# Patient Record
Sex: FEMALE | Race: WHITE | NOT HISPANIC OR LATINO | Employment: OTHER | URBAN - NONMETROPOLITAN AREA
[De-identification: names, ages, dates, MRNs, and addresses within clinical notes are randomized per-mention and may not be internally consistent; named-entity substitution may affect disease eponyms.]

---

## 2017-03-08 ENCOUNTER — OFFICE VISIT (OUTPATIENT)
Dept: OBSTETRICS AND GYNECOLOGY | Facility: CLINIC | Age: 27
End: 2017-03-08

## 2017-03-08 VITALS
BODY MASS INDEX: 32.27 KG/M2 | DIASTOLIC BLOOD PRESSURE: 90 MMHG | HEIGHT: 64 IN | WEIGHT: 189 LBS | SYSTOLIC BLOOD PRESSURE: 138 MMHG

## 2017-03-08 DIAGNOSIS — R87.612 LOW GRADE SQUAMOUS INTRAEPITHELIAL LESION (LGSIL) ON PAPANICOLAOU SMEAR OF CERVIX: Primary | ICD-10-CM

## 2017-03-08 PROCEDURE — 99212 OFFICE O/P EST SF 10 MIN: CPT | Performed by: OBSTETRICS & GYNECOLOGY

## 2017-03-08 NOTE — PROGRESS NOTES
She presents thinking she is due for a Pap smear.  She underwent a cone biopsy back in September secondary to a history of mild cervical dysplasia.  The Pap smear result was benign.  At this time she just needs to reschedule appointment for September for a Pap smear with HPV testing.

## 2017-04-27 ENCOUNTER — TRANSCRIBE ORDERS (OUTPATIENT)
Dept: GENERAL RADIOLOGY | Facility: CLINIC | Age: 27
End: 2017-04-27

## 2017-04-27 DIAGNOSIS — G89.29 BILATERAL CHRONIC KNEE PAIN: Primary | ICD-10-CM

## 2017-04-27 DIAGNOSIS — M25.561 BILATERAL CHRONIC KNEE PAIN: Primary | ICD-10-CM

## 2017-04-27 DIAGNOSIS — M25.562 BILATERAL CHRONIC KNEE PAIN: Primary | ICD-10-CM

## 2018-03-14 ENCOUNTER — OFFICE VISIT (OUTPATIENT)
Dept: FAMILY MEDICINE CLINIC | Facility: CLINIC | Age: 28
End: 2018-03-14

## 2018-03-14 VITALS
OXYGEN SATURATION: 97 % | SYSTOLIC BLOOD PRESSURE: 128 MMHG | HEART RATE: 69 BPM | TEMPERATURE: 97.6 F | HEIGHT: 64 IN | WEIGHT: 176 LBS | DIASTOLIC BLOOD PRESSURE: 74 MMHG | BODY MASS INDEX: 30.05 KG/M2

## 2018-03-14 DIAGNOSIS — F41.1 GAD (GENERALIZED ANXIETY DISORDER): ICD-10-CM

## 2018-03-14 DIAGNOSIS — M25.552 BILATERAL HIP PAIN: ICD-10-CM

## 2018-03-14 DIAGNOSIS — I10 ESSENTIAL HYPERTENSION: ICD-10-CM

## 2018-03-14 DIAGNOSIS — M54.50 CHRONIC MIDLINE LOW BACK PAIN WITHOUT SCIATICA: ICD-10-CM

## 2018-03-14 DIAGNOSIS — F33.1 MODERATE EPISODE OF RECURRENT MAJOR DEPRESSIVE DISORDER (HCC): Primary | ICD-10-CM

## 2018-03-14 DIAGNOSIS — E66.09 CLASS 1 OBESITY DUE TO EXCESS CALORIES WITHOUT SERIOUS COMORBIDITY WITH BODY MASS INDEX (BMI) OF 30.0 TO 30.9 IN ADULT: ICD-10-CM

## 2018-03-14 DIAGNOSIS — F17.201 TOBACCO DEPENDENCE IN REMISSION: ICD-10-CM

## 2018-03-14 DIAGNOSIS — M25.551 BILATERAL HIP PAIN: ICD-10-CM

## 2018-03-14 DIAGNOSIS — G89.29 CHRONIC MIDLINE LOW BACK PAIN WITHOUT SCIATICA: ICD-10-CM

## 2018-03-14 DIAGNOSIS — Z76.89 ENCOUNTER TO ESTABLISH CARE WITH NEW DOCTOR: ICD-10-CM

## 2018-03-14 PROCEDURE — 99214 OFFICE O/P EST MOD 30 MIN: CPT | Performed by: FAMILY MEDICINE

## 2018-03-14 RX ORDER — TRAZODONE HYDROCHLORIDE 50 MG/1
50 TABLET ORAL NIGHTLY
Qty: 30 TABLET | Refills: 5 | Status: SHIPPED | OUTPATIENT
Start: 2018-03-14 | End: 2018-11-08 | Stop reason: SDUPTHER

## 2018-03-14 RX ORDER — DULOXETIN HYDROCHLORIDE 30 MG/1
30 CAPSULE, DELAYED RELEASE ORAL DAILY
Qty: 30 CAPSULE | Refills: 1 | Status: SHIPPED | OUTPATIENT
Start: 2018-03-14 | End: 2018-06-01 | Stop reason: SDUPTHER

## 2018-03-14 RX ORDER — OXYCODONE AND ACETAMINOPHEN 7.5; 325 MG/1; MG/1
1 TABLET ORAL EVERY 6 HOURS PRN
COMMUNITY
End: 2019-02-21
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
## 2018-06-01 RX ORDER — DULOXETIN HYDROCHLORIDE 30 MG/1
30 CAPSULE, DELAYED RELEASE ORAL DAILY
Qty: 30 CAPSULE | Refills: 1 | Status: SHIPPED | OUTPATIENT
Start: 2018-06-01 | End: 2018-08-13 | Stop reason: SDUPTHER

## 2018-08-13 RX ORDER — DULOXETIN HYDROCHLORIDE 30 MG/1
30 CAPSULE, DELAYED RELEASE ORAL DAILY
Qty: 30 CAPSULE | Refills: 0 | Status: SHIPPED | OUTPATIENT
Start: 2018-08-13 | End: 2018-11-08 | Stop reason: SDUPTHER

## 2018-09-11 RX ORDER — DULOXETIN HYDROCHLORIDE 30 MG/1
30 CAPSULE, DELAYED RELEASE ORAL DAILY
Qty: 30 CAPSULE | Refills: 0 | OUTPATIENT
Start: 2018-09-11

## 2018-11-08 ENCOUNTER — OFFICE VISIT (OUTPATIENT)
Dept: FAMILY MEDICINE CLINIC | Facility: CLINIC | Age: 28
End: 2018-11-08

## 2018-11-08 VITALS
OXYGEN SATURATION: 100 % | BODY MASS INDEX: 30.52 KG/M2 | TEMPERATURE: 98.2 F | WEIGHT: 178.8 LBS | SYSTOLIC BLOOD PRESSURE: 130 MMHG | HEIGHT: 64 IN | DIASTOLIC BLOOD PRESSURE: 90 MMHG | HEART RATE: 95 BPM

## 2018-11-08 DIAGNOSIS — F33.1 MODERATE EPISODE OF RECURRENT MAJOR DEPRESSIVE DISORDER (HCC): ICD-10-CM

## 2018-11-08 DIAGNOSIS — G89.29 CHRONIC MIDLINE LOW BACK PAIN WITHOUT SCIATICA: ICD-10-CM

## 2018-11-08 DIAGNOSIS — M54.50 CHRONIC MIDLINE LOW BACK PAIN WITHOUT SCIATICA: ICD-10-CM

## 2018-11-08 DIAGNOSIS — E66.09 CLASS 1 OBESITY DUE TO EXCESS CALORIES WITHOUT SERIOUS COMORBIDITY WITH BODY MASS INDEX (BMI) OF 30.0 TO 30.9 IN ADULT: ICD-10-CM

## 2018-11-08 DIAGNOSIS — F41.1 GENERALIZED ANXIETY DISORDER: Primary | ICD-10-CM

## 2018-11-08 DIAGNOSIS — Z28.21 INFLUENZA VACCINE REFUSED: ICD-10-CM

## 2018-11-08 DIAGNOSIS — Z23 NEED FOR TETANUS BOOSTER: ICD-10-CM

## 2018-11-08 PROCEDURE — 99214 OFFICE O/P EST MOD 30 MIN: CPT | Performed by: FAMILY MEDICINE

## 2018-11-08 PROCEDURE — 90471 IMMUNIZATION ADMIN: CPT | Performed by: FAMILY MEDICINE

## 2018-11-08 PROCEDURE — 90715 TDAP VACCINE 7 YRS/> IM: CPT | Performed by: FAMILY MEDICINE

## 2018-11-08 RX ORDER — PROPRANOLOL HYDROCHLORIDE 20 MG/1
20 TABLET ORAL 3 TIMES DAILY
Qty: 90 TABLET | Refills: 2 | Status: SHIPPED | OUTPATIENT
Start: 2018-11-08 | End: 2019-02-21 | Stop reason: SDUPTHER

## 2018-11-08 RX ORDER — DULOXETIN HYDROCHLORIDE 30 MG/1
30 CAPSULE, DELAYED RELEASE ORAL DAILY
Qty: 30 CAPSULE | Refills: 2 | Status: SHIPPED | OUTPATIENT
Start: 2018-11-08 | End: 2019-01-14 | Stop reason: SDUPTHER

## 2018-11-08 RX ORDER — TRAZODONE HYDROCHLORIDE 50 MG/1
50 TABLET ORAL NIGHTLY
Qty: 30 TABLET | Refills: 2 | Status: SHIPPED | OUTPATIENT
Start: 2018-11-08 | End: 2019-01-14

## 2018-11-08 RX ORDER — GABAPENTIN 300 MG/1
300 CAPSULE ORAL 3 TIMES DAILY
Qty: 90 CAPSULE | Refills: 0 | Status: SHIPPED | OUTPATIENT
Start: 2018-11-08 | End: 2018-12-10 | Stop reason: SDUPTHER

## 2018-11-08 RX ORDER — ROPINIROLE 1 MG/1
TABLET, FILM COATED ORAL
Refills: 0 | COMMUNITY
Start: 2018-10-15 | End: 2019-02-21

## 2018-11-08 RX ORDER — ROPINIROLE 1 MG/1
TABLET, FILM COATED ORAL
Refills: 0 | Status: CANCELLED | OUTPATIENT
Start: 2018-11-08

## 2018-11-08 RX ORDER — ROPINIROLE 0.5 MG/1
TABLET, FILM COATED ORAL
Refills: 0 | COMMUNITY
Start: 2018-10-15 | End: 2018-11-08

## 2018-11-08 NOTE — PROGRESS NOTES
Subjective   Chief Complaint   Patient presents with   • Depression     8 months   • Med Refill     Cally Olsen is a 28 y.o. female.   Depression (8 months) and Med Refill    History of Present Illness     Depression and anxiety - currently remains uncontrolled  She has been without medication for a month or longer  Previously treated with klonopin which she has brought up today while discussing her mood  Depression rated 5/10 by patient today  She has a long standing history of depression but denies any medical treatment with antidepressants   She discusses that people think she is crazy and she is often not honest while at the doctor because she fears being judged or being admitted to a psych unit  She denies hallucinations but describes seeing spirits in her home  She believes her home is haunted by children spirits    She is asking to restart neurontin for her back pain and anxiety    Chronic back and hip pain - followed by Dr Boston  Managed with percocet    Insomnia - managed with trazodone PRN    The following portions of the patient's history were reviewed and updated as appropriate: allergies, current medications, past family history, past medical history, past social history, past surgical history and problem list.    Review of Systems   Constitutional: Negative for appetite change, chills, fatigue and fever.   HENT: Negative for congestion, ear pain, rhinorrhea and sore throat.    Eyes: Negative for pain.   Respiratory: Negative for cough and shortness of breath.    Cardiovascular: Negative for chest pain and palpitations.   Gastrointestinal: Negative for abdominal pain, constipation, diarrhea and nausea.   Genitourinary: Negative for dysuria.   Musculoskeletal: Positive for arthralgias and back pain. Negative for joint swelling and neck pain.   Skin: Negative for rash.   Neurological: Negative for dizziness and headaches.   Psychiatric/Behavioral: Positive for decreased concentration, dysphoric mood  "and sleep disturbance. The patient is nervous/anxious.        Objective   /90   Pulse 95   Temp 98.2 °F (36.8 °C)   Ht 162.6 cm (64.02\")   Wt 81.1 kg (178 lb 12.8 oz)   SpO2 100%   BMI 30.68 kg/m²   Physical Exam   Constitutional: She is oriented to person, place, and time. She appears well-developed and well-nourished.   HENT:   Head: Normocephalic and atraumatic.   Eyes: Pupils are equal, round, and reactive to light.   Neck: Normal range of motion. Neck supple.   Cardiovascular: Normal rate, regular rhythm and normal heart sounds.    Pulmonary/Chest: Effort normal and breath sounds normal. No respiratory distress. She has no wheezes. She has no rales.   Abdominal: Soft. Bowel sounds are normal.   Musculoskeletal: Normal range of motion.   Neurological: She is alert and oriented to person, place, and time.   Skin: Skin is warm and dry.   Psychiatric: She has a normal mood and affect.   Nursing note and vitals reviewed.      Assessment/Plan   Problems Addressed this Visit        Digestive    Class 1 obesity due to excess calories without serious comorbidity with body mass index (BMI) of 30.0 to 30.9 in adult       Nervous and Auditory    Chronic midline low back pain without sciatica       Other    Moderate episode of recurrent major depressive disorder (CMS/HCC)    Relevant Medications    traZODone (DESYREL) 50 MG tablet    DULoxetine (CYMBALTA) 30 MG capsule    Generalized anxiety disorder - Primary    Relevant Medications    traZODone (DESYREL) 50 MG tablet    DULoxetine (CYMBALTA) 30 MG capsule      Other Visit Diagnoses     Need for tetanus booster        Influenza vaccine refused            Tetanus booster today    Declined flu vaccine    Patient's Body mass index is 30.68 kg/m². BMI is above normal parameters. Recommendations include: exercise counseling and nutrition counseling.    The patient has read and signed the [facility] Controlled Substance Contract.  I will continue to see patient " for regular follow up appointments.  They are well controlled on their medication.  RORY is updated every 3 months. The patient is aware of the potential for addiction and dependence.  rory [id number]  Last neurontin prescribed in February - 400mg dose    Refilled cymbalta, trazodone, inderal    Recheck in 4 weeks

## 2018-12-03 RX ORDER — GABAPENTIN 300 MG/1
CAPSULE ORAL
Qty: 90 CAPSULE | Refills: 0 | OUTPATIENT
Start: 2018-12-03

## 2018-12-10 ENCOUNTER — OFFICE VISIT (OUTPATIENT)
Dept: FAMILY MEDICINE CLINIC | Facility: CLINIC | Age: 28
End: 2018-12-10

## 2018-12-10 VITALS
DIASTOLIC BLOOD PRESSURE: 90 MMHG | HEIGHT: 64 IN | OXYGEN SATURATION: 98 % | BODY MASS INDEX: 30.22 KG/M2 | WEIGHT: 177 LBS | HEART RATE: 88 BPM | SYSTOLIC BLOOD PRESSURE: 142 MMHG | TEMPERATURE: 98 F

## 2018-12-10 DIAGNOSIS — F41.1 GENERALIZED ANXIETY DISORDER: Primary | ICD-10-CM

## 2018-12-10 DIAGNOSIS — R10.2 CHRONIC PELVIC PAIN IN FEMALE: ICD-10-CM

## 2018-12-10 DIAGNOSIS — F33.1 MODERATE EPISODE OF RECURRENT MAJOR DEPRESSIVE DISORDER (HCC): ICD-10-CM

## 2018-12-10 DIAGNOSIS — F51.01 PRIMARY INSOMNIA: ICD-10-CM

## 2018-12-10 DIAGNOSIS — G89.29 CHRONIC PELVIC PAIN IN FEMALE: ICD-10-CM

## 2018-12-10 DIAGNOSIS — Z98.890 HISTORY OF LOOP ELECTROSURGICAL EXCISION PROCEDURE (LEEP): ICD-10-CM

## 2018-12-10 DIAGNOSIS — E66.09 CLASS 1 OBESITY DUE TO EXCESS CALORIES WITHOUT SERIOUS COMORBIDITY WITH BODY MASS INDEX (BMI) OF 30.0 TO 30.9 IN ADULT: ICD-10-CM

## 2018-12-10 PROCEDURE — 99214 OFFICE O/P EST MOD 30 MIN: CPT | Performed by: FAMILY MEDICINE

## 2018-12-10 RX ORDER — GABAPENTIN 300 MG/1
300 CAPSULE ORAL 3 TIMES DAILY
Qty: 90 CAPSULE | Refills: 0 | Status: SHIPPED | OUTPATIENT
Start: 2018-12-10 | End: 2019-01-14 | Stop reason: SDUPTHER

## 2018-12-10 NOTE — PROGRESS NOTES
"Subjective   Chief Complaint   Patient presents with   • Anxiety     4 weeks   • Med Refill     Cally Olsen is a 28 y.o. female.   Anxiety (4 weeks) and Med Refill    History of Present Illness     Depression and anxiety - currently managed with cymbalta, trazodone  Depression rated 5/10 by patient today  She has a long standing history of depression but denies any medical treatment with antidepressants     Chronic back and hip pain - followed by Dr Boston  Managed with percocet, neurontin  She restarted the neurontin last visit and is due for a refill today    Insomnia - managed with trazodone PRN    C/o chronic pelvic pain  History of mild cervical dysplasia  History of LEEP procedure with Dr Chen  She is due for a repeat pelvic exam  Would like to set up a follow up appointment with ob/gyn or see women's health specialist    The following portions of the patient's history were reviewed and updated as appropriate: allergies, current medications, past family history, past medical history, past social history, past surgical history and problem list.    Review of Systems   Constitutional: Negative for appetite change, chills, fatigue and fever.   HENT: Negative for congestion, ear pain, rhinorrhea and sore throat.    Eyes: Negative for pain.   Respiratory: Negative for cough and shortness of breath.    Cardiovascular: Negative for chest pain and palpitations.   Gastrointestinal: Negative for abdominal pain, constipation, diarrhea and nausea.   Genitourinary: Positive for pelvic pain. Negative for dysuria.   Musculoskeletal: Negative for back pain, joint swelling and neck pain.   Skin: Negative for rash.   Neurological: Negative for dizziness and headaches.       Objective   /90   Pulse 88   Temp 98 °F (36.7 °C)   Ht 162.6 cm (64.02\")   Wt 80.3 kg (177 lb)   SpO2 98%   BMI 30.37 kg/m²   Physical Exam   Constitutional: She is oriented to person, place, and time. She appears well-developed and " well-nourished.   HENT:   Head: Normocephalic and atraumatic.   Eyes: Pupils are equal, round, and reactive to light.   Neck: Normal range of motion. Neck supple.   Cardiovascular: Normal rate, regular rhythm and normal heart sounds.   Pulmonary/Chest: Effort normal and breath sounds normal. No respiratory distress. She has no wheezes. She has no rales.   Abdominal: Soft. Bowel sounds are normal.   Musculoskeletal: Normal range of motion.   Neurological: She is alert and oriented to person, place, and time.   Skin: Skin is warm and dry.   Psychiatric: She has a normal mood and affect.   Nursing note and vitals reviewed.      Assessment/Plan   Problems Addressed this Visit        Digestive    Class 1 obesity due to excess calories without serious comorbidity with body mass index (BMI) of 30.0 to 30.9 in adult       Nervous and Auditory    Chronic pelvic pain in female       Other    Moderate episode of recurrent major depressive disorder (CMS/HCC)    Generalized anxiety disorder - Primary    History of loop electrosurgical excision procedure (LEEP)      Other Visit Diagnoses     Primary insomnia            Patient's Body mass index is 30.37 kg/m². BMI is above normal parameters. Recommendations include: exercise counseling and nutrition counseling.    Referred to women's health specialist    Refilled neurontin  The patient has read and signed the [facility] Controlled Substance Contract.  I will continue to see patient for regular follow up appointments.  They are well controlled on their medication.  RORY is updated every 3 months. The patient is aware of the potential for addiction and dependence.    Recheck in 4 weeks

## 2019-01-12 RX ORDER — GABAPENTIN 300 MG/1
CAPSULE ORAL
Qty: 90 CAPSULE | Refills: 0 | Status: CANCELLED | OUTPATIENT
Start: 2019-01-12

## 2019-01-14 ENCOUNTER — OFFICE VISIT (OUTPATIENT)
Dept: FAMILY MEDICINE CLINIC | Facility: CLINIC | Age: 29
End: 2019-01-14

## 2019-01-14 VITALS
HEIGHT: 64 IN | BODY MASS INDEX: 30.05 KG/M2 | RESPIRATION RATE: 16 BRPM | HEART RATE: 103 BPM | DIASTOLIC BLOOD PRESSURE: 82 MMHG | OXYGEN SATURATION: 98 % | SYSTOLIC BLOOD PRESSURE: 132 MMHG | WEIGHT: 176 LBS | TEMPERATURE: 98.3 F

## 2019-01-14 DIAGNOSIS — M54.50 CHRONIC MIDLINE LOW BACK PAIN WITHOUT SCIATICA: Primary | ICD-10-CM

## 2019-01-14 DIAGNOSIS — E66.09 CLASS 1 OBESITY DUE TO EXCESS CALORIES WITHOUT SERIOUS COMORBIDITY WITH BODY MASS INDEX (BMI) OF 30.0 TO 30.9 IN ADULT: ICD-10-CM

## 2019-01-14 DIAGNOSIS — G89.29 CHRONIC MIDLINE LOW BACK PAIN WITHOUT SCIATICA: Primary | ICD-10-CM

## 2019-01-14 DIAGNOSIS — F33.1 MODERATE EPISODE OF RECURRENT MAJOR DEPRESSIVE DISORDER (HCC): ICD-10-CM

## 2019-01-14 DIAGNOSIS — F51.01 PRIMARY INSOMNIA: ICD-10-CM

## 2019-01-14 PROCEDURE — 99214 OFFICE O/P EST MOD 30 MIN: CPT | Performed by: FAMILY MEDICINE

## 2019-01-14 RX ORDER — ALBUTEROL SULFATE 90 UG/1
2 AEROSOL, METERED RESPIRATORY (INHALATION) EVERY 6 HOURS PRN
Qty: 18 G | Refills: 5 | Status: SHIPPED | OUTPATIENT
Start: 2019-01-14 | End: 2019-08-06 | Stop reason: SDUPTHER

## 2019-01-14 RX ORDER — DULOXETIN HYDROCHLORIDE 30 MG/1
30 CAPSULE, DELAYED RELEASE ORAL DAILY
Qty: 90 CAPSULE | Refills: 0 | Status: SHIPPED | OUTPATIENT
Start: 2019-01-14 | End: 2019-04-12 | Stop reason: SDUPTHER

## 2019-01-14 RX ORDER — GABAPENTIN 300 MG/1
300 CAPSULE ORAL 3 TIMES DAILY
Qty: 90 CAPSULE | Refills: 0 | Status: CANCELLED | OUTPATIENT
Start: 2019-01-14

## 2019-01-14 RX ORDER — GABAPENTIN 300 MG/1
300 CAPSULE ORAL 3 TIMES DAILY
Qty: 90 CAPSULE | Refills: 0 | Status: SHIPPED | OUTPATIENT
Start: 2019-01-14 | End: 2019-02-21 | Stop reason: SDUPTHER

## 2019-01-14 RX ORDER — MIRTAZAPINE 15 MG/1
15 TABLET, FILM COATED ORAL NIGHTLY
Qty: 90 TABLET | Refills: 0 | Status: SHIPPED | OUTPATIENT
Start: 2019-01-14 | End: 2019-02-21

## 2019-01-14 NOTE — PROGRESS NOTES
"Subjective   Chief Complaint   Patient presents with   • Med Refill     Cally Olsen is a 28 y.o. female.   Med Refill    History of Present Illness     Patient presents today for medication refill on her neurontin for chronic back pain    Depression and anxiety - currently managed with cymbalta, trazodone, neurontin    Chronic back and hip pain - followed by Dr Boston  Managed with percocet, neurontin  She restarted the neurontin last visit and is due for a refill today    Insomnia - managed with trazodone PRN  She has complaints of headaches with the trazodone  Has tried seroquel     Asking for a refill on albuterol inhaler    The following portions of the patient's history were reviewed and updated as appropriate: allergies, current medications, past family history, past medical history, past social history, past surgical history and problem list.    Review of Systems   Constitutional: Negative for appetite change, chills, fatigue and fever.   HENT: Negative for congestion, ear pain, rhinorrhea and sore throat.    Eyes: Negative for pain.   Respiratory: Negative for cough and shortness of breath.    Cardiovascular: Negative for chest pain and palpitations.   Gastrointestinal: Negative for abdominal pain, constipation, diarrhea and nausea.   Genitourinary: Negative for dysuria.   Musculoskeletal: Positive for arthralgias and back pain. Negative for joint swelling and neck pain.   Skin: Negative for rash.   Neurological: Negative for dizziness and headaches.   Psychiatric/Behavioral: Positive for sleep disturbance.       Objective   /82   Pulse 103   Temp 98.3 °F (36.8 °C) (Temporal)   Resp 16   Ht 162.6 cm (64\")   Wt 79.8 kg (176 lb)   SpO2 98%   Breastfeeding? No   BMI 30.21 kg/m²   Physical Exam   Constitutional: She is oriented to person, place, and time. She appears well-developed and well-nourished.   HENT:   Head: Normocephalic and atraumatic.   Eyes: Pupils are equal, round, and reactive to " light.   Neck: Normal range of motion. Neck supple.   Cardiovascular: Normal rate, regular rhythm and normal heart sounds.   Pulmonary/Chest: Effort normal and breath sounds normal. No respiratory distress. She has no wheezes. She has no rales.   Abdominal: Soft. Bowel sounds are normal.   Musculoskeletal: Normal range of motion.   Neurological: She is alert and oriented to person, place, and time.   Skin: Skin is warm and dry.   Psychiatric: She has a normal mood and affect.   Nursing note and vitals reviewed.      Assessment/Plan   Problems Addressed this Visit        Digestive    Class 1 obesity due to excess calories without serious comorbidity with body mass index (BMI) of 30.0 to 30.9 in adult       Nervous and Auditory    Chronic midline low back pain without sciatica - Primary       Other    Moderate episode of recurrent major depressive disorder (CMS/HCC)    Relevant Medications    DULoxetine (CYMBALTA) 30 MG capsule    mirtazapine (REMERON) 15 MG tablet      Other Visit Diagnoses     Primary insomnia            Stop trazodone  Start remeron  Continue with cymbalta    Patient's Body mass index is 30.21 kg/m². BMI is above normal parameters. Recommendations include: exercise counseling and nutrition counseling.    The patient has read and signed the [facility] Controlled Substance Contract.  I will continue to see patient for regular follow up appointments.  They are well controlled on their medication.  RORY is updated every 3 months. The patient is aware of the potential for addiction and dependence.  Refilled neurontin    Refilled albuterol inhaler    Recheck in 4 weeks

## 2019-02-06 RX ORDER — DULOXETIN HYDROCHLORIDE 30 MG/1
30 CAPSULE, DELAYED RELEASE ORAL DAILY
Qty: 30 CAPSULE | Refills: 0 | OUTPATIENT
Start: 2019-02-06

## 2019-02-06 RX ORDER — GABAPENTIN 300 MG/1
CAPSULE ORAL
Qty: 90 CAPSULE | Refills: 0 | OUTPATIENT
Start: 2019-02-06

## 2019-02-21 ENCOUNTER — LAB (OUTPATIENT)
Dept: LAB | Facility: OTHER | Age: 29
End: 2019-02-21

## 2019-02-21 ENCOUNTER — OFFICE VISIT (OUTPATIENT)
Dept: FAMILY MEDICINE CLINIC | Facility: CLINIC | Age: 29
End: 2019-02-21

## 2019-02-21 VITALS
BODY MASS INDEX: 29.74 KG/M2 | TEMPERATURE: 98 F | HEIGHT: 64 IN | DIASTOLIC BLOOD PRESSURE: 77 MMHG | SYSTOLIC BLOOD PRESSURE: 130 MMHG | WEIGHT: 174.2 LBS | HEART RATE: 88 BPM

## 2019-02-21 DIAGNOSIS — M25.551 BILATERAL HIP PAIN: ICD-10-CM

## 2019-02-21 DIAGNOSIS — G89.29 CHRONIC MIDLINE LOW BACK PAIN WITHOUT SCIATICA: ICD-10-CM

## 2019-02-21 DIAGNOSIS — F41.1 GENERALIZED ANXIETY DISORDER: ICD-10-CM

## 2019-02-21 DIAGNOSIS — F51.01 PRIMARY INSOMNIA: Primary | ICD-10-CM

## 2019-02-21 DIAGNOSIS — Z51.81 THERAPEUTIC DRUG MONITORING: ICD-10-CM

## 2019-02-21 DIAGNOSIS — I10 ESSENTIAL HYPERTENSION: ICD-10-CM

## 2019-02-21 DIAGNOSIS — M25.552 BILATERAL HIP PAIN: ICD-10-CM

## 2019-02-21 DIAGNOSIS — E66.09 CLASS 1 OBESITY DUE TO EXCESS CALORIES WITHOUT SERIOUS COMORBIDITY WITH BODY MASS INDEX (BMI) OF 30.0 TO 30.9 IN ADULT: ICD-10-CM

## 2019-02-21 DIAGNOSIS — F41.1 GAD (GENERALIZED ANXIETY DISORDER): ICD-10-CM

## 2019-02-21 DIAGNOSIS — E66.3 OVERWEIGHT (BMI 25.0-29.9): ICD-10-CM

## 2019-02-21 DIAGNOSIS — M54.50 CHRONIC MIDLINE LOW BACK PAIN WITHOUT SCIATICA: ICD-10-CM

## 2019-02-21 DIAGNOSIS — N92.0 MENORRHAGIA WITH REGULAR CYCLE: ICD-10-CM

## 2019-02-21 DIAGNOSIS — F33.1 MODERATE EPISODE OF RECURRENT MAJOR DEPRESSIVE DISORDER (HCC): ICD-10-CM

## 2019-02-21 LAB
ALBUMIN SERPL-MCNC: 5.2 G/DL (ref 3.5–5)
ALBUMIN/GLOB SERPL: 1.6 G/DL (ref 1.1–1.8)
ALP SERPL-CCNC: 76 U/L (ref 38–126)
ALT SERPL W P-5'-P-CCNC: 24 U/L
ANION GAP SERPL CALCULATED.3IONS-SCNC: 9 MMOL/L (ref 5–15)
AST SERPL-CCNC: 24 U/L (ref 14–36)
BASOPHILS # BLD AUTO: 0.02 10*3/MM3 (ref 0–0.2)
BASOPHILS NFR BLD AUTO: 0.2 % (ref 0–2)
BILIRUB SERPL-MCNC: 0.4 MG/DL (ref 0.2–1.3)
BILIRUB UR QL STRIP: NEGATIVE
BUN BLD-MCNC: 13 MG/DL (ref 7–17)
BUN/CREAT SERPL: 21.3 (ref 7–25)
CALCIUM SPEC-SCNC: 9.6 MG/DL (ref 8.4–10.2)
CHLORIDE SERPL-SCNC: 102 MMOL/L (ref 98–107)
CLARITY UR: ABNORMAL
CO2 SERPL-SCNC: 31 MMOL/L (ref 22–30)
COLOR UR: YELLOW
CREAT BLD-MCNC: 0.61 MG/DL (ref 0.52–1.04)
DEPRECATED RDW RBC AUTO: 42.1 FL (ref 36.4–46.3)
EOSINOPHIL # BLD AUTO: 0.13 10*3/MM3 (ref 0–0.7)
EOSINOPHIL NFR BLD AUTO: 1.1 % (ref 0–7)
ERYTHROCYTE [DISTWIDTH] IN BLOOD BY AUTOMATED COUNT: 13.1 % (ref 11.5–14.5)
GFR SERPL CREATININE-BSD FRML MDRD: 115 ML/MIN/1.73 (ref 64–149)
GLOBULIN UR ELPH-MCNC: 3.3 GM/DL (ref 2.3–3.5)
GLUCOSE BLD-MCNC: 84 MG/DL (ref 74–99)
GLUCOSE UR STRIP-MCNC: NEGATIVE MG/DL
HCT VFR BLD AUTO: 45.7 % (ref 35–45)
HGB BLD-MCNC: 15.7 G/DL (ref 12–15.5)
HGB UR QL STRIP.AUTO: ABNORMAL
KETONES UR QL STRIP: NEGATIVE
LEUKOCYTE ESTERASE UR QL STRIP.AUTO: ABNORMAL
LYMPHOCYTES # BLD AUTO: 3.78 10*3/MM3 (ref 0.6–4.2)
LYMPHOCYTES NFR BLD AUTO: 33 % (ref 10–50)
MCH RBC QN AUTO: 30.8 PG (ref 26.5–34)
MCHC RBC AUTO-ENTMCNC: 34.4 G/DL (ref 31.4–36)
MCV RBC AUTO: 89.8 FL (ref 80–98)
MONOCYTES # BLD AUTO: 0.87 10*3/MM3 (ref 0–0.9)
MONOCYTES NFR BLD AUTO: 7.6 % (ref 0–12)
NEUTROPHILS # BLD AUTO: 6.64 10*3/MM3 (ref 2–8.6)
NEUTROPHILS NFR BLD AUTO: 58.1 % (ref 37–80)
NITRITE UR QL STRIP: NEGATIVE
PH UR STRIP.AUTO: 7 [PH] (ref 5.5–8)
PLATELET # BLD AUTO: 383 10*3/MM3 (ref 150–450)
PMV BLD AUTO: 8.8 FL (ref 8–12)
POTASSIUM BLD-SCNC: 4.1 MMOL/L (ref 3.4–5)
PROT SERPL-MCNC: 8.5 G/DL (ref 6.3–8.2)
PROT UR QL STRIP: NEGATIVE
RBC # BLD AUTO: 5.09 10*6/MM3 (ref 3.77–5.16)
SODIUM BLD-SCNC: 142 MMOL/L (ref 137–145)
SP GR UR STRIP: 1.01 (ref 1–1.03)
T4 FREE SERPL-MCNC: 1.17 NG/DL (ref 0.78–2.19)
TSH SERPL DL<=0.05 MIU/L-ACNC: 0.82 MIU/ML (ref 0.46–4.68)
UROBILINOGEN UR QL STRIP: ABNORMAL
WBC NRBC COR # BLD: 11.44 10*3/MM3 (ref 3.2–9.8)

## 2019-02-21 PROCEDURE — 80053 COMPREHEN METABOLIC PANEL: CPT | Performed by: FAMILY MEDICINE

## 2019-02-21 PROCEDURE — 84443 ASSAY THYROID STIM HORMONE: CPT | Performed by: FAMILY MEDICINE

## 2019-02-21 PROCEDURE — 80307 DRUG TEST PRSMV CHEM ANLYZR: CPT | Performed by: FAMILY MEDICINE

## 2019-02-21 PROCEDURE — 81003 URINALYSIS AUTO W/O SCOPE: CPT | Performed by: FAMILY MEDICINE

## 2019-02-21 PROCEDURE — 84439 ASSAY OF FREE THYROXINE: CPT | Performed by: FAMILY MEDICINE

## 2019-02-21 PROCEDURE — 85025 COMPLETE CBC W/AUTO DIFF WBC: CPT | Performed by: FAMILY MEDICINE

## 2019-02-21 PROCEDURE — 99214 OFFICE O/P EST MOD 30 MIN: CPT | Performed by: FAMILY MEDICINE

## 2019-02-21 PROCEDURE — G0481 DRUG TEST DEF 8-14 CLASSES: HCPCS | Performed by: FAMILY MEDICINE

## 2019-02-21 RX ORDER — PROPRANOLOL HYDROCHLORIDE 20 MG/1
20 TABLET ORAL 3 TIMES DAILY
Qty: 90 TABLET | Refills: 5 | Status: SHIPPED | OUTPATIENT
Start: 2019-02-21 | End: 2019-08-06 | Stop reason: SDUPTHER

## 2019-02-21 RX ORDER — GABAPENTIN 400 MG/1
400 CAPSULE ORAL 3 TIMES DAILY
Qty: 90 CAPSULE | Refills: 0 | Status: SHIPPED | OUTPATIENT
Start: 2019-02-21 | End: 2019-04-12 | Stop reason: SDUPTHER

## 2019-02-21 RX ORDER — GABAPENTIN 400 MG/1
400 CAPSULE ORAL 3 TIMES DAILY
Qty: 90 CAPSULE | Refills: 0 | Status: CANCELLED | OUTPATIENT
Start: 2019-02-21

## 2019-02-21 RX ORDER — ONDANSETRON 4 MG/1
4 TABLET, FILM COATED ORAL EVERY 8 HOURS PRN
Qty: 30 TABLET | Refills: 0 | Status: SHIPPED | OUTPATIENT
Start: 2019-02-21 | End: 2019-04-12 | Stop reason: SDUPTHER

## 2019-02-21 RX ORDER — HYDROCODONE BITARTRATE AND ACETAMINOPHEN 10; 325 MG/1; MG/1
TABLET ORAL
Refills: 0 | COMMUNITY
Start: 2019-02-16 | End: 2021-12-13 | Stop reason: SDUPTHER

## 2019-02-21 RX ORDER — DULOXETIN HYDROCHLORIDE 30 MG/1
30 CAPSULE, DELAYED RELEASE ORAL DAILY
Qty: 90 CAPSULE | Refills: 0 | Status: CANCELLED | OUTPATIENT
Start: 2019-02-21

## 2019-02-21 RX ORDER — AMITRIPTYLINE HYDROCHLORIDE 25 MG/1
25 TABLET, FILM COATED ORAL NIGHTLY
Qty: 90 TABLET | Refills: 1 | Status: SHIPPED | OUTPATIENT
Start: 2019-02-21 | End: 2019-05-10 | Stop reason: SDUPTHER

## 2019-02-21 NOTE — PROGRESS NOTES
"Subjective   Chief Complaint   Patient presents with   • chronic back pain     refills last dose of pain med 02/21/2019     Cally Olsen is a 28 y.o. female.   chronic back pain (refills last dose of pain med 02/21/2019)    History of Present Illness     Patient presents today for medication refill on her neurontin for chronic back pain  She has requested a change in the dose of her medication to return to her 400 mg dose previously prescribed by her previous provider.    Depression and anxiety - currently managed with cymbalta, trazodone, neurontin    Chronic back and hip pain - followed by Dr Boston  Managed with percocet, neurontin    Insomnia - has remained uncontrolled  She has complaints of headaches with the trazodone  Has tried seroquel  Failed remeron    Menstrual irregularity with heavy flow - has complaints of periodic spotting  scheduled for evaluation with Sole Houser's office    The following portions of the patient's history were reviewed and updated as appropriate: allergies, current medications, past family history, past medical history, past social history, past surgical history and problem list.    Review of Systems   Constitutional: Negative for appetite change, chills, fatigue and fever.   HENT: Negative for congestion, ear pain, rhinorrhea and sore throat.    Eyes: Negative for pain.   Respiratory: Negative for cough and shortness of breath.    Cardiovascular: Negative for chest pain and palpitations.   Gastrointestinal: Negative for abdominal pain, constipation, diarrhea and nausea.   Genitourinary: Positive for menstrual problem. Negative for dysuria.   Musculoskeletal: Positive for arthralgias and back pain. Negative for joint swelling and neck pain.   Skin: Negative for rash.   Neurological: Negative for dizziness and headaches.   Psychiatric/Behavioral: Positive for sleep disturbance.       Objective   /77   Pulse 88   Temp 98 °F (36.7 °C)   Ht 162.6 cm (64\")   Wt 79 kg (174 " lb 3.2 oz)   BMI 29.90 kg/m²   Physical Exam   Constitutional: She is oriented to person, place, and time. She appears well-developed and well-nourished.   HENT:   Head: Normocephalic and atraumatic.   Eyes: Pupils are equal, round, and reactive to light.   Neck: Normal range of motion. Neck supple.   Cardiovascular: Normal rate, regular rhythm and normal heart sounds.   Pulmonary/Chest: Effort normal and breath sounds normal. No respiratory distress. She has no wheezes. She has no rales.   Abdominal: Soft. Bowel sounds are normal.   Musculoskeletal: Normal range of motion.   Neurological: She is alert and oriented to person, place, and time.   Skin: Skin is warm and dry.   Psychiatric: She has a normal mood and affect.   Nursing note and vitals reviewed.      Assessment/Plan   Problems Addressed this Visit        Cardiovascular and Mediastinum    Essential hypertension    Relevant Medications    propranolol (INDERAL) 20 MG tablet       Digestive    RESOLVED: Class 1 obesity due to excess calories without serious comorbidity with body mass index (BMI) of 30.0 to 30.9 in adult       Nervous and Auditory    Bilateral hip pain    Chronic midline low back pain without sciatica       Genitourinary    Menorrhagia with regular cycle       Other    Moderate episode of recurrent major depressive disorder (CMS/HCC)    Relevant Medications    amitriptyline (ELAVIL) 25 MG tablet    Generalized anxiety disorder    Relevant Medications    amitriptyline (ELAVIL) 25 MG tablet    Other Relevant Orders    CBC & Differential (Completed)    Comprehensive Metabolic Panel (Completed)    TSH    T4, free    Primary insomnia - Primary    Overweight (BMI 25.0-29.9)      Other Visit Diagnoses     Therapeutic drug monitoring        Relevant Orders    ToxASSURE Select 13 (MW) - Urine, Clean Catch        The patient has read and signed the [facility] Controlled Substance Contract.  I will continue to see patient for regular follow up  appointments.  They are well controlled on their medication.  RORY is updated every 3 months. The patient is aware of the potential for addiction and dependence.  Adjusted neurontin  uds ordered    Labs ordered - to be done today    Patient's Body mass index is 29.9 kg/m². BMI is above normal parameters. Recommendations include: exercise counseling and nutrition counseling.    Refilled medications    Start elavil for insomnia  Educated patient on prescription medications - encouraged her to not just stop medication if this appears to be ineffective  This may require a dose adjustment to find the right mg to help with her symptoms    Recheck in 4 weeks then will provide her with refills until I return from medical leave

## 2019-02-27 LAB — CONV REPORT SUMMARY: NORMAL

## 2019-03-22 RX ORDER — GABAPENTIN 400 MG/1
CAPSULE ORAL
Qty: 90 CAPSULE | Refills: 0 | OUTPATIENT
Start: 2019-03-22

## 2019-03-25 RX ORDER — ONDANSETRON 4 MG/1
TABLET, FILM COATED ORAL
Qty: 30 TABLET | Refills: 0 | OUTPATIENT
Start: 2019-03-25

## 2019-04-12 ENCOUNTER — OFFICE VISIT (OUTPATIENT)
Dept: FAMILY MEDICINE CLINIC | Facility: CLINIC | Age: 29
End: 2019-04-12

## 2019-04-12 VITALS
WEIGHT: 180.3 LBS | BODY MASS INDEX: 30.78 KG/M2 | TEMPERATURE: 97.3 F | DIASTOLIC BLOOD PRESSURE: 84 MMHG | OXYGEN SATURATION: 97 % | HEART RATE: 85 BPM | SYSTOLIC BLOOD PRESSURE: 128 MMHG | RESPIRATION RATE: 16 BRPM | HEIGHT: 64 IN

## 2019-04-12 DIAGNOSIS — G89.29 CHRONIC MIDLINE LOW BACK PAIN WITHOUT SCIATICA: Primary | ICD-10-CM

## 2019-04-12 DIAGNOSIS — R11.0 NAUSEA: ICD-10-CM

## 2019-04-12 DIAGNOSIS — M51.36 DDD (DEGENERATIVE DISC DISEASE), LUMBAR: ICD-10-CM

## 2019-04-12 DIAGNOSIS — R31.9 URINARY TRACT INFECTION WITH HEMATURIA, SITE UNSPECIFIED: ICD-10-CM

## 2019-04-12 DIAGNOSIS — M54.50 CHRONIC MIDLINE LOW BACK PAIN WITHOUT SCIATICA: Primary | ICD-10-CM

## 2019-04-12 DIAGNOSIS — N39.0 URINARY TRACT INFECTION WITH HEMATURIA, SITE UNSPECIFIED: ICD-10-CM

## 2019-04-12 DIAGNOSIS — D72.829 LEUKOCYTOSIS, UNSPECIFIED TYPE: ICD-10-CM

## 2019-04-12 DIAGNOSIS — F33.1 MODERATE EPISODE OF RECURRENT MAJOR DEPRESSIVE DISORDER (HCC): ICD-10-CM

## 2019-04-12 PROCEDURE — 99214 OFFICE O/P EST MOD 30 MIN: CPT | Performed by: NURSE PRACTITIONER

## 2019-04-12 RX ORDER — GABAPENTIN 400 MG/1
400 CAPSULE ORAL 3 TIMES DAILY
Qty: 90 CAPSULE | Refills: 0 | Status: SHIPPED | OUTPATIENT
Start: 2019-04-12 | End: 2019-05-10 | Stop reason: SDUPTHER

## 2019-04-12 RX ORDER — HYDROCODONE BITARTRATE AND ACETAMINOPHEN 10; 325 MG/1; MG/1
TABLET ORAL
Refills: 0 | Status: CANCELLED | OUTPATIENT
Start: 2019-04-12

## 2019-04-12 RX ORDER — ONDANSETRON 4 MG/1
4 TABLET, FILM COATED ORAL EVERY 8 HOURS PRN
Qty: 30 TABLET | Refills: 0 | Status: SHIPPED | OUTPATIENT
Start: 2019-04-12 | End: 2019-05-10 | Stop reason: SDUPTHER

## 2019-04-12 RX ORDER — DULOXETIN HYDROCHLORIDE 30 MG/1
30 CAPSULE, DELAYED RELEASE ORAL DAILY
Qty: 90 CAPSULE | Refills: 0 | Status: SHIPPED | OUTPATIENT
Start: 2019-04-12 | End: 2019-05-10 | Stop reason: SDUPTHER

## 2019-04-12 NOTE — PROGRESS NOTES
Chief Complaint   Patient presents with   • Establish Care     Charanjit pt med refills     Subjective   Cally Olsen is a 29 y.o. female who presents to the office for establishing care, transfer from Dr Dueñas office. She has multiple chronic complaints and needs refills on gabapentin, zofran and cymbalta.     Most recent imaging:   MRI L spine 7/27/16 shows mild bulging anulus L5-S1 and mild levoscoliosis at L3/4.    T spine MRI of 7/27/16 shows a subcentimeter probabale benign hemanigoma at T10 vertebral body.   Xray left knee:   Mild DJD with mild joint space narrowing left knee by xray of 4/27/17    UDS: 2/21/19  appropriate  Fasting labs: 2/21/19  Positive for UTI and leukocytosis, no record of or patient recall of any treatment will retest today.       The following portions of the patient's history were reviewed and updated as appropriate: allergies, current medications, past family history, past medical history, past social history, past surgical history and problem list.    History of Present Illness   Patient presents today for medication refill on her neurontin for chronic back pain     Depression and anxiety - currently managed with cymbalta, trazodone, neurontin     Chronic back and hip pain - followed by Dr Boston  Managed with percocet, neurontin     Insomnia - has remained uncontrolled  She has complaints of headaches with the trazodone  Has tried seroquel  Failed remeron     Menstrual irregularity with heavy flow - has complaints of periodic spotting  scheduled for evaluation with Sole Houser's office        Past Medical History:   Diagnosis Date   • Acute pharyngitis     unspecified     • Adjustment disorder with anxious mood     Celexa     • Allergic rhinitis    • Asthma    • Backache    • Candidiasis    • Cervical intraepithelial neoplasia grade 1    • Contraception     Desires permanent sterilization    • Depressive disorder    • Dysphagia     unspecified     • Encounter for gynecological  "examination without abnormal finding    • Encounter for  visit      visit status    • Excessive and frequent menstruation with irregular cycle    • Excessive and frequent menstruation with regular cycle    • Fatigue    • Generalized abdominal pain    • Generalized anxiety disorder    • Headache    • Heartburn    • Hip pain    • Insomnia      Trazodone   • Low grade squamous intraepithelial lesion (LGSIL) on cervicovaginal cytologic smear    • Malaise    • Organic anxiety disorder    • Spasm of back muscles    • Surgical follow-up care    • Tobacco dependence syndrome           Family History   Problem Relation Age of Onset   • Seizures Mother    • Alcohol abuse Father    • Stroke Other         Review of Systems   Constitutional: Negative.  Negative for fever and unexpected weight change.   HENT: Negative.    Eyes: Negative.    Respiratory: Negative.  Negative for cough, chest tightness and shortness of breath.    Cardiovascular: Negative.  Negative for chest pain.   Gastrointestinal: Negative.    Endocrine: Negative.    Genitourinary: Positive for menstrual problem and pelvic pain. Negative for dysuria.        Abnormal PAP requiring multiple treatments, nearly constant pelvic pain.   Musculoskeletal: Positive for arthralgias and back pain.   Skin: Negative.  Negative for color change, pallor, rash and wound.   Allergic/Immunologic: Negative.    Neurological: Negative.    Hematological: Negative.    Psychiatric/Behavioral: Negative.  Negative for sleep disturbance and suicidal ideas.   All other systems reviewed and are negative.      Objective   Vitals:    19 0919   BP: 128/84   BP Location: Left arm   Patient Position: Sitting   Cuff Size: Adult   Pulse: 85   Resp: 16   Temp: 97.3 °F (36.3 °C)   TempSrc: Oral   SpO2: 97%   Weight: 81.8 kg (180 lb 4.8 oz)   Height: 162.6 cm (64\")   PainSc: 0-No pain     Physical Exam   Constitutional: She is oriented to person, place, and time. She appears " well-developed and well-nourished.   HENT:   Head: Normocephalic and atraumatic.   Eyes: Conjunctivae are normal. Pupils are equal, round, and reactive to light.   Neck: Normal range of motion. Neck supple.   Cardiovascular: Normal rate, regular rhythm, normal heart sounds and intact distal pulses. Exam reveals no gallop and no friction rub.   No murmur heard.  Pulmonary/Chest: Effort normal and breath sounds normal. No respiratory distress. She has no wheezes. She has no rales. She exhibits no tenderness.   Abdominal: Soft. Bowel sounds are normal.   Musculoskeletal: Normal range of motion. She exhibits no edema, tenderness or deformity.   Lymphadenopathy:     She has no cervical adenopathy.   Neurological: She is alert and oriented to person, place, and time.   Skin: Skin is warm and dry. Capillary refill takes 2 to 3 seconds. No erythema. No pallor.   Psychiatric: She has a normal mood and affect. Her behavior is normal. Judgment and thought content normal.   Nursing note and vitals reviewed.      Assessment/Plan   Cally was seen today for establish care.    Diagnoses and all orders for this visit:    Chronic midline low back pain without sciatica  -     gabapentin (NEURONTIN) 400 MG capsule; Take 1 capsule by mouth 3 (Three) Times a Day.    DDD (degenerative disc disease), lumbar  -     gabapentin (NEURONTIN) 400 MG capsule; Take 1 capsule by mouth 3 (Three) Times a Day.    Urinary tract infection with hematuria, site unspecified  -     Urinalysis With Microscopic - Urine, Clean Catch; Future  -     Urine Culture - Urine, Urine, Clean Catch    Leukocytosis, unspecified type  -     CBC & Differential  -     Urinalysis With Microscopic - Urine, Clean Catch; Future  -     Urine Culture - Urine, Urine, Clean Catch    Moderate episode of recurrent major depressive disorder (CMS/HCC)  -     gabapentin (NEURONTIN) 400 MG capsule; Take 1 capsule by mouth 3 (Three) Times a Day.  -     DULoxetine (CYMBALTA) 30 MG  capsule; Take 1 capsule by mouth Daily.    Nausea  -     ondansetron (ZOFRAN) 4 MG tablet; Take 1 tablet by mouth Every 8 (Eight) Hours As Needed for Nausea or Vomiting.    Other orders  -     Cancel: HYDROcodone-acetaminophen (NORCO)  MG per tablet           PHQ-2/PHQ-9 Depression Screening 4/12/2019   Little interest or pleasure in doing things 1   Feeling down, depressed, or hopeless 0   Trouble falling or staying asleep, or sleeping too much -   Feeling tired or having little energy -   Poor appetite or overeating -   Feeling bad about yourself - or that you are a failure or have let yourself or your family down -   Trouble concentrating on things, such as reading the newspaper or watching television -   Moving or speaking so slowly that other people could have noticed. Or the opposite - being so fidgety or restless that you have been moving around a lot more than usual -   Thoughts that you would be better off dead, or of hurting yourself in some way -   Total Score 1   Patient understands the risks associated with this controlled medication, including tolerance and addiction.  Patient also agrees to only obtain this medication from me, and not from a another provider, unless that provider is covering for me in my absence.  Patient also agrees to be compliant in dosing, and not self adjust the dose of medication.  A signed controlled substance agreement is on file, and the patient has received a controlled substance education sheet at this a previous visit.  The patient has also signed a consent for treatment with a controlled substance as per [facility] policy. RORY was obtained.      HITESH Parra         Return in about 4 weeks (around 5/10/2019) for Next scheduled follow up.    There are no Patient Instructions on file for this visit.

## 2019-05-07 DIAGNOSIS — M54.50 CHRONIC MIDLINE LOW BACK PAIN WITHOUT SCIATICA: ICD-10-CM

## 2019-05-07 DIAGNOSIS — M51.36 DDD (DEGENERATIVE DISC DISEASE), LUMBAR: ICD-10-CM

## 2019-05-07 DIAGNOSIS — G89.29 CHRONIC MIDLINE LOW BACK PAIN WITHOUT SCIATICA: ICD-10-CM

## 2019-05-07 DIAGNOSIS — F33.1 MODERATE EPISODE OF RECURRENT MAJOR DEPRESSIVE DISORDER (HCC): ICD-10-CM

## 2019-05-07 RX ORDER — GABAPENTIN 400 MG/1
CAPSULE ORAL
Qty: 90 CAPSULE | Refills: 0 | OUTPATIENT
Start: 2019-05-07

## 2019-05-10 ENCOUNTER — LAB (OUTPATIENT)
Dept: LAB | Facility: OTHER | Age: 29
End: 2019-05-10

## 2019-05-10 ENCOUNTER — OFFICE VISIT (OUTPATIENT)
Dept: FAMILY MEDICINE CLINIC | Facility: CLINIC | Age: 29
End: 2019-05-10

## 2019-05-10 VITALS
SYSTOLIC BLOOD PRESSURE: 122 MMHG | HEIGHT: 64 IN | RESPIRATION RATE: 16 BRPM | BODY MASS INDEX: 31.48 KG/M2 | DIASTOLIC BLOOD PRESSURE: 80 MMHG | HEART RATE: 84 BPM | WEIGHT: 184.4 LBS | OXYGEN SATURATION: 97 % | TEMPERATURE: 98.1 F

## 2019-05-10 DIAGNOSIS — F33.1 MODERATE EPISODE OF RECURRENT MAJOR DEPRESSIVE DISORDER (HCC): ICD-10-CM

## 2019-05-10 DIAGNOSIS — E55.9 VITAMIN D DEFICIENCY: ICD-10-CM

## 2019-05-10 DIAGNOSIS — F51.01 PRIMARY INSOMNIA: ICD-10-CM

## 2019-05-10 DIAGNOSIS — R53.82 CHRONIC FATIGUE: ICD-10-CM

## 2019-05-10 DIAGNOSIS — R11.0 NAUSEA: ICD-10-CM

## 2019-05-10 DIAGNOSIS — N92.6 MENSTRUAL ABNORMALITY: ICD-10-CM

## 2019-05-10 DIAGNOSIS — G47.8 OTHER SLEEP DISORDERS: ICD-10-CM

## 2019-05-10 DIAGNOSIS — M25.561 ARTHRALGIA OF BOTH KNEES: ICD-10-CM

## 2019-05-10 DIAGNOSIS — M25.562 ARTHRALGIA OF BOTH KNEES: ICD-10-CM

## 2019-05-10 DIAGNOSIS — E66.09 CLASS 1 OBESITY DUE TO EXCESS CALORIES WITHOUT SERIOUS COMORBIDITY WITH BODY MASS INDEX (BMI) OF 31.0 TO 31.9 IN ADULT: ICD-10-CM

## 2019-05-10 DIAGNOSIS — N39.0 URINARY TRACT INFECTION WITH HEMATURIA, SITE UNSPECIFIED: ICD-10-CM

## 2019-05-10 DIAGNOSIS — J44.9 CHRONIC OBSTRUCTIVE PULMONARY DISEASE, UNSPECIFIED COPD TYPE (HCC): ICD-10-CM

## 2019-05-10 DIAGNOSIS — R31.9 URINARY TRACT INFECTION WITH HEMATURIA, SITE UNSPECIFIED: ICD-10-CM

## 2019-05-10 DIAGNOSIS — N94.6 PAINFUL MENSTRUAL PERIODS: ICD-10-CM

## 2019-05-10 DIAGNOSIS — M51.36 DDD (DEGENERATIVE DISC DISEASE), LUMBAR: ICD-10-CM

## 2019-05-10 DIAGNOSIS — G89.29 CHRONIC MIDLINE LOW BACK PAIN WITHOUT SCIATICA: Primary | ICD-10-CM

## 2019-05-10 DIAGNOSIS — R05.9 COUGH: ICD-10-CM

## 2019-05-10 DIAGNOSIS — E53.8 B12 DEFICIENCY: ICD-10-CM

## 2019-05-10 DIAGNOSIS — E88.81 METABOLIC SYNDROME: ICD-10-CM

## 2019-05-10 DIAGNOSIS — M54.50 CHRONIC MIDLINE LOW BACK PAIN WITHOUT SCIATICA: Primary | ICD-10-CM

## 2019-05-10 DIAGNOSIS — R06.83 SNORING: ICD-10-CM

## 2019-05-10 DIAGNOSIS — Z13.220 SCREENING FOR HYPERLIPIDEMIA: ICD-10-CM

## 2019-05-10 DIAGNOSIS — R79.89 ABNORMAL CBC: ICD-10-CM

## 2019-05-10 PROBLEM — I10 ESSENTIAL HYPERTENSION: Chronic | Status: ACTIVE | Noted: 2018-03-14

## 2019-05-10 PROBLEM — M25.551 BILATERAL HIP PAIN: Chronic | Status: ACTIVE | Noted: 2018-03-14

## 2019-05-10 PROBLEM — M25.552 BILATERAL HIP PAIN: Chronic | Status: ACTIVE | Noted: 2018-03-14

## 2019-05-10 PROBLEM — F41.1 GENERALIZED ANXIETY DISORDER: Chronic | Status: ACTIVE | Noted: 2018-03-14

## 2019-05-10 PROBLEM — E66.3 OVERWEIGHT (BMI 25.0-29.9): Chronic | Status: ACTIVE | Noted: 2019-02-21

## 2019-05-10 LAB
BACTERIA UR QL AUTO: ABNORMAL /HPF
BASOPHILS # BLD AUTO: 0.02 10*3/MM3 (ref 0–0.2)
BASOPHILS NFR BLD AUTO: 0.1 % (ref 0–1.5)
BILIRUB UR QL STRIP: NEGATIVE
CHOLEST SERPL-MCNC: 247 MG/DL (ref 150–200)
CLARITY UR: ABNORMAL
COLOR UR: YELLOW
DEPRECATED RDW RBC AUTO: 43.9 FL (ref 37–54)
EOSINOPHIL # BLD AUTO: 0.07 10*3/MM3 (ref 0–0.4)
EOSINOPHIL NFR BLD AUTO: 0.5 % (ref 0.3–6.2)
ERYTHROCYTE [DISTWIDTH] IN BLOOD BY AUTOMATED COUNT: 13.1 % (ref 12.3–15.4)
ERYTHROCYTE [SEDIMENTATION RATE] IN BLOOD: 12 MM/HR (ref 0–20)
GLUCOSE UR STRIP-MCNC: NEGATIVE MG/DL
HCT VFR BLD AUTO: 45.3 % (ref 34–46.6)
HDLC SERPL-MCNC: 45 MG/DL (ref 40–59)
HGB BLD-MCNC: 15.7 G/DL (ref 12–15.9)
HGB UR QL STRIP.AUTO: NEGATIVE
HYALINE CASTS UR QL AUTO: ABNORMAL /LPF
KETONES UR QL STRIP: NEGATIVE
LDLC SERPL CALC-MCNC: 159 MG/DL
LDLC/HDLC SERPL: 3.53 {RATIO} (ref 0–3.22)
LEUKOCYTE ESTERASE UR QL STRIP.AUTO: NEGATIVE
LYMPHOCYTES # BLD AUTO: 3 10*3/MM3 (ref 0.7–3.1)
LYMPHOCYTES NFR BLD AUTO: 22.2 % (ref 19.6–45.3)
MCH RBC QN AUTO: 32.4 PG (ref 26.6–33)
MCHC RBC AUTO-ENTMCNC: 34.7 G/DL (ref 31.5–35.7)
MCV RBC AUTO: 93.4 FL (ref 79–97)
MONOCYTES # BLD AUTO: 0.89 10*3/MM3 (ref 0.1–0.9)
MONOCYTES NFR BLD AUTO: 6.6 % (ref 5–12)
MUCOUS THREADS URNS QL MICRO: ABNORMAL /HPF
NEUTROPHILS # BLD AUTO: 9.55 10*3/MM3 (ref 1.7–7)
NEUTROPHILS NFR BLD AUTO: 70.6 % (ref 42.7–76)
NITRITE UR QL STRIP: NEGATIVE
PH UR STRIP.AUTO: 7.5 [PH] (ref 5.5–8)
PLATELET # BLD AUTO: 318 10*3/MM3 (ref 140–450)
PMV BLD AUTO: 8.9 FL (ref 6–12)
PROT UR QL STRIP: NEGATIVE
RBC # BLD AUTO: 4.85 10*6/MM3 (ref 3.77–5.28)
RBC # UR: ABNORMAL /HPF
REF LAB TEST METHOD: ABNORMAL
RHEUMATOID FACT SERPL-ACNC: NEGATIVE [IU]/ML
SP GR UR STRIP: 1.02 (ref 1–1.03)
SQUAMOUS #/AREA URNS HPF: ABNORMAL /HPF
TRIGL SERPL-MCNC: 215 MG/DL
URATE SERPL-MCNC: 4.2 MG/DL (ref 2.5–6.2)
UROBILINOGEN UR QL STRIP: ABNORMAL
VLDLC SERPL-MCNC: 43 MG/DL
WBC NRBC COR # BLD: 13.53 10*3/MM3 (ref 3.4–10.8)
WBC UR QL AUTO: ABNORMAL /HPF

## 2019-05-10 PROCEDURE — 83036 HEMOGLOBIN GLYCOSYLATED A1C: CPT | Performed by: NURSE PRACTITIONER

## 2019-05-10 PROCEDURE — 86430 RHEUMATOID FACTOR TEST QUAL: CPT | Performed by: NURSE PRACTITIONER

## 2019-05-10 PROCEDURE — 86235 NUCLEAR ANTIGEN ANTIBODY: CPT | Performed by: NURSE PRACTITIONER

## 2019-05-10 PROCEDURE — 83525 ASSAY OF INSULIN: CPT | Performed by: NURSE PRACTITIONER

## 2019-05-10 PROCEDURE — 85651 RBC SED RATE NONAUTOMATED: CPT | Performed by: NURSE PRACTITIONER

## 2019-05-10 PROCEDURE — 82746 ASSAY OF FOLIC ACID SERUM: CPT | Performed by: NURSE PRACTITIONER

## 2019-05-10 PROCEDURE — 82728 ASSAY OF FERRITIN: CPT | Performed by: NURSE PRACTITIONER

## 2019-05-10 PROCEDURE — 82607 VITAMIN B-12: CPT | Performed by: NURSE PRACTITIONER

## 2019-05-10 PROCEDURE — 82306 VITAMIN D 25 HYDROXY: CPT | Performed by: NURSE PRACTITIONER

## 2019-05-10 PROCEDURE — 86225 DNA ANTIBODY NATIVE: CPT | Performed by: NURSE PRACTITIONER

## 2019-05-10 PROCEDURE — 83540 ASSAY OF IRON: CPT | Performed by: NURSE PRACTITIONER

## 2019-05-10 PROCEDURE — 84550 ASSAY OF BLOOD/URIC ACID: CPT | Performed by: NURSE PRACTITIONER

## 2019-05-10 PROCEDURE — 84466 ASSAY OF TRANSFERRIN: CPT | Performed by: NURSE PRACTITIONER

## 2019-05-10 PROCEDURE — 99214 OFFICE O/P EST MOD 30 MIN: CPT | Performed by: NURSE PRACTITIONER

## 2019-05-10 PROCEDURE — 86200 CCP ANTIBODY: CPT | Performed by: NURSE PRACTITIONER

## 2019-05-10 PROCEDURE — 81001 URINALYSIS AUTO W/SCOPE: CPT | Performed by: NURSE PRACTITIONER

## 2019-05-10 PROCEDURE — 83527 ASSAY OF INSULIN: CPT | Performed by: NURSE PRACTITIONER

## 2019-05-10 PROCEDURE — 85025 COMPLETE CBC W/AUTO DIFF WBC: CPT | Performed by: NURSE PRACTITIONER

## 2019-05-10 PROCEDURE — 80061 LIPID PANEL: CPT | Performed by: NURSE PRACTITIONER

## 2019-05-10 RX ORDER — AMITRIPTYLINE HYDROCHLORIDE 25 MG/1
25 TABLET, FILM COATED ORAL NIGHTLY
Qty: 90 TABLET | Refills: 3 | Status: SHIPPED | OUTPATIENT
Start: 2019-05-10 | End: 2019-08-06 | Stop reason: DRUGHIGH

## 2019-05-10 RX ORDER — SULFAMETHOXAZOLE AND TRIMETHOPRIM 800; 160 MG/1; MG/1
1 TABLET ORAL 2 TIMES DAILY
Qty: 14 TABLET | Refills: 0 | Status: SHIPPED | OUTPATIENT
Start: 2019-05-10 | End: 2019-05-17

## 2019-05-10 RX ORDER — GABAPENTIN 400 MG/1
400 CAPSULE ORAL 3 TIMES DAILY
Qty: 90 CAPSULE | Refills: 2 | Status: SHIPPED | OUTPATIENT
Start: 2019-05-10 | End: 2019-08-06 | Stop reason: SDUPTHER

## 2019-05-10 RX ORDER — ONDANSETRON 4 MG/1
4 TABLET, FILM COATED ORAL EVERY 8 HOURS PRN
Qty: 30 TABLET | Refills: 2 | Status: SHIPPED | OUTPATIENT
Start: 2019-05-10 | End: 2019-08-06 | Stop reason: SDUPTHER

## 2019-05-10 RX ORDER — DULOXETIN HYDROCHLORIDE 30 MG/1
30 CAPSULE, DELAYED RELEASE ORAL DAILY
Qty: 90 CAPSULE | Refills: 3 | Status: SHIPPED | OUTPATIENT
Start: 2019-05-10 | End: 2019-08-06 | Stop reason: SDUPTHER

## 2019-05-10 NOTE — PROGRESS NOTES
Chief Complaint   Patient presents with   • Hip Pain     4 week f/u med refills     Subjective   Cally Olsen is a 29 y.o. female who presents to the office for management of chronic neuropathy pain and new complaint of a chronic fatigue which is becoming more troublesome.     The following portions of the patient's history were reviewed and updated as appropriate: allergies, current medications, past family history, past medical history, past social history, past surgical history and problem list.    History of Present Illness   Most recent imaging:   MRI L spine 16 shows mild bulging anulus L5-S1 and mild levoscoliosis at L3/4.    T spine MRI of 16 shows a subcentimeter probabale benign hemanigoma at T10 vertebral body.   Xray left knee:   Mild DJD with mild joint space narrowing left knee by xray of 17     UDS: 19  appropriate  Fasting labs: 19  Positive for UTI and leukocytosis, needs retested today    Patient presents today for medication refill on her neurontin for chronic back pain  Depression and anxiety - currently managed with cymbalta, trazodone, neurontin     Chronic back and hip pain - followed by Dr Boston  Managed with hydrocodone, neurontin     Insomnia - has remained uncontrolled  She has complaints of headaches with the trazodone  Has tried seroquel  Failed remeron     Menstrual irregularity with heavy flow - has complaints of periodic spotting  scheduled for evaluation with Sole Houser's office           Past Medical History:   Diagnosis Date   • Acute pharyngitis     unspecified     • Adjustment disorder with anxious mood     Celexa     • Allergic rhinitis    • Asthma    • Backache    • Candidiasis    • Cervical intraepithelial neoplasia grade 1    • Contraception     Desires permanent sterilization    • Depressive disorder    • Dysphagia     unspecified     • Encounter for gynecological examination without abnormal finding    • Encounter for  visit      " visit status    • Excessive and frequent menstruation with irregular cycle    • Excessive and frequent menstruation with regular cycle    • Fatigue    • Generalized abdominal pain    • Generalized anxiety disorder    • Headache    • Heartburn    • Hip pain    • Insomnia      Trazodone   • Low grade squamous intraepithelial lesion (LGSIL) on cervicovaginal cytologic smear    • Malaise    • Organic anxiety disorder    • Spasm of back muscles    • Surgical follow-up care    • Tobacco dependence syndrome           Family History   Problem Relation Age of Onset   • Seizures Mother    • Alcohol abuse Father    • Stroke Other         Review of Systems   Constitutional: Positive for fatigue. Negative for fever and unexpected weight change.   HENT: Negative.    Eyes: Negative.    Respiratory: Negative.  Negative for cough, chest tightness and shortness of breath.    Cardiovascular: Negative.  Negative for chest pain.   Gastrointestinal: Negative.    Endocrine: Negative.    Genitourinary: Positive for menstrual problem and pelvic pain. Negative for dysuria.        Abnormal PAP requiring multiple treatments, nearly constant pelvic pain.   Musculoskeletal: Positive for arthralgias and back pain.   Skin: Negative.  Negative for color change, pallor, rash and wound.   Allergic/Immunologic: Negative.    Neurological: Negative.    Hematological: Negative.    Psychiatric/Behavioral: Negative.  Negative for sleep disturbance and suicidal ideas.   All other systems reviewed and are negative.      Objective   Vitals:    05/10/19 0925   BP: 122/80   BP Location: Left arm   Patient Position: Sitting   Cuff Size: Adult   Pulse: 84   Resp: 16   Temp: 98.1 °F (36.7 °C)   TempSrc: Oral   SpO2: 97%   Weight: 83.6 kg (184 lb 6.4 oz)   Height: 162.6 cm (64\")   PainSc:   6   PainLoc: Hip     Physical Exam   Constitutional: She is oriented to person, place, and time. She appears well-developed and well-nourished.   HENT:   Head: " Normocephalic and atraumatic.   Eyes: Conjunctivae are normal. Pupils are equal, round, and reactive to light.   Neck: Normal range of motion. Neck supple.   Cardiovascular: Normal rate, regular rhythm, normal heart sounds and intact distal pulses. Exam reveals no gallop and no friction rub.   No murmur heard.  Pulmonary/Chest: Effort normal and breath sounds normal. No respiratory distress. She has no wheezes. She has no rales. She exhibits no tenderness.   Abdominal: Soft. Bowel sounds are normal.   Musculoskeletal: Normal range of motion. She exhibits no edema, tenderness or deformity.   Lymphadenopathy:     She has no cervical adenopathy.   Neurological: She is alert and oriented to person, place, and time.   Skin: Skin is warm and dry. Capillary refill takes 2 to 3 seconds. No erythema. No pallor.   Psychiatric: She has a normal mood and affect. Her behavior is normal. Judgment and thought content normal.   Nursing note and vitals reviewed.      Assessment/Plan   Cally was seen today for hip pain.    Diagnoses and all orders for this visit:    Chronic midline low back pain without sciatica  -     gabapentin (NEURONTIN) 400 MG capsule; Take 1 capsule by mouth 3 (Three) Times a Day.    Moderate episode of recurrent major depressive disorder (CMS/HCC)  -     gabapentin (NEURONTIN) 400 MG capsule; Take 1 capsule by mouth 3 (Three) Times a Day.  -     DULoxetine (CYMBALTA) 30 MG capsule; Take 1 capsule by mouth Daily.    DDD (degenerative disc disease), lumbar  -     gabapentin (NEURONTIN) 400 MG capsule; Take 1 capsule by mouth 3 (Three) Times a Day.  -     [facility]ANY Comprehensive Panel  -     Rheumatoid Factor  -     Cyclic Citrul Peptide Antibody, IgG / IgA  -     Sedimentation Rate  -     Uric Acid    Nausea  -     ondansetron (ZOFRAN) 4 MG tablet; Take 1 tablet by mouth Every 8 (Eight) Hours As Needed for Nausea or Vomiting.    Chronic fatigue  -     BRITTANY Comprehensive Panel  -     Rheumatoid Factor  -     Cyclic  Citrul Peptide Antibody, IgG / IgA  -     Sedimentation Rate  -     Ferritin  -     Iron Profile  -     Vitamin B12 & Folate  -     Vitamin D 25 Hydroxy  -     Insulin, Free & Total, Serum; Future  -     Hemoglobin A1c    B12 deficiency  -     Vitamin B12 & Folate    Vitamin D deficiency  -     Vitamin D 25 Hydroxy    Screening for hyperlipidemia  -     Lipid Panel    Abnormal CBC  -     CBC & Differential  -     Ferritin  -     Iron Profile    Arthralgia of both knees  -     BRITTANY Comprehensive Panel  -     Rheumatoid Factor  -     Cyclic Citrul Peptide Antibody, IgG / IgA  -     Sedimentation Rate  -     Uric Acid    Urinary tract infection with hematuria, site unspecified  -     Urinalysis With Microscopic - Urine, Clean Catch; Future    Menstrual abnormality  -     Ambulatory Referral to Gynecology    Painful menstrual periods  -     Ambulatory Referral to Gynecology    Metabolic syndrome  -     Insulin, Free & Total, Serum; Future  -     Hemoglobin A1c  -     Ambulatory Referral to Sleep Medicine    Cough  -     XR Chest 2 View    Chronic obstructive pulmonary disease, unspecified COPD type (CMS/HCC)  -     Full Pulmonary Function Test With Bronchodilator; Future    Snoring  -     Ambulatory Referral to Sleep Medicine    Primary insomnia  -     Ambulatory Referral to Sleep Medicine    Other sleep disorders  -     Ambulatory Referral to Sleep Medicine    Class 1 obesity due to excess calories without serious comorbidity with body mass index (BMI) of 31.0 to 31.9 in adult    Other orders  -     amitriptyline (ELAVIL) 25 MG tablet; Take 1 tablet by mouth Every Night.           PHQ-2/PHQ-9 Depression Screening 5/10/2019   Little interest or pleasure in doing things 0   Feeling down, depressed, or hopeless 0   Trouble falling or staying asleep, or sleeping too much -   Feeling tired or having little energy -   Poor appetite or overeating -   Feeling bad about yourself - or that you are a failure or have let yourself  or your family down -   Trouble concentrating on things, such as reading the newspaper or watching television -   Moving or speaking so slowly that other people could have noticed. Or the opposite - being so fidgety or restless that you have been moving around a lot more than usual -   Thoughts that you would be better off dead, or of hurting yourself in some way -   Total Score 0   Patient understands the risks associated with this controlled medication, including tolerance and addiction.  Patient also agrees to only obtain this medication from me, and not from a another provider, unless that provider is covering for me in my absence.  Patient also agrees to be compliant in dosing, and not self adjust the dose of medication.  A signed controlled substance agreement is on file, and the patient has received a controlled substance education sheet at this a previous visit.  The patient has also signed a consent for treatment with a controlled substance as per [facility] policy. RORY was obtained.      HITESH Parra         Return in about 3 months (around 8/10/2019), or 1 WEEK FOR LABS AND FATIGUE.    Patient Instructions   YOU HAVE BEEN REFERRED TO [facility] FOR ABNORMAL VAGINAL BLEEDING.     YOU HAVE BEEN REFERRED TO DR TUCKER, [facility] SLEEP MEDICINE-SLEEP DISORDERS CAN CAUSE CHRONIC FATIGUE.    HAVE LABS DONE SOON TO TEST FOR CAUSES OF FATIGUE    SEE ME IN 1 WEEK FOR LAB RESULTS AND FATIGUE    SEE ME IN 12 WEEKS FOR REFILL OF NEURONTIN    YOU HAVE BEEN REFERRED FOR PULMONARY FUNCTION TESTING TO GAUGE YOUR OXYGENATION AND AERATION AND SEE IF YOU NEED ANY TREATMENT FOR EITHER ASTHMA OR COPD/ CHRONIC BRONCHITIS-- NOT GETTING ENOUGH OXYGEN IN CAN CAUSE CHRONIC FATIGUE.      Abnormal Uterine Bleeding  Abnormal uterine bleeding means bleeding more than usual from your uterus. It can include:  · Bleeding between periods.  · Bleeding after sex.  · Bleeding that is heavier than  normal.  · Periods that last longer than usual.  · Bleeding after you have stopped having your period (menopause).    There are many problems that may cause this. You should see a doctor for any kind of bleeding that is not normal. Treatment depends on the cause of the bleeding.  Follow these instructions at home:  · Watch your condition for any changes.  · Do not use tampons, douche, or have sex, if your doctor tells you not to.  · Change your pads often.  · Get regular well-woman exams. Make sure they include a pelvic exam and cervical cancer screening.  · Keep all follow-up visits as told by your doctor. This is important.  Contact a doctor if:  · The bleeding lasts more than one week.  · You feel dizzy at times.  · You feel like you are going to throw up (nauseous).  · You throw up.  Get help right away if:  · You pass out.  · You have to change pads every hour.  · You have belly (abdominal) pain.  · You have a fever.  · You get sweaty.  · You get weak.  · You passing large blood clots from your vagina.  Summary  · Abnormal uterine bleeding means bleeding more than usual from your uterus.  · There are many problems that may cause this. You should see a doctor for any kind of bleeding that is not normal.  · Treatment depends on the cause of the bleeding.  This information is not intended to replace advice given to you by your health care provider. Make sure you discuss any questions you have with your health care provider.  Document Released: 10/15/2010 Document Revised: 12/12/2017 Document Reviewed: 12/12/2017  [employer] Interactive Patient Education © 2019 [employer]    Asthma, Adult  Asthma is a long-term (chronic) condition in which the airways get tight and narrow. The airways are the breathing passages that lead from the nose and mouth down into the lungs. A person with asthma will have times when symptoms get worse. These are called asthma attacks. They can cause coughing, whistling sounds when you  breathe (wheezing), shortness of breath, and chest pain. They can make it hard to breathe. There is no cure for asthma, but medicines and lifestyle changes can help control it.  There are many things that can bring on an asthma attack or make asthma symptoms worse (triggers). Common triggers include:  · Mold.  · Dust.  · Cigarette smoke.  · Cockroaches.  · Things that can cause allergy symptoms (allergens). These include animal skin flakes (dander) and pollen from trees or grass.  · Things that pollute the air. These may include household , wood smoke, smog, or chemical odors.  · Cold air, weather changes, and wind.  · Crying or laughing hard.  · Stress.  · Certain medicines or drugs.  · Certain foods such as dried fruit, potato chips, and grape juice.  · Infections, such as a cold or the flu.  · Certain medical conditions or diseases.  · Exercise or tiring activities.    Asthma may be treated with medicines and by staying away from the things that cause asthma attacks. Types of medicines may include:  · Controller medicines. These help prevent asthma symptoms. They are usually taken every day.  · Fast-acting reliever or rescue medicines. These quickly relieve asthma symptoms. They are used as needed and provide short-term relief.  · Allergy medicines if your attacks are brought on by allergens.  · Medicines to help control the body's defense (immune) system.    Follow these instructions at home:  Avoiding triggers in your home  · Change your heating and air conditioning filter often.  · Limit your use of fireplaces and wood stoves.  · Get rid of pests (such as roaches and mice) and their droppings.  · Throw away plants if you see mold on them.  · Clean your floors. Dust regularly. Use cleaning products that do not smell.  · Have someone vacuum when you are not home. Use a vacuum  with a HEPA filter if possible.  · Replace carpet with wood, tile, or vinyl david. Carpet can trap animal skin flakes  and dust.  · Use allergy-proof pillows, mattress covers, and box spring covers.  · Wash bed sheets and blankets every week in hot water. Dry them in a dryer.  · Keep your bedroom free of any triggers.   · Avoid pets and keep windows closed when things that cause allergy symptoms are in the air.  · Use blankets that are made of polyester or cotton.  · Clean bathrooms and vanessa with bleach. If possible, have someone repaint the walls in these rooms with mold-resistant paint. Keep out of the rooms that are being cleaned and painted.  · Wash your hands often with soap and water. If soap and water are not available, use hand .  · Do not allow anyone to smoke in your home.  General instructions  · Take over-the-counter and prescription medicines only as told by your doctor.  ? Talk with your doctor if you have questions about how or when to take your medicines.  ? Make note if you need to use your medicines more often than usual.  · Do not use any products that contain nicotine or tobacco, such as cigarettes and e-cigarettes. If you need help quitting, ask your doctor.  · Stay away from secondhand smoke.  · Avoid doing things outdoors when allergen counts are high and when air quality is low.  · Wear a ski mask when doing outdoor activities in the winter. The mask should cover your nose and mouth. Exercise indoors on cold days if you can.  · Warm up before you exercise. Take time to cool down after exercise.  · Use a peak flow meter as told by your doctor. A peak flow meter is a tool that measures how well the lungs are working.  · Keep track of the peak flow meter's readings. Write them down.  · Follow your asthma action plan. This is a written plan for taking care of your asthma and treating your attacks.  · Make sure you get all the shots (vaccines) that your doctor recommends. Ask your doctor about a flu shot and a pneumonia shot.  · Keep all follow-up visits as told by your doctor. This is  important.  Contact a doctor if:  · You have wheezing, shortness of breath, or a cough even while taking medicine to prevent attacks.  · The mucus you cough up (sputum) is thicker than usual.  · The mucus you cough up changes from clear or white to yellow, green, gray, or bloody.  · You have problems from the medicine you are taking, such as:  ? A rash.  ? Itching.  ? Swelling.  ? Trouble breathing.  · You need reliever medicines more than 2-3 times a week.  · Your peak flow reading is still at 50-79% of your personal best after following the action plan for 1 hour.  · You have a fever.  Get help right away if:  · You seem to be worse and are not responding to medicine during an asthma attack.  · You are short of breath even at rest.  · You get short of breath when doing very little activity.  · You have trouble eating, drinking, or talking.  · You have chest pain or tightness.  · You have a fast heartbeat.  · Your lips or fingernails start to turn blue.  · You are light-headed or dizzy, or you faint.  · Your peak flow is less than 50% of your personal best.  · You feel too tired to breathe normally.  Summary  · Asthma is a long-term (chronic) condition in which the airways get tight and narrow. An asthma attack can make it hard to breathe.  · Asthma cannot be cured, but medicines and lifestyle changes can help control it.  · Make sure you understand how to avoid triggers and how and when to use your medicines.  This information is not intended to replace advice given to you by your health care provider. Make sure you discuss any questions you have with your health care provider.  Document Released: 06/05/2009 Document Revised: 01/22/2018 Document Reviewed: 01/22/2018  [employer] Interactive Patient Education © 2019 [employer]    Back Injury Prevention  Back injuries can be very painful. They can also be difficult to heal. After having one back injury, you are more likely to injure your back again. It is important  "to learn how to avoid injuring or re-injuring your back. The following tips can help you to prevent a back injury.  What should I know about physical fitness?  · Exercise for 30 minutes per day on most days of the week or as told by your doctor. Make sure to:  ? Do aerobic exercises, such as walking, jogging, biking, or swimming.  ? Do exercises that increase balance and strength, such as jen chi and yoga.  ? Do stretching exercises. This helps with flexibility.  ? Try to develop strong belly (abdominal) muscles. Your belly muscles help to support your back.  · Stay at a healthy weight. This helps to decrease your risk of a back injury.  What should I know about my diet?  · Talk with your doctor about your overall diet. Take supplements and vitamins only as told by your doctor.  · Talk with your doctor about how much calcium and vitamin D you need each day. These nutrients help to prevent weakening of the bones (osteoporosis).  · Include good sources of calcium in your diet, such as:  ? Dairy products.  ? Green leafy vegetables.  ? Products that have had calcium added to them (fortified).  · Include good sources of vitamin D in your diet, such as:  ? Milk.  ? Foods that have had vitamin D added to them.  What should I know about my posture?  · Sit up straight and stand up straight. Avoid leaning forward when you sit or hunching over when you stand.  · Choose chairs that have good low-back (lumbar) support.  · If you work at a desk, sit close to it so you do not need to lean over. Keep your chin tucked in. Keep your neck drawn back. Keep your elbows bent so your arms look like the letter \"L\" (right angle).  · Sit high and close to the steering wheel when you drive. Add a low-back support to your car seat, if needed.  · Avoid sitting or standing in one position for very long. Take breaks to get up, stretch, and walk around at least one time every hour. Take breaks every hour if you are driving for long periods of " time.  · Sleep on your side with your knees slightly bent, or sleep on your back with a pillow under your knees. Do not lie on the front of your body to sleep.  What should I know about lifting, twisting, and reaching?  Lifting and Heavy Lifting    · Avoid heavy lifting, especially lifting over and over again. If you must do heavy lifting:  ? Stretch before lifting.  ? Work slowly.  ? Rest between lifts.  ? Use a tool such as a cart or a vannesa to move objects if one is available.  ? Make several small trips instead of carrying one heavy load.  ? Ask for help when you need it, especially when moving big objects.  · Follow these steps when lifting:  ? Stand with your feet shoulder-width apart.  ? Get as close to the object as you can. Do not  a heavy object that is far from your body.  ? Use handles or lifting straps if they are available.  ? Bend at your knees. Squat down, but keep your heels off the floor.  ? Keep your shoulders back. Keep your chin tucked in. Keep your back straight.  ? Lift the object slowly while you tighten the muscles in your legs, belly, and butt. Keep the object as close to the center of your body as possible.  · Follow these steps when putting down a heavy load:  ? Stand with your feet shoulder-width apart.  ? Lower the object slowly while you tighten the muscles in your legs, belly, and butt. Keep the object as close to the center of your body as possible.  ? Keep your shoulders back. Keep your chin tucked in. Keep your back straight.  ? Bend at your knees. Squat down, but keep your heels off the floor.  ? Use handles or lifting straps if they are available.  Twisting and Reaching  · Avoid lifting heavy objects above your waist.  · Do not twist at your waist while you are lifting or carrying a load. If you need to turn, move your feet.  · Do not bend over without bending at your knees.  · Avoid reaching over your head, across a table, or for an object on a high surface.  What are  some other tips?  · Avoid wet floors and icy ground. Keep sidewalks clear of ice to prevent falls.  · Do not sleep on a mattress that is too soft or too hard.  · Keep items that you use often within easy reach.  · Put heavier objects on shelves at waist level, and put lighter objects on lower or higher shelves.  · Find ways to lower your stress, such as:  ? Exercise.  ? Massage.  ? Relaxation techniques.  · Talk with your doctor if you feel anxious or depressed. These conditions can make back pain worse.  · Wear flat heel shoes with cushioned soles.  · Avoid making quick (sudden) movements.  · Use both shoulder straps when carrying a backpack.  · Do not use any tobacco products, including cigarettes, chewing tobacco, or electronic cigarettes. If you need help quitting, ask your doctor.  This information is not intended to replace advice given to you by your health care provider. Make sure you discuss any questions you have with your health care provider.  Document Released: 06/05/2009 Document Revised: 05/25/2017 Document Reviewed: 12/22/2015  [employer] Interactive Patient Education © 2019 [employer]    Chronic Bronchitis  Chronic bronchitis is a lasting inflammation of the bronchial tubes, which are the tubes that carry air into your lungs. This is inflammation that occurs:  · On most days of the week.  · For at least three months at a time.  · Over a period of two years in a row.    When the bronchial tubes are inflamed, they start to produce mucus. The inflammation and buildup of mucus make it more difficult to breathe. Chronic bronchitis is usually a permanent problem and is one type of chronic obstructive pulmonary disease (COPD). People with chronic bronchitis are at greater risk for getting repeated colds, or respiratory infections.  What are the causes?  Chronic bronchitis most often occurs in people who have:  · Long-standing, severe asthma.  · A history of smoking.  · Asthma and who also smoke.    What  "are the signs or symptoms?  Chronic bronchitis may cause the following:  · A cough that brings up mucus (productive cough).  · Shortness of breath.  · Early morning headache.  · Wheezing.  · Chest discomfort.  · Recurring respiratory infections.    How is this diagnosed?  Your health care provider may confirm the diagnosis by:  · Taking your medical history.  · Performing a physical exam.  · Taking a chest X-ray.  · Performing pulmonary function tests.    How is this treated?  Treatment involves controlling symptoms with medicines, oxygen therapy, or making lifestyle changes, such as exercising and eating a healthy, well-balanced diet. Medicines could include:  · Inhalers to improve air flow in and out of your lungs.  · Antibiotics to treat bacterial infections, such as pneumonia, sinus infections, and acute bronchitis.    As a preventative measure, your health care provider may recommend routine vaccinations for influenza and pneumonia. This is to prevent infection and hospitalization since you may be more at risk for these types of infections.  Follow these instructions at home:  · Take medicines only as directed by your health care provider.  · If you smoke cigarettes, chew tobacco, or use electronic cigarettes, quit. If you need help quitting, ask your health care provider.  · Avoid pollen, dust, animal dander, molds, smoke, and other things that cause shortness of breath or wheezing attacks.  · Talk to your health care provider about possible exercise routines. Regular exercise is very important to help you feel better.  · If you are prescribed oxygen use at home follow these guidelines:  ? Never smoke while using oxygen. Oxygen does not burn or explode, but flammable materials will burn faster in the presence of oxygen.  ? Keep a fire extinguisher close by. Let your fire department know that you have oxygen in your home.  ? Warn visitors not to smoke near you when you are using oxygen. Put up \"no smoking\" " signs in your home where you most often use the oxygen.  ? Regularly test your smoke detectors at home to make sure they work. If you receive care in your home from a nurse or other health care provider, he or she may also check to make sure your smoke detectors work.  · Ask your health care provider whether you would benefit from a pulmonary rehabilitation program.  · Do not wait to get medical care if you have any concerning symptoms. Delays could cause permanent injury and may be life threatening.  Contact a health care provider if:  · You have increased coughing or shortness of breath or both.  · You have muscle aches.  · You have chest pain.  · Your mucus gets thicker.  · Your mucus changes from clear or white to yellow, green, gray, or bloody.  Get help right away if:  · Your usual medicines do not stop your wheezing.  · You have increased difficulty breathing.  · You have any problems with the medicine you are taking, such as a rash, itching, swelling, or trouble breathing.  This information is not intended to replace advice given to you by your health care provider. Make sure you discuss any questions you have with your health care provider.  Document Released: 10/05/2007 Document Revised: 04/27/2017 Document Reviewed: 01/26/2015  [employer] Interactive Patient Education © 2018 [employer]  Degenerative Disk Disease  Degenerative disk disease is a condition caused by the changes that occur in spinal disks as you grow older. Spinal disks are soft and compressible disks located between the bones of your spine (vertebrae). These disks act like shock absorbers. Degenerative disk disease can affect the whole spine. However, the neck and lower back are most commonly affected. Many changes can occur in the spinal disks with aging, such as:  · The spinal disks may dry and shrink.  · Small tears may occur in the tough, outer covering of the disk (annulus).  · The disk space may become smaller due to loss of  water.  · Abnormal growths in the bone (spurs) may occur. This can put pressure on the nerve roots exiting the spinal canal, causing pain.  · The spinal canal may become narrowed.    What increases the risk?  · Being overweight.  · Having a family history of degenerative disk disease.  · Smoking.  · There is increased risk if you are doing heavy lifting or have a sudden injury.  What are the signs or symptoms?  Symptoms vary from person to person and may include:  · Pain that varies in intensity. Some people have no pain, while others have severe pain. The location of the pain depends on the part of your backbone that is affected.  ? You will have neck or arm pain if a disk in the neck area is affected.  ? You will have pain in your back, buttocks, or legs if a disk in the lower back is affected.  · Pain that becomes worse while bending, reaching up, or with twisting movements.  · Pain that may start gradually and then get worse as time passes. It may also start after a major or minor injury.  · Numbness or tingling in the arms or legs.    How is this diagnosed?  Your health care provider will ask you about your symptoms and about activities or habits that may cause the pain. He or she may also ask about any injuries, diseases, or treatments you have had. Your health care provider will examine you to check for the range of movement that is possible in the affected area, to check for strength in your extremities, and to check for sensation in the areas of the arms and legs supplied by different nerve roots. You may also have:  · An X-ray of the spine.  · Other imaging tests, such as MRI.    How is this treated?  Your health care provider will advise you on the best plan for treatment. Treatment may include:  · Medicines.  · Rehabilitation exercises.    Follow these instructions at home:  · Follow proper lifting and walking techniques as advised by your health care provider.  · Maintain good posture.  · Exercise  regularly as advised by your health care provider.  · Perform relaxation exercises.  · Change your sitting, standing, and sleeping habits as advised by your health care provider.  · Change positions frequently.  · Lose weight or maintain a healthy weight as advised by your health care provider.  · Do not use any tobacco products, including cigarettes, chewing tobacco, or electronic cigarettes. If you need help quitting, ask your health care provider.  · Wear supportive footwear.  · Take medicines only as directed by your health care provider.  Contact a health care provider if:  · Your pain does not go away within 1-4 weeks.  · You have significant appetite or weight loss.  Get help right away if:  · Your pain is severe.  · You notice weakness in your arms, hands, or legs.  · You begin to lose control of your bladder or bowel movements.  · You have fevers or night sweats.  This information is not intended to replace advice given to you by your health care provider. Make sure you discuss any questions you have with your health care provider.  Document Released: 10/14/2008 Document Revised: 05/25/2017 Document Reviewed: 04/21/2015  [employer] Interactive Patient Education © 2019 [employer]    Dysmenorrhea  Dysmenorrhea means painful cramps during your period (menstrual period). You will have pain in your lower belly (abdomen). The pain is caused by the tightening (david) of the muscles of the womb (uterus). The pain may be mild or very bad. With this condition, you may:  · Have a headache.  · Feel sick to your stomach (nauseous).  · Throw up (vomit).  · Have lower back pain.    Follow these instructions at home:  Helping pain and cramping  · Put heat on your lower back or belly when you have pain or cramps. Use the heat source that your doctor tells you to use.  ? Place a towel between your skin and the heat.  ? Leave the heat on for 20-30 minutes.  ? Remove the heat if your skin turns bright red. This is  especially important if you cannot feel pain, heat, or cold.  ? Do not have a heating pad on during sleep.  · Do aerobic exercises. These include walking, swimming, or biking. These may help with cramps.  · Massage your lower back or belly. This may help lessen pain.  General instructions  · Take over-the-counter and prescription medicines only as told by your doctor.  · Do not drive or use heavy machinery while taking prescription pain medicine.  · Avoid alcohol and caffeine during and right before your period. These can make cramps worse.  · Do not use any products that have nicotine or tobacco. These include cigarettes and e-cigarettes. If you need help quitting, ask your doctor.  · Keep all follow-up visits as told by your doctor. This is important.  Contact a doctor if:  · You have pain that gets worse.  · You have pain that does not get better with medicine.  · You have pain during sex.  · You feel sick to your stomach or you throw up during your period, and medicine does not help.  Get help right away if:  · You pass out (faint).  Summary  · Dysmenorrhea means painful cramps during your period (menstrual period).  · Put heat on your lower back or belly when you have pain or cramps.  · Do exercises like walking, swimming, or biking to help with cramps.  · Contact a doctor if you have pain during sex.  This information is not intended to replace advice given to you by your health care provider. Make sure you discuss any questions you have with your health care provider.  Document Released: 03/16/2010 Document Revised: 01/04/2018 Document Reviewed: 01/04/2018  [employer] Interactive Patient Education © 2019 [employer]    Pulmonary Function Tests  Pulmonary function tests (PFTs) are used to measure how well your lungs work, find out what is causing your lung problems, and figure out the best treatment for you. You may have PFTs:  · When you have an illness involving the lungs.  · To follow changes in your lung  function over time if you have a chronic lung disease.  · If you are an industrial . This checks the effects of being exposed to chemicals over a long period of time.  · To check lung function before having surgery or other procedures.  · To check your lungs if you smoke.  · To check if prescribed medicines or treatments are helping your lungs.    Your results will be compared to the expected lung function of someone with healthy lungs who is similar to you in:  · Age.  · Gender.  · Height.  · Weight.  · Race or ethnicity.    This is done to show how your lungs compare to normal lung function (percent predicted). This is how your health care provider knows if your lung function is normal or not. If you have had PFTs done before, your health care provider will compare your current results with past results. This shows if your lung function is better, worse, or the same as before.  Tell a health care provider about:  · Any allergies you have.  · All medicines you are taking, including inhaler or nebulizer medicines, vitamins, herbs, eye drops, creams, and over-the-counter medicines.  · Any blood disorders you have.  · Any surgeries you have had, especially recent eye surgery, abdominal surgery, or chest surgery. These can make PFTs difficult or unsafe.  · Any medical conditions you have, including chest pain or heart problems, tuberculosis, or respiratory infections such as pneumonia, a cold, or the flu.  · Any fear of being in closed spaces (claustrophobia). Some of your tests may be in a closed space.  What are the risks?  Generally, this is a safe procedure. However, problems may occur, including:  · Light-headedness due to over-breathing (hyperventilation).  · An asthma attack from deep breathing.  · A collapsed lung.    What happens before the procedure?  · Take over-the-counter and prescription medicines only as told by your health care provider. If you take inhaler or nebulizer medicines, ask your  health care provider which medicines you should take on the day of your testing. Some inhaler medicines may interfere with PFTs if they are taken shortly before the tests.  · Follow your health care provider's instructions on eating and drinking restrictions. This may include avoiding eating large meals and drinking alcohol before the testing.  · Do not use any products that contain nicotine or tobacco, such as cigarettes and e-cigarettes. If you need help quitting, ask your health care provider.  · Wear comfortable clothing that will not interfere with breathing.  What happens during the procedure?  · You will be given a soft nose clip to wear. This is done so all of your breaths will go through your mouth instead of your nose.  · You will be given a germ-free (sterile) mouthpiece. It will be attached to a machine that measures your breathing (spirometer).  · You will be asked to do various breathing maneuvers. The maneuvers will be done by breathing in (inhaling) and breathing out (exhaling). You may be asked to repeat the maneuvers several times before the testing is done.  · It is important to follow the instructions exactly to get accurate results. Make sure to blow as hard and as fast as you can when you are told to do so.  · You may be given a medicine that makes the small air passages in your lungs larger (bronchodilator) after testing has been done. This medicine will make it easier for you to breathe.  · The tests will be repeated after the bronchodilator has taken effect.  · You will be monitored carefully during the procedure for faintness, dizziness, trouble breathing, or any other problems.  The procedure may vary among health care providers and hospitals.  What happens after the procedure?  · It is up to you to get your test results. Ask your health care provider, or the department that is doing the tests, when your results will be ready. After you have received your test results, talk with your  health care provider about treatment options, if necessary.  Summary  · Pulmonary function tests (PFTs) are used to measure how well your lungs work, find out what is causing your lung problems, and figure out the best treatment for you.  · Wear comfortable clothing that will not interfere with breathing.  · It is up to you to get your test results. After you have received them, talk with your health care provider about treatment options, if necessary.  This information is not intended to replace advice given to you by your health care provider. Make sure you discuss any questions you have with your health care provider.  Document Released: 08/10/2005 Document Revised: 11/09/2017 Document Reviewed: 11/09/2017  [employer] Interactive Patient Education © 2019 [employer]  Urinalysis  A urinalysis (UA) is a series of tests done on a sample of your urine. Your kidneys filter your blood to make urine. They get rid of waste products and save the important parts of your blood like proteins and minerals (electrolytes). You may need more testing if your UA shows too much:  · Protein.  · Sugar (glucose).  · Blood cells.  · Bacteria.    You may have a UA:  · As part of routine wellness screening.  · Before surgery.  · During pregnancy.    You may also need to have this test if you have:  · Kidney disease.  · Symptoms of a urinary tract infection (UTI).  · Diabetes.  · Conditions that cause an imbalance in your hormone regulation systems.    UA is divided into three parts:  · A visual exam of the urine sample to check for color or cloudiness.  · A dipstick test to check for:  ? Proteins.  ? Concentration (specific gravity).  ? Acidity (pH).  ? Glucose.  ? Ketones. These are by-products of your body burning fat for energy instead of sugar.  ? A waste product from red blood cells (bilirubin).  ? A product of white blood cells (leukocyte esterase).  ? A product of bacteria (nitrite).  ? Blood.  · A microscopic exam to check  for:  ? Red blood cells.  ? White blood cells.  ? Tube-shaped proteins (hyaline casts).  ? Crystal structures.  ? Bacteria.  ? Epithelial cells. These are cells that line your urinary tract.  ? Yeast.    How do I prepare for this test?  Some medicines can affect the results of your UA. Let your health care provider know about all medicines you are taking, including vitamins, herbs, and over-the-counter medicines.  A test sample is collected by passing urine into a clean cup. You may be asked to collect a urine sample first thing in the morning. You may also be asked to collect a clean-catch sample to prevent the bacteria on your skin from getting into your test sample. Your health care provider may give you sterile wipes to clean your vagina or penis to prepare for collecting a clean-catch sample.  For women and girls  · Sit on the toilet and spread open the lips of your vagina.  · Use one wipe to clean your vaginal area from front to back.  · Use the second wipe to clean the opening of your urethra. Your urethra is the opening from which urine exits the body.  · Pass a small amount of urine directly into the toilet while still holding your vaginal lips apart.  · Hold the sterile cup underneath you and urinate into it.  · Fill the cup about long-term, then cap it and return it for testing.  For men and boys  · Use the sterile wipe to clean the tip of your penis.  · Pass a small amount of urine directly into the toilet first.  · Urinate into the sterile cup provided.  · Fill the cup about long-term, then cap it and return it for testing.  What do the results mean?  It is your responsibility to obtain your test results. Ask the lab or department performing the test when and how you will get your results. Contact your health care provider to discuss any questions you have about your results.  The results of a UA depend on the kind of test:  · A visual exam result will be based on the appearance of the urine.  · A dipstick  test result will be a range of values or negative or positive.  · Microscopic findings result will be a range of values or negative or positive.    Normal Results  Ranges for normal values vary among different labs and hospitals. You should always check with your health care provider after having lab work or other tests done to discuss whether your values are considered within normal limits.  · Appearance: clear.  · Color: gail yellow.  · Odor: aromatic.  · pH: 4.6-8.0 (average, 6.0).  · Protein:  ? 0-8 mg/dL.  ? 50-80 mg/24 hr (at rest).  ? Less than 250 mg/24 hr (during exercise).  · Specific gravity:  ? Adult: 1.005-1.030 (usually, 1.010-1.025).  ? Elderly: values decrease with age.  ? : 1.001-1.020.  · Leukocyte esterase: negative.  · Nitrites: none.  · Ketones: none.  · Bilirubin: none.  · Urobilinogen: 0.01-1 Edna unit/mL.  · Crystals: none.  · Casts: none.  · Glucose:  ? Fresh specimen: none.  ? 24-hour specimen:  mg/24 hr or 0.3-1.7 mmol/day (SI units).  · White blood cells (WBCs): 0-4 per low-power field.  · WBC casts: none.  · Red blood cells (RBCs): Less than or equal to 2.  · RBC casts: none.  · Epithelial cells: Few or 0-4 per low-power field.  · Bacteria: none.  · Yeast: none.    Meaning of Abnormal Results  Many conditions can cause abnormal UA results:  · Cloudy urine may be a sign of a UTI.  · Blood in your urine may be a sign of kidney disease, UTI, or other medical conditions.  · White blood cells may be a sign of UTI.  · Crystals may be a sign of a kidney stone or other kidney disease.  · High pH may mean you have a kidney stone, UTI, or kidney disease.  · Protein may be a sign of kidney disease, high blood pressure in pregnancy (toxemia), or other medical conditions.  · Glucose may be a sign of diabetes.  · Urobilinogen may be a sign of liver disease.    Discuss the meaning of abnormal results with your health care provider. You may need to have additional tests.  Talk with your  health care provider to discuss your results, treatment options, and if necessary, the need for more tests. Talk with your health care provider if you have any questions about your results.  This information is not intended to replace advice given to you by your health care provider. Make sure you discuss any questions you have with your health care provider.  Document Released: 01/12/2006 Document Revised: 08/23/2017 Document Reviewed: 04/15/2015  [employer] Interactive Patient Education © 2019 [employer]    Chronic Fatigue Syndrome  Chronic fatigue syndrome (CFS) is a condition that causes extreme tiredness (fatigue). This fatigue does not improve with rest, and it gets worse with physical or mental activity. You may have several other symptoms along with fatigue.  Symptoms may come and go, but they generally last for months. Sometimes, CFS gets better over time, but it can be a lifelong condition. There is no cure, but there are many possible treatments. You will need to work with your health care providers to find a treatment plan that works best for you.  What are the causes?  The cause of CFS is not known. There may be more than one cause. Possible causes include:  · An infection.  · An abnormal body defense system (immune system).  · Low blood pressure.  · Poor diet.  · Physical or emotional stress.    What increases the risk?  You are more likely to develop this condition if:  · You are female.  · You are 40?50 years old.  · You have a family history of CFS.  · You live with a lot of emotional stress.    What are the signs or symptoms?  The main symptom of CFS is fatigue that is severe enough to interfere with day-to-day activities. This fatigue does not get better with rest, and it gets worse with physical or mental activity. There are eight other major symptoms of CFS:  · Lack of energy (malaise) that lasts more than 24 hours after physical exertion.  · Sleep that does not relieve fatigue (unrefreshing  sleep).  · Short-term memory loss or confusion.  · Joint pain without redness or swelling.  · Muscle aches.  · Headaches.  · Painful and swollen glands (lymph nodes) in the neck or under the arms.  · Sore throat.    You may also have:  · Abdominal cramps, constipation, or diarrhea (irritable bowel).  · Chills.  · Night sweats.  · Vision changes.  · Dizziness.  · Mental confusion (brain fog).  · Clumsiness.  · Sensitivity to food, noise, or odors.  · Mood swings, depression, or anxiety attacks.    How is this diagnosed?  There are no tests that can diagnose this condition. Your health care provider will make the diagnosis based on your medical history, a physical exam, and a mental health exam. However, it is important to make sure that your symptoms are not caused by another medical condition. You may have lab tests or X-rays to rule out other conditions.  For your health care provider to diagnose CFS:  · You must have had fatigue for at least 6 straight months.  · Fatigue must be your first symptom, and it must be severe enough to interfere with day-to-day activities.  · There must be no other cause found for the fatigue.  · You must also have at least four of the eight other major symptoms of CFS.    How is this treated?  There is no cure for CFS. The condition affects everyone differently. You will need to work with your team of health care providers to find the best treatments for your symptoms. Your team may include your primary care provider, physical and exercise therapists, and mental health therapists. Treatment may include:  · Improving sleep with a regular bedtime routine.  · Avoiding caffeine, alcohol, and tobacco.  · Doing light exercise and stretching during the day.  · Taking medicines to help you sleep or to relieve joint or muscle pain.  · Learning and practicing relaxation techniques.  · Using memory aids or doing brainteasers to improve memory and concentration.  · Seeing a mental health therapist  to evaluate and treat depression, if necessary.  · Trying massage therapy, acupuncture, and movement exercises, such as yoga or jen chi.    Follow these instructions at home:    Activity  · Exercise regularly, as told by your health care provider.  · Avoid fatigue by pacing yourself during the day and getting enough sleep at night.  · Go to bed and get up at the same time every day.  Eating and drinking  · Avoid caffeine and alcohol.  · Avoid heavy meals in the evening.  · Eat a well-balanced diet.  General instructions  · Take over-the-counter and prescription medicines only as told by your health care provider.  · Do not use herbal or dietary supplements unless they are approved by your health care provider.  · Maintain a healthy weight.  · Avoid stress and use stress-reducing techniques that you learn in therapy.  · Do not use any products that contain nicotine or tobacco, such as cigarettes and e-cigarettes. If you need help quitting, ask your health care provider.  · Consider joining a CFS support group.  · Keep all follow-up visits as told by your health care provider. This is important.  Contact a health care provider if:  · Your symptoms do not get better or they get worse.  · You feel angry, guilty, anxious, or depressed.  This information is not intended to replace advice given to you by your health care provider. Make sure you discuss any questions you have with your health care provider.  Document Released: 01/25/2006 Document Revised: 08/24/2017 Document Reviewed: 03/27/2017  [employer] Interactive Patient Education © 2019 [employer]        Lab on 02/21/2019   Component Date Value Ref Range Status   • Color, UA 02/21/2019 Yellow  Yellow, Straw Final   • Appearance, UA 02/21/2019 Slightly Cloudy* Clear Final   • pH, UA 02/21/2019 7.0  5.5 - 8.0 Final   • Specific Gravity, UA 02/21/2019 1.010  1.005 - 1.030 Final   • Glucose, UA 02/21/2019 Negative  Negative Final   • Ketones, UA 02/21/2019 Negative   Negative Final   • Bilirubin, UA 02/21/2019 Negative  Negative Final   • Blood, UA 02/21/2019 Moderate (2+)* Negative Final   • Protein, UA 02/21/2019 Negative  Negative Final   • Leuk Esterase, UA 02/21/2019 Trace* Negative Final   • Nitrite, UA 02/21/2019 Negative  Negative Final   • Urobilinogen, UA 02/21/2019 0.2 E.U./dL  0.2 - 1.0 E.U./dL Final   • Report Summary 02/21/2019 FINAL   Final    Comment: ====================================================================  [facility] SELECT 13 (MW)  ====================================================================  Test                             Result       Flag       Units  Drug Present    Hydrocodone                    2085                    ng/mg creat    Hydromorphone                  923                     ng/mg creat    Dihydrocodeine                 313                     ng/mg creat    Norhydrocodone                 3697                    ng/mg creat     Sources of hydrocodone include scheduled prescription     medications. Hydromorphone, dihydrocodeine and norhydrocodone are     expected metabolites of hydrocodone. Hydromorphone and     dihydrocodeine are also available as scheduled prescription     medications.  ====================================================================  Test                      Result    Flag   Units      Ref Range    Creatinine              39               mg/dL                                 >=20  ====================================================================  Declared Medications:   Medication list was not provided.  ====================================================================  For clinical consultation, please call [phone].  ====================================================================   • TSH 02/21/2019 0.820  0.460 - 4.680 mIU/mL Final   • Glucose 02/21/2019 84  74 - 99 mg/dL Final   • BUN 02/21/2019 13  7 - 17 mg/dL Final   • Creatinine 02/21/2019 0.61  0.52 - 1.04 mg/dL Final   •  Sodium 02/21/2019 142  137 - 145 mmol/L Final   • Potassium 02/21/2019 4.1  3.4 - 5.0 mmol/L Final   • Chloride 02/21/2019 102  98 - 107 mmol/L Final   • CO2 02/21/2019 31.0* 22.0 - 30.0 mmol/L Final   • Calcium 02/21/2019 9.6  8.4 - 10.2 mg/dL Final   • Total Protein 02/21/2019 8.5* 6.3 - 8.2 g/dL Final   • Albumin 02/21/2019 5.20* 3.50 - 5.00 g/dL Final   • ALT (SGPT) 02/21/2019 24  <=35 U/L Final   • AST (SGOT) 02/21/2019 24  14 - 36 U/L Final   • Alkaline Phosphatase 02/21/2019 76  38 - 126 U/L Final   • Total Bilirubin 02/21/2019 0.4  0.2 - 1.3 mg/dL Final   • eGFR Non African Amer 02/21/2019 115  64 - 149 mL/min/1.73 Final   • Globulin 02/21/2019 3.3  2.3 - 3.5 gm/dL Final   • A/G Ratio 02/21/2019 1.6  1.1 - 1.8 g/dL Final   • BUN/Creatinine Ratio 02/21/2019 21.3  7.0 - 25.0 Final   • Anion Gap 02/21/2019 9.0  5.0 - 15.0 mmol/L Final   • Free T4 02/21/2019 1.17  0.78 - 2.19 ng/dL Final   • WBC 02/21/2019 11.44* 3.20 - 9.80 10*3/mm3 Final   • RBC 02/21/2019 5.09  3.77 - 5.16 10*6/mm3 Final   • Hemoglobin 02/21/2019 15.7* 12.0 - 15.5 g/dL Final   • Hematocrit 02/21/2019 45.7* 35.0 - 45.0 % Final   • MCV 02/21/2019 89.8  80.0 - 98.0 fL Final   • MCH 02/21/2019 30.8  26.5 - 34.0 pg Final   • MCHC 02/21/2019 34.4  31.4 - 36.0 g/dL Final   • RDW 02/21/2019 13.1  11.5 - 14.5 % Final   • RDW-SD 02/21/2019 42.1  36.4 - 46.3 fl Final   • MPV 02/21/2019 8.8  8.0 - 12.0 fL Final   • Platelets 02/21/2019 383  150 - 450 10*3/mm3 Final   • Neutrophil % 02/21/2019 58.1  37.0 - 80.0 % Final   • Lymphocyte % 02/21/2019 33.0  10.0 - 50.0 % Final   • Monocyte % 02/21/2019 7.6  0.0 - 12.0 % Final   • Eosinophil % 02/21/2019 1.1  0.0 - 7.0 % Final   • Basophil % 02/21/2019 0.2  0.0 - 2.0 % Final   • Neutrophils, Absolute 02/21/2019 6.64  2.00 - 8.60 10*3/mm3 Final   • Lymphocytes, Absolute 02/21/2019 3.78  0.60 - 4.20 10*3/mm3 Final   • Monocytes, Absolute 02/21/2019 0.87  0.00 - 0.90 10*3/mm3 Final   • Eosinophils, Absolute  02/21/2019 0.13  0.00 - 0.70 10*3/mm3 Final   • Basophils, Absolute 02/21/2019 0.02  0.00 - 0.20 10*3/mm3 Final   ]

## 2019-05-10 NOTE — PATIENT INSTRUCTIONS
YOU HAVE BEEN REFERRED TO [facility] FOR ABNORMAL VAGINAL BLEEDING.     YOU HAVE BEEN REFERRED TO DR TUCKER, [facility] SLEEP MEDICINE-SLEEP DISORDERS CAN CAUSE CHRONIC FATIGUE.    HAVE LABS DONE SOON TO TEST FOR CAUSES OF FATIGUE    SEE ME IN 1 WEEK FOR LAB RESULTS AND FATIGUE    SEE ME IN 12 WEEKS FOR REFILL OF NEURONTIN    YOU HAVE BEEN REFERRED FOR PULMONARY FUNCTION TESTING TO GAUGE YOUR OXYGENATION AND AERATION AND SEE IF YOU NEED ANY TREATMENT FOR EITHER ASTHMA OR COPD/ CHRONIC BRONCHITIS-- NOT GETTING ENOUGH OXYGEN IN CAN CAUSE CHRONIC FATIGUE.      Abnormal Uterine Bleeding  Abnormal uterine bleeding means bleeding more than usual from your uterus. It can include:  · Bleeding between periods.  · Bleeding after sex.  · Bleeding that is heavier than normal.  · Periods that last longer than usual.  · Bleeding after you have stopped having your period (menopause).    There are many problems that may cause this. You should see a doctor for any kind of bleeding that is not normal. Treatment depends on the cause of the bleeding.  Follow these instructions at home:  · Watch your condition for any changes.  · Do not use tampons, douche, or have sex, if your doctor tells you not to.  · Change your pads often.  · Get regular well-woman exams. Make sure they include a pelvic exam and cervical cancer screening.  · Keep all follow-up visits as told by your doctor. This is important.  Contact a doctor if:  · The bleeding lasts more than one week.  · You feel dizzy at times.  · You feel like you are going to throw up (nauseous).  · You throw up.  Get help right away if:  · You pass out.  · You have to change pads every hour.  · You have belly (abdominal) pain.  · You have a fever.  · You get sweaty.  · You get weak.  · You passing large blood clots from your vagina.  Summary  · Abnormal uterine bleeding means bleeding more than usual from your uterus.  · There are many problems that may cause this. You  should see a doctor for any kind of bleeding that is not normal.  · Treatment depends on the cause of the bleeding.  This information is not intended to replace advice given to you by your health care provider. Make sure you discuss any questions you have with your health care provider.  Document Released: 10/15/2010 Document Revised: 12/12/2017 Document Reviewed: 12/12/2017  [employer] Interactive Patient Education © 2019 [employer]    Asthma, Adult  Asthma is a long-term (chronic) condition in which the airways get tight and narrow. The airways are the breathing passages that lead from the nose and mouth down into the lungs. A person with asthma will have times when symptoms get worse. These are called asthma attacks. They can cause coughing, whistling sounds when you breathe (wheezing), shortness of breath, and chest pain. They can make it hard to breathe. There is no cure for asthma, but medicines and lifestyle changes can help control it.  There are many things that can bring on an asthma attack or make asthma symptoms worse (triggers). Common triggers include:  · Mold.  · Dust.  · Cigarette smoke.  · Cockroaches.  · Things that can cause allergy symptoms (allergens). These include animal skin flakes (dander) and pollen from trees or grass.  · Things that pollute the air. These may include household , wood smoke, smog, or chemical odors.  · Cold air, weather changes, and wind.  · Crying or laughing hard.  · Stress.  · Certain medicines or drugs.  · Certain foods such as dried fruit, potato chips, and grape juice.  · Infections, such as a cold or the flu.  · Certain medical conditions or diseases.  · Exercise or tiring activities.    Asthma may be treated with medicines and by staying away from the things that cause asthma attacks. Types of medicines may include:  · Controller medicines. These help prevent asthma symptoms. They are usually taken every day.  · Fast-acting reliever or rescue medicines.  These quickly relieve asthma symptoms. They are used as needed and provide short-term relief.  · Allergy medicines if your attacks are brought on by allergens.  · Medicines to help control the body's defense (immune) system.    Follow these instructions at home:  Avoiding triggers in your home  · Change your heating and air conditioning filter often.  · Limit your use of fireplaces and wood stoves.  · Get rid of pests (such as roaches and mice) and their droppings.  · Throw away plants if you see mold on them.  · Clean your floors. Dust regularly. Use cleaning products that do not smell.  · Have someone vacuum when you are not home. Use a vacuum  with a HEPA filter if possible.  · Replace carpet with wood, tile, or vinyl david. Carpet can trap animal skin flakes and dust.  · Use allergy-proof pillows, mattress covers, and box spring covers.  · Wash bed sheets and blankets every week in hot water. Dry them in a dryer.  · Keep your bedroom free of any triggers.   · Avoid pets and keep windows closed when things that cause allergy symptoms are in the air.  · Use blankets that are made of polyester or cotton.  · Clean bathrooms and vanessa with bleach. If possible, have someone repaint the walls in these rooms with mold-resistant paint. Keep out of the rooms that are being cleaned and painted.  · Wash your hands often with soap and water. If soap and water are not available, use hand .  · Do not allow anyone to smoke in your home.  General instructions  · Take over-the-counter and prescription medicines only as told by your doctor.  ? Talk with your doctor if you have questions about how or when to take your medicines.  ? Make note if you need to use your medicines more often than usual.  · Do not use any products that contain nicotine or tobacco, such as cigarettes and e-cigarettes. If you need help quitting, ask your doctor.  · Stay away from secondhand smoke.  · Avoid doing things outdoors when  allergen counts are high and when air quality is low.  · Wear a ski mask when doing outdoor activities in the winter. The mask should cover your nose and mouth. Exercise indoors on cold days if you can.  · Warm up before you exercise. Take time to cool down after exercise.  · Use a peak flow meter as told by your doctor. A peak flow meter is a tool that measures how well the lungs are working.  · Keep track of the peak flow meter's readings. Write them down.  · Follow your asthma action plan. This is a written plan for taking care of your asthma and treating your attacks.  · Make sure you get all the shots (vaccines) that your doctor recommends. Ask your doctor about a flu shot and a pneumonia shot.  · Keep all follow-up visits as told by your doctor. This is important.  Contact a doctor if:  · You have wheezing, shortness of breath, or a cough even while taking medicine to prevent attacks.  · The mucus you cough up (sputum) is thicker than usual.  · The mucus you cough up changes from clear or white to yellow, green, gray, or bloody.  · You have problems from the medicine you are taking, such as:  ? A rash.  ? Itching.  ? Swelling.  ? Trouble breathing.  · You need reliever medicines more than 2-3 times a week.  · Your peak flow reading is still at 50-79% of your personal best after following the action plan for 1 hour.  · You have a fever.  Get help right away if:  · You seem to be worse and are not responding to medicine during an asthma attack.  · You are short of breath even at rest.  · You get short of breath when doing very little activity.  · You have trouble eating, drinking, or talking.  · You have chest pain or tightness.  · You have a fast heartbeat.  · Your lips or fingernails start to turn blue.  · You are light-headed or dizzy, or you faint.  · Your peak flow is less than 50% of your personal best.  · You feel too tired to breathe normally.  Summary  · Asthma is a long-term (chronic) condition in  which the airways get tight and narrow. An asthma attack can make it hard to breathe.  · Asthma cannot be cured, but medicines and lifestyle changes can help control it.  · Make sure you understand how to avoid triggers and how and when to use your medicines.  This information is not intended to replace advice given to you by your health care provider. Make sure you discuss any questions you have with your health care provider.  Document Released: 06/05/2009 Document Revised: 01/22/2018 Document Reviewed: 01/22/2018  [employer] Interactive Patient Education © 2019 [employer]    Back Injury Prevention  Back injuries can be very painful. They can also be difficult to heal. After having one back injury, you are more likely to injure your back again. It is important to learn how to avoid injuring or re-injuring your back. The following tips can help you to prevent a back injury.  What should I know about physical fitness?  · Exercise for 30 minutes per day on most days of the week or as told by your doctor. Make sure to:  ? Do aerobic exercises, such as walking, jogging, biking, or swimming.  ? Do exercises that increase balance and strength, such as jen chi and yoga.  ? Do stretching exercises. This helps with flexibility.  ? Try to develop strong belly (abdominal) muscles. Your belly muscles help to support your back.  · Stay at a healthy weight. This helps to decrease your risk of a back injury.  What should I know about my diet?  · Talk with your doctor about your overall diet. Take supplements and vitamins only as told by your doctor.  · Talk with your doctor about how much calcium and vitamin D you need each day. These nutrients help to prevent weakening of the bones (osteoporosis).  · Include good sources of calcium in your diet, such as:  ? Dairy products.  ? Green leafy vegetables.  ? Products that have had calcium added to them (fortified).  · Include good sources of vitamin D in your diet, such  "as:  ? Milk.  ? Foods that have had vitamin D added to them.  What should I know about my posture?  · Sit up straight and stand up straight. Avoid leaning forward when you sit or hunching over when you stand.  · Choose chairs that have good low-back (lumbar) support.  · If you work at a desk, sit close to it so you do not need to lean over. Keep your chin tucked in. Keep your neck drawn back. Keep your elbows bent so your arms look like the letter \"L\" (right angle).  · Sit high and close to the steering wheel when you drive. Add a low-back support to your car seat, if needed.  · Avoid sitting or standing in one position for very long. Take breaks to get up, stretch, and walk around at least one time every hour. Take breaks every hour if you are driving for long periods of time.  · Sleep on your side with your knees slightly bent, or sleep on your back with a pillow under your knees. Do not lie on the front of your body to sleep.  What should I know about lifting, twisting, and reaching?  Lifting and Heavy Lifting    · Avoid heavy lifting, especially lifting over and over again. If you must do heavy lifting:  ? Stretch before lifting.  ? Work slowly.  ? Rest between lifts.  ? Use a tool such as a cart or a vannesa to move objects if one is available.  ? Make several small trips instead of carrying one heavy load.  ? Ask for help when you need it, especially when moving big objects.  · Follow these steps when lifting:  ? Stand with your feet shoulder-width apart.  ? Get as close to the object as you can. Do not  a heavy object that is far from your body.  ? Use handles or lifting straps if they are available.  ? Bend at your knees. Squat down, but keep your heels off the floor.  ? Keep your shoulders back. Keep your chin tucked in. Keep your back straight.  ? Lift the object slowly while you tighten the muscles in your legs, belly, and butt. Keep the object as close to the center of your body as " possible.  · Follow these steps when putting down a heavy load:  ? Stand with your feet shoulder-width apart.  ? Lower the object slowly while you tighten the muscles in your legs, belly, and butt. Keep the object as close to the center of your body as possible.  ? Keep your shoulders back. Keep your chin tucked in. Keep your back straight.  ? Bend at your knees. Squat down, but keep your heels off the floor.  ? Use handles or lifting straps if they are available.  Twisting and Reaching  · Avoid lifting heavy objects above your waist.  · Do not twist at your waist while you are lifting or carrying a load. If you need to turn, move your feet.  · Do not bend over without bending at your knees.  · Avoid reaching over your head, across a table, or for an object on a high surface.  What are some other tips?  · Avoid wet floors and icy ground. Keep sidewalks clear of ice to prevent falls.  · Do not sleep on a mattress that is too soft or too hard.  · Keep items that you use often within easy reach.  · Put heavier objects on shelves at waist level, and put lighter objects on lower or higher shelves.  · Find ways to lower your stress, such as:  ? Exercise.  ? Massage.  ? Relaxation techniques.  · Talk with your doctor if you feel anxious or depressed. These conditions can make back pain worse.  · Wear flat heel shoes with cushioned soles.  · Avoid making quick (sudden) movements.  · Use both shoulder straps when carrying a backpack.  · Do not use any tobacco products, including cigarettes, chewing tobacco, or electronic cigarettes. If you need help quitting, ask your doctor.  This information is not intended to replace advice given to you by your health care provider. Make sure you discuss any questions you have with your health care provider.  Document Released: 06/05/2009 Document Revised: 05/25/2017 Document Reviewed: 12/22/2015  [employer] Interactive Patient Education © 2019 [employer]    Chronic Bronchitis  Chronic  bronchitis is a lasting inflammation of the bronchial tubes, which are the tubes that carry air into your lungs. This is inflammation that occurs:  · On most days of the week.  · For at least three months at a time.  · Over a period of two years in a row.    When the bronchial tubes are inflamed, they start to produce mucus. The inflammation and buildup of mucus make it more difficult to breathe. Chronic bronchitis is usually a permanent problem and is one type of chronic obstructive pulmonary disease (COPD). People with chronic bronchitis are at greater risk for getting repeated colds, or respiratory infections.  What are the causes?  Chronic bronchitis most often occurs in people who have:  · Long-standing, severe asthma.  · A history of smoking.  · Asthma and who also smoke.    What are the signs or symptoms?  Chronic bronchitis may cause the following:  · A cough that brings up mucus (productive cough).  · Shortness of breath.  · Early morning headache.  · Wheezing.  · Chest discomfort.  · Recurring respiratory infections.    How is this diagnosed?  Your health care provider may confirm the diagnosis by:  · Taking your medical history.  · Performing a physical exam.  · Taking a chest X-ray.  · Performing pulmonary function tests.    How is this treated?  Treatment involves controlling symptoms with medicines, oxygen therapy, or making lifestyle changes, such as exercising and eating a healthy, well-balanced diet. Medicines could include:  · Inhalers to improve air flow in and out of your lungs.  · Antibiotics to treat bacterial infections, such as pneumonia, sinus infections, and acute bronchitis.    As a preventative measure, your health care provider may recommend routine vaccinations for influenza and pneumonia. This is to prevent infection and hospitalization since you may be more at risk for these types of infections.  Follow these instructions at home:  · Take medicines only as directed by your health care  "provider.  · If you smoke cigarettes, chew tobacco, or use electronic cigarettes, quit. If you need help quitting, ask your health care provider.  · Avoid pollen, dust, animal dander, molds, smoke, and other things that cause shortness of breath or wheezing attacks.  · Talk to your health care provider about possible exercise routines. Regular exercise is very important to help you feel better.  · If you are prescribed oxygen use at home follow these guidelines:  ? Never smoke while using oxygen. Oxygen does not burn or explode, but flammable materials will burn faster in the presence of oxygen.  ? Keep a fire extinguisher close by. Let your fire department know that you have oxygen in your home.  ? Warn visitors not to smoke near you when you are using oxygen. Put up \"no smoking\" signs in your home where you most often use the oxygen.  ? Regularly test your smoke detectors at home to make sure they work. If you receive care in your home from a nurse or other health care provider, he or she may also check to make sure your smoke detectors work.  · Ask your health care provider whether you would benefit from a pulmonary rehabilitation program.  · Do not wait to get medical care if you have any concerning symptoms. Delays could cause permanent injury and may be life threatening.  Contact a health care provider if:  · You have increased coughing or shortness of breath or both.  · You have muscle aches.  · You have chest pain.  · Your mucus gets thicker.  · Your mucus changes from clear or white to yellow, green, gray, or bloody.  Get help right away if:  · Your usual medicines do not stop your wheezing.  · You have increased difficulty breathing.  · You have any problems with the medicine you are taking, such as a rash, itching, swelling, or trouble breathing.  This information is not intended to replace advice given to you by your health care provider. Make sure you discuss any questions you have with your health care " provider.  Document Released: 10/05/2007 Document Revised: 04/27/2017 Document Reviewed: 01/26/2015  [employer] Interactive Patient Education © 2018 [employer]  Degenerative Disk Disease  Degenerative disk disease is a condition caused by the changes that occur in spinal disks as you grow older. Spinal disks are soft and compressible disks located between the bones of your spine (vertebrae). These disks act like shock absorbers. Degenerative disk disease can affect the whole spine. However, the neck and lower back are most commonly affected. Many changes can occur in the spinal disks with aging, such as:  · The spinal disks may dry and shrink.  · Small tears may occur in the tough, outer covering of the disk (annulus).  · The disk space may become smaller due to loss of water.  · Abnormal growths in the bone (spurs) may occur. This can put pressure on the nerve roots exiting the spinal canal, causing pain.  · The spinal canal may become narrowed.    What increases the risk?  · Being overweight.  · Having a family history of degenerative disk disease.  · Smoking.  · There is increased risk if you are doing heavy lifting or have a sudden injury.  What are the signs or symptoms?  Symptoms vary from person to person and may include:  · Pain that varies in intensity. Some people have no pain, while others have severe pain. The location of the pain depends on the part of your backbone that is affected.  ? You will have neck or arm pain if a disk in the neck area is affected.  ? You will have pain in your back, buttocks, or legs if a disk in the lower back is affected.  · Pain that becomes worse while bending, reaching up, or with twisting movements.  · Pain that may start gradually and then get worse as time passes. It may also start after a major or minor injury.  · Numbness or tingling in the arms or legs.    How is this diagnosed?  Your health care provider will ask you about your symptoms and about activities or  habits that may cause the pain. He or she may also ask about any injuries, diseases, or treatments you have had. Your health care provider will examine you to check for the range of movement that is possible in the affected area, to check for strength in your extremities, and to check for sensation in the areas of the arms and legs supplied by different nerve roots. You may also have:  · An X-ray of the spine.  · Other imaging tests, such as MRI.    How is this treated?  Your health care provider will advise you on the best plan for treatment. Treatment may include:  · Medicines.  · Rehabilitation exercises.    Follow these instructions at home:  · Follow proper lifting and walking techniques as advised by your health care provider.  · Maintain good posture.  · Exercise regularly as advised by your health care provider.  · Perform relaxation exercises.  · Change your sitting, standing, and sleeping habits as advised by your health care provider.  · Change positions frequently.  · Lose weight or maintain a healthy weight as advised by your health care provider.  · Do not use any tobacco products, including cigarettes, chewing tobacco, or electronic cigarettes. If you need help quitting, ask your health care provider.  · Wear supportive footwear.  · Take medicines only as directed by your health care provider.  Contact a health care provider if:  · Your pain does not go away within 1-4 weeks.  · You have significant appetite or weight loss.  Get help right away if:  · Your pain is severe.  · You notice weakness in your arms, hands, or legs.  · You begin to lose control of your bladder or bowel movements.  · You have fevers or night sweats.  This information is not intended to replace advice given to you by your health care provider. Make sure you discuss any questions you have with your health care provider.  Document Released: 10/14/2008 Document Revised: 05/25/2017 Document Reviewed: 04/21/2015  [employer]  Patient Education © 2019 [employer]    Dysmenorrhea  Dysmenorrhea means painful cramps during your period (menstrual period). You will have pain in your lower belly (abdomen). The pain is caused by the tightening (david) of the muscles of the womb (uterus). The pain may be mild or very bad. With this condition, you may:  · Have a headache.  · Feel sick to your stomach (nauseous).  · Throw up (vomit).  · Have lower back pain.    Follow these instructions at home:  Helping pain and cramping  · Put heat on your lower back or belly when you have pain or cramps. Use the heat source that your doctor tells you to use.  ? Place a towel between your skin and the heat.  ? Leave the heat on for 20-30 minutes.  ? Remove the heat if your skin turns bright red. This is especially important if you cannot feel pain, heat, or cold.  ? Do not have a heating pad on during sleep.  · Do aerobic exercises. These include walking, swimming, or biking. These may help with cramps.  · Massage your lower back or belly. This may help lessen pain.  General instructions  · Take over-the-counter and prescription medicines only as told by your doctor.  · Do not drive or use heavy machinery while taking prescription pain medicine.  · Avoid alcohol and caffeine during and right before your period. These can make cramps worse.  · Do not use any products that have nicotine or tobacco. These include cigarettes and e-cigarettes. If you need help quitting, ask your doctor.  · Keep all follow-up visits as told by your doctor. This is important.  Contact a doctor if:  · You have pain that gets worse.  · You have pain that does not get better with medicine.  · You have pain during sex.  · You feel sick to your stomach or you throw up during your period, and medicine does not help.  Get help right away if:  · You pass out (faint).  Summary  · Dysmenorrhea means painful cramps during your period (menstrual period).  · Put heat on your lower back or  belly when you have pain or cramps.  · Do exercises like walking, swimming, or biking to help with cramps.  · Contact a doctor if you have pain during sex.  This information is not intended to replace advice given to you by your health care provider. Make sure you discuss any questions you have with your health care provider.  Document Released: 03/16/2010 Document Revised: 01/04/2018 Document Reviewed: 01/04/2018  [employer] Interactive Patient Education © 2019 [employer]    Pulmonary Function Tests  Pulmonary function tests (PFTs) are used to measure how well your lungs work, find out what is causing your lung problems, and figure out the best treatment for you. You may have PFTs:  · When you have an illness involving the lungs.  · To follow changes in your lung function over time if you have a chronic lung disease.  · If you are an industrial . This checks the effects of being exposed to chemicals over a long period of time.  · To check lung function before having surgery or other procedures.  · To check your lungs if you smoke.  · To check if prescribed medicines or treatments are helping your lungs.    Your results will be compared to the expected lung function of someone with healthy lungs who is similar to you in:  · Age.  · Gender.  · Height.  · Weight.  · Race or ethnicity.    This is done to show how your lungs compare to normal lung function (percent predicted). This is how your health care provider knows if your lung function is normal or not. If you have had PFTs done before, your health care provider will compare your current results with past results. This shows if your lung function is better, worse, or the same as before.  Tell a health care provider about:  · Any allergies you have.  · All medicines you are taking, including inhaler or nebulizer medicines, vitamins, herbs, eye drops, creams, and over-the-counter medicines.  · Any blood disorders you have.  · Any surgeries you have had,  especially recent eye surgery, abdominal surgery, or chest surgery. These can make PFTs difficult or unsafe.  · Any medical conditions you have, including chest pain or heart problems, tuberculosis, or respiratory infections such as pneumonia, a cold, or the flu.  · Any fear of being in closed spaces (claustrophobia). Some of your tests may be in a closed space.  What are the risks?  Generally, this is a safe procedure. However, problems may occur, including:  · Light-headedness due to over-breathing (hyperventilation).  · An asthma attack from deep breathing.  · A collapsed lung.    What happens before the procedure?  · Take over-the-counter and prescription medicines only as told by your health care provider. If you take inhaler or nebulizer medicines, ask your health care provider which medicines you should take on the day of your testing. Some inhaler medicines may interfere with PFTs if they are taken shortly before the tests.  · Follow your health care provider's instructions on eating and drinking restrictions. This may include avoiding eating large meals and drinking alcohol before the testing.  · Do not use any products that contain nicotine or tobacco, such as cigarettes and e-cigarettes. If you need help quitting, ask your health care provider.  · Wear comfortable clothing that will not interfere with breathing.  What happens during the procedure?  · You will be given a soft nose clip to wear. This is done so all of your breaths will go through your mouth instead of your nose.  · You will be given a germ-free (sterile) mouthpiece. It will be attached to a machine that measures your breathing (spirometer).  · You will be asked to do various breathing maneuvers. The maneuvers will be done by breathing in (inhaling) and breathing out (exhaling). You may be asked to repeat the maneuvers several times before the testing is done.  · It is important to follow the instructions exactly to get accurate results.  Make sure to blow as hard and as fast as you can when you are told to do so.  · You may be given a medicine that makes the small air passages in your lungs larger (bronchodilator) after testing has been done. This medicine will make it easier for you to breathe.  · The tests will be repeated after the bronchodilator has taken effect.  · You will be monitored carefully during the procedure for faintness, dizziness, trouble breathing, or any other problems.  The procedure may vary among health care providers and hospitals.  What happens after the procedure?  · It is up to you to get your test results. Ask your health care provider, or the department that is doing the tests, when your results will be ready. After you have received your test results, talk with your health care provider about treatment options, if necessary.  Summary  · Pulmonary function tests (PFTs) are used to measure how well your lungs work, find out what is causing your lung problems, and figure out the best treatment for you.  · Wear comfortable clothing that will not interfere with breathing.  · It is up to you to get your test results. After you have received them, talk with your health care provider about treatment options, if necessary.  This information is not intended to replace advice given to you by your health care provider. Make sure you discuss any questions you have with your health care provider.  Document Released: 08/10/2005 Document Revised: 11/09/2017 Document Reviewed: 11/09/2017  [employer] Interactive Patient Education © 2019 [employer]  Urinalysis  A urinalysis (UA) is a series of tests done on a sample of your urine. Your kidneys filter your blood to make urine. They get rid of waste products and save the important parts of your blood like proteins and minerals (electrolytes). You may need more testing if your UA shows too much:  · Protein.  · Sugar (glucose).  · Blood cells.  · Bacteria.    You may have a UA:  · As part of  routine wellness screening.  · Before surgery.  · During pregnancy.    You may also need to have this test if you have:  · Kidney disease.  · Symptoms of a urinary tract infection (UTI).  · Diabetes.  · Conditions that cause an imbalance in your hormone regulation systems.    UA is divided into three parts:  · A visual exam of the urine sample to check for color or cloudiness.  · A dipstick test to check for:  ? Proteins.  ? Concentration (specific gravity).  ? Acidity (pH).  ? Glucose.  ? Ketones. These are by-products of your body burning fat for energy instead of sugar.  ? A waste product from red blood cells (bilirubin).  ? A product of white blood cells (leukocyte esterase).  ? A product of bacteria (nitrite).  ? Blood.  · A microscopic exam to check for:  ? Red blood cells.  ? White blood cells.  ? Tube-shaped proteins (hyaline casts).  ? Crystal structures.  ? Bacteria.  ? Epithelial cells. These are cells that line your urinary tract.  ? Yeast.    How do I prepare for this test?  Some medicines can affect the results of your UA. Let your health care provider know about all medicines you are taking, including vitamins, herbs, and over-the-counter medicines.  A test sample is collected by passing urine into a clean cup. You may be asked to collect a urine sample first thing in the morning. You may also be asked to collect a clean-catch sample to prevent the bacteria on your skin from getting into your test sample. Your health care provider may give you sterile wipes to clean your vagina or penis to prepare for collecting a clean-catch sample.  For women and girls  · Sit on the toilet and spread open the lips of your vagina.  · Use one wipe to clean your vaginal area from front to back.  · Use the second wipe to clean the opening of your urethra. Your urethra is the opening from which urine exits the body.  · Pass a small amount of urine directly into the toilet while still holding your vaginal lips  apart.  · Hold the sterile cup underneath you and urinate into it.  · Fill the cup about FPC, then cap it and return it for testing.  For men and boys  · Use the sterile wipe to clean the tip of your penis.  · Pass a small amount of urine directly into the toilet first.  · Urinate into the sterile cup provided.  · Fill the cup about FPC, then cap it and return it for testing.  What do the results mean?  It is your responsibility to obtain your test results. Ask the lab or department performing the test when and how you will get your results. Contact your health care provider to discuss any questions you have about your results.  The results of a UA depend on the kind of test:  · A visual exam result will be based on the appearance of the urine.  · A dipstick test result will be a range of values or negative or positive.  · Microscopic findings result will be a range of values or negative or positive.    Normal Results  Ranges for normal values vary among different labs and hospitals. You should always check with your health care provider after having lab work or other tests done to discuss whether your values are considered within normal limits.  · Appearance: clear.  · Color: gail yellow.  · Odor: aromatic.  · pH: 4.6-8.0 (average, 6.0).  · Protein:  ? 0-8 mg/dL.  ? 50-80 mg/24 hr (at rest).  ? Less than 250 mg/24 hr (during exercise).  · Specific gravity:  ? Adult: 1.005-1.030 (usually, 1.010-1.025).  ? Elderly: values decrease with age.  ? Tangent: 1.001-1.020.  · Leukocyte esterase: negative.  · Nitrites: none.  · Ketones: none.  · Bilirubin: none.  · Urobilinogen: 0.01-1 Edna unit/mL.  · Crystals: none.  · Casts: none.  · Glucose:  ? Fresh specimen: none.  ? 24-hour specimen:  mg/24 hr or 0.3-1.7 mmol/day (SI units).  · White blood cells (WBCs): 0-4 per low-power field.  · WBC casts: none.  · Red blood cells (RBCs): Less than or equal to 2.  · RBC casts: none.  · Epithelial cells: Few or 0-4  per low-power field.  · Bacteria: none.  · Yeast: none.    Meaning of Abnormal Results  Many conditions can cause abnormal UA results:  · Cloudy urine may be a sign of a UTI.  · Blood in your urine may be a sign of kidney disease, UTI, or other medical conditions.  · White blood cells may be a sign of UTI.  · Crystals may be a sign of a kidney stone or other kidney disease.  · High pH may mean you have a kidney stone, UTI, or kidney disease.  · Protein may be a sign of kidney disease, high blood pressure in pregnancy (toxemia), or other medical conditions.  · Glucose may be a sign of diabetes.  · Urobilinogen may be a sign of liver disease.    Discuss the meaning of abnormal results with your health care provider. You may need to have additional tests.  Talk with your health care provider to discuss your results, treatment options, and if necessary, the need for more tests. Talk with your health care provider if you have any questions about your results.  This information is not intended to replace advice given to you by your health care provider. Make sure you discuss any questions you have with your health care provider.  Document Released: 01/12/2006 Document Revised: 08/23/2017 Document Reviewed: 04/15/2015  [employer] Interactive Patient Education © 2019 [employer]    Chronic Fatigue Syndrome  Chronic fatigue syndrome (CFS) is a condition that causes extreme tiredness (fatigue). This fatigue does not improve with rest, and it gets worse with physical or mental activity. You may have several other symptoms along with fatigue.  Symptoms may come and go, but they generally last for months. Sometimes, CFS gets better over time, but it can be a lifelong condition. There is no cure, but there are many possible treatments. You will need to work with your health care providers to find a treatment plan that works best for you.  What are the causes?  The cause of CFS is not known. There may be more than one cause.  Possible causes include:  · An infection.  · An abnormal body defense system (immune system).  · Low blood pressure.  · Poor diet.  · Physical or emotional stress.    What increases the risk?  You are more likely to develop this condition if:  · You are female.  · You are 40?50 years old.  · You have a family history of CFS.  · You live with a lot of emotional stress.    What are the signs or symptoms?  The main symptom of CFS is fatigue that is severe enough to interfere with day-to-day activities. This fatigue does not get better with rest, and it gets worse with physical or mental activity. There are eight other major symptoms of CFS:  · Lack of energy (malaise) that lasts more than 24 hours after physical exertion.  · Sleep that does not relieve fatigue (unrefreshing sleep).  · Short-term memory loss or confusion.  · Joint pain without redness or swelling.  · Muscle aches.  · Headaches.  · Painful and swollen glands (lymph nodes) in the neck or under the arms.  · Sore throat.    You may also have:  · Abdominal cramps, constipation, or diarrhea (irritable bowel).  · Chills.  · Night sweats.  · Vision changes.  · Dizziness.  · Mental confusion (brain fog).  · Clumsiness.  · Sensitivity to food, noise, or odors.  · Mood swings, depression, or anxiety attacks.    How is this diagnosed?  There are no tests that can diagnose this condition. Your health care provider will make the diagnosis based on your medical history, a physical exam, and a mental health exam. However, it is important to make sure that your symptoms are not caused by another medical condition. You may have lab tests or X-rays to rule out other conditions.  For your health care provider to diagnose CFS:  · You must have had fatigue for at least 6 straight months.  · Fatigue must be your first symptom, and it must be severe enough to interfere with day-to-day activities.  · There must be no other cause found for the fatigue.  · You must also have at  least four of the eight other major symptoms of CFS.    How is this treated?  There is no cure for CFS. The condition affects everyone differently. You will need to work with your team of health care providers to find the best treatments for your symptoms. Your team may include your primary care provider, physical and exercise therapists, and mental health therapists. Treatment may include:  · Improving sleep with a regular bedtime routine.  · Avoiding caffeine, alcohol, and tobacco.  · Doing light exercise and stretching during the day.  · Taking medicines to help you sleep or to relieve joint or muscle pain.  · Learning and practicing relaxation techniques.  · Using memory aids or doing brainteasers to improve memory and concentration.  · Seeing a mental health therapist to evaluate and treat depression, if necessary.  · Trying massage therapy, acupuncture, and movement exercises, such as yoga or jen chi.    Follow these instructions at home:    Activity  · Exercise regularly, as told by your health care provider.  · Avoid fatigue by pacing yourself during the day and getting enough sleep at night.  · Go to bed and get up at the same time every day.  Eating and drinking  · Avoid caffeine and alcohol.  · Avoid heavy meals in the evening.  · Eat a well-balanced diet.  General instructions  · Take over-the-counter and prescription medicines only as told by your health care provider.  · Do not use herbal or dietary supplements unless they are approved by your health care provider.  · Maintain a healthy weight.  · Avoid stress and use stress-reducing techniques that you learn in therapy.  · Do not use any products that contain nicotine or tobacco, such as cigarettes and e-cigarettes. If you need help quitting, ask your health care provider.  · Consider joining a CFS support group.  · Keep all follow-up visits as told by your health care provider. This is important.  Contact a health care provider if:  · Your symptoms  do not get better or they get worse.  · You feel angry, guilty, anxious, or depressed.  This information is not intended to replace advice given to you by your health care provider. Make sure you discuss any questions you have with your health care provider.  Document Released: 01/25/2006 Document Revised: 08/24/2017 Document Reviewed: 03/27/2017  [employer] Interactive Patient Education © 2019 [employer]

## 2019-05-11 LAB
25(OH)D3 SERPL-MCNC: 31.9 NG/ML (ref 30–100)
FERRITIN SERPL-MCNC: 111 NG/ML (ref 13–150)
FOLATE SERPL-MCNC: 6.49 NG/ML (ref 4.78–24.2)
HBA1C MFR BLD: 5 % (ref 4.8–5.6)
IRON 24H UR-MRATE: 83 MCG/DL (ref 37–145)
IRON SATN MFR SERPL: 21 % (ref 20–50)
TIBC SERPL-MCNC: 402 MCG/DL (ref 298–536)
TRANSFERRIN SERPL-MCNC: 270 MG/DL (ref 200–360)
VIT B12 BLD-MCNC: 587 PG/ML (ref 211–946)

## 2019-05-13 LAB
CCP IGA+IGG SERPL IA-ACNC: 5 UNITS (ref 0–19)
CENTROMERE B AB SER-ACNC: <0.2 AI (ref 0–0.9)
CHROMATIN AB SERPL-ACNC: 3.1 AI (ref 0–0.9)
DSDNA AB SER-ACNC: 3 IU/ML (ref 0–9)
ENA JO1 AB SER-ACNC: <0.2 AI (ref 0–0.9)
ENA RNP AB SER-ACNC: <0.2 AI (ref 0–0.9)
ENA SCL70 AB SER-ACNC: 0.3 AI (ref 0–0.9)
ENA SM AB SER-ACNC: <0.2 AI (ref 0–0.9)
ENA SS-A AB SER-ACNC: <0.2 AI (ref 0–0.9)
ENA SS-B AB SER-ACNC: 0.3 AI (ref 0–0.9)
Lab: ABNORMAL

## 2019-05-14 ENCOUNTER — TELEPHONE (OUTPATIENT)
Dept: FAMILY MEDICINE CLINIC | Facility: CLINIC | Age: 29
End: 2019-05-14

## 2019-05-14 NOTE — TELEPHONE ENCOUNTER
----- Message from HITESH Saul sent at 5/10/2019  5:42 PM CDT -----  Notify pt that her urine was cloudy and looked like she may have a UTI, I sent Rx for Bactrim antibiotic to pharmacy for this  Thanks clsharmin

## 2019-05-17 LAB
INSULIN FREE SERPL-ACNC: 17 UU/ML
INSULIN SERPL-ACNC: 17 UU/ML

## 2019-08-06 ENCOUNTER — OFFICE VISIT (OUTPATIENT)
Dept: FAMILY MEDICINE CLINIC | Facility: CLINIC | Age: 29
End: 2019-08-06

## 2019-08-06 VITALS
HEIGHT: 64 IN | BODY MASS INDEX: 31.76 KG/M2 | TEMPERATURE: 97.7 F | DIASTOLIC BLOOD PRESSURE: 72 MMHG | RESPIRATION RATE: 16 BRPM | HEART RATE: 85 BPM | OXYGEN SATURATION: 98 % | SYSTOLIC BLOOD PRESSURE: 118 MMHG | WEIGHT: 186 LBS

## 2019-08-06 DIAGNOSIS — M51.36 DDD (DEGENERATIVE DISC DISEASE), LUMBAR: ICD-10-CM

## 2019-08-06 DIAGNOSIS — F33.1 MODERATE EPISODE OF RECURRENT MAJOR DEPRESSIVE DISORDER (HCC): ICD-10-CM

## 2019-08-06 DIAGNOSIS — R11.0 NAUSEA: ICD-10-CM

## 2019-08-06 DIAGNOSIS — G89.29 CHRONIC MIDLINE LOW BACK PAIN WITHOUT SCIATICA: ICD-10-CM

## 2019-08-06 DIAGNOSIS — M54.50 CHRONIC MIDLINE LOW BACK PAIN WITHOUT SCIATICA: ICD-10-CM

## 2019-08-06 PROCEDURE — 99214 OFFICE O/P EST MOD 30 MIN: CPT | Performed by: NURSE PRACTITIONER

## 2019-08-06 RX ORDER — ATORVASTATIN CALCIUM 20 MG/1
20 TABLET, FILM COATED ORAL DAILY
Qty: 90 TABLET | Refills: 1 | Status: SHIPPED | OUTPATIENT
Start: 2019-08-06 | End: 2019-11-05 | Stop reason: SDUPTHER

## 2019-08-06 RX ORDER — ONDANSETRON 4 MG/1
4 TABLET, FILM COATED ORAL EVERY 8 HOURS PRN
Qty: 90 TABLET | Refills: 1 | Status: SHIPPED | OUTPATIENT
Start: 2019-08-06 | End: 2019-11-05 | Stop reason: SDUPTHER

## 2019-08-06 RX ORDER — AMITRIPTYLINE HYDROCHLORIDE 50 MG/1
50 TABLET, FILM COATED ORAL NIGHTLY
Qty: 90 TABLET | Refills: 1 | Status: SHIPPED | OUTPATIENT
Start: 2019-08-06 | End: 2019-11-05 | Stop reason: SDUPTHER

## 2019-08-06 RX ORDER — ROPINIROLE 1 MG/1
TABLET, FILM COATED ORAL
Refills: 1 | COMMUNITY
Start: 2019-05-10 | End: 2019-08-06 | Stop reason: SDUPTHER

## 2019-08-06 RX ORDER — ALBUTEROL SULFATE 90 UG/1
2 AEROSOL, METERED RESPIRATORY (INHALATION) EVERY 6 HOURS PRN
Qty: 18 G | Refills: 5 | Status: SHIPPED | OUTPATIENT
Start: 2019-08-06 | End: 2020-03-23 | Stop reason: SDUPTHER

## 2019-08-06 RX ORDER — GABAPENTIN 400 MG/1
400 CAPSULE ORAL 4 TIMES DAILY
Qty: 120 CAPSULE | Refills: 2 | Status: SHIPPED | OUTPATIENT
Start: 2019-08-06 | End: 2019-11-05 | Stop reason: SDUPTHER

## 2019-08-06 RX ORDER — ROPINIROLE 1 MG/1
1 TABLET, FILM COATED ORAL 2 TIMES DAILY
Qty: 180 TABLET | Refills: 1 | Status: SHIPPED | OUTPATIENT
Start: 2019-08-06 | End: 2019-11-05 | Stop reason: SDUPTHER

## 2019-08-06 RX ORDER — PROPRANOLOL HYDROCHLORIDE 20 MG/1
20 TABLET ORAL 3 TIMES DAILY
Qty: 270 TABLET | Refills: 1 | Status: SHIPPED | OUTPATIENT
Start: 2019-08-06 | End: 2019-11-05 | Stop reason: SDUPTHER

## 2019-08-06 RX ORDER — AMITRIPTYLINE HYDROCHLORIDE 25 MG/1
25 TABLET, FILM COATED ORAL NIGHTLY
Qty: 90 TABLET | Refills: 3 | Status: CANCELLED | OUTPATIENT
Start: 2019-08-06

## 2019-08-06 RX ORDER — DULOXETIN HYDROCHLORIDE 30 MG/1
30 CAPSULE, DELAYED RELEASE ORAL DAILY
Qty: 90 CAPSULE | Refills: 1 | Status: SHIPPED | OUTPATIENT
Start: 2019-08-06 | End: 2019-10-15 | Stop reason: SDUPTHER

## 2019-08-06 NOTE — PROGRESS NOTES
Chief Complaint   Patient presents with   • Back Pain     med refills     Subjective   Cally Olsen is a 29 y.o. female who presents to the office for chronic pain management and follow up of chronic conditions    The following portions of the patient's history were reviewed and updated as appropriate: allergies, current medications, past family history, past medical history, past social history, past surgical history and problem list.    History of Present Illness   PMH: chronic hip and  Back pain, depression, anxiety, insomnia, mentrual irregularity.  UDS: 19  appropriate  Urine gabapentin due    Fasting labs: 5/10//19    CBC  WBC 13.53 neurotrophil, abs 9.55  CMP  Lipid elevated  Iron normal  Ferritin normal  BRITTANY comprehensive profile + for antichromatin antibodies= 3.1  Vit D normal  Vit b12 and folate, normal  Insulin level normal  A1C normal  19  CMP  CO2 31   Prot 8.5   Albumin 5.2    Most recent imaging:   MRI L spine 16 shows mild bulging anulus L5-S1 and mild levoscoliosis at L3/4.    T spine MRI of 16 shows a subcentimeter probabale benign hemanigoma at T10 vertebral body.   Xray left knee:   Mild DJD with mild joint space narrowing left knee by xray of 17          Patient presents today for medication refill on her neurontin for chronic back pain. Requests increase in dosing to 4 times daily.   Depression and anxiety - currently managed with cymbalta, trazodone, neurontin     Chronic back and hip pain - followed by Dr Boston  Managed with hydrocodone, neurontin     Insomnia - has remained uncontrolled requests increase in amitriptyline today.   She has complaints of headaches with the trazodone  Has tried seroquel  Failed remeron     Menstrual irregularity with heavy flow - has complaints of periodic spotting  scheduled for evaluation with Sole Houser's office        Past Surgical History:   Procedure Laterality Date   •  SECTION  2015    Repeat   • INJECTION OF  MEDICATION  2015    [place] (1)  -  GEMABolivar Andreajd   • OTHER SURGICAL HISTORY  2015    Laparoscopy; tubal cautery (1)  - Exam under anesthesia and laparoscopic tubal fulguration.         Past Medical History:   Diagnosis Date   • Acute pharyngitis     unspecified     • Adjustment disorder with anxious mood     Celexa     • Allergic rhinitis    • Asthma    • Backache    • Candidiasis    • Cervical intraepithelial neoplasia grade 1    • Contraception     Desires permanent sterilization    • Depressive disorder    • Dysphagia     unspecified     • Encounter for gynecological examination without abnormal finding    • Encounter for  visit      visit status    • Excessive and frequent menstruation with irregular cycle    • Excessive and frequent menstruation with regular cycle    • Fatigue    • Generalized abdominal pain    • Generalized anxiety disorder    • Headache    • Heartburn    • Hip pain    • Insomnia      Trazodone   • Low grade squamous intraepithelial lesion (LGSIL) on cervicovaginal cytologic smear    • Malaise    • Organic anxiety disorder    • Spasm of back muscles    • Surgical follow-up care    • Tobacco dependence syndrome           Family History   Problem Relation Age of Onset   • Seizures Mother    • Alcohol abuse Father    • Stroke Other         Review of Systems   Constitutional: Positive for fatigue. Negative for fever and unexpected weight change.   HENT: Negative.    Eyes: Negative.    Respiratory: Negative.  Negative for cough, chest tightness and shortness of breath.    Cardiovascular: Negative.  Negative for chest pain.   Gastrointestinal: Negative.    Endocrine: Negative.    Genitourinary: Positive for menstrual problem and pelvic pain. Negative for dysuria.        Abnormal PAP requiring multiple treatments, nearly constant pelvic pain.   Musculoskeletal: Positive for arthralgias and back pain.   Skin: Negative.  Negative for color change, pallor, rash and wound.  "  Allergic/Immunologic: Negative.    Neurological: Negative.    Hematological: Negative.    Psychiatric/Behavioral: Negative.  Negative for sleep disturbance and suicidal ideas.   All other systems reviewed and are negative.      Objective   Vitals:    08/06/19 0849   BP: 118/72   BP Location: Left arm   Patient Position: Sitting   Cuff Size: Adult   Pulse: 85   Resp: 16   Temp: 97.7 °F (36.5 °C)   TempSrc: Tympanic   SpO2: 98%   Weight: 84.4 kg (186 lb)   Height: 162.6 cm (64\")   PainSc:   6   PainLoc: Back     Physical Exam   Constitutional: She is oriented to person, place, and time. She appears well-developed and well-nourished.   HENT:   Head: Normocephalic and atraumatic.   Eyes: Conjunctivae are normal. Pupils are equal, round, and reactive to light.   Neck: Normal range of motion. Neck supple.   Cardiovascular: Normal rate, regular rhythm, normal heart sounds and intact distal pulses. Exam reveals no gallop and no friction rub.   No murmur heard.  Pulmonary/Chest: Effort normal and breath sounds normal. No respiratory distress. She has no wheezes. She has no rales. She exhibits no tenderness.   Abdominal: Soft. Bowel sounds are normal.   Musculoskeletal: Normal range of motion. She exhibits no edema, tenderness or deformity.   Lymphadenopathy:     She has no cervical adenopathy.   Neurological: She is alert and oriented to person, place, and time.   Skin: Skin is warm and dry. Capillary refill takes 2 to 3 seconds. No erythema. No pallor.   Psychiatric: She has a normal mood and affect. Her behavior is normal. Judgment and thought content normal.   Nursing note and vitals reviewed.      Assessment/Plan   Cally was seen today for back pain.    Diagnoses and all orders for this visit:    Moderate episode of recurrent major depressive disorder (CMS/HCC)  -     gabapentin (NEURONTIN) 400 MG capsule; Take 1 capsule by mouth 4 (Four) Times a Day.  -     DULoxetine (CYMBALTA) 30 MG capsule; Take 1 capsule by mouth " Daily.    Chronic midline low back pain without sciatica  -     gabapentin (NEURONTIN) 400 MG capsule; Take 1 capsule by mouth 4 (Four) Times a Day.    DDD (degenerative disc disease), lumbar  -     gabapentin (NEURONTIN) 400 MG capsule; Take 1 capsule by mouth 4 (Four) Times a Day.    Nausea  -     ondansetron (ZOFRAN) 4 MG tablet; Take 1 tablet by mouth Every 8 (Eight) Hours As Needed for Nausea or Vomiting.    Other orders  -     Cancel: amitriptyline (ELAVIL) 25 MG tablet; Take 1 tablet by mouth Every Night.  -     propranolol (INDERAL) 20 MG tablet; Take 1 tablet by mouth 3 (Three) Times a Day.  -     rOPINIRole (REQUIP) 1 MG tablet; Take 1 tablet by mouth 2 (Two) Times a Day. Take 1 hour before bedtime.  -     albuterol sulfate  (90 Base) MCG/ACT inhaler; Inhale 2 puffs Every 6 (Six) Hours As Needed for Wheezing or Shortness of Air.  -     amitriptyline (ELAVIL) 50 MG tablet; Take 1 tablet by mouth Every Night.  -     atorvastatin (LIPITOR) 20 MG tablet; Take 1 tablet by mouth Daily.           PHQ-2/PHQ-9 Depression Screening 8/6/2019   Little interest or pleasure in doing things 0   Feeling down, depressed, or hopeless 0   Trouble falling or staying asleep, or sleeping too much 0   Feeling tired or having little energy 0   Poor appetite or overeating 0   Feeling bad about yourself - or that you are a failure or have let yourself or your family down 0   Trouble concentrating on things, such as reading the newspaper or watching television 0   Moving or speaking so slowly that other people could have noticed. Or the opposite - being so fidgety or restless that you have been moving around a lot more than usual 0   Thoughts that you would be better off dead, or of hurting yourself in some way 0   Total Score 0   Patient understands the risks associated with this controlled medication, including tolerance and addiction.  Patient also agrees to only obtain this medication from me, and not from a another  provider, unless that provider is covering for me in my absence.  Patient also agrees to be compliant in dosing, and not self adjust the dose of medication.  A signed controlled substance agreement is on file, and the patient has received a controlled substance education sheet at this a previous visit.  The patient has also signed a consent for treatment with a controlled substance as per [facility] policy. RORY was obtained.      HITESH Parra         Return in about 3 months (around 11/6/2019).    There are no Patient Instructions on file for this visit.

## 2019-08-12 ENCOUNTER — OFFICE VISIT (OUTPATIENT)
Dept: FAMILY MEDICINE CLINIC | Facility: CLINIC | Age: 29
End: 2019-08-12

## 2019-08-12 VITALS
TEMPERATURE: 98.6 F | HEIGHT: 63 IN | BODY MASS INDEX: 33.1 KG/M2 | WEIGHT: 186.8 LBS | SYSTOLIC BLOOD PRESSURE: 142 MMHG | DIASTOLIC BLOOD PRESSURE: 86 MMHG | HEART RATE: 100 BPM | OXYGEN SATURATION: 98 %

## 2019-08-12 DIAGNOSIS — R05.9 COUGH: ICD-10-CM

## 2019-08-12 DIAGNOSIS — R09.81 HEAD CONGESTION: ICD-10-CM

## 2019-08-12 DIAGNOSIS — J30.2 SEASONAL ALLERGIC RHINITIS, UNSPECIFIED TRIGGER: ICD-10-CM

## 2019-08-12 DIAGNOSIS — J06.9 ACUTE URI: Primary | ICD-10-CM

## 2019-08-12 PROCEDURE — 96372 THER/PROPH/DIAG INJ SC/IM: CPT | Performed by: NURSE PRACTITIONER

## 2019-08-12 PROCEDURE — 99214 OFFICE O/P EST MOD 30 MIN: CPT | Performed by: NURSE PRACTITIONER

## 2019-08-12 RX ORDER — FLUTICASONE PROPIONATE 50 MCG
2 SPRAY, SUSPENSION (ML) NASAL DAILY
Qty: 1 BOTTLE | Refills: 11 | Status: SHIPPED | OUTPATIENT
Start: 2019-08-12 | End: 2019-08-12 | Stop reason: CLARIF

## 2019-08-12 RX ORDER — CETIRIZINE HYDROCHLORIDE 10 MG/1
10 TABLET ORAL DAILY
Qty: 30 TABLET | Refills: 11 | Status: SHIPPED | OUTPATIENT
Start: 2019-08-12 | End: 2019-08-12

## 2019-08-12 RX ORDER — MOMETASONE FUROATE 50 UG/1
2 SPRAY, METERED NASAL DAILY
Qty: 1 EACH | Refills: 12 | Status: SHIPPED | OUTPATIENT
Start: 2019-08-12 | End: 2019-11-05

## 2019-08-12 RX ORDER — CETIRIZINE HYDROCHLORIDE 10 MG/1
10 TABLET ORAL DAILY
Qty: 30 TABLET | Refills: 11 | Status: SHIPPED | OUTPATIENT
Start: 2019-08-12 | End: 2021-12-13 | Stop reason: SDUPTHER

## 2019-08-12 RX ORDER — GUAIFENESIN/DEXTROMETHORPHAN 100-10MG/5
10 SYRUP ORAL 3 TIMES DAILY PRN
Qty: 118 ML | Refills: 0 | Status: SHIPPED | OUTPATIENT
Start: 2019-08-12 | End: 2019-11-05

## 2019-08-12 RX ORDER — TRIAMCINOLONE ACETONIDE 40 MG/ML
80 INJECTION, SUSPENSION INTRA-ARTICULAR; INTRAMUSCULAR ONCE
Status: COMPLETED | OUTPATIENT
Start: 2019-08-12 | End: 2019-08-12

## 2019-08-12 RX ORDER — GUAIFENESIN 200 MG/10ML
200 LIQUID ORAL 3 TIMES DAILY PRN
Qty: 118 ML | Refills: 0 | Status: SHIPPED | OUTPATIENT
Start: 2019-08-12 | End: 2019-08-12

## 2019-08-12 RX ORDER — FLUTICASONE PROPIONATE 50 MCG
2 SPRAY, SUSPENSION (ML) NASAL DAILY
Qty: 1 BOTTLE | Refills: 11 | Status: SHIPPED | OUTPATIENT
Start: 2019-08-12 | End: 2019-08-12

## 2019-08-12 RX ADMIN — TRIAMCINOLONE ACETONIDE 80 MG: 40 INJECTION, SUSPENSION INTRA-ARTICULAR; INTRAMUSCULAR at 09:02

## 2019-08-12 NOTE — PATIENT INSTRUCTIONS
Allergic Rhinitis, Adult  Allergic rhinitis is an allergic reaction that affects the mucous membrane inside the nose. It causes sneezing, a runny or stuffy nose, and the feeling of mucus going down the back of the throat (postnasal drip). Allergic rhinitis can be mild to severe.  There are two types of allergic rhinitis:  · Seasonal. This type is also called hay fever. It happens only during certain seasons.  · Perennial. This type can happen at any time of the year.  What are the causes?  This condition happens when the body's defense system (immune system) responds to certain harmless substances called allergens as though they were germs.   Seasonal allergic rhinitis is triggered by pollen, which can come from grasses, trees, and weeds. Perennial allergic rhinitis may be caused by:  · House dust mites.  · Pet dander.  · Mold spores.  What are the signs or symptoms?  Symptoms of this condition include:  · Sneezing.  · Runny or stuffy nose (nasal congestion).  · Postnasal drip.  · Itchy nose.  · Tearing of the eyes.  · Trouble sleeping.  · Daytime sleepiness.  How is this diagnosed?  This condition may be diagnosed based on:  · Your medical history.  · A physical exam.  · Tests to check for related conditions, such as:  ? Asthma.  ? Pink eye.  ? Ear infection.  ? Upper respiratory infection.  · Tests to find out which allergens trigger your symptoms. These may include skin or blood tests.  How is this treated?  There is no cure for this condition, but treatment can help control symptoms. Treatment may include:  · Taking medicines that block allergy symptoms, such as antihistamines. Medicine may be given as a shot, nasal spray, or pill.  · Avoiding the allergen.  · Desensitization. This treatment involves getting ongoing shots until your body becomes less sensitive to the allergen. This treatment may be done if other treatments do not help.  · If taking medicine and avoiding the allergen does not work, new, stronger  medicines may be prescribed.  Follow these instructions at home:  · Find out what you are allergic to. Common allergens include smoke, dust, and pollen.  · Avoid the things you are allergic to. These are some things you can do to help avoid allergens:  ? Replace carpet with wood, tile, or vinyl david. Carpet can trap dander and dust.  ? Do not smoke. Do not allow smoking in your home.  ? Change your heating and air conditioning filter at least once a month.  ? During allergy season:  § Keep windows closed as much as possible.  § Plan outdoor activities when pollen counts are lowest. This is usually during the evening hours.  § When coming indoors, change clothing and shower before sitting on furniture or bedding.  · Take over-the-counter and prescription medicines only as told by your health care provider.  · Keep all follow-up visits as told by your health care provider. This is important.  Contact a health care provider if:  · You have a fever.  · You develop a persistent cough.  · You make whistling sounds when you breathe (you wheeze).  · Your symptoms interfere with your normal daily activities.  Get help right away if:  · You have shortness of breath.  Summary  · This condition can be managed by taking medicines as directed and avoiding allergens.  · Contact your health care provider if you develop a persistent cough or fever.  · During allergy season, keep windows closed as much as possible.  This information is not intended to replace advice given to you by your health care provider. Make sure you discuss any questions you have with your health care provider.  Document Released: 09/12/2002 Document Revised: 01/25/2018 Document Reviewed: 01/25/2018  [employer] Interactive Patient Education © 2019 [employer]    Upper Respiratory Infection, Adult  An upper respiratory infection (URI) affects the nose, throat, and upper air passages. URIs are caused by germs (viruses). The most common type of URI is often  "called \"the common cold.\"  Medicines cannot cure URIs, but you can do things at home to relieve your symptoms. URIs usually get better within 7-10 days.  Follow these instructions at home:  Activity  · Rest as needed.  · If you have a fever, stay home from work or school until your fever is gone, or until your doctor says you may return to work or school.  ? You should stay home until you cannot spread the infection anymore (you are not contagious).  ? Your doctor may have you wear a face mask so you have less risk of spreading the infection.  Relieving symptoms  · Gargle with a salt-water mixture 3-4 times a day or as needed. To make a salt-water mixture, completely dissolve ½-1 tsp of salt in 1 cup of warm water.  · Use a cool-mist humidifier to add moisture to the air. This can help you breathe more easily.  Eating and drinking    · Drink enough fluid to keep your pee (urine) pale yellow.  · Eat soups and other clear broths.  General instructions    · Take over-the-counter and prescription medicines only as told by your doctor. These include cold medicines, fever reducers, and cough suppressants.  · Do not use any products that contain nicotine or tobacco. These include cigarettes and e-cigarettes. If you need help quitting, ask your doctor.  · Avoid being where people are smoking (avoid secondhand smoke).  · Make sure you get regular shots and get the flu shot every year.  · Keep all follow-up visits as told by your doctor. This is important.  How to avoid spreading infection to others    · Wash your hands often with soap and water. If you do not have soap and water, use hand .  · Avoid touching your mouth, face, eyes, or nose.  · Cough or sneeze into a tissue or your sleeve or elbow. Do not cough or sneeze into your hand or into the air.  Contact a doctor if:  · You are getting worse, not better.  · You have any of these:  ? A fever.  ? Chills.  ? Brown or red mucus in your nose.  ? Yellow or brown " "fluid (discharge)coming from your nose.  ? Pain in your face, especially when you bend forward.  ? Swollen neck glands.  ? Pain with swallowing.  ? White areas in the back of your throat.  Get help right away if:  · You have shortness of breath that gets worse.  · You have very bad or constant:  ? Headache.  ? Ear pain.  ? Pain in your forehead, behind your eyes, and over your cheekbones (sinus pain).  ? Chest pain.  · You have long-lasting (chronic) lung disease along with any of these:  ? Wheezing.  ? Long-lasting cough.  ? Coughing up blood.  ? A change in your usual mucus.  · You have a stiff neck.  · You have changes in your:  ? Vision.  ? Hearing.  ? Thinking.  ? Mood.  Summary  · An upper respiratory infection (URI) is caused by a germ called a virus. The most common type of URI is often called \"the common cold.\"  · URIs usually get better within 7-10 days.  · Take over-the-counter and prescription medicines only as told by your doctor.  This information is not intended to replace advice given to you by your health care provider. Make sure you discuss any questions you have with your health care provider.  Document Released: 06/05/2009 Document Revised: 08/10/2018 Document Reviewed: 08/10/2018  [employer] Interactive Patient Education © 2019 [employer]    "

## 2019-08-12 NOTE — PROGRESS NOTES
"Subjective   Cally Olsen is a 29 y.o. female. Cough, congestion.    History of Present Illness Pt presents today for cough and congestion. She says she has had these symptoms for around 4 days now. At home she reports taking some kind of allergy medicine but she doesn't remember the name of it. The allergy medicine she feels provided little to no relief. In the past she says she has had ear infections and is concerned that she could have one now. She says her ears don't hurt but feel she feels a \"fullness\" in her ears. Reports that she didn't feel Claritin helped with her allergies in the past. Denies any fever, chills, myalgias, or shortness of breath. No recent sick contacts to her knowledge.    The following portions of the patient's history were reviewed and updated as appropriate: allergies, current medications, past family history, past medical history, past social history, past surgical history and problem list.    Review of Systems   Constitutional: Negative for chills, fatigue and fever.   HENT: Positive for congestion, ear pain (pt reports \"fullness\" in her ears), postnasal drip and rhinorrhea. Negative for ear discharge, sinus pressure, sneezing and sore throat.    Eyes: Negative for blurred vision, double vision and visual disturbance.   Respiratory: Negative for cough, chest tightness, shortness of breath and wheezing.    Cardiovascular: Negative for chest pain, palpitations and leg swelling.   Gastrointestinal: Negative for abdominal pain, diarrhea, nausea and vomiting.   Endocrine: Negative for cold intolerance and heat intolerance.   Genitourinary: Negative for difficulty urinating, dysuria, frequency and hematuria.   Musculoskeletal: Negative for arthralgias, back pain, myalgias, neck pain and neck stiffness.   Skin: Negative for dry skin, pallor, rash, skin lesions and bruise.   Allergic/Immunologic: Positive for environmental allergies. Negative for food allergies and immunocompromised state. "   Neurological: Negative for dizziness, syncope, weakness, light-headedness, headache and confusion.   Hematological: Negative for adenopathy. Does not bruise/bleed easily.   Psychiatric/Behavioral: Negative for self-injury, sleep disturbance, suicidal ideas, depressed mood and stress. The patient is not nervous/anxious.        Objective   Physical Exam   Constitutional: She is oriented to person, place, and time. Vital signs are normal. She appears well-developed and well-nourished. She is cooperative. She does not have a sickly appearance. She does not appear ill. No distress.   HENT:   Head: Normocephalic.   Right Ear: Hearing, tympanic membrane, external ear and ear canal normal.   Left Ear: Hearing, tympanic membrane, external ear and ear canal normal.   Nose: Mucosal edema and congestion present.   Mouth/Throat: Uvula is midline and mucous membranes are normal. Posterior oropharyngeal erythema (cobblestone appearance) present. Tonsils are 1+ on the right. Tonsils are 1+ on the left. No tonsillar exudate.   Eyes: Conjunctivae, EOM and lids are normal. Pupils are equal, round, and reactive to light.   Neck: Trachea normal, normal range of motion, full passive range of motion without pain and phonation normal. Neck supple. No thyroid mass and no thyromegaly present.   Cardiovascular: Normal rate, regular rhythm, S1 normal, S2 normal and normal heart sounds.   No murmur heard.  Pulmonary/Chest: Effort normal and breath sounds normal. No respiratory distress. She has no wheezes. She has no rhonchi. She has no rales.   Abdominal: Soft. Normal appearance. There is no tenderness.   Lymphadenopathy:     She has no cervical adenopathy.   Neurological: She is alert and oriented to person, place, and time. She has normal strength. No cranial nerve deficit. Coordination and gait normal.   Skin: Skin is warm, dry and intact. She is not diaphoretic. No pallor.   Psychiatric: She has a normal mood and affect. Her speech is  normal and behavior is normal. Judgment and thought content normal. Cognition and memory are normal.     Vitals:    08/12/19 0830   BP: 142/86   Pulse: 100   Temp: 98.6 °F (37 °C)   SpO2: 98%         Assessment/Plan   Cally was seen today for nasal congestion and cough.    Diagnoses and all orders for this visit:    Acute URI    Seasonal allergic rhinitis, unspecified trigger  -     Discontinue: cetirizine (zyrTEC) 10 MG tablet; Take 1 tablet by mouth Daily.  -     Discontinue: fluticasone (FLONASE) 50 MCG/ACT nasal spray; 2 sprays by Each Nare route Daily for 30 days.  -     cetirizine (zyrTEC) 10 MG tablet; Take 1 tablet by mouth Daily.  -     fluticasone (FLONASE) 50 MCG/ACT nasal spray; 2 sprays by Each Nare route Daily for 30 days.    Head congestion  -     triamcinolone acetonide (KENALOG-40) injection 80 mg    Cough  -     Discontinue: guaifenesin (ROBITUSSIN) 100 MG/5ML liquid; Take 10 mL by mouth 3 (Three) Times a Day As Needed for Cough or Congestion.  -     guaifenesin-dextromethorphan (ROBITUSSIN DM) 100-10 MG/5ML syrup; Take 10 mL by mouth 3 (Three) Times a Day As Needed for Cough.    Acute URI- Pt educated this is viral and no antibiotics are necessary, she should only treat her symptoms. Its important that she rest and drink plenty of water. She should have improvement in her symptoms within 7-10 days.  Seasonal allergic rhinitis- Pt educated some of her symptoms could be related to seasonal allergies. She should take zyrtec 10mg daily and use flonase nasal spray, 2 puffs in each nostril daily. Allergy warning popped up with zyrtec due to hydroxyzine allergy, pt says she has previously tolerated it in the past.  Head congestion- Kenalog 80mg given IM in office.  Cough- Robitussin DM 10ml TID PRN for cough.  If symptoms do not improve or worsen, patient was instructed to return to clinic for further evaluation.         This document has been electronically signed by HITESH Crisostomo on  August 12,  2019 9:14 AM

## 2019-10-02 DIAGNOSIS — R05.9 COUGH: ICD-10-CM

## 2019-10-02 RX ORDER — GUAIFENESIN 200 MG/10ML
LIQUID ORAL
Qty: 118 ML | Refills: 0 | Status: SHIPPED | OUTPATIENT
Start: 2019-10-02 | End: 2019-11-05

## 2019-10-15 DIAGNOSIS — F33.1 MODERATE EPISODE OF RECURRENT MAJOR DEPRESSIVE DISORDER (HCC): ICD-10-CM

## 2019-10-15 RX ORDER — DULOXETIN HYDROCHLORIDE 30 MG/1
30 CAPSULE, DELAYED RELEASE ORAL DAILY
Qty: 90 CAPSULE | Refills: 1 | Status: SHIPPED | OUTPATIENT
Start: 2019-10-15 | End: 2019-11-05 | Stop reason: SDUPTHER

## 2019-10-30 DIAGNOSIS — G89.29 CHRONIC MIDLINE LOW BACK PAIN WITHOUT SCIATICA: ICD-10-CM

## 2019-10-30 DIAGNOSIS — R11.0 NAUSEA: ICD-10-CM

## 2019-10-30 DIAGNOSIS — M54.50 CHRONIC MIDLINE LOW BACK PAIN WITHOUT SCIATICA: ICD-10-CM

## 2019-10-30 DIAGNOSIS — F33.1 MODERATE EPISODE OF RECURRENT MAJOR DEPRESSIVE DISORDER (HCC): ICD-10-CM

## 2019-10-30 DIAGNOSIS — M51.36 DDD (DEGENERATIVE DISC DISEASE), LUMBAR: ICD-10-CM

## 2019-11-01 RX ORDER — ONDANSETRON 4 MG/1
TABLET, FILM COATED ORAL
Qty: 90 TABLET | Refills: 0 | OUTPATIENT
Start: 2019-11-01

## 2019-11-01 RX ORDER — GABAPENTIN 400 MG/1
CAPSULE ORAL
Qty: 120 CAPSULE | Refills: 0 | OUTPATIENT
Start: 2019-11-01

## 2019-11-05 ENCOUNTER — OFFICE VISIT (OUTPATIENT)
Dept: OBSTETRICS AND GYNECOLOGY | Facility: CLINIC | Age: 29
End: 2019-11-05

## 2019-11-05 ENCOUNTER — OFFICE VISIT (OUTPATIENT)
Dept: FAMILY MEDICINE CLINIC | Facility: CLINIC | Age: 29
End: 2019-11-05

## 2019-11-05 VITALS
WEIGHT: 193.8 LBS | OXYGEN SATURATION: 98 % | SYSTOLIC BLOOD PRESSURE: 126 MMHG | RESPIRATION RATE: 16 BRPM | HEIGHT: 64 IN | TEMPERATURE: 98.2 F | DIASTOLIC BLOOD PRESSURE: 82 MMHG | BODY MASS INDEX: 33.09 KG/M2 | HEART RATE: 87 BPM

## 2019-11-05 VITALS
BODY MASS INDEX: 32.95 KG/M2 | HEIGHT: 64 IN | SYSTOLIC BLOOD PRESSURE: 126 MMHG | DIASTOLIC BLOOD PRESSURE: 82 MMHG | HEART RATE: 87 BPM | WEIGHT: 193 LBS

## 2019-11-05 DIAGNOSIS — F51.01 PRIMARY INSOMNIA: Chronic | ICD-10-CM

## 2019-11-05 DIAGNOSIS — G89.29 CHRONIC MIDLINE LOW BACK PAIN WITHOUT SCIATICA: ICD-10-CM

## 2019-11-05 DIAGNOSIS — R10.2 PELVIC PAIN: Primary | ICD-10-CM

## 2019-11-05 DIAGNOSIS — R10.11 COLICKY RUQ ABDOMINAL PAIN: ICD-10-CM

## 2019-11-05 DIAGNOSIS — B96.89 BV (BACTERIAL VAGINOSIS): ICD-10-CM

## 2019-11-05 DIAGNOSIS — N76.0 BV (BACTERIAL VAGINOSIS): ICD-10-CM

## 2019-11-05 DIAGNOSIS — Z79.899 ENCOUNTER FOR MONITORING LONG-TERM ANTICONVULSANT THERAPY: ICD-10-CM

## 2019-11-05 DIAGNOSIS — G25.81 RLS (RESTLESS LEGS SYNDROME): ICD-10-CM

## 2019-11-05 DIAGNOSIS — R11.0 NAUSEA: ICD-10-CM

## 2019-11-05 DIAGNOSIS — M51.36 DDD (DEGENERATIVE DISC DISEASE), LUMBAR: ICD-10-CM

## 2019-11-05 DIAGNOSIS — D72.829 LEUKOCYTOSIS, UNSPECIFIED TYPE: ICD-10-CM

## 2019-11-05 DIAGNOSIS — I10 ESSENTIAL HYPERTENSION: Chronic | ICD-10-CM

## 2019-11-05 DIAGNOSIS — N93.9 VAGINAL BLEEDING: ICD-10-CM

## 2019-11-05 DIAGNOSIS — F33.1 MODERATE EPISODE OF RECURRENT MAJOR DEPRESSIVE DISORDER (HCC): ICD-10-CM

## 2019-11-05 DIAGNOSIS — F41.1 GENERALIZED ANXIETY DISORDER: ICD-10-CM

## 2019-11-05 DIAGNOSIS — Z51.81 ENCOUNTER FOR MONITORING LONG-TERM ANTICONVULSANT THERAPY: ICD-10-CM

## 2019-11-05 DIAGNOSIS — M54.50 CHRONIC MIDLINE LOW BACK PAIN WITHOUT SCIATICA: ICD-10-CM

## 2019-11-05 DIAGNOSIS — E78.2 MIXED HYPERLIPIDEMIA: Primary | ICD-10-CM

## 2019-11-05 DIAGNOSIS — Z11.3 ROUTINE SCREENING FOR STI (SEXUALLY TRANSMITTED INFECTION): ICD-10-CM

## 2019-11-05 LAB
ALBUMIN SERPL-MCNC: 4.8 G/DL (ref 3.5–5)
ALBUMIN/GLOB SERPL: 1.4 G/DL (ref 1.1–1.8)
ALP SERPL-CCNC: 83 U/L (ref 38–126)
ALT SERPL W P-5'-P-CCNC: 28 U/L
AMYLASE SERPL-CCNC: 85 U/L (ref 30–110)
ANION GAP SERPL CALCULATED.3IONS-SCNC: 9 MMOL/L (ref 5–15)
AST SERPL-CCNC: 27 U/L (ref 14–36)
BACTERIA UR QL AUTO: ABNORMAL /HPF
BASOPHILS # BLD AUTO: 0.03 10*3/MM3 (ref 0–0.2)
BASOPHILS NFR BLD AUTO: 0.3 % (ref 0–1.5)
BILIRUB SERPL-MCNC: 0.4 MG/DL (ref 0.2–1.3)
BILIRUB UR QL STRIP: NEGATIVE
BUN BLD-MCNC: 9 MG/DL (ref 7–23)
BUN/CREAT SERPL: 16.7 (ref 7–25)
CALCIUM SPEC-SCNC: 10 MG/DL (ref 8.4–10.2)
CHLORIDE SERPL-SCNC: 104 MMOL/L (ref 101–112)
CHOLEST SERPL-MCNC: 166 MG/DL (ref 150–200)
CLARITY UR: ABNORMAL
CO2 SERPL-SCNC: 29 MMOL/L (ref 22–30)
COLOR UR: YELLOW
CREAT BLD-MCNC: 0.54 MG/DL (ref 0.52–1.04)
DEPRECATED RDW RBC AUTO: 47.5 FL (ref 37–54)
EOSINOPHIL # BLD AUTO: 0.15 10*3/MM3 (ref 0–0.4)
EOSINOPHIL NFR BLD AUTO: 1.4 % (ref 0.3–6.2)
ERYTHROCYTE [DISTWIDTH] IN BLOOD BY AUTOMATED COUNT: 14.2 % (ref 12.3–15.4)
GFR SERPL CREATININE-BSD FRML MDRD: 132 ML/MIN/1.73 (ref 64–149)
GLOBULIN UR ELPH-MCNC: 3.4 GM/DL (ref 2.3–3.5)
GLUCOSE BLD-MCNC: 75 MG/DL (ref 70–99)
GLUCOSE UR STRIP-MCNC: NEGATIVE MG/DL
HCT VFR BLD AUTO: 42.7 % (ref 34–46.6)
HDLC SERPL-MCNC: 48 MG/DL (ref 40–59)
HGB BLD-MCNC: 14.3 G/DL (ref 12–15.9)
HGB UR QL STRIP.AUTO: ABNORMAL
HYALINE CASTS UR QL AUTO: ABNORMAL /LPF
KETONES UR QL STRIP: NEGATIVE
LDLC SERPL CALC-MCNC: 83 MG/DL
LDLC/HDLC SERPL: 1.72 {RATIO} (ref 0–3.22)
LEUKOCYTE ESTERASE UR QL STRIP.AUTO: ABNORMAL
LYMPHOCYTES # BLD AUTO: 3.53 10*3/MM3 (ref 0.7–3.1)
LYMPHOCYTES NFR BLD AUTO: 32.6 % (ref 19.6–45.3)
MCH RBC QN AUTO: 31.8 PG (ref 26.6–33)
MCHC RBC AUTO-ENTMCNC: 33.5 G/DL (ref 31.5–35.7)
MCV RBC AUTO: 95.1 FL (ref 79–97)
MONOCYTES # BLD AUTO: 0.85 10*3/MM3 (ref 0.1–0.9)
MONOCYTES NFR BLD AUTO: 7.8 % (ref 5–12)
NEUTROPHILS # BLD AUTO: 6.28 10*3/MM3 (ref 1.7–7)
NEUTROPHILS NFR BLD AUTO: 57.9 % (ref 42.7–76)
NITRITE UR QL STRIP: NEGATIVE
PH UR STRIP.AUTO: 8.5 [PH] (ref 5.5–8)
PLATELET # BLD AUTO: 350 10*3/MM3 (ref 140–450)
PMV BLD AUTO: 8.8 FL (ref 6–12)
POTASSIUM BLD-SCNC: 4.7 MMOL/L (ref 3.4–5)
PROT SERPL-MCNC: 8.2 G/DL (ref 6.3–8.6)
PROT UR QL STRIP: NEGATIVE
RBC # BLD AUTO: 4.49 10*6/MM3 (ref 3.77–5.28)
RBC # UR: ABNORMAL /HPF
REF LAB TEST METHOD: ABNORMAL
SODIUM BLD-SCNC: 142 MMOL/L (ref 137–145)
SP GR UR STRIP: 1.01 (ref 1–1.03)
SQUAMOUS #/AREA URNS HPF: ABNORMAL /HPF
TRIGL SERPL-MCNC: 177 MG/DL
UROBILINOGEN UR QL STRIP: ABNORMAL
VLDLC SERPL-MCNC: 35.4 MG/DL
WBC NRBC COR # BLD: 10.84 10*3/MM3 (ref 3.4–10.8)
WBC UR QL AUTO: ABNORMAL /HPF

## 2019-11-05 PROCEDURE — 85025 COMPLETE CBC W/AUTO DIFF WBC: CPT | Performed by: NURSE PRACTITIONER

## 2019-11-05 PROCEDURE — 83690 ASSAY OF LIPASE: CPT | Performed by: NURSE PRACTITIONER

## 2019-11-05 PROCEDURE — 80053 COMPREHEN METABOLIC PANEL: CPT | Performed by: NURSE PRACTITIONER

## 2019-11-05 PROCEDURE — 87661 TRICHOMONAS VAGINALIS AMPLIF: CPT | Performed by: NURSE PRACTITIONER

## 2019-11-05 PROCEDURE — 82150 ASSAY OF AMYLASE: CPT | Performed by: NURSE PRACTITIONER

## 2019-11-05 PROCEDURE — 87210 SMEAR WET MOUNT SALINE/INK: CPT | Performed by: NURSE PRACTITIONER

## 2019-11-05 PROCEDURE — 80061 LIPID PANEL: CPT | Performed by: NURSE PRACTITIONER

## 2019-11-05 PROCEDURE — G0480 DRUG TEST DEF 1-7 CLASSES: HCPCS | Performed by: NURSE PRACTITIONER

## 2019-11-05 PROCEDURE — 99214 OFFICE O/P EST MOD 30 MIN: CPT | Performed by: NURSE PRACTITIONER

## 2019-11-05 PROCEDURE — 87591 N.GONORRHOEAE DNA AMP PROB: CPT | Performed by: NURSE PRACTITIONER

## 2019-11-05 PROCEDURE — 87086 URINE CULTURE/COLONY COUNT: CPT | Performed by: NURSE PRACTITIONER

## 2019-11-05 PROCEDURE — 87491 CHLMYD TRACH DNA AMP PROBE: CPT | Performed by: NURSE PRACTITIONER

## 2019-11-05 RX ORDER — METRONIDAZOLE 500 MG/1
500 TABLET ORAL 2 TIMES DAILY
Qty: 14 TABLET | Refills: 0 | Status: SHIPPED | OUTPATIENT
Start: 2019-11-05 | End: 2019-11-12

## 2019-11-05 RX ORDER — AMITRIPTYLINE HYDROCHLORIDE 50 MG/1
50 TABLET, FILM COATED ORAL NIGHTLY
Qty: 90 TABLET | Refills: 1 | Status: SHIPPED | OUTPATIENT
Start: 2019-11-05 | End: 2020-03-23 | Stop reason: SDUPTHER

## 2019-11-05 RX ORDER — ONDANSETRON 4 MG/1
4 TABLET, FILM COATED ORAL EVERY 8 HOURS PRN
Qty: 90 TABLET | Refills: 1 | Status: SHIPPED | OUTPATIENT
Start: 2019-11-05 | End: 2020-01-27

## 2019-11-05 RX ORDER — FLUTICASONE PROPIONATE 50 MCG
SPRAY, SUSPENSION (ML) NASAL
Refills: 11 | COMMUNITY
Start: 2019-10-01 | End: 2020-03-23

## 2019-11-05 RX ORDER — ATORVASTATIN CALCIUM 20 MG/1
20 TABLET, FILM COATED ORAL DAILY
Qty: 90 TABLET | Refills: 1 | Status: SHIPPED | OUTPATIENT
Start: 2019-11-05 | End: 2020-02-20

## 2019-11-05 RX ORDER — PROPRANOLOL HYDROCHLORIDE 20 MG/1
20 TABLET ORAL 3 TIMES DAILY
Qty: 270 TABLET | Refills: 1 | Status: SHIPPED | OUTPATIENT
Start: 2019-11-05 | End: 2020-08-03

## 2019-11-05 RX ORDER — ROPINIROLE 1 MG/1
1 TABLET, FILM COATED ORAL 2 TIMES DAILY
Qty: 180 TABLET | Refills: 1 | Status: SHIPPED | OUTPATIENT
Start: 2019-11-05 | End: 2021-11-04 | Stop reason: SDUPTHER

## 2019-11-05 RX ORDER — DULOXETIN HYDROCHLORIDE 30 MG/1
30 CAPSULE, DELAYED RELEASE ORAL DAILY
Qty: 90 CAPSULE | Refills: 1 | Status: SHIPPED | OUTPATIENT
Start: 2019-11-05 | End: 2020-03-23 | Stop reason: SDUPTHER

## 2019-11-05 RX ORDER — GABAPENTIN 400 MG/1
400 CAPSULE ORAL 4 TIMES DAILY
Qty: 120 CAPSULE | Refills: 2 | Status: SHIPPED | OUTPATIENT
Start: 2019-11-05 | End: 2020-03-23 | Stop reason: SDUPTHER

## 2019-11-05 NOTE — PROGRESS NOTES
"Tim Olsen is a 29 y.o. female.     History of Present Illness   Pt presents with complaints of pelvic pain x 6 years, worsening over the last few years. Bilateral but worse on the left.  \"Comes and goes\" but occurs multiple times a week. Ramping up over a few hours, then settling back down over a few hours. Pt states pain is severe and when asked what type of pain (ie sharp, stabbing, aching, dull, etc.) pt states \"All of it\".     Pt also complains of vaginal bleeding every single day for the last 4 years. Pt states bleeding is mostly dark brown and red spotting but can be heavier. Pt denies vaginal discharge. Confirms odor but states it is because of the blood.     9/2016, pt had a diagnostic hysteroscopy, fractional D&C, LEEP and endometrial ablation with Dr. Chen for mild dysplasia and abnormal uterine bleeding. US from that time suggests adenomyosis. Pt states bleeding only stopped for 6 months after surgery.     She doesn't think she needs STI testing but I would like to check, given her symptoms and no recorded testing in Epic since 2014.      The following portions of the patient's history were reviewed and updated as appropriate: allergies, current medications, past family history, past medical history, past social history, past surgical history and problem list.    Review of Systems   Constitutional: Negative for chills, fever, unexpected weight gain and unexpected weight loss.   Respiratory: Negative.    Cardiovascular: Negative.    Gastrointestinal: Negative for constipation and diarrhea.   Genitourinary: Positive for dyspareunia, pelvic pain and vaginal bleeding. Negative for difficulty urinating, dysuria, flank pain, frequency, hematuria, urgency, vaginal discharge and vaginal pain.   Musculoskeletal: Positive for back pain (chronic).   Psychiatric/Behavioral: Positive for depressed mood. The patient is nervous/anxious.        Objective    Vitals:    11/05/19 1023   BP: 126/82 "   Pulse: 87         11/05/19  1023   Weight: 87.5 kg (193 lb)     Body mass index is 33.13 kg/m².    Physical Exam   Constitutional: She is oriented to person, place, and time. She appears well-developed and well-nourished.   Cardiovascular: Normal rate, regular rhythm and normal heart sounds.   Pulmonary/Chest: Effort normal and breath sounds normal.   Abdominal: Soft. Normal appearance and bowel sounds are normal. There is tenderness in the right lower quadrant, suprapubic area and left lower quadrant.   Genitourinary: Cervix normal. There is no rash, tenderness, lesion or injury on the right labia. There is no rash, tenderness, lesion or injury on the left labia. Uterus is enlarged and tender. Right adnexum displays no mass, no tenderness and no fullness. Left adnexum displays no mass, no tenderness and no fullness. No erythema, tenderness or bleeding (no bleeding on exam) in the vagina. No foreign body in the vagina. No signs of injury around the vagina. Vaginal discharge found.   Genitourinary Comments: Wet prep: positive for clue cells TNTC, no yeast buds, hyphae or trichomonads. Evaluated by CRUZ Fajardo    Neurological: She is alert and oriented to person, place, and time.   Skin: Skin is warm and dry.   Psychiatric:   Flat affect    Vitals reviewed.    Brief Urine Lab Results  (Last result in the past 365 days)      Color   Clarity   Blood   Leuk Est   Nitrite   Protein   CREAT   Urine HCG        11/05/19 1033 Yellow Cloudy Trace Small (1+) Negative Negative                 Assessment/Plan   Cally was seen today for pelvic pain and abnormal bleeding.    Diagnoses and all orders for this visit:    Pelvic pain  -     Urinalysis With Culture If Indicated - Urine, Clean Catch  -     Urinalysis, Microscopic Only - Urine, Clean Catch; Future  -     Urinalysis, Microscopic Only - Urine, Clean Catch  -     US Non-ob Transvaginal; Future  -     Urine Culture - Urine,; Future  -     Urine Culture - Urine,  Urine, Clean Catch  -     Chlamydia trachomatis, Neisseria gonorrhoeae, Trichomonas vaginalis, PCR - Urine, Urine, Clean Catch    Vaginal bleeding  -     US Non-ob Transvaginal; Future    Routine screening for STI (sexually transmitted infection)  -     Chlamydia trachomatis, Neisseria gonorrhoeae, Trichomonas vaginalis, PCR - Urine, Urine, Clean Catch    BV (bacterial vaginosis)  -     metroNIDAZOLE (FLAGYL) 500 MG tablet; Take 1 tablet by mouth 2 (Two) Times a Day for 7 days. No alcohol up to 48 hours after last dose    Pt denies any urinary symptoms. UA questionable for infection with only small amount of leukocytes and trace blood. I will consider Rx based on culture results.     Obtain TVUS for further evaluation of pelvic pain and vaginal bleeding. We discussed endometrial ablations effectiveness at length. We will base our next steps on US results. Probable surgical consultation.     I will call with G/C/T results and Rx PRN.     Discussed findings on exam and wet prep consistent with BV. Treat with Flagyl 500mg BID x 7 days. No alcohol was stressed. Reviewed vulvar hygiene guidelines with pt.     Pt agrees with this plan of care. She will need a pap smear at follow up visit. Last pap was in 2015, LEEP 2016.

## 2019-11-05 NOTE — PROGRESS NOTES
Chief Complaint   Patient presents with   • Depression     3 mo f/u med refills   • Back Pain     Subjective   Cally Olsen is a 29 y.o. female who presents to the office for routine 3 month follow up of depression/anxiety and back pain with sciatica requiring gabapentin. New complaint of intermittent RUQ abd pain, colicky in nature.     The following portions of the patient's history were reviewed and updated as appropriate: allergies, current medications, past family history, past medical history, past social history, past surgical history and problem list.    History of Present Illness   PMH: chronic hip and  Back pain, depression, anxiety, insomnia, mentrual irregularity.  UDS: 2/21/19  appropriate  Urine gabapentin due     Fasting labs: 5/10//19    CBC  WBC 13.53 neurotrophil, abs 9.55  CMP  Lipid tchol 247 trig 215   Started on atorvastatin.   Iron normal  Ferritin normal  BRITTANY comprehensive profile + only for antichromatin antibodies= 3.1  Vit D normal  Vit b12 and folate, normal  Insulin level normal  A1C normal  2/21/19  CMP  CO2 31   Prot 8.5   Albumin 5.2     Most recent imaging:   MRI L spine 7/27/16 shows mild bulging anulus L5-S1 and mild levoscoliosis at L3/4.    T spine MRI of 7/27/16 shows a subcentimeter probable benign hemanigoma at T10 vertebral body.   Xray left knee:   Mild DJD with mild joint space narrowing left knee by xray of 4/27/17  Chronic lumbar and hip pain managed by Dr Forte at pain management with hydrocodone.  Her back pain is moderately improved with gabapentin, which is primarily used for mood stabilization.      Depression and anxiety - currently managed with cymbalta, amitriptyline, neurontin. Due for refills today. Complaint with therapy. Due for urine gabapentin today.      Insomnia - fair improvement with amitriptyline. Due for refill today.  She has complaints of headaches with the trazodone- stopped due to side effects.  Has tried seroquel was ineffective  Failed  "ruchi    New complaint: RUQ pain wrapping around to lower posterior rib cage intermittent, nagging, feels \"like termites crunching away on my bones\". Occurs after meals and when sleeping maybe 4 times per week.  Onset months ago, with long periods of remission, occurring more often and with greater intensity in past 30 days or so.  She still has her gallbladder.     Medications refilled for insomnia, depression, anxiety, insomnia, RLS, lower back pain (gabapentin only) which are stable.            Past Surgical History:   Procedure Laterality Date   •  SECTION   2015     Repeat   • INJECTION OF MEDICATION   2015     Kenalog (1)  -  SEFERINO Perla   • OTHER SURGICAL HISTORY   2015     Laparoscopy; tubal cautery (1)  - Exam under anesthesia and laparoscopic tubal fulguration.        Past Medical History:   Diagnosis Date   • Acute pharyngitis     unspecified     • Adjustment disorder with anxious mood     Celexa     • Allergic rhinitis    • Asthma    • Backache    • Candidiasis    • Cervical intraepithelial neoplasia grade 1    • Contraception     Desires permanent sterilization    • Depressive disorder    • Dysphagia     unspecified     • Encounter for gynecological examination without abnormal finding    • Encounter for  visit      visit status    • Excessive and frequent menstruation with irregular cycle    • Excessive and frequent menstruation with regular cycle    • Fatigue    • Generalized abdominal pain    • Generalized anxiety disorder    • Headache    • Heartburn    • Hip pain    • Insomnia      Trazodone   • Low grade squamous intraepithelial lesion (LGSIL) on cervicovaginal cytologic smear    • Malaise    • Organic anxiety disorder    • Spasm of back muscles    • Surgical follow-up care    • Tobacco dependence syndrome           Family History   Problem Relation Age of Onset   • Seizures Mother    • Alcohol abuse Father    • Stroke Other         Review of Systems " "  Constitutional: Positive for fatigue. Negative for fever and unexpected weight change.   HENT: Negative.    Eyes: Negative.    Respiratory: Negative.  Negative for cough, chest tightness and shortness of breath.    Cardiovascular: Negative.  Negative for chest pain.   Gastrointestinal: Positive for abdominal pain.   Endocrine: Negative.    Genitourinary: Negative for dysuria, menstrual problem and pelvic pain.        Abnormal PAP requiring multiple treatments, nearly constant pelvic pain.   Musculoskeletal: Positive for arthralgias and back pain.   Skin: Negative.  Negative for color change, pallor, rash and wound.   Allergic/Immunologic: Negative.    Neurological: Negative.    Hematological: Negative.    Psychiatric/Behavioral: Positive for dysphoric mood and sleep disturbance. Negative for suicidal ideas. The patient is nervous/anxious.    All other systems reviewed and are negative.      Objective   Vitals:    11/05/19 0914   BP: 126/82   BP Location: Left arm   Patient Position: Sitting   Cuff Size: Adult   Pulse: 87   Resp: 16   Temp: 98.2 °F (36.8 °C)   TempSrc: Tympanic   SpO2: 98%   Weight: 87.9 kg (193 lb 12.8 oz)   Height: 162.6 cm (64\")   PainSc: 0-No pain     Physical Exam   Constitutional: She is oriented to person, place, and time. She appears well-developed and well-nourished.   HENT:   Head: Normocephalic and atraumatic.   Eyes: Conjunctivae are normal. Pupils are equal, round, and reactive to light.   Neck: Normal range of motion. Neck supple.   Cardiovascular: Normal rate, regular rhythm, normal heart sounds and intact distal pulses. Exam reveals no gallop and no friction rub.   No murmur heard.  Pulmonary/Chest: Effort normal and breath sounds normal. No respiratory distress. She has no wheezes. She has no rales. She exhibits no tenderness.   Abdominal: Soft. Bowel sounds are normal. She exhibits no distension and no mass. There is no tenderness. There is no rebound and no guarding. No hernia. "   Musculoskeletal: Normal range of motion. She exhibits no edema, tenderness or deformity.   Lymphadenopathy:     She has no cervical adenopathy.   Neurological: She is alert and oriented to person, place, and time.   Skin: Skin is warm and dry. Capillary refill takes 2 to 3 seconds. No erythema. No pallor.   Psychiatric: She has a normal mood and affect. Her behavior is normal. Judgment and thought content normal.   Nursing note and vitals reviewed.      Assessment/Plan   Cally was seen today for depression and back pain.    Diagnoses and all orders for this visit:    Mixed hyperlipidemia  -     atorvastatin (LIPITOR) 20 MG tablet; Take 1 tablet by mouth Daily.  -     Lipid Panel    Moderate episode of recurrent major depressive disorder (CMS/HCC)  -     gabapentin (NEURONTIN) 400 MG capsule; Take 1 capsule by mouth 4 (Four) Times a Day.  -     DULoxetine (CYMBALTA) 30 MG capsule; Take 1 capsule by mouth Daily.    Chronic midline low back pain without sciatica  -     gabapentin (NEURONTIN) 400 MG capsule; Take 1 capsule by mouth 4 (Four) Times a Day.    DDD (degenerative disc disease), lumbar  -     gabapentin (NEURONTIN) 400 MG capsule; Take 1 capsule by mouth 4 (Four) Times a Day.    Nausea  -     ondansetron (ZOFRAN) 4 MG tablet; Take 1 tablet by mouth Every 8 (Eight) Hours As Needed for Nausea or Vomiting.    Essential hypertension  -     CBC & Differential  -     Comprehensive Metabolic Panel    Leukocytosis, unspecified type  -     CBC & Differential    Colicky RUQ abdominal pain  -     Amylase  -     Lipase  -     US Gallbladder; Future    Generalized anxiety disorder  -     amitriptyline (ELAVIL) 50 MG tablet; Take 1 tablet by mouth Every Night.  -     propranolol (INDERAL) 20 MG tablet; Take 1 tablet by mouth 3 (Three) Times a Day.    Primary insomnia  -     amitriptyline (ELAVIL) 50 MG tablet; Take 1 tablet by mouth Every Night.    RLS (restless legs syndrome)  -     rOPINIRole (REQUIP) 1 MG tablet; Take  1 tablet by mouth 2 (Two) Times a Day. Take 1 hour before bedtime.           PHQ-2/PHQ-9 Depression Screening 8/6/2019   Little interest or pleasure in doing things 0   Feeling down, depressed, or hopeless 0   Trouble falling or staying asleep, or sleeping too much 0   Feeling tired or having little energy 0   Poor appetite or overeating 0   Feeling bad about yourself - or that you are a failure or have let yourself or your family down 0   Trouble concentrating on things, such as reading the newspaper or watching television 0   Moving or speaking so slowly that other people could have noticed. Or the opposite - being so fidgety or restless that you have been moving around a lot more than usual 0   Thoughts that you would be better off dead, or of hurting yourself in some way 0   Total Score 0   Patient understands the risks associated with this controlled medication, including tolerance and addiction.  Patient also agrees to only obtain this medication from me, and not from a another provider, unless that provider is covering for me in my absence.  Patient also agrees to be compliant in dosing, and not self adjust the dose of medication.  A signed controlled substance agreement is on file, and the patient has received a controlled substance education sheet at this a previous visit.  The patient has also signed a consent for treatment with a controlled substance as per [facility] policy. RORY was obtained.      HITESH Parra         Return in about 3 months (around 2/5/2020), or if symptoms worsen or fail to improve.    There are no Patient Instructions on file for this visit.

## 2019-11-06 LAB
BACTERIA SPEC AEROBE CULT: NORMAL
C TRACH RRNA CVX QL NAA+PROBE: NEGATIVE
LIPASE SERPL-CCNC: 21 U/L (ref 13–60)
N GONORRHOEA RRNA SPEC QL NAA+PROBE: NEGATIVE
TRICHOMONAS VAGINALIS PCR: NEGATIVE

## 2019-11-08 LAB — GABAPENTIN UR-MCNC: 331.4 UG/ML

## 2019-12-02 DIAGNOSIS — B96.89 BV (BACTERIAL VAGINOSIS): ICD-10-CM

## 2019-12-02 DIAGNOSIS — N76.0 BV (BACTERIAL VAGINOSIS): ICD-10-CM

## 2019-12-03 RX ORDER — METRONIDAZOLE 500 MG/1
TABLET ORAL
Qty: 14 TABLET | Refills: 0 | OUTPATIENT
Start: 2019-12-03

## 2020-01-06 ENCOUNTER — HOSPITAL ENCOUNTER (OUTPATIENT)
Dept: GENERAL RADIOLOGY | Facility: HOSPITAL | Age: 30
Discharge: HOME | End: 2020-01-06
Admitting: RADIOLOGY

## 2020-01-06 ENCOUNTER — HOSPITAL ENCOUNTER (OUTPATIENT)
Dept: GENERAL RADIOLOGY | Facility: HOSPITAL | Age: 30
Discharge: HOME | End: 2020-01-06

## 2020-01-06 DIAGNOSIS — M54.50 LOW BACK PAIN, UNSPECIFIED BACK PAIN LATERALITY, UNSPECIFIED CHRONICITY, UNSPECIFIED WHETHER SCIATICA PRESENT: ICD-10-CM

## 2020-01-06 DIAGNOSIS — M54.6 PAIN IN THORACIC SPINE: ICD-10-CM

## 2020-01-06 DIAGNOSIS — M25.552 PAIN IN LEFT HIP: ICD-10-CM

## 2020-01-06 PROCEDURE — 72072 X-RAY EXAM THORAC SPINE 3VWS: CPT

## 2020-01-06 PROCEDURE — 73502 X-RAY EXAM HIP UNI 2-3 VIEWS: CPT

## 2020-01-06 PROCEDURE — 72110 X-RAY EXAM L-2 SPINE 4/>VWS: CPT

## 2020-01-14 RX ORDER — HYDROCODONE BITARTRATE AND ACETAMINOPHEN 10; 325 MG/1; MG/1
TABLET ORAL
Refills: 0 | OUTPATIENT
Start: 2020-01-14

## 2020-01-14 NOTE — TELEPHONE ENCOUNTER
If patient wants me to assume from Dr Forte, will need dose information and will need to come sign a new controlled substance prescribing contract to include hydrocodone.

## 2020-01-23 ENCOUNTER — OFFICE VISIT (OUTPATIENT)
Dept: FAMILY MEDICINE CLINIC | Facility: CLINIC | Age: 30
End: 2020-01-23

## 2020-01-23 VITALS
BODY MASS INDEX: 35.26 KG/M2 | SYSTOLIC BLOOD PRESSURE: 140 MMHG | DIASTOLIC BLOOD PRESSURE: 80 MMHG | TEMPERATURE: 96.3 F | OXYGEN SATURATION: 99 % | HEART RATE: 100 BPM | WEIGHT: 199 LBS | HEIGHT: 63 IN

## 2020-01-23 DIAGNOSIS — J40 BRONCHITIS: Primary | ICD-10-CM

## 2020-01-23 DIAGNOSIS — R05.9 COUGH: ICD-10-CM

## 2020-01-23 LAB
EXPIRATION DATE: NORMAL
FLUAV AG NPH QL: NEGATIVE
FLUBV AG NPH QL: NEGATIVE
INTERNAL CONTROL: NORMAL
Lab: NORMAL

## 2020-01-23 PROCEDURE — 87804 INFLUENZA ASSAY W/OPTIC: CPT | Performed by: NURSE PRACTITIONER

## 2020-01-23 PROCEDURE — 96372 THER/PROPH/DIAG INJ SC/IM: CPT | Performed by: NURSE PRACTITIONER

## 2020-01-23 PROCEDURE — 99213 OFFICE O/P EST LOW 20 MIN: CPT | Performed by: NURSE PRACTITIONER

## 2020-01-23 RX ORDER — TRIAMCINOLONE ACETONIDE 40 MG/ML
80 INJECTION, SUSPENSION INTRA-ARTICULAR; INTRAMUSCULAR ONCE
Status: COMPLETED | OUTPATIENT
Start: 2020-01-23 | End: 2020-01-23

## 2020-01-23 RX ORDER — AZITHROMYCIN 250 MG/1
TABLET, FILM COATED ORAL
Qty: 6 TABLET | Refills: 0 | Status: SHIPPED | OUTPATIENT
Start: 2020-01-23 | End: 2020-02-20

## 2020-01-23 RX ORDER — BROMPHENIRAMINE MALEATE, PSEUDOEPHEDRINE HYDROCHLORIDE, AND DEXTROMETHORPHAN HYDROBROMIDE 2; 30; 10 MG/5ML; MG/5ML; MG/5ML
5 SYRUP ORAL 4 TIMES DAILY PRN
Qty: 240 ML | Refills: 1 | Status: SHIPPED | OUTPATIENT
Start: 2020-01-23 | End: 2020-02-20

## 2020-01-23 RX ADMIN — TRIAMCINOLONE ACETONIDE 80 MG: 40 INJECTION, SUSPENSION INTRA-ARTICULAR; INTRAMUSCULAR at 14:00

## 2020-01-23 NOTE — PROGRESS NOTES
Subjective   Cally Olsen is a 29 y.o. female. URI    History of Present Illness   Upper Respiratory Infection  Patient complains of symptoms of a URI. Symptoms include bilateral ear pressure/pain, congestion, cough described as productive, nasal congestion, shortness of breath and sore throat. She says she has felt feverish with chills but has not checked her temperature. Onset of symptoms was 4 months ago with cough and worsened with other symptoms about a week ago . Treatment to date: cough suppressants. Uses albuterol inhaler as needed for shortness of breath and has been using more often. Her daughter is sick with similar symptoms. Denies any nausea, vomiting, or diarrhea.     The following portions of the patient's history were reviewed and updated as appropriate: allergies, current medications, past family history, past medical history, past social history, past surgical history and problem list.    Review of Systems   Constitutional: Negative for chills, fatigue and fever.   HENT: Positive for congestion, ear pain, postnasal drip and sore throat. Negative for rhinorrhea.    Eyes: Negative for blurred vision, double vision and visual disturbance.   Respiratory: Positive for cough and shortness of breath. Negative for chest tightness and wheezing.    Cardiovascular: Negative for chest pain, palpitations and leg swelling.   Gastrointestinal: Negative for abdominal pain, diarrhea, nausea and vomiting.   Endocrine: Negative for cold intolerance and heat intolerance.   Genitourinary: Negative for difficulty urinating, dysuria, frequency and hematuria.   Musculoskeletal: Negative for arthralgias, back pain, neck pain and neck stiffness.   Skin: Negative for dry skin, pallor, rash, skin lesions and bruise.   Allergic/Immunologic: Negative for environmental allergies, food allergies and immunocompromised state.   Neurological: Negative for dizziness, syncope, weakness, light-headedness, headache and confusion.    Hematological: Negative for adenopathy. Does not bruise/bleed easily.   Psychiatric/Behavioral: Negative for self-injury, sleep disturbance, suicidal ideas, depressed mood and stress. The patient is not nervous/anxious.        Objective   Physical Exam   Constitutional: She is oriented to person, place, and time. She appears well-developed and well-nourished. She is cooperative. She does not have a sickly appearance. She does not appear ill. No distress.   HENT:   Head: Normocephalic.   Right Ear: Hearing, external ear and ear canal normal. Tympanic membrane is erythematous (mild).   Left Ear: Hearing, tympanic membrane, external ear and ear canal normal.   Nose: Mucosal edema and congestion present.   Mouth/Throat: Uvula is midline and mucous membranes are normal. Posterior oropharyngeal erythema present. Tonsils are 1+ on the right. Tonsils are 1+ on the left. No tonsillar exudate.   Eyes: Pupils are equal, round, and reactive to light. Conjunctivae, EOM and lids are normal.   Neck: Trachea normal, normal range of motion, full passive range of motion without pain and phonation normal. Neck supple. No thyroid mass and no thyromegaly present.   Cardiovascular: Normal rate, regular rhythm, S1 normal, S2 normal and normal heart sounds.   No murmur heard.  Pulmonary/Chest: Effort normal and breath sounds normal. No respiratory distress. She has no decreased breath sounds. She has no wheezes. She has no rhonchi. She has no rales.   Abdominal: Soft. Normal appearance. There is no tenderness.   Neurological: She is alert and oriented to person, place, and time. She has normal strength. No cranial nerve deficit. Coordination and gait normal.   Skin: Skin is warm, dry and intact. She is not diaphoretic. No pallor.   Psychiatric: She has a normal mood and affect. Her speech is normal and behavior is normal. Judgment and thought content normal. Cognition and memory are normal.     Vitals:    01/23/20 1310   BP: 140/80    Pulse: 100   Temp: 96.3 °F (35.7 °C)   SpO2: 99%         Assessment/Plan   Cally was seen today for uri.    Diagnoses and all orders for this visit:    Bronchitis  -     triamcinolone acetonide (KENALOG-40) injection 80 mg  -     azithromycin (ZITHROMAX Z-ANT) 250 MG tablet; Take 2 tablets the first day, then 1 tablet daily for 4 days.  -     brompheniramine-pseudoephedrine-DM 30-2-10 MG/5ML syrup; Take 5 mL by mouth 4 (Four) Times a Day As Needed for Congestion or Cough.    Cough  -     POCT Influenza A/B    Flu swab negative.  Lung sounds WNL, don't suspect pneumonia.  Bronchitis- Pt educated that this is of viral origin 90% of the time so no antibiotics are necessary. Its important to rest and stay well hydrated. When coughing, cough in the bend of arm to avoid spreading to others to others and wash hands often. Usually duration of illness is 1-3 weeks but cough may persist longer. Kenalog 80 mg given to reduce inflammation of bronchus. Azithromycin 2 tablets now then 1 tablet daily for 4 days given to do history of chronic cough.   If symptoms do not improve or worsen, patient was instructed to return to clinic for further evaluation.         This document has been electronically signed by HITESH Crisostomo on  January 23, 2020 2:00 PM

## 2020-01-25 DIAGNOSIS — R11.0 NAUSEA: ICD-10-CM

## 2020-01-25 DIAGNOSIS — R05.9 COUGH: ICD-10-CM

## 2020-01-27 RX ORDER — GUAIFENESIN 200 MG/10ML
LIQUID ORAL
Qty: 118 ML | Refills: 0 | Status: SHIPPED | OUTPATIENT
Start: 2020-01-27 | End: 2020-02-20

## 2020-01-27 RX ORDER — ONDANSETRON 4 MG/1
TABLET, FILM COATED ORAL
Qty: 30 TABLET | Refills: 2 | Status: SHIPPED | OUTPATIENT
Start: 2020-01-27 | End: 2020-03-23 | Stop reason: SDUPTHER

## 2020-02-13 DIAGNOSIS — M51.36 DDD (DEGENERATIVE DISC DISEASE), LUMBAR: ICD-10-CM

## 2020-02-13 DIAGNOSIS — F33.1 MODERATE EPISODE OF RECURRENT MAJOR DEPRESSIVE DISORDER (HCC): ICD-10-CM

## 2020-02-13 DIAGNOSIS — F51.01 PRIMARY INSOMNIA: Chronic | ICD-10-CM

## 2020-02-13 DIAGNOSIS — E78.2 MIXED HYPERLIPIDEMIA: ICD-10-CM

## 2020-02-13 DIAGNOSIS — G89.29 CHRONIC MIDLINE LOW BACK PAIN WITHOUT SCIATICA: ICD-10-CM

## 2020-02-13 DIAGNOSIS — M54.50 CHRONIC MIDLINE LOW BACK PAIN WITHOUT SCIATICA: ICD-10-CM

## 2020-02-13 DIAGNOSIS — F41.1 GENERALIZED ANXIETY DISORDER: ICD-10-CM

## 2020-02-14 RX ORDER — PROPRANOLOL HYDROCHLORIDE 20 MG/1
TABLET ORAL
Qty: 270 TABLET | Refills: 1 | OUTPATIENT
Start: 2020-02-14

## 2020-02-14 RX ORDER — GABAPENTIN 400 MG/1
CAPSULE ORAL
Qty: 120 CAPSULE | OUTPATIENT
Start: 2020-02-14

## 2020-02-14 RX ORDER — ATORVASTATIN CALCIUM 20 MG/1
20 TABLET, FILM COATED ORAL DAILY
Qty: 90 TABLET | Refills: 1 | OUTPATIENT
Start: 2020-02-14

## 2020-02-14 RX ORDER — AMITRIPTYLINE HYDROCHLORIDE 50 MG/1
50 TABLET, FILM COATED ORAL NIGHTLY
Qty: 90 TABLET | Refills: 1 | OUTPATIENT
Start: 2020-02-14

## 2020-02-20 ENCOUNTER — OFFICE VISIT (OUTPATIENT)
Dept: FAMILY MEDICINE CLINIC | Facility: CLINIC | Age: 30
End: 2020-02-20

## 2020-02-20 ENCOUNTER — APPOINTMENT (OUTPATIENT)
Dept: LAB | Facility: HOSPITAL | Age: 30
End: 2020-02-20

## 2020-02-20 VITALS
WEIGHT: 195.4 LBS | OXYGEN SATURATION: 100 % | TEMPERATURE: 96.4 F | HEART RATE: 95 BPM | BODY MASS INDEX: 34.62 KG/M2 | DIASTOLIC BLOOD PRESSURE: 78 MMHG | SYSTOLIC BLOOD PRESSURE: 140 MMHG | HEIGHT: 63 IN

## 2020-02-20 DIAGNOSIS — R05.9 COUGH: ICD-10-CM

## 2020-02-20 DIAGNOSIS — Z72.0 DECLINED SMOKING CESSATION: ICD-10-CM

## 2020-02-20 DIAGNOSIS — J40 BRONCHITIS: Primary | ICD-10-CM

## 2020-02-20 DIAGNOSIS — B37.0 ORAL CANDIDIASIS: ICD-10-CM

## 2020-02-20 DIAGNOSIS — R06.02 SHORTNESS OF BREATH: ICD-10-CM

## 2020-02-20 LAB — D-DIMER, QUANTITATIVE (MAD,POW, STR): <270 NG/ML (FEU) (ref 0–470)

## 2020-02-20 PROCEDURE — 99213 OFFICE O/P EST LOW 20 MIN: CPT | Performed by: NURSE PRACTITIONER

## 2020-02-20 PROCEDURE — 85379 FIBRIN DEGRADATION QUANT: CPT | Performed by: NURSE PRACTITIONER

## 2020-02-20 RX ORDER — GUAIFENESIN/DEXTROMETHORPHAN 100-10MG/5
10 SYRUP ORAL 4 TIMES DAILY PRN
Qty: 240 ML | Refills: 1 | Status: SHIPPED | OUTPATIENT
Start: 2020-02-20 | End: 2020-03-23 | Stop reason: SDUPTHER

## 2020-02-20 NOTE — PROGRESS NOTES
Subjective   Cally Olsen is a 29 y.o. female. URI    History of Present Illness   Upper Respiratory Infection  Patient complains of symptoms of a URI. Symptoms include productive cough with  brown colored sputum, wheezing and chest tightness. She says she has also felt more fatigued in the last few days. Onset of symptoms was a few days ago, and has been gradually worsening since that time. Treatment to date: cough suppressants. Denies any recent sick contacts. Treated for bronchitis about a month. Denies any chest pain, heart palpations, fever, chills, nausea, vomiting, or diarrhea.    Cally Olsen  reports that she has been smoking. She has a 8.00 pack-year smoking history. She has never used smokeless tobacco.. I have educated her on the risk of diseases from using tobacco products such as cancer, COPD, heart diease and cataracts.     I advised her to quit and she is not willing to quit.    I spent 3  minutes counseling the patient.    The following portions of the patient's history were reviewed and updated as appropriate: allergies, current medications, past family history, past medical history, past social history, past surgical history and problem list.    Review of Systems   Constitutional: Positive for fatigue. Negative for chills and fever.   HENT: Negative for congestion, ear pain, rhinorrhea and sore throat.    Eyes: Negative for blurred vision, double vision and visual disturbance.   Respiratory: Positive for cough, chest tightness and shortness of breath. Negative for wheezing.    Cardiovascular: Negative for chest pain, palpitations and leg swelling.   Gastrointestinal: Negative for abdominal pain, diarrhea, nausea and vomiting.   Endocrine: Negative for cold intolerance and heat intolerance.   Genitourinary: Negative for difficulty urinating, dysuria, frequency and hematuria.   Musculoskeletal: Negative for arthralgias, back pain, neck pain and neck stiffness.   Skin: Negative for dry  skin, pallor, rash, skin lesions and bruise.   Allergic/Immunologic: Negative for environmental allergies, food allergies and immunocompromised state.   Neurological: Negative for dizziness, syncope, weakness, light-headedness, headache and confusion.   Hematological: Negative for adenopathy. Does not bruise/bleed easily.   Psychiatric/Behavioral: Negative for self-injury, sleep disturbance, suicidal ideas, depressed mood and stress. The patient is not nervous/anxious.        Objective   Physical Exam   Constitutional: She is oriented to person, place, and time. She appears well-developed and well-nourished. She is cooperative. She does not have a sickly appearance. She does not appear ill. No distress.   HENT:   Head: Normocephalic.   Right Ear: Hearing, tympanic membrane, external ear and ear canal normal.   Left Ear: Hearing, tympanic membrane, external ear and ear canal normal.   Nose: Mucosal edema and congestion present.   Mouth/Throat: Uvula is midline and mucous membranes are normal. Oral lesions (white coating on tongue) present. Posterior oropharyngeal erythema present. Tonsils are 1+ on the right. Tonsils are 1+ on the left. No tonsillar exudate.   Eyes: Pupils are equal, round, and reactive to light. Conjunctivae, EOM and lids are normal.   Neck: Trachea normal, normal range of motion, full passive range of motion without pain and phonation normal. Neck supple. No thyroid mass and no thyromegaly present.   Cardiovascular: Normal rate, regular rhythm, S1 normal, S2 normal and normal heart sounds.   No murmur heard.  Pulmonary/Chest: Effort normal and breath sounds normal. No respiratory distress. She has no decreased breath sounds. She has no wheezes. She has no rhonchi. She has no rales.   Abdominal: Soft. Normal appearance. There is no tenderness.   Neurological: She is alert and oriented to person, place, and time. She has normal strength. No cranial nerve deficit. Coordination and gait normal.   Skin:  Skin is warm, dry and intact. She is not diaphoretic. No pallor.   Psychiatric: She has a normal mood and affect. Her speech is normal and behavior is normal. Judgment and thought content normal. Cognition and memory are normal.     Vitals:    02/20/20 0910   BP: 140/78   Pulse: 95   Temp: 96.4 °F (35.8 °C)   SpO2: 100%         Assessment/Plan   Cally was seen today for uri.    Diagnoses and all orders for this visit:    Bronchitis    Oral candidiasis  -     nystatin (MYCOSTATIN) 409382 UNIT/ML suspension; Swish and swallow 5 mL 4 (Four) Times a Day.    Shortness of breath  -     XR Chest PA & Lateral (In Office)  -     D-dimer, Quantitative    Cough  -     XR Chest PA & Lateral (In Office)  -     guaiFENesin-dextromethorphan (ROBITUSSIN DM) 100-10 MG/5ML syrup; Take 10 mL by mouth 4 (Four) Times a Day As Needed for Cough.    Declined smoking cessation    Bronchitis- Pt educated that this is of viral origin 90% of the time so no antibiotics are necessary. Its important to rest and stay well hydrated. When coughing, cough in the bend of arm to avoid spreading to others to others and wash hands often. Usually duration of illness is 1-3 weeks but cough may persist longer. Pt educated that smoking makes bronchitis symptoms worse and persist longer. Pt educated on dangers of smoking but says she is not ready to quit.  Oral candidiasis Nystatin swish and swallow 5 ml QID until gone.   Shortness of breath- Chest XR unremarkable, awaiting radiologist to confirm, will call pt with final radiologist report. D-dimer to rule out possible PE, will call with results  If symptoms do not improve or worsen, patient was instructed to return to clinic for further evaluation.         This document has been electronically signed by HITESH Crisostomo on  February 20, 2020 9:55 AM

## 2020-03-17 DIAGNOSIS — M51.36 DDD (DEGENERATIVE DISC DISEASE), LUMBAR: ICD-10-CM

## 2020-03-17 DIAGNOSIS — F33.1 MODERATE EPISODE OF RECURRENT MAJOR DEPRESSIVE DISORDER (HCC): ICD-10-CM

## 2020-03-17 DIAGNOSIS — M54.50 CHRONIC MIDLINE LOW BACK PAIN WITHOUT SCIATICA: ICD-10-CM

## 2020-03-17 DIAGNOSIS — G89.29 CHRONIC MIDLINE LOW BACK PAIN WITHOUT SCIATICA: ICD-10-CM

## 2020-03-17 RX ORDER — GABAPENTIN 400 MG/1
400 CAPSULE ORAL 4 TIMES DAILY
Qty: 120 CAPSULE | Refills: 2 | OUTPATIENT
Start: 2020-03-17

## 2020-03-17 NOTE — TELEPHONE ENCOUNTER
Needs visit, RORY, UDS and urine gabapentin, last visit in November, must be seen every 3 months for controls.,

## 2020-03-23 ENCOUNTER — OFFICE VISIT (OUTPATIENT)
Dept: FAMILY MEDICINE CLINIC | Facility: CLINIC | Age: 30
End: 2020-03-23

## 2020-03-23 VITALS
RESPIRATION RATE: 16 BRPM | BODY MASS INDEX: 33.29 KG/M2 | TEMPERATURE: 97.6 F | HEART RATE: 66 BPM | OXYGEN SATURATION: 98 % | SYSTOLIC BLOOD PRESSURE: 138 MMHG | DIASTOLIC BLOOD PRESSURE: 76 MMHG | HEIGHT: 64 IN | WEIGHT: 195 LBS

## 2020-03-23 DIAGNOSIS — Z51.81 ENCOUNTER FOR THERAPEUTIC DRUG LEVEL MONITORING: ICD-10-CM

## 2020-03-23 DIAGNOSIS — M54.50 CHRONIC MIDLINE LOW BACK PAIN WITHOUT SCIATICA: ICD-10-CM

## 2020-03-23 DIAGNOSIS — G89.29 CHRONIC MIDLINE LOW BACK PAIN WITHOUT SCIATICA: ICD-10-CM

## 2020-03-23 DIAGNOSIS — R05.9 COUGH: ICD-10-CM

## 2020-03-23 DIAGNOSIS — M51.36 DDD (DEGENERATIVE DISC DISEASE), LUMBAR: ICD-10-CM

## 2020-03-23 DIAGNOSIS — F33.1 MODERATE EPISODE OF RECURRENT MAJOR DEPRESSIVE DISORDER (HCC): ICD-10-CM

## 2020-03-23 DIAGNOSIS — J44.9 CHRONIC OBSTRUCTIVE PULMONARY DISEASE, UNSPECIFIED COPD TYPE (HCC): Primary | ICD-10-CM

## 2020-03-23 DIAGNOSIS — F41.1 GENERALIZED ANXIETY DISORDER: ICD-10-CM

## 2020-03-23 DIAGNOSIS — F51.01 PRIMARY INSOMNIA: Chronic | ICD-10-CM

## 2020-03-23 DIAGNOSIS — R11.0 NAUSEA: ICD-10-CM

## 2020-03-23 PROCEDURE — 99214 OFFICE O/P EST MOD 30 MIN: CPT | Performed by: NURSE PRACTITIONER

## 2020-03-23 PROCEDURE — 80307 DRUG TEST PRSMV CHEM ANLYZR: CPT | Performed by: NURSE PRACTITIONER

## 2020-03-23 PROCEDURE — G0481 DRUG TEST DEF 8-14 CLASSES: HCPCS | Performed by: NURSE PRACTITIONER

## 2020-03-23 PROCEDURE — G0480 DRUG TEST DEF 1-7 CLASSES: HCPCS | Performed by: NURSE PRACTITIONER

## 2020-03-23 RX ORDER — ALBUTEROL SULFATE 90 UG/1
2 AEROSOL, METERED RESPIRATORY (INHALATION) EVERY 6 HOURS PRN
Qty: 18 G | Refills: 5 | Status: SHIPPED | OUTPATIENT
Start: 2020-03-23 | End: 2020-06-15

## 2020-03-23 RX ORDER — GUAIFENESIN/DEXTROMETHORPHAN 100-10MG/5
10 SYRUP ORAL 4 TIMES DAILY PRN
Qty: 240 ML | Refills: 1 | Status: SHIPPED | OUTPATIENT
Start: 2020-03-23 | End: 2021-03-08

## 2020-03-23 RX ORDER — AMITRIPTYLINE HYDROCHLORIDE 50 MG/1
50 TABLET, FILM COATED ORAL NIGHTLY
Qty: 90 TABLET | Refills: 1 | Status: SHIPPED | OUTPATIENT
Start: 2020-03-23 | End: 2020-09-28

## 2020-03-23 RX ORDER — DULOXETIN HYDROCHLORIDE 30 MG/1
30 CAPSULE, DELAYED RELEASE ORAL DAILY
Qty: 90 CAPSULE | Refills: 1 | Status: SHIPPED | OUTPATIENT
Start: 2020-03-23 | End: 2020-09-28

## 2020-03-23 RX ORDER — GABAPENTIN 400 MG/1
400 CAPSULE ORAL 4 TIMES DAILY
Qty: 120 CAPSULE | Refills: 2 | Status: SHIPPED | OUTPATIENT
Start: 2020-03-23 | End: 2020-05-19 | Stop reason: SDUPTHER

## 2020-03-23 RX ORDER — ONDANSETRON 4 MG/1
4 TABLET, FILM COATED ORAL EVERY 8 HOURS PRN
Qty: 30 TABLET | Refills: 2 | Status: SHIPPED | OUTPATIENT
Start: 2020-03-23 | End: 2020-06-12

## 2020-03-23 NOTE — PROGRESS NOTES
Chief Complaint   Patient presents with   • Pain     refill     Subjective   Cally Olsen is a 29 y.o. female who presents to the office for review of chronic mood disorder managed with gabapentin.    The following portions of the patient's history were reviewed and updated as appropriate: allergies, current medications, past family history, past medical history, past social history, past surgical history and problem list.    History of Present Illness   UDs and urine gabapentin current but will be due before next visit, collect today.   Most recent imaging:   MRI L spine 7/27/16 shows mild bulging anulus L5-S1 and mild levoscoliosis at L3/4.    T spine MRI of 7/27/16 shows a subcentimeter probable benign hemanigoma at T10 vertebral body.   Xray left knee:   Mild DJD with mild joint space narrowing left knee by xray of 4/27/17  Chronic lumbar and hip pain managed by Dr Forte at pain management with hydrocodone.  Her back pain is moderately improved with gabapentin, which is primarily used for mood stabilization.      Depression and anxiety - currently managed with cymbalta, amitriptyline, neurontin. Due for refills today. Complaint with therapy. Due for urine gabapentin today.      Insomnia - fair improvement with amitriptyline. Due for refill today.  She has complaints of headaches with the trazodone- stopped due to side effects.  Has tried seroquel was ineffective  Failed remeron    HPI, ROS  and PE from most recent  Visit carried forward and updated as appropriate for current situation.    Past Medical History:   Diagnosis Date   • Abnormal Pap smear of cervix    • Acute pharyngitis     unspecified     • Adjustment disorder with anxious mood     Celexa     • Allergic rhinitis    • Asthma    • Backache    • Candidiasis    • Cervical intraepithelial neoplasia grade 1    • Contraception     Desires permanent sterilization    • Depressive disorder    • Dysphagia     unspecified     • Encounter for  gynecological examination without abnormal finding    • Encounter for  visit      visit status    • Excessive and frequent menstruation with irregular cycle    • Excessive and frequent menstruation with regular cycle    • Fatigue    • Generalized abdominal pain    • Generalized anxiety disorder    • Headache    • Heartburn    • Hip pain    • Hypertension    • Insomnia      Trazodone   • Low grade squamous intraepithelial lesion (LGSIL) on cervicovaginal cytologic smear    • Malaise    • Organic anxiety disorder    • Spasm of back muscles    • Surgical follow-up care    • Tobacco dependence syndrome    • Urinary tract infection           Family History   Problem Relation Age of Onset   • Seizures Mother    • Alcohol abuse Father    • Stroke Other         Review of Systems   Constitutional: Positive for fatigue. Negative for fever and unexpected weight change.   HENT: Negative.    Eyes: Negative.    Respiratory: Negative.  Negative for cough, chest tightness and shortness of breath.    Cardiovascular: Negative.  Negative for chest pain.   Gastrointestinal: Negative for abdominal pain.   Endocrine: Negative.    Genitourinary: Negative for dysuria, menstrual problem and pelvic pain.        Abnormal PAP requiring multiple treatments, nearly constant pelvic pain.   Musculoskeletal: Positive for arthralgias and back pain.   Skin: Negative.  Negative for color change, pallor, rash and wound.   Allergic/Immunologic: Negative.    Neurological: Negative.    Hematological: Negative.    Psychiatric/Behavioral: Positive for dysphoric mood and sleep disturbance. Negative for suicidal ideas. The patient is nervous/anxious.    All other systems reviewed and are negative.      Objective   Vitals:    20 0801   BP: 138/76   BP Location: Left arm   Patient Position: Sitting   Cuff Size: Adult   Pulse: 66   Resp: 16   Temp: 97.6 °F (36.4 °C)   TempSrc: Tympanic   SpO2: 98%   Weight: 88.5 kg (195 lb)   Height:  "162.6 cm (64\")     Physical Exam   Constitutional: She is oriented to person, place, and time. She appears well-developed and well-nourished.   HENT:   Head: Normocephalic and atraumatic.   Eyes: Pupils are equal, round, and reactive to light. Conjunctivae are normal.   Neck: Normal range of motion. Neck supple.   Cardiovascular: Normal rate, regular rhythm, normal heart sounds and intact distal pulses. Exam reveals no gallop and no friction rub.   No murmur heard.  Pulmonary/Chest: Effort normal and breath sounds normal. No respiratory distress. She has no wheezes. She has no rales. She exhibits no tenderness.   Abdominal: Soft. Bowel sounds are normal. She exhibits no distension and no mass. There is no tenderness. There is no rebound and no guarding. No hernia.   Musculoskeletal: Normal range of motion. She exhibits no edema, tenderness or deformity.   Lymphadenopathy:     She has no cervical adenopathy.   Neurological: She is alert and oriented to person, place, and time.   Skin: Skin is warm and dry. Capillary refill takes 2 to 3 seconds. No erythema. No pallor.   Psychiatric: She has a normal mood and affect. Her behavior is normal. Judgment and thought content normal.   Nursing note and vitals reviewed.      Assessment/Plan   Cally was seen today for pain.    Diagnoses and all orders for this visit:    Chronic obstructive pulmonary disease, unspecified COPD type (CMS/HCC)  -     albuterol sulfate  (90 Base) MCG/ACT inhaler; Inhale 2 puffs Every 6 (Six) Hours As Needed for Wheezing or Shortness of Air.    Moderate episode of recurrent major depressive disorder (CMS/HCC)  -     gabapentin (NEURONTIN) 400 MG capsule; Take 1 capsule by mouth 4 (Four) Times a Day.  -     DULoxetine (CYMBALTA) 30 MG capsule; Take 1 capsule by mouth Daily.    Chronic midline low back pain without sciatica  -     gabapentin (NEURONTIN) 400 MG capsule; Take 1 capsule by mouth 4 (Four) Times a Day.    DDD (degenerative disc " disease), lumbar  -     gabapentin (NEURONTIN) 400 MG capsule; Take 1 capsule by mouth 4 (Four) Times a Day.    Nausea  -     ondansetron (ZOFRAN) 4 MG tablet; Take 1 tablet by mouth Every 8 (Eight) Hours As Needed for Nausea or Vomiting.    Cough  -     guaiFENesin-dextromethorphan (ROBITUSSIN DM) 100-10 MG/5ML syrup; Take 10 mL by mouth 4 (Four) Times a Day As Needed for Cough.    Generalized anxiety disorder  -     amitriptyline (ELAVIL) 50 MG tablet; Take 1 tablet by mouth Every Night.    Primary insomnia  -     amitriptyline (ELAVIL) 50 MG tablet; Take 1 tablet by mouth Every Night.    Encounter for therapeutic drug level monitoring  -     ToxASSURE Select 13 Discrete -  -     Gabapentin, Urine -           PHQ-2/PHQ-9 Depression Screening 3/23/2020   Little interest or pleasure in doing things 0   Feeling down, depressed, or hopeless 0   Trouble falling or staying asleep, or sleeping too much 0   Feeling tired or having little energy 0   Poor appetite or overeating 0   Feeling bad about yourself - or that you are a failure or have let yourself or your family down 0   Trouble concentrating on things, such as reading the newspaper or watching television 0   Moving or speaking so slowly that other people could have noticed. Or the opposite - being so fidgety or restless that you have been moving around a lot more than usual 0   Thoughts that you would be better off dead, or of hurting yourself in some way 0   Total Score 0   Patient understands the risks associated with this controlled medication, including tolerance and addiction.  Patient also agrees to only obtain this medication from me, and not from a another provider, unless that provider is covering for me in my absence.  Patient also agrees to be compliant in dosing, and not self adjust the dose of medication.  A signed controlled substance agreement is on file, and the patient has received a controlled substance education sheet at this a previous visit.   The patient has also signed a consent for treatment with a controlled substance as per [facility] policy. RORY was obtained.      HITESH Parra         Return in about 3 months (around 6/23/2020).    There are no Patient Instructions on file for this visit.

## 2020-03-25 LAB — GABAPENTIN UR-MCNC: NEGATIVE UG/ML

## 2020-03-26 ENCOUNTER — TELEPHONE (OUTPATIENT)
Dept: FAMILY MEDICINE CLINIC | Facility: CLINIC | Age: 30
End: 2020-03-26

## 2020-03-26 ENCOUNTER — OFFICE VISIT (OUTPATIENT)
Dept: FAMILY MEDICINE CLINIC | Facility: CLINIC | Age: 30
End: 2020-03-26

## 2020-03-26 VITALS
HEART RATE: 120 BPM | DIASTOLIC BLOOD PRESSURE: 90 MMHG | HEIGHT: 64 IN | OXYGEN SATURATION: 95 % | WEIGHT: 195.8 LBS | TEMPERATURE: 97.6 F | SYSTOLIC BLOOD PRESSURE: 150 MMHG | BODY MASS INDEX: 33.43 KG/M2

## 2020-03-26 DIAGNOSIS — R31.9 URINARY TRACT INFECTION WITH HEMATURIA, SITE UNSPECIFIED: Primary | ICD-10-CM

## 2020-03-26 DIAGNOSIS — R10.84 GENERALIZED ABDOMINAL PAIN: ICD-10-CM

## 2020-03-26 DIAGNOSIS — N39.0 URINARY TRACT INFECTION WITH HEMATURIA, SITE UNSPECIFIED: Primary | ICD-10-CM

## 2020-03-26 DIAGNOSIS — R35.0 URINARY FREQUENCY: ICD-10-CM

## 2020-03-26 DIAGNOSIS — R10.30 LOWER ABDOMINAL PAIN: ICD-10-CM

## 2020-03-26 DIAGNOSIS — R31.9 HEMATURIA, UNSPECIFIED TYPE: ICD-10-CM

## 2020-03-26 LAB
6-ACETYLMORPHINE: NEGATIVE
6MAM SERPLBLD-MCNC: NOT DETECTED NG/MG CREAT
7-AMINOCLONAZEPAM UR: 176 NG/MG CREAT
A-OH ALPRAZ/CREAT UR: NOT DETECTED NG/MG CREAT
ALFENTANIL UR QL: NOT DETECTED NG/MG CREAT
ALPHA-HYDROXYMIDAZOLAM, URINE: NOT DETECTED NG/MG CREAT
ALPHA-HYDROXYTRIAZOLAM, URINE: NOT DETECTED NG/MG CREAT
AMOBARBITAL UR: NOT DETECTED
AMPHET UR QL CFM: NOT DETECTED NG/MG CREAT
AMPHETAMINES UR QL SCN: NEGATIVE
BARBITAL UR QL CFM: NOT DETECTED
BARBITURATES UR QL SCN: NEGATIVE
BENZODIAZ UR QL SCN: NORMAL
BENZOYLECGONINE UR: NOT DETECTED NG/MG CREAT
BILIRUB BLD-MCNC: ABNORMAL MG/DL
BUPRENORPHINE UR QL: NEGATIVE
BUPRENORPHINE UR QL: NOT DETECTED NG/MG CREAT
BUTABARBITAL UR QL: NOT DETECTED
BUTALBITAL UR QL: NOT DETECTED
CANNABINOIDS UR QL CFM: NEGATIVE
CLARITY, POC: ABNORMAL
CLONAZEPAM UR QL: NOT DETECTED NG/MG CREAT
COCAETHYLENE UR QL CFM: NOT DETECTED NG/MG CREAT
COCAINE UR QL CFM: NEGATIVE
CODEINE UR QL: NOT DETECTED NG/MG CREAT
COLOR UR: ABNORMAL
CONV COCAINE, UR: NOT DETECTED NG/MG CREAT
CONV REPORT SUMMARY: NORMAL
CREAT 24H UR-MCNC: 212 MG/DL
DESALKYLFLURAZ/CREAT UR: NOT DETECTED NG/MG CREAT
DESMETHYLFLUNITRAZEPAM: NOT DETECTED NG/MG CREAT
DIAZEPAM UR-MCNC: NOT DETECTED NG/MG CREAT
DIHYDROCODEINE UR: 475 NG/MG CREAT
EDDP SERPL QL: NOT DETECTED NG/MG CREAT
ETHANOL SCREEN URINE (REF): NEGATIVE
ETHANOL UR-MCNC: NOT DETECTED G/DL
FENTANYL UR QL: NEGATIVE
FENTANYL+NORFENTANYL UR QL SCN: NOT DETECTED NG/MG CREAT
FLUNITRAZEPAM SERPLBLD-MCNC: NOT DETECTED NG/MG CREAT
GLUCOSE UR STRIP-MCNC: NEGATIVE MG/DL
HYDROCODONE UR QL: 2615 NG/MG CREAT
HYDROMORPHONE UR QL: 125 NG/MG CREAT
KETONES UR QL: ABNORMAL
LEUKOCYTE EST, POC: ABNORMAL
LEVEL OF DETECTION:: NORMAL
LORAZEPAM UR-MCNC: NOT DETECTED NG/MG CREAT
LORAZEPAM/CREAT UR: NOT DETECTED NG/MG CREAT
MDA SERPLBLD-MCNC: NOT DETECTED NG/MG CREAT
MDMA UR QL SCN: NOT DETECTED NG/MG CREAT
MEPHOBARBITAL UR QL CFM: NOT DETECTED
METHADONE BLD QL SCN: NEGATIVE
METHADONE, URINE: NOT DETECTED NG/MG CREAT
METHAMPHETAMINE UR: NOT DETECTED NG/MG CREAT
MIDAZOLAM UR-MCNC: NOT DETECTED NG/MG CREAT
MORPHINE UR QL: NOT DETECTED NG/MG CREAT
N-DESMETHYLTRAMADOL, U: NOT DETECTED NG/MG CREAT
NARCOTICS UR: NEGATIVE
NITRITE UR-MCNC: NEGATIVE MG/ML
NORBUPRENORPHINE SERPLBLD-MCNC: NOT DETECTED NG/MG CREAT
NORCODEINE UR-MCNC: NOT DETECTED NG/MG CREAT
NORDIAZEPAM SERPL CFM-MCNC: NOT DETECTED NG/MG CREAT
NORFENTANYL UR: NOT DETECTED NG/MG CREAT
NORMORPHINE: NOT DETECTED NG/MG CREAT
NOROXYMORPHONE: NOT DETECTED NG/MG CREAT
O-DESMETHYLTRAMADOL, UR: NOT DETECTED NG/MG CREAT
OPIATES UR QL CFM: NORMAL
OPIATES, URINE, NORHYDROCODONE: >2358 NG/MG CREAT
OPIATES, URINE, NOROXYCODONE: NOT DETECTED NG/MG CREAT
OXAZEPAM UR QL: NOT DETECTED NG/MG CREAT
OXYCODONE UR QL: NEGATIVE
OXYCODONE UR: NOT DETECTED NG/MG CREAT
OXYMORPHONE UR: NOT DETECTED NG/MG CREAT
PENTOBARBITAL UR: NOT DETECTED
PH UR: 6 [PH] (ref 5–8)
PHENOBARB UR QL: NOT DETECTED
PROT UR STRIP-MCNC: ABNORMAL MG/DL
RBC # UR STRIP: ABNORMAL /UL
SECOBARBITAL UR QL: NOT DETECTED
SP GR UR: 1.02 (ref 1–1.03)
SUFENTANIL UR QL: NOT DETECTED NG/MG CREAT
TAPENTADOL SERPLBLD-MCNC: NEGATIVE NG/ML
TAPENTADOL UR-MCNC: NOT DETECTED NG/MG CREAT
TEMAZEPAM UR QL CFM: NOT DETECTED NG/MG CREAT
THC UR CFM-MCNC: NOT DETECTED NG/MG CREAT
THIOPENTAL UR QL CFM: NOT DETECTED
TRAMADOL UR: NOT DETECTED NG/MG CREAT
UROBILINOGEN UR QL: NORMAL

## 2020-03-26 PROCEDURE — 96372 THER/PROPH/DIAG INJ SC/IM: CPT | Performed by: NURSE PRACTITIONER

## 2020-03-26 PROCEDURE — 99213 OFFICE O/P EST LOW 20 MIN: CPT | Performed by: NURSE PRACTITIONER

## 2020-03-26 PROCEDURE — 81002 URINALYSIS NONAUTO W/O SCOPE: CPT | Performed by: NURSE PRACTITIONER

## 2020-03-26 RX ORDER — PHENAZOPYRIDINE HYDROCHLORIDE 100 MG/1
100 TABLET, FILM COATED ORAL 2 TIMES DAILY PRN
Qty: 10 TABLET | Refills: 0 | Status: SHIPPED | OUTPATIENT
Start: 2020-03-26 | End: 2020-10-15

## 2020-03-26 RX ORDER — SULFAMETHOXAZOLE AND TRIMETHOPRIM 800; 160 MG/1; MG/1
TABLET ORAL
Qty: 14 TABLET | Refills: 0 | OUTPATIENT
Start: 2020-03-26

## 2020-03-26 RX ORDER — SULFAMETHOXAZOLE AND TRIMETHOPRIM 800; 160 MG/1; MG/1
1 TABLET ORAL 2 TIMES DAILY
Qty: 14 TABLET | Refills: 0 | Status: SHIPPED | OUTPATIENT
Start: 2020-03-26 | End: 2020-04-02

## 2020-03-26 RX ORDER — KETOROLAC TROMETHAMINE 30 MG/ML
60 INJECTION, SOLUTION INTRAMUSCULAR; INTRAVENOUS ONCE
Status: COMPLETED | OUTPATIENT
Start: 2020-03-26 | End: 2020-03-26

## 2020-03-26 RX ADMIN — KETOROLAC TROMETHAMINE 60 MG: 30 INJECTION, SOLUTION INTRAMUSCULAR; INTRAVENOUS at 15:11

## 2020-03-26 NOTE — PATIENT INSTRUCTIONS

## 2020-03-26 NOTE — TELEPHONE ENCOUNTER
Patient Is treated in my office for mood disorder/ anxiety requiring gabapentin, is also a known patient of pain clinic provider Dr Jenaro Forte, who prescribes her hydrocodone for chronic pain.   A UDS reported today from 3/23/20 shows clonazepam metabolites, which are not legally prescribed for her by our RORY reports and by her self reporting of medications at visit on 3/23/20.   She was appropriately negative for gabapentin, as had not sought refills on that since January.  She is to be notified by my nurse today of inappropriate UDS advised she will no longer be prescribed gabapentin from my office, as she tested positive for substances not legally prescribed on the UDS. Additional fills of the gabapentin sent on 3/23/20 have been cancelled by be personally.  I will care for her other medical needs if requested, but no more controls.   Sima Hoffmann

## 2020-03-26 NOTE — PROGRESS NOTES
Subjective   Cally Olsen is a 30 y.o. female. Abdominal pain.    History of Present Illness   Abdominal Pain  Patient complains of abdominal pain. The pain is described as aching, sharp and shooting, and is 8/10 in intensity. The patient is experiencing lower pain without radiation. Onset was 1 day ago. Symptoms have been gradually worsening. Aggravating factors: none.  Alleviating factors: none. Associated symptoms: nausea, urinary frequency. The patient denies anorexia, arthralagias, belching, chills, dysuria, fever, headache, hematuria, melena, myalgias, sweats and vomiting. Last bowel movement- today, semi solid. At home ibuprofen which she says is not providing pain relief. Pt says she doesn't have regular menstrual cycles but has experienced spotting daily for several years. Pt seen in past for menstrual problem but failed to follow up. Last pap smear 2015, LEEP in 2016. She has non OB transvaginal ultrasound scheduled next week.     The following portions of the patient's history were reviewed and updated as appropriate: allergies, current medications, past family history, past medical history, past social history, past surgical history and problem list.    Review of Systems   Constitutional: Negative for chills, fatigue and fever.   HENT: Negative for congestion, ear pain, rhinorrhea and sore throat.    Eyes: Negative for blurred vision, double vision and visual disturbance.   Respiratory: Negative for cough, chest tightness, shortness of breath and wheezing.    Cardiovascular: Negative for chest pain, palpitations and leg swelling.   Gastrointestinal: Positive for abdominal pain (lower abdominal pain), diarrhea (semi solid bowel movement today) and nausea. Negative for abdominal distention, anal bleeding, blood in stool, constipation, rectal pain, vomiting, GERD and indigestion.   Endocrine: Negative for cold intolerance and heat intolerance.   Genitourinary: Positive for menstrual problem. Negative  for difficulty urinating, dysuria, frequency, hematuria, vaginal bleeding, vaginal discharge and vaginal pain.   Musculoskeletal: Negative for arthralgias, back pain, neck pain and neck stiffness.   Skin: Negative for dry skin, pallor, rash, skin lesions and bruise.   Allergic/Immunologic: Negative for environmental allergies, food allergies and immunocompromised state.   Neurological: Negative for dizziness, syncope, weakness, light-headedness, headache and confusion.   Hematological: Negative for adenopathy. Does not bruise/bleed easily.   Psychiatric/Behavioral: Negative for self-injury, sleep disturbance, suicidal ideas, depressed mood and stress. The patient is not nervous/anxious.        Objective   Physical Exam   Constitutional: She is oriented to person, place, and time. She appears well-developed and well-nourished. She is cooperative. She does not have a sickly appearance. She does not appear ill. No distress.   HENT:   Head: Normocephalic.   Right Ear: Hearing, tympanic membrane, external ear and ear canal normal.   Left Ear: Hearing, tympanic membrane, external ear and ear canal normal.   Nose: Nose normal.   Mouth/Throat: Uvula is midline, oropharynx is clear and moist and mucous membranes are normal.   Eyes: Pupils are equal, round, and reactive to light. Conjunctivae, EOM and lids are normal.   Neck: Trachea normal, normal range of motion, full passive range of motion without pain and phonation normal. Neck supple. No thyroid mass and no thyromegaly present.   Cardiovascular: Normal rate, regular rhythm, S1 normal, S2 normal and normal heart sounds.   No murmur heard.  Pulmonary/Chest: Effort normal and breath sounds normal. No respiratory distress. She has no wheezes. She has no rhonchi. She has no rales.   Abdominal: Soft. Normal appearance. She exhibits no shifting dullness, no distension, no pulsatile liver, no fluid wave, no abdominal bruit, no ascites, no pulsatile midline mass and no mass.  There is no hepatosplenomegaly. There is tenderness in the right lower quadrant, suprapubic area and left lower quadrant.   Neurological: She is alert and oriented to person, place, and time. She has normal strength. No cranial nerve deficit. Coordination and gait normal.   Skin: Skin is warm, dry and intact. She is not diaphoretic. No pallor.   Psychiatric: She has a normal mood and affect. Her speech is normal and behavior is normal. Judgment and thought content normal. Cognition and memory are normal.     Vitals:    03/26/20 1358   BP: 150/90   Pulse: 120   Temp: 97.6 °F (36.4 °C)   SpO2: 95%         Assessment/Plan   Cally was seen today for abdominal pain.    Diagnoses and all orders for this visit:    Generalized abdominal pain  -     POCT urinalysis dipstick, manual  -     ketorolac (TORADOL) injection 60 mg    Hematuria, unspecified type  -     XR Abdomen KUB (In Office)    Urinary frequency  -     XR Abdomen KUB (In Office)    Lower abdominal pain  -     XR Abdomen KUB (In Office)    Urinary tract infection with hematuria, site unspecified  -     Urine Culture - Urine, Urine, Clean Catch; Future  -     sulfamethoxazole-trimethoprim (BACTRIM DS,SEPTRA DS) 800-160 MG per tablet; Take 1 tablet by mouth 2 (Two) Times a Day for 7 days.  -     phenazopyridine (PYRIDIUM) 100 MG tablet; Take 1 tablet by mouth 2 (Two) Times a Day As Needed (urinary pain).    Urinalysis- RBC's- 3+, WBC's- trace, SG- 1.025  XR KUB revealed possible non obstructing stones which don' t correlate with patients pain.   Urinary Tract Infection- Urine culture pending, will call with results. Pyridium 100 mg BID PRN for urinary pain. Bactrim BID x7days. Stressed importance of staying well hydrated (8-10 glasses water daily) and emptying bladder when feel urge. Toradol 60 mg given IM in office for pain.   Stressed importance of patient making appointment with [facility] for pap smear and further evaluation of  menstrual problem. She already has transvaginal ultrasound scheduled next week.  If symptoms do not improve or worsen, patient was instructed to return to clinic for further evaluation.         This document has been electronically signed by HITESH Crisostomo on  March 26, 2020 15:16

## 2020-04-27 ENCOUNTER — HOSPITAL ENCOUNTER (EMERGENCY)
Facility: HOSPITAL | Age: 30
Discharge: HOME | End: 2020-04-27
Attending: FAMILY MEDICINE | Admitting: FAMILY MEDICINE

## 2020-04-27 ENCOUNTER — APPOINTMENT (OUTPATIENT)
Dept: CT IMAGING | Facility: HOSPITAL | Age: 30
End: 2020-04-27

## 2020-04-27 VITALS
BODY MASS INDEX: 34.55 KG/M2 | DIASTOLIC BLOOD PRESSURE: 78 MMHG | HEART RATE: 109 BPM | WEIGHT: 195 LBS | HEIGHT: 63 IN | TEMPERATURE: 97.5 F | OXYGEN SATURATION: 95 % | SYSTOLIC BLOOD PRESSURE: 139 MMHG | RESPIRATION RATE: 17 BRPM

## 2020-04-27 DIAGNOSIS — G43.909 MIGRAINE WITHOUT STATUS MIGRAINOSUS, NOT INTRACTABLE, UNSPECIFIED MIGRAINE TYPE: Primary | ICD-10-CM

## 2020-04-27 PROCEDURE — 96361 HYDRATE IV INFUSION ADD-ON: CPT

## 2020-04-27 PROCEDURE — 96375 TX/PRO/DX INJ NEW DRUG ADDON: CPT

## 2020-04-27 PROCEDURE — 25010000002 PROCHLORPERAZINE 10 MG/2ML SOLUTION: Performed by: STUDENT IN AN ORGANIZED HEALTH CARE EDUCATION/TRAINING PROGRAM

## 2020-04-27 PROCEDURE — 25010000002 DIPHENHYDRAMINE PER 50 MG: Performed by: STUDENT IN AN ORGANIZED HEALTH CARE EDUCATION/TRAINING PROGRAM

## 2020-04-27 PROCEDURE — 96374 THER/PROPH/DIAG INJ IV PUSH: CPT

## 2020-04-27 PROCEDURE — 99283 EMERGENCY DEPT VISIT LOW MDM: CPT

## 2020-04-27 PROCEDURE — 25010000002 KETOROLAC TROMETHAMINE PER 15 MG: Performed by: STUDENT IN AN ORGANIZED HEALTH CARE EDUCATION/TRAINING PROGRAM

## 2020-04-27 PROCEDURE — 70450 CT HEAD/BRAIN W/O DYE: CPT

## 2020-04-27 RX ORDER — KETOROLAC TROMETHAMINE 30 MG/ML
30 INJECTION, SOLUTION INTRAMUSCULAR; INTRAVENOUS ONCE
Status: COMPLETED | OUTPATIENT
Start: 2020-04-27 | End: 2020-04-27

## 2020-04-27 RX ORDER — SUMATRIPTAN 50 MG/1
TABLET, FILM COATED ORAL
Qty: 10 TABLET | Refills: 0 | Status: SHIPPED | OUTPATIENT
Start: 2020-04-27 | End: 2020-04-27 | Stop reason: SDUPTHER

## 2020-04-27 RX ORDER — PROCHLORPERAZINE EDISYLATE 5 MG/ML
5 INJECTION INTRAMUSCULAR; INTRAVENOUS ONCE
Status: COMPLETED | OUTPATIENT
Start: 2020-04-27 | End: 2020-04-27

## 2020-04-27 RX ORDER — DIPHENHYDRAMINE HYDROCHLORIDE 50 MG/ML
25 INJECTION INTRAMUSCULAR; INTRAVENOUS ONCE
Status: COMPLETED | OUTPATIENT
Start: 2020-04-27 | End: 2020-04-27

## 2020-04-27 RX ORDER — SUMATRIPTAN 50 MG/1
TABLET, FILM COATED ORAL
Qty: 10 TABLET | Refills: 0 | Status: SHIPPED | OUTPATIENT
Start: 2020-04-27 | End: 2021-03-08

## 2020-04-27 RX ADMIN — PROCHLORPERAZINE EDISYLATE 5 MG: 5 INJECTION INTRAMUSCULAR; INTRAVENOUS at 07:50

## 2020-04-27 RX ADMIN — SODIUM CHLORIDE 1000 ML: 9 INJECTION, SOLUTION INTRAVENOUS at 08:06

## 2020-04-27 RX ADMIN — DIPHENHYDRAMINE HYDROCHLORIDE 25 MG: 50 INJECTION INTRAMUSCULAR; INTRAVENOUS at 07:51

## 2020-04-27 RX ADMIN — KETOROLAC TROMETHAMINE 30 MG: 30 INJECTION, SOLUTION INTRAMUSCULAR at 07:51

## 2020-04-28 ENCOUNTER — APPOINTMENT (OUTPATIENT)
Dept: GENERAL RADIOLOGY | Facility: HOSPITAL | Age: 30
End: 2020-04-28

## 2020-04-28 ENCOUNTER — APPOINTMENT (OUTPATIENT)
Dept: ULTRASOUND IMAGING | Facility: HOSPITAL | Age: 30
End: 2020-04-28

## 2020-04-28 ENCOUNTER — APPOINTMENT (OUTPATIENT)
Dept: CT IMAGING | Facility: HOSPITAL | Age: 30
End: 2020-04-28

## 2020-04-28 ENCOUNTER — HOSPITAL ENCOUNTER (INPATIENT)
Facility: HOSPITAL | Age: 30
LOS: 4 days | Discharge: HOME | End: 2020-05-02
Attending: FAMILY MEDICINE | Admitting: INTERNAL MEDICINE

## 2020-04-28 DIAGNOSIS — Z74.09 IMPAIRED MOBILITY AND ACTIVITIES OF DAILY LIVING: ICD-10-CM

## 2020-04-28 DIAGNOSIS — Z74.09 IMPAIRED FUNCTIONAL MOBILITY, BALANCE, GAIT, AND ENDURANCE: ICD-10-CM

## 2020-04-28 DIAGNOSIS — R11.2 NON-INTRACTABLE VOMITING WITH NAUSEA, UNSPECIFIED VOMITING TYPE: Primary | ICD-10-CM

## 2020-04-28 DIAGNOSIS — R00.0 TACHYCARDIA: ICD-10-CM

## 2020-04-28 DIAGNOSIS — E86.0 DEHYDRATION: ICD-10-CM

## 2020-04-28 DIAGNOSIS — Z78.9 IMPAIRED MOBILITY AND ACTIVITIES OF DAILY LIVING: ICD-10-CM

## 2020-04-28 PROBLEM — R11.10 NON-INTRACTABLE VOMITING: Status: ACTIVE | Noted: 2020-04-28

## 2020-04-28 LAB
ALBUMIN SERPL-MCNC: 3.6 G/DL (ref 3.5–5.2)
ALBUMIN/GLOB SERPL: 1.2 G/DL
ALP SERPL-CCNC: 337 U/L (ref 39–117)
ALT SERPL W P-5'-P-CCNC: 110 U/L (ref 1–33)
AMPHET+METHAMPHET UR QL: NEGATIVE
AMPHETAMINES UR QL: NEGATIVE
ANION GAP SERPL CALCULATED.3IONS-SCNC: 17 MMOL/L (ref 5–15)
ANISOCYTOSIS BLD QL: ABNORMAL
APTT PPP: 37.3 SECONDS (ref 20–40.3)
AST SERPL-CCNC: 141 U/L (ref 1–32)
BACTERIA UR QL AUTO: ABNORMAL /HPF
BARBITURATES UR QL SCN: NEGATIVE
BASOPHILS # BLD AUTO: 0.01 10*3/MM3 (ref 0–0.2)
BASOPHILS # BLD MANUAL: 0.03 10*3/MM3 (ref 0–0.2)
BASOPHILS NFR BLD AUTO: 0.3 % (ref 0–1.5)
BASOPHILS NFR BLD AUTO: 1 % (ref 0–1.5)
BENZODIAZ UR QL SCN: NEGATIVE
BILIRUB CONJ SERPL-MCNC: 1.4 MG/DL (ref 0.2–0.3)
BILIRUB SERPL-MCNC: 2 MG/DL (ref 0.2–1.2)
BILIRUB UR QL STRIP: ABNORMAL
BUN BLD-MCNC: 13 MG/DL (ref 6–20)
BUN/CREAT SERPL: 25 (ref 7–25)
BUPRENORPHINE SERPL-MCNC: NEGATIVE NG/ML
CALCIUM SPEC-SCNC: 9 MG/DL (ref 8.6–10.5)
CANNABINOIDS SERPL QL: NEGATIVE
CHLORIDE SERPL-SCNC: 97 MMOL/L (ref 98–107)
CK SERPL-CCNC: 39 U/L (ref 20–180)
CLARITY UR: CLEAR
CO2 SERPL-SCNC: 22 MMOL/L (ref 22–29)
COCAINE UR QL: NEGATIVE
COLOR UR: ABNORMAL
CREAT BLD-MCNC: 0.52 MG/DL (ref 0.57–1)
CRP SERPL-MCNC: 19.48 MG/DL (ref 0–0.5)
D-DIMER, QUANTITATIVE (MAD,POW, STR): >4000 NG/ML (FEU) (ref 0–470)
D-LACTATE SERPL-SCNC: 1.7 MMOL/L (ref 0.5–2)
D-LACTATE SERPL-SCNC: 2.4 MMOL/L (ref 0.5–2)
DEPRECATED RDW RBC AUTO: 42.8 FL (ref 37–54)
DEPRECATED RDW RBC AUTO: 45.7 FL (ref 37–54)
EOSINOPHIL # BLD AUTO: 0 10*3/MM3 (ref 0–0.4)
EOSINOPHIL NFR BLD AUTO: 0 % (ref 0.3–6.2)
ERYTHROCYTE [DISTWIDTH] IN BLOOD BY AUTOMATED COUNT: 13.2 % (ref 12.3–15.4)
ERYTHROCYTE [DISTWIDTH] IN BLOOD BY AUTOMATED COUNT: 13.7 % (ref 12.3–15.4)
ETHANOL BLD-MCNC: <10 MG/DL (ref 0–10)
ETHANOL UR QL: <0.01 %
FERRITIN SERPL-MCNC: 2373 NG/ML (ref 13–150)
FIBRINOGEN PPP-MCNC: 361 MG/DL (ref 228–514)
GFR SERPL CREATININE-BSD FRML MDRD: 137 ML/MIN/1.73
GLOBULIN UR ELPH-MCNC: 3.1 GM/DL
GLUCOSE BLD-MCNC: 195 MG/DL (ref 65–99)
GLUCOSE UR STRIP-MCNC: ABNORMAL MG/DL
HAV IGM SERPL QL IA: NORMAL
HBA1C MFR BLD: 5.7 % (ref 4.8–5.6)
HBV CORE IGM SERPL QL IA: NORMAL
HBV SURFACE AG SERPL QL IA: NORMAL
HCG SERPL QL: NEGATIVE
HCT VFR BLD AUTO: 34.6 % (ref 34–46.6)
HCT VFR BLD AUTO: 35.2 % (ref 34–46.6)
HCV AB SER DONR QL: NORMAL
HGB BLD-MCNC: 12.1 G/DL (ref 12–15.9)
HGB BLD-MCNC: 12.5 G/DL (ref 12–15.9)
HGB UR QL STRIP.AUTO: NEGATIVE
HOLD SPECIMEN: NORMAL
HOLD SPECIMEN: NORMAL
HYALINE CASTS UR QL AUTO: ABNORMAL /LPF
IMM GRANULOCYTES # BLD AUTO: 0.02 10*3/MM3 (ref 0–0.05)
IMM GRANULOCYTES NFR BLD AUTO: 0.6 % (ref 0–0.5)
INR PPP: 1.12 (ref 0.8–1.2)
KETONES UR QL STRIP: ABNORMAL
LACTATE HOLD SPECIMEN: NORMAL
LEUKOCYTE ESTERASE UR QL STRIP.AUTO: ABNORMAL
LIPASE SERPL-CCNC: 6 U/L (ref 13–60)
LYMPHOCYTES # BLD AUTO: 0.71 10*3/MM3 (ref 0.7–3.1)
LYMPHOCYTES # BLD MANUAL: 0.26 10*3/MM3 (ref 0.7–3.1)
LYMPHOCYTES NFR BLD AUTO: 21.4 % (ref 19.6–45.3)
LYMPHOCYTES NFR BLD MANUAL: 10 % (ref 19.6–45.3)
LYMPHOCYTES NFR BLD MANUAL: 5 % (ref 5–12)
MACROCYTES BLD QL SMEAR: ABNORMAL
MAGNESIUM SERPL-MCNC: 1.7 MG/DL (ref 1.6–2.6)
MCH RBC QN AUTO: 31.4 PG (ref 26.6–33)
MCH RBC QN AUTO: 31.7 PG (ref 26.6–33)
MCHC RBC AUTO-ENTMCNC: 34.4 G/DL (ref 31.5–35.7)
MCHC RBC AUTO-ENTMCNC: 36.1 G/DL (ref 31.5–35.7)
MCV RBC AUTO: 87.8 FL (ref 79–97)
MCV RBC AUTO: 91.4 FL (ref 79–97)
METHADONE UR QL SCN: NEGATIVE
MONOCYTES # BLD AUTO: 0.13 10*3/MM3 (ref 0.1–0.9)
MONOCYTES # BLD AUTO: 0.16 10*3/MM3 (ref 0.1–0.9)
MONOCYTES NFR BLD AUTO: 4.8 % (ref 5–12)
NEUTROPHILS # BLD AUTO: 2 10*3/MM3 (ref 1.7–7)
NEUTROPHILS # BLD AUTO: 2.42 10*3/MM3 (ref 1.7–7)
NEUTROPHILS NFR BLD AUTO: 72.9 % (ref 42.7–76)
NEUTROPHILS NFR BLD MANUAL: 70 % (ref 42.7–76)
NEUTS BAND NFR BLD MANUAL: 7 % (ref 0–5)
NITRITE UR QL STRIP: NEGATIVE
NRBC BLD AUTO-RTO: 0 /100 WBC (ref 0–0.2)
OPIATES UR QL: POSITIVE
OXYCODONE UR QL SCN: NEGATIVE
PCP UR QL SCN: NEGATIVE
PH UR STRIP.AUTO: 6 [PH] (ref 5–9)
PLATELET # BLD AUTO: 31 10*3/MM3 (ref 140–450)
PLATELET # BLD AUTO: 33 10*3/MM3 (ref 140–450)
PMV BLD AUTO: 12.3 FL (ref 6–12)
PMV BLD AUTO: 12.7 FL (ref 6–12)
POTASSIUM BLD-SCNC: 3.2 MMOL/L (ref 3.5–5.2)
POTASSIUM BLD-SCNC: 3.4 MMOL/L (ref 3.5–5.2)
PROCALCITONIN SERPL-MCNC: 0.4 NG/ML (ref 0.1–0.25)
PROPOXYPH UR QL: NEGATIVE
PROT SERPL-MCNC: 6.7 G/DL (ref 6–8.5)
PROT UR QL STRIP: ABNORMAL
PROTHROMBIN TIME: 14.2 SECONDS (ref 11.1–15.3)
RBC # BLD AUTO: 3.85 10*6/MM3 (ref 3.77–5.28)
RBC # BLD AUTO: 3.94 10*6/MM3 (ref 3.77–5.28)
RBC # UR: ABNORMAL /HPF
REF LAB TEST METHOD: ABNORMAL
SMALL PLATELETS BLD QL SMEAR: ABNORMAL
SODIUM BLD-SCNC: 136 MMOL/L (ref 136–145)
SP GR UR STRIP: 1.03 (ref 1–1.03)
SQUAMOUS #/AREA URNS HPF: ABNORMAL /HPF
TRICYCLICS UR QL SCN: POSITIVE
UROBILINOGEN UR QL STRIP: ABNORMAL
VARIANT LYMPHS NFR BLD MANUAL: 7 % (ref 0–5)
WBC MORPH BLD: NORMAL
WBC NRBC COR # BLD: 2.6 10*3/MM3 (ref 3.4–10.8)
WBC NRBC COR # BLD: 3.32 10*3/MM3 (ref 3.4–10.8)
WBC UR QL AUTO: ABNORMAL /HPF
WHOLE BLOOD HOLD SPECIMEN: NORMAL
WHOLE BLOOD HOLD SPECIMEN: NORMAL

## 2020-04-28 PROCEDURE — 85610 PROTHROMBIN TIME: CPT | Performed by: INTERNAL MEDICINE

## 2020-04-28 PROCEDURE — 80307 DRUG TEST PRSMV CHEM ANLYZR: CPT | Performed by: FAMILY MEDICINE

## 2020-04-28 PROCEDURE — 86140 C-REACTIVE PROTEIN: CPT | Performed by: INTERNAL MEDICINE

## 2020-04-28 PROCEDURE — 25010000002 ONDANSETRON PER 1 MG: Performed by: INTERNAL MEDICINE

## 2020-04-28 PROCEDURE — 83735 ASSAY OF MAGNESIUM: CPT | Performed by: FAMILY MEDICINE

## 2020-04-28 PROCEDURE — 85007 BL SMEAR W/DIFF WBC COUNT: CPT | Performed by: FAMILY MEDICINE

## 2020-04-28 PROCEDURE — 71045 X-RAY EXAM CHEST 1 VIEW: CPT

## 2020-04-28 PROCEDURE — 85060 BLOOD SMEAR INTERPRETATION: CPT | Performed by: INTERNAL MEDICINE

## 2020-04-28 PROCEDURE — 99284 EMERGENCY DEPT VISIT MOD MDM: CPT

## 2020-04-28 PROCEDURE — 84703 CHORIONIC GONADOTROPIN ASSAY: CPT | Performed by: INTERNAL MEDICINE

## 2020-04-28 PROCEDURE — 85730 THROMBOPLASTIN TIME PARTIAL: CPT | Performed by: INTERNAL MEDICINE

## 2020-04-28 PROCEDURE — 83036 HEMOGLOBIN GLYCOSYLATED A1C: CPT | Performed by: INTERNAL MEDICINE

## 2020-04-28 PROCEDURE — 85384 FIBRINOGEN ACTIVITY: CPT | Performed by: INTERNAL MEDICINE

## 2020-04-28 PROCEDURE — 25010000002 IOPAMIDOL 61 % SOLUTION: Performed by: FAMILY MEDICINE

## 2020-04-28 PROCEDURE — 83690 ASSAY OF LIPASE: CPT | Performed by: FAMILY MEDICINE

## 2020-04-28 PROCEDURE — 85025 COMPLETE CBC W/AUTO DIFF WBC: CPT | Performed by: INTERNAL MEDICINE

## 2020-04-28 PROCEDURE — 84145 PROCALCITONIN (PCT): CPT | Performed by: INTERNAL MEDICINE

## 2020-04-28 PROCEDURE — 74177 CT ABD & PELVIS W/CONTRAST: CPT

## 2020-04-28 PROCEDURE — 25010000002 CEFTRIAXONE: Performed by: INTERNAL MEDICINE

## 2020-04-28 PROCEDURE — 85379 FIBRIN DEGRADATION QUANT: CPT | Performed by: INTERNAL MEDICINE

## 2020-04-28 PROCEDURE — 85025 COMPLETE CBC W/AUTO DIFF WBC: CPT | Performed by: FAMILY MEDICINE

## 2020-04-28 PROCEDURE — 87040 BLOOD CULTURE FOR BACTERIA: CPT | Performed by: FAMILY MEDICINE

## 2020-04-28 PROCEDURE — 83605 ASSAY OF LACTIC ACID: CPT | Performed by: FAMILY MEDICINE

## 2020-04-28 PROCEDURE — 81001 URINALYSIS AUTO W/SCOPE: CPT | Performed by: FAMILY MEDICINE

## 2020-04-28 PROCEDURE — 82550 ASSAY OF CK (CPK): CPT | Performed by: FAMILY MEDICINE

## 2020-04-28 PROCEDURE — 25010000002 DIHYDROERGOTAMINE MESYLATE PER 1 MG: Performed by: INTERNAL MEDICINE

## 2020-04-28 PROCEDURE — 93010 ELECTROCARDIOGRAM REPORT: CPT | Performed by: INTERNAL MEDICINE

## 2020-04-28 PROCEDURE — 25010000002 HYDROMORPHONE 1 MG/ML SOLUTION: Performed by: FAMILY MEDICINE

## 2020-04-28 PROCEDURE — 36415 COLL VENOUS BLD VENIPUNCTURE: CPT | Performed by: INTERNAL MEDICINE

## 2020-04-28 PROCEDURE — 84132 ASSAY OF SERUM POTASSIUM: CPT | Performed by: INTERNAL MEDICINE

## 2020-04-28 PROCEDURE — 82728 ASSAY OF FERRITIN: CPT | Performed by: INTERNAL MEDICINE

## 2020-04-28 PROCEDURE — 87040 BLOOD CULTURE FOR BACTERIA: CPT

## 2020-04-28 PROCEDURE — 76705 ECHO EXAM OF ABDOMEN: CPT

## 2020-04-28 PROCEDURE — 80074 ACUTE HEPATITIS PANEL: CPT | Performed by: FAMILY MEDICINE

## 2020-04-28 PROCEDURE — 82248 BILIRUBIN DIRECT: CPT | Performed by: INTERNAL MEDICINE

## 2020-04-28 PROCEDURE — 80053 COMPREHEN METABOLIC PANEL: CPT

## 2020-04-28 PROCEDURE — 25010000002 ONDANSETRON PER 1 MG: Performed by: FAMILY MEDICINE

## 2020-04-28 PROCEDURE — 25010000002 METOCLOPRAMIDE PER 10 MG: Performed by: INTERNAL MEDICINE

## 2020-04-28 PROCEDURE — 25010000002 MORPHINE PER 10 MG: Performed by: FAMILY MEDICINE

## 2020-04-28 PROCEDURE — 93005 ELECTROCARDIOGRAM TRACING: CPT | Performed by: FAMILY MEDICINE

## 2020-04-28 RX ORDER — ONDANSETRON 2 MG/ML
4 INJECTION INTRAMUSCULAR; INTRAVENOUS ONCE
Status: COMPLETED | OUTPATIENT
Start: 2020-04-28 | End: 2020-04-28

## 2020-04-28 RX ORDER — ALBUTEROL SULFATE 90 UG/1
2 AEROSOL, METERED RESPIRATORY (INHALATION) EVERY 6 HOURS PRN
Status: DISCONTINUED | OUTPATIENT
Start: 2020-04-28 | End: 2020-05-02 | Stop reason: HOSPADM

## 2020-04-28 RX ORDER — SODIUM CHLORIDE 0.9 % (FLUSH) 0.9 %
10 SYRINGE (ML) INJECTION EVERY 12 HOURS SCHEDULED
Status: DISCONTINUED | OUTPATIENT
Start: 2020-04-28 | End: 2020-05-02 | Stop reason: HOSPADM

## 2020-04-28 RX ORDER — DIHYDROERGOTAMINE MESYLATE 1 MG/ML
0.5 INJECTION, SOLUTION INTRAMUSCULAR; INTRAVENOUS; SUBCUTANEOUS ONCE
Status: DISCONTINUED | OUTPATIENT
Start: 2020-04-28 | End: 2020-04-29

## 2020-04-28 RX ORDER — NICOTINE 21 MG/24HR
1 PATCH, TRANSDERMAL 24 HOURS TRANSDERMAL EVERY 24 HOURS
Status: DISCONTINUED | OUTPATIENT
Start: 2020-04-28 | End: 2020-05-02 | Stop reason: HOSPADM

## 2020-04-28 RX ORDER — ACETAMINOPHEN 325 MG/1
650 TABLET ORAL EVERY 4 HOURS PRN
Status: DISCONTINUED | OUTPATIENT
Start: 2020-04-28 | End: 2020-05-02 | Stop reason: HOSPADM

## 2020-04-28 RX ORDER — ACETAMINOPHEN 160 MG/5ML
650 SOLUTION ORAL EVERY 4 HOURS PRN
Status: DISCONTINUED | OUTPATIENT
Start: 2020-04-28 | End: 2020-05-02 | Stop reason: HOSPADM

## 2020-04-28 RX ORDER — DIHYDROERGOTAMINE MESYLATE 1 MG/ML
1 INJECTION, SOLUTION INTRAMUSCULAR; INTRAVENOUS; SUBCUTANEOUS EVERY 8 HOURS
Status: DISCONTINUED | OUTPATIENT
Start: 2020-04-28 | End: 2020-04-29

## 2020-04-28 RX ORDER — ROPINIROLE 1 MG/1
1 TABLET, FILM COATED ORAL 2 TIMES DAILY
Status: DISCONTINUED | OUTPATIENT
Start: 2020-04-28 | End: 2020-05-02 | Stop reason: HOSPADM

## 2020-04-28 RX ORDER — SODIUM CHLORIDE 0.9 % (FLUSH) 0.9 %
10 SYRINGE (ML) INJECTION AS NEEDED
Status: DISCONTINUED | OUTPATIENT
Start: 2020-04-28 | End: 2020-05-02 | Stop reason: HOSPADM

## 2020-04-28 RX ORDER — ONDANSETRON 2 MG/ML
4 INJECTION INTRAMUSCULAR; INTRAVENOUS EVERY 6 HOURS PRN
Status: DISCONTINUED | OUTPATIENT
Start: 2020-04-28 | End: 2020-05-02 | Stop reason: HOSPADM

## 2020-04-28 RX ORDER — POTASSIUM CHLORIDE 7.45 MG/ML
10 INJECTION INTRAVENOUS
Status: DISCONTINUED | OUTPATIENT
Start: 2020-04-28 | End: 2020-05-02 | Stop reason: HOSPADM

## 2020-04-28 RX ORDER — METOCLOPRAMIDE HYDROCHLORIDE 5 MG/ML
10 INJECTION INTRAMUSCULAR; INTRAVENOUS EVERY 8 HOURS
Status: COMPLETED | OUTPATIENT
Start: 2020-04-28 | End: 2020-04-28

## 2020-04-28 RX ORDER — ACETAMINOPHEN 650 MG/1
650 SUPPOSITORY RECTAL EVERY 4 HOURS PRN
Status: DISCONTINUED | OUTPATIENT
Start: 2020-04-28 | End: 2020-05-02 | Stop reason: HOSPADM

## 2020-04-28 RX ORDER — SODIUM CHLORIDE 9 MG/ML
INJECTION, SOLUTION INTRAVENOUS
Status: COMPLETED
Start: 2020-04-28 | End: 2020-04-28

## 2020-04-28 RX ORDER — DEXTROSE MONOHYDRATE 25 G/50ML
25 INJECTION, SOLUTION INTRAVENOUS
Status: DISCONTINUED | OUTPATIENT
Start: 2020-04-28 | End: 2020-04-30

## 2020-04-28 RX ORDER — POTASSIUM CHLORIDE 750 MG/1
40 CAPSULE, EXTENDED RELEASE ORAL AS NEEDED
Status: DISCONTINUED | OUTPATIENT
Start: 2020-04-28 | End: 2020-05-02 | Stop reason: HOSPADM

## 2020-04-28 RX ORDER — FAMOTIDINE 10 MG/ML
20 INJECTION, SOLUTION INTRAVENOUS DAILY
Status: DISCONTINUED | OUTPATIENT
Start: 2020-04-28 | End: 2020-04-29 | Stop reason: CLARIF

## 2020-04-28 RX ORDER — SODIUM CHLORIDE 9 MG/ML
75 INJECTION, SOLUTION INTRAVENOUS CONTINUOUS
Status: DISCONTINUED | OUTPATIENT
Start: 2020-04-28 | End: 2020-05-02

## 2020-04-28 RX ORDER — NICOTINE POLACRILEX 4 MG
15 LOZENGE BUCCAL
Status: DISCONTINUED | OUTPATIENT
Start: 2020-04-28 | End: 2020-04-30

## 2020-04-28 RX ORDER — PROPRANOLOL HYDROCHLORIDE 20 MG/1
20 TABLET ORAL 3 TIMES DAILY
Status: DISCONTINUED | OUTPATIENT
Start: 2020-04-28 | End: 2020-05-02 | Stop reason: HOSPADM

## 2020-04-28 RX ORDER — ONDANSETRON 4 MG/1
4 TABLET, FILM COATED ORAL EVERY 6 HOURS PRN
Status: DISCONTINUED | OUTPATIENT
Start: 2020-04-28 | End: 2020-05-02 | Stop reason: HOSPADM

## 2020-04-28 RX ORDER — POTASSIUM CHLORIDE 1.5 G/1.77G
40 POWDER, FOR SOLUTION ORAL AS NEEDED
Status: DISCONTINUED | OUTPATIENT
Start: 2020-04-28 | End: 2020-05-02 | Stop reason: HOSPADM

## 2020-04-28 RX ADMIN — SODIUM CHLORIDE 1000 ML: 9 INJECTION, SOLUTION INTRAVENOUS at 11:08

## 2020-04-28 RX ADMIN — SODIUM CHLORIDE 100 ML/HR: 900 INJECTION, SOLUTION INTRAVENOUS at 17:16

## 2020-04-28 RX ADMIN — NICOTINE 1 PATCH: 21 PATCH, EXTENDED RELEASE TRANSDERMAL at 16:50

## 2020-04-28 RX ADMIN — ROPINIROLE 1 MG: 1 TABLET, FILM COATED ORAL at 22:33

## 2020-04-28 RX ADMIN — CEFTRIAXONE 1 G: 1 INJECTION, POWDER, FOR SOLUTION INTRAMUSCULAR; INTRAVENOUS at 16:50

## 2020-04-28 RX ADMIN — FAMOTIDINE 20 MG: 10 INJECTION INTRAVENOUS at 16:50

## 2020-04-28 RX ADMIN — MORPHINE SULFATE 4 MG: 4 INJECTION INTRAVENOUS at 11:46

## 2020-04-28 RX ADMIN — PROPRANOLOL HYDROCHLORIDE 20 MG: 20 TABLET ORAL at 16:55

## 2020-04-28 RX ADMIN — HYDROMORPHONE HYDROCHLORIDE 1 MG: 1 INJECTION, SOLUTION INTRAMUSCULAR; INTRAVENOUS; SUBCUTANEOUS at 09:22

## 2020-04-28 RX ADMIN — IOPAMIDOL 90 ML: 612 INJECTION, SOLUTION INTRAVENOUS at 10:18

## 2020-04-28 RX ADMIN — POTASSIUM CHLORIDE 40 MEQ: 10 CAPSULE, COATED, EXTENDED RELEASE ORAL at 16:50

## 2020-04-28 RX ADMIN — PROPRANOLOL HYDROCHLORIDE 20 MG: 20 TABLET ORAL at 22:22

## 2020-04-28 RX ADMIN — SODIUM CHLORIDE 1000 ML: 9 INJECTION, SOLUTION INTRAVENOUS at 07:40

## 2020-04-28 RX ADMIN — SODIUM CHLORIDE 1000 ML: 900 INJECTION, SOLUTION INTRAVENOUS at 11:08

## 2020-04-28 RX ADMIN — ONDANSETRON 4 MG: 2 INJECTION INTRAMUSCULAR; INTRAVENOUS at 22:33

## 2020-04-28 RX ADMIN — POTASSIUM CHLORIDE 40 MEQ: 10 CAPSULE, COATED, EXTENDED RELEASE ORAL at 22:31

## 2020-04-28 RX ADMIN — ACETAMINOPHEN 650 MG: 325 TABLET, FILM COATED ORAL at 22:20

## 2020-04-28 RX ADMIN — METOCLOPRAMIDE 10 MG: 5 INJECTION, SOLUTION INTRAMUSCULAR; INTRAVENOUS at 22:32

## 2020-04-28 RX ADMIN — DIHYDROERGOTAMINE MESYLATE 1 MG: 1 INJECTION, SOLUTION INTRAMUSCULAR; INTRAVENOUS; SUBCUTANEOUS at 22:21

## 2020-04-28 RX ADMIN — ONDANSETRON 4 MG: 2 INJECTION INTRAMUSCULAR; INTRAVENOUS at 07:41

## 2020-04-29 ENCOUNTER — APPOINTMENT (OUTPATIENT)
Dept: CT IMAGING | Facility: HOSPITAL | Age: 30
End: 2020-04-29

## 2020-04-29 LAB
ALBUMIN SERPL-MCNC: 3 G/DL (ref 3.5–5.2)
ALBUMIN/GLOB SERPL: 1.2 G/DL
ALP SERPL-CCNC: 266 U/L (ref 39–117)
ALT SERPL W P-5'-P-CCNC: 87 U/L (ref 1–33)
ANION GAP SERPL CALCULATED.3IONS-SCNC: 13 MMOL/L (ref 5–15)
AST SERPL-CCNC: 132 U/L (ref 1–32)
B PERT DNA SPEC QL NAA+PROBE: NOT DETECTED
BASOPHILS # BLD AUTO: 0.01 10*3/MM3 (ref 0–0.2)
BASOPHILS NFR BLD AUTO: 0.3 % (ref 0–1.5)
BILIRUB SERPL-MCNC: 1.7 MG/DL (ref 0.2–1.2)
BUN BLD-MCNC: 13 MG/DL (ref 6–20)
BUN/CREAT SERPL: 33.3 (ref 7–25)
C PNEUM DNA NPH QL NAA+NON-PROBE: NOT DETECTED
CALCIUM SPEC-SCNC: 7.8 MG/DL (ref 8.6–10.5)
CHLORIDE SERPL-SCNC: 100 MMOL/L (ref 98–107)
CO2 SERPL-SCNC: 22 MMOL/L (ref 22–29)
CREAT BLD-MCNC: 0.39 MG/DL (ref 0.57–1)
DEPRECATED RDW RBC AUTO: 42.9 FL (ref 37–54)
EOSINOPHIL # BLD AUTO: 0 10*3/MM3 (ref 0–0.4)
EOSINOPHIL NFR BLD AUTO: 0 % (ref 0.3–6.2)
ERYTHROCYTE [DISTWIDTH] IN BLOOD BY AUTOMATED COUNT: 13.3 % (ref 12.3–15.4)
FLUAV H1 2009 PAND RNA NPH QL NAA+PROBE: NOT DETECTED
FLUAV H1 HA GENE NPH QL NAA+PROBE: NOT DETECTED
FLUAV H3 RNA NPH QL NAA+PROBE: NOT DETECTED
FLUAV SUBTYP SPEC NAA+PROBE: NOT DETECTED
FLUBV RNA ISLT QL NAA+PROBE: NOT DETECTED
GFR SERPL CREATININE-BSD FRML MDRD: >150 ML/MIN/1.73
GLOBULIN UR ELPH-MCNC: 2.5 GM/DL
GLUCOSE BLD-MCNC: 118 MG/DL (ref 65–99)
GLUCOSE BLDC GLUCOMTR-MCNC: 120 MG/DL (ref 70–130)
GLUCOSE BLDC GLUCOMTR-MCNC: 95 MG/DL (ref 70–130)
GLUCOSE BLDC GLUCOMTR-MCNC: 99 MG/DL (ref 70–130)
HADV DNA SPEC NAA+PROBE: NOT DETECTED
HCOV 229E RNA SPEC QL NAA+PROBE: NOT DETECTED
HCOV HKU1 RNA SPEC QL NAA+PROBE: NOT DETECTED
HCOV NL63 RNA SPEC QL NAA+PROBE: NOT DETECTED
HCOV OC43 RNA SPEC QL NAA+PROBE: NOT DETECTED
HCT VFR BLD AUTO: 32.3 % (ref 34–46.6)
HGB BLD-MCNC: 11.4 G/DL (ref 12–15.9)
HMPV RNA NPH QL NAA+NON-PROBE: NOT DETECTED
HPIV1 RNA SPEC QL NAA+PROBE: NOT DETECTED
HPIV2 RNA SPEC QL NAA+PROBE: NOT DETECTED
HPIV3 RNA NPH QL NAA+PROBE: NOT DETECTED
HPIV4 P GENE NPH QL NAA+PROBE: NOT DETECTED
IMM GRANULOCYTES # BLD AUTO: 0.02 10*3/MM3 (ref 0–0.05)
IMM GRANULOCYTES NFR BLD AUTO: 0.5 % (ref 0–0.5)
LYMPHOCYTES # BLD AUTO: 1 10*3/MM3 (ref 0.7–3.1)
LYMPHOCYTES NFR BLD AUTO: 27.4 % (ref 19.6–45.3)
M PNEUMO IGG SER IA-ACNC: NOT DETECTED
MCH RBC QN AUTO: 31 PG (ref 26.6–33)
MCHC RBC AUTO-ENTMCNC: 35.3 G/DL (ref 31.5–35.7)
MCV RBC AUTO: 87.8 FL (ref 79–97)
MONOCYTES # BLD AUTO: 0.17 10*3/MM3 (ref 0.1–0.9)
MONOCYTES NFR BLD AUTO: 4.7 % (ref 5–12)
NEUTROPHILS # BLD AUTO: 2.45 10*3/MM3 (ref 1.7–7)
NEUTROPHILS NFR BLD AUTO: 67.1 % (ref 42.7–76)
NRBC BLD AUTO-RTO: 0 /100 WBC (ref 0–0.2)
PLATELET # BLD AUTO: 34 10*3/MM3 (ref 140–450)
PMV BLD AUTO: 13.2 FL (ref 6–12)
POTASSIUM BLD-SCNC: 2.9 MMOL/L (ref 3.5–5.2)
POTASSIUM BLD-SCNC: 2.9 MMOL/L (ref 3.5–5.2)
POTASSIUM BLD-SCNC: 3 MMOL/L (ref 3.5–5.2)
PROT SERPL-MCNC: 5.5 G/DL (ref 6–8.5)
RBC # BLD AUTO: 3.68 10*6/MM3 (ref 3.77–5.28)
RHINOVIRUS RNA SPEC NAA+PROBE: NOT DETECTED
RSV RNA NPH QL NAA+NON-PROBE: NOT DETECTED
SARS-COV-2 RNA RESP QL NAA+PROBE: NOT DETECTED
SODIUM BLD-SCNC: 135 MMOL/L (ref 136–145)
WBC NRBC COR # BLD: 3.65 10*3/MM3 (ref 3.4–10.8)

## 2020-04-29 PROCEDURE — 82962 GLUCOSE BLOOD TEST: CPT

## 2020-04-29 PROCEDURE — 0099U HC BIOFIRE FILMARRAY RESP PANEL 1: CPT | Performed by: INTERNAL MEDICINE

## 2020-04-29 PROCEDURE — 84132 ASSAY OF SERUM POTASSIUM: CPT | Performed by: INTERNAL MEDICINE

## 2020-04-29 PROCEDURE — 25010000002 ONDANSETRON PER 1 MG: Performed by: INTERNAL MEDICINE

## 2020-04-29 PROCEDURE — 80053 COMPREHEN METABOLIC PANEL: CPT | Performed by: INTERNAL MEDICINE

## 2020-04-29 PROCEDURE — 87635 SARS-COV-2 COVID-19 AMP PRB: CPT | Performed by: INTERNAL MEDICINE

## 2020-04-29 PROCEDURE — 0 IOPAMIDOL PER 1 ML: Performed by: INTERNAL MEDICINE

## 2020-04-29 PROCEDURE — 85025 COMPLETE CBC W/AUTO DIFF WBC: CPT | Performed by: INTERNAL MEDICINE

## 2020-04-29 PROCEDURE — 99221 1ST HOSP IP/OBS SF/LOW 40: CPT | Performed by: SURGERY

## 2020-04-29 PROCEDURE — 85362 FIBRIN DEGRADATION PRODUCTS: CPT | Performed by: INTERNAL MEDICINE

## 2020-04-29 PROCEDURE — 25010000002 CEFTRIAXONE PER 250 MG: Performed by: INTERNAL MEDICINE

## 2020-04-29 PROCEDURE — 71275 CT ANGIOGRAPHY CHEST: CPT

## 2020-04-29 PROCEDURE — 25010000003 POTASSIUM CHLORIDE 10 MEQ/100ML SOLUTION: Performed by: INTERNAL MEDICINE

## 2020-04-29 RX ORDER — HYDROCODONE BITARTRATE AND ACETAMINOPHEN 10; 325 MG/1; MG/1
1 TABLET ORAL EVERY 6 HOURS PRN
Status: DISCONTINUED | OUTPATIENT
Start: 2020-04-29 | End: 2020-05-02 | Stop reason: HOSPADM

## 2020-04-29 RX ORDER — FAMOTIDINE 20 MG/1
20 TABLET, FILM COATED ORAL DAILY
Status: DISCONTINUED | OUTPATIENT
Start: 2020-04-30 | End: 2020-05-02 | Stop reason: HOSPADM

## 2020-04-29 RX ORDER — GABAPENTIN 400 MG/1
400 CAPSULE ORAL EVERY 8 HOURS SCHEDULED
Status: DISCONTINUED | OUTPATIENT
Start: 2020-04-29 | End: 2020-05-02 | Stop reason: HOSPADM

## 2020-04-29 RX ORDER — DULOXETIN HYDROCHLORIDE 30 MG/1
30 CAPSULE, DELAYED RELEASE ORAL DAILY
Status: DISCONTINUED | OUTPATIENT
Start: 2020-04-29 | End: 2020-05-02 | Stop reason: HOSPADM

## 2020-04-29 RX ORDER — SUMATRIPTAN 50 MG/1
50 TABLET, FILM COATED ORAL ONCE AS NEEDED
Status: DISCONTINUED | OUTPATIENT
Start: 2020-04-29 | End: 2020-05-02 | Stop reason: HOSPADM

## 2020-04-29 RX ORDER — AMITRIPTYLINE HYDROCHLORIDE 50 MG/1
50 TABLET, FILM COATED ORAL NIGHTLY
Status: DISCONTINUED | OUTPATIENT
Start: 2020-04-29 | End: 2020-05-02 | Stop reason: HOSPADM

## 2020-04-29 RX ADMIN — ROPINIROLE 1 MG: 1 TABLET, FILM COATED ORAL at 22:11

## 2020-04-29 RX ADMIN — IOPAMIDOL 65 ML: 755 INJECTION, SOLUTION INTRAVENOUS at 12:12

## 2020-04-29 RX ADMIN — ONDANSETRON 4 MG: 2 INJECTION INTRAMUSCULAR; INTRAVENOUS at 13:19

## 2020-04-29 RX ADMIN — HYDROCODONE BITARTRATE AND ACETAMINOPHEN 1 TABLET: 10; 325 TABLET ORAL at 05:41

## 2020-04-29 RX ADMIN — GABAPENTIN 400 MG: 400 CAPSULE ORAL at 15:56

## 2020-04-29 RX ADMIN — PROPRANOLOL HYDROCHLORIDE 20 MG: 20 TABLET ORAL at 22:11

## 2020-04-29 RX ADMIN — PROPRANOLOL HYDROCHLORIDE 20 MG: 20 TABLET ORAL at 09:00

## 2020-04-29 RX ADMIN — GABAPENTIN 400 MG: 400 CAPSULE ORAL at 22:12

## 2020-04-29 RX ADMIN — ONDANSETRON 4 MG: 2 INJECTION INTRAMUSCULAR; INTRAVENOUS at 22:23

## 2020-04-29 RX ADMIN — SODIUM CHLORIDE 100 ML/HR: 900 INJECTION, SOLUTION INTRAVENOUS at 03:15

## 2020-04-29 RX ADMIN — NICOTINE 1 PATCH: 21 PATCH, EXTENDED RELEASE TRANSDERMAL at 16:00

## 2020-04-29 RX ADMIN — SODIUM CHLORIDE, PRESERVATIVE FREE 10 ML: 5 INJECTION INTRAVENOUS at 09:01

## 2020-04-29 RX ADMIN — POTASSIUM CHLORIDE 40 MEQ: 10 CAPSULE, COATED, EXTENDED RELEASE ORAL at 06:55

## 2020-04-29 RX ADMIN — HYDROCODONE BITARTRATE AND ACETAMINOPHEN 1 TABLET: 10; 325 TABLET ORAL at 18:06

## 2020-04-29 RX ADMIN — CEFTRIAXONE 1 G: 1 INJECTION, POWDER, FOR SOLUTION INTRAMUSCULAR; INTRAVENOUS at 15:56

## 2020-04-29 RX ADMIN — AMITRIPTYLINE HYDROCHLORIDE 50 MG: 50 TABLET, FILM COATED ORAL at 22:12

## 2020-04-29 RX ADMIN — POTASSIUM CHLORIDE 40 MEQ: 10 CAPSULE, COATED, EXTENDED RELEASE ORAL at 22:35

## 2020-04-29 RX ADMIN — DULOXETINE HYDROCHLORIDE 30 MG: 30 CAPSULE, DELAYED RELEASE ORAL at 12:58

## 2020-04-29 RX ADMIN — FAMOTIDINE 20 MG: 10 INJECTION INTRAVENOUS at 08:59

## 2020-04-29 RX ADMIN — SODIUM CHLORIDE, PRESERVATIVE FREE 10 ML: 5 INJECTION INTRAVENOUS at 22:12

## 2020-04-29 RX ADMIN — PROPRANOLOL HYDROCHLORIDE 20 MG: 20 TABLET ORAL at 15:57

## 2020-04-29 RX ADMIN — ROPINIROLE 1 MG: 1 TABLET, FILM COATED ORAL at 09:01

## 2020-04-30 LAB
ADV 40+41 DNA STL QL NAA+NON-PROBE: NOT DETECTED
ALBUMIN SERPL-MCNC: 2.9 G/DL (ref 3.5–5.2)
ALBUMIN/GLOB SERPL: 1.1 G/DL
ALP SERPL-CCNC: 313 U/L (ref 39–117)
ALT SERPL W P-5'-P-CCNC: 90 U/L (ref 1–33)
ANION GAP SERPL CALCULATED.3IONS-SCNC: 13 MMOL/L (ref 5–15)
ANISOCYTOSIS BLD QL: ABNORMAL
AST SERPL-CCNC: 144 U/L (ref 1–32)
ASTRO TYP 1-8 RNA STL QL NAA+NON-PROBE: NOT DETECTED
BILIRUB SERPL-MCNC: 1.2 MG/DL (ref 0.2–1.2)
BUN BLD-MCNC: 11 MG/DL (ref 6–20)
BUN/CREAT SERPL: 32.4 (ref 7–25)
C CAYETANENSIS DNA STL QL NAA+NON-PROBE: NOT DETECTED
CALCIUM SPEC-SCNC: 7.9 MG/DL (ref 8.6–10.5)
CAMPY SP DNA.DIARRHEA STL QL NAA+PROBE: NOT DETECTED
CHLORIDE SERPL-SCNC: 102 MMOL/L (ref 98–107)
CO2 SERPL-SCNC: 19 MMOL/L (ref 22–29)
CREAT BLD-MCNC: 0.34 MG/DL (ref 0.57–1)
CRYPTOSP STL CULT: NOT DETECTED
CYTOLOGIST CVX/VAG CYTO: NORMAL
DEPRECATED RDW RBC AUTO: 43.5 FL (ref 37–54)
E COLI DNA SPEC QL NAA+PROBE: NOT DETECTED
E HISTOLYT AG STL-ACNC: NOT DETECTED
EAEC PAA PLAS AGGR+AATA ST NAA+NON-PRB: NOT DETECTED
EC STX1 + STX2 GENES STL NAA+PROBE: NOT DETECTED
EPEC EAE GENE STL QL NAA+NON-PROBE: NOT DETECTED
ERYTHROCYTE [DISTWIDTH] IN BLOOD BY AUTOMATED COUNT: 13.5 % (ref 12.3–15.4)
ETEC LTA+ST1A+ST1B TOX ST NAA+NON-PROBE: NOT DETECTED
FSP PPP LA-ACNC: 10 UG/ML
G LAMBLIA DNA SPEC QL NAA+PROBE: NOT DETECTED
GFR SERPL CREATININE-BSD FRML MDRD: >150 ML/MIN/1.73
GLOBULIN UR ELPH-MCNC: 2.7 GM/DL
GLUCOSE BLD-MCNC: 96 MG/DL (ref 65–99)
GLUCOSE BLDC GLUCOMTR-MCNC: 103 MG/DL (ref 70–130)
GLUCOSE BLDC GLUCOMTR-MCNC: 83 MG/DL (ref 70–130)
GLUCOSE BLDC GLUCOMTR-MCNC: 90 MG/DL (ref 70–130)
HCT VFR BLD AUTO: 32.6 % (ref 34–46.6)
HGB BLD-MCNC: 11.6 G/DL (ref 12–15.9)
LYMPHOCYTES # BLD MANUAL: 1.39 10*3/MM3 (ref 0.7–3.1)
LYMPHOCYTES NFR BLD MANUAL: 31 % (ref 19.6–45.3)
LYMPHOCYTES NFR BLD MANUAL: 7 % (ref 5–12)
MAGNESIUM SERPL-MCNC: 2.1 MG/DL (ref 1.6–2.6)
MCH RBC QN AUTO: 31.3 PG (ref 26.6–33)
MCHC RBC AUTO-ENTMCNC: 35.6 G/DL (ref 31.5–35.7)
MCV RBC AUTO: 87.9 FL (ref 79–97)
MONOCYTES # BLD AUTO: 0.31 10*3/MM3 (ref 0.1–0.9)
NEUTROPHILS # BLD AUTO: 2.65 10*3/MM3 (ref 1.7–7)
NEUTROPHILS NFR BLD MANUAL: 59 % (ref 42.7–76)
NOROVIRUS GI+II RNA STL QL NAA+NON-PROBE: NOT DETECTED
P SHIGELLOIDES DNA STL QL NAA+PROBE: NOT DETECTED
PATH INTERP BLD-IMP: NORMAL
PLASMA CELL PREC NFR BLD MANUAL: 2 % (ref 0–0)
PLATELET # BLD AUTO: 50 10*3/MM3 (ref 140–450)
PMV BLD AUTO: 13 FL (ref 6–12)
POTASSIUM BLD-SCNC: 3.7 MMOL/L (ref 3.5–5.2)
POTASSIUM BLD-SCNC: 3.8 MMOL/L (ref 3.5–5.2)
PROT SERPL-MCNC: 5.6 G/DL (ref 6–8.5)
RBC # BLD AUTO: 3.71 10*6/MM3 (ref 3.77–5.28)
RV RNA STL NAA+PROBE: NOT DETECTED
SALMONELLA DNA SPEC QL NAA+PROBE: NOT DETECTED
SAPO I+II+IV+V RNA STL QL NAA+NON-PROBE: NOT DETECTED
SHIGELLA SP+EIEC IPAH STL QL NAA+PROBE: NOT DETECTED
SMALL PLATELETS BLD QL SMEAR: ABNORMAL
SODIUM BLD-SCNC: 134 MMOL/L (ref 136–145)
V CHOLERAE DNA SPEC QL NAA+PROBE: NOT DETECTED
VARIANT LYMPHS NFR BLD MANUAL: 1 % (ref 0–5)
VIBRIO DNA SPEC NAA+PROBE: NOT DETECTED
WBC MORPH BLD: NORMAL
WBC NRBC COR # BLD: 4.49 10*3/MM3 (ref 3.4–10.8)
YERSINIA STL CULT: NOT DETECTED

## 2020-04-30 PROCEDURE — 0097U HC BIOFIRE FILMARRAY GI PANEL: CPT | Performed by: INTERNAL MEDICINE

## 2020-04-30 PROCEDURE — 25010000002 ONDANSETRON PER 1 MG: Performed by: INTERNAL MEDICINE

## 2020-04-30 PROCEDURE — 85007 BL SMEAR W/DIFF WBC COUNT: CPT | Performed by: INTERNAL MEDICINE

## 2020-04-30 PROCEDURE — 97530 THERAPEUTIC ACTIVITIES: CPT

## 2020-04-30 PROCEDURE — 85025 COMPLETE CBC W/AUTO DIFF WBC: CPT | Performed by: INTERNAL MEDICINE

## 2020-04-30 PROCEDURE — 83735 ASSAY OF MAGNESIUM: CPT | Performed by: INTERNAL MEDICINE

## 2020-04-30 PROCEDURE — 84132 ASSAY OF SERUM POTASSIUM: CPT | Performed by: INTERNAL MEDICINE

## 2020-04-30 PROCEDURE — 97162 PT EVAL MOD COMPLEX 30 MIN: CPT

## 2020-04-30 PROCEDURE — 97116 GAIT TRAINING THERAPY: CPT

## 2020-04-30 PROCEDURE — 80053 COMPREHEN METABOLIC PANEL: CPT | Performed by: INTERNAL MEDICINE

## 2020-04-30 PROCEDURE — 82962 GLUCOSE BLOOD TEST: CPT

## 2020-04-30 PROCEDURE — 25010000002 CEFTRIAXONE PER 250 MG: Performed by: INTERNAL MEDICINE

## 2020-04-30 RX ORDER — LOPERAMIDE HYDROCHLORIDE 2 MG/1
2 CAPSULE ORAL 3 TIMES DAILY PRN
Status: DISPENSED | OUTPATIENT
Start: 2020-04-30 | End: 2020-05-02

## 2020-04-30 RX ORDER — LOPERAMIDE HYDROCHLORIDE 2 MG/1
4 CAPSULE ORAL ONCE
Status: COMPLETED | OUTPATIENT
Start: 2020-04-30 | End: 2020-04-30

## 2020-04-30 RX ADMIN — ROPINIROLE 1 MG: 1 TABLET, FILM COATED ORAL at 21:10

## 2020-04-30 RX ADMIN — SODIUM CHLORIDE, PRESERVATIVE FREE 10 ML: 5 INJECTION INTRAVENOUS at 09:05

## 2020-04-30 RX ADMIN — ROPINIROLE 1 MG: 1 TABLET, FILM COATED ORAL at 09:05

## 2020-04-30 RX ADMIN — AMITRIPTYLINE HYDROCHLORIDE 50 MG: 50 TABLET, FILM COATED ORAL at 21:10

## 2020-04-30 RX ADMIN — PROPRANOLOL HYDROCHLORIDE 20 MG: 20 TABLET ORAL at 09:05

## 2020-04-30 RX ADMIN — FAMOTIDINE 20 MG: 20 TABLET, FILM COATED ORAL at 09:04

## 2020-04-30 RX ADMIN — SODIUM CHLORIDE 100 ML/HR: 900 INJECTION, SOLUTION INTRAVENOUS at 02:29

## 2020-04-30 RX ADMIN — SODIUM CHLORIDE 75 ML/HR: 900 INJECTION, SOLUTION INTRAVENOUS at 12:05

## 2020-04-30 RX ADMIN — HYDROCODONE BITARTRATE AND ACETAMINOPHEN 1 TABLET: 10; 325 TABLET ORAL at 23:19

## 2020-04-30 RX ADMIN — ONDANSETRON 4 MG: 2 INJECTION INTRAMUSCULAR; INTRAVENOUS at 16:25

## 2020-04-30 RX ADMIN — HYDROCODONE BITARTRATE AND ACETAMINOPHEN 1 TABLET: 10; 325 TABLET ORAL at 02:29

## 2020-04-30 RX ADMIN — PROPRANOLOL HYDROCHLORIDE 20 MG: 20 TABLET ORAL at 21:09

## 2020-04-30 RX ADMIN — ONDANSETRON 4 MG: 2 INJECTION INTRAMUSCULAR; INTRAVENOUS at 06:43

## 2020-04-30 RX ADMIN — GABAPENTIN 400 MG: 400 CAPSULE ORAL at 13:26

## 2020-04-30 RX ADMIN — CEFTRIAXONE 1 G: 1 INJECTION, POWDER, FOR SOLUTION INTRAMUSCULAR; INTRAVENOUS at 16:15

## 2020-04-30 RX ADMIN — SODIUM CHLORIDE, PRESERVATIVE FREE 10 ML: 5 INJECTION INTRAVENOUS at 21:10

## 2020-04-30 RX ADMIN — LOPERAMIDE HYDROCHLORIDE 4 MG: 2 CAPSULE ORAL at 12:05

## 2020-04-30 RX ADMIN — POTASSIUM CHLORIDE 40 MEQ: 10 CAPSULE, COATED, EXTENDED RELEASE ORAL at 02:30

## 2020-04-30 RX ADMIN — GABAPENTIN 400 MG: 400 CAPSULE ORAL at 06:43

## 2020-04-30 RX ADMIN — NICOTINE 1 PATCH: 21 PATCH, EXTENDED RELEASE TRANSDERMAL at 13:26

## 2020-04-30 RX ADMIN — POTASSIUM CHLORIDE 40 MEQ: 10 CAPSULE, COATED, EXTENDED RELEASE ORAL at 06:44

## 2020-04-30 RX ADMIN — LOPERAMIDE HYDROCHLORIDE 2 MG: 2 CAPSULE ORAL at 17:30

## 2020-04-30 RX ADMIN — PROPRANOLOL HYDROCHLORIDE 20 MG: 20 TABLET ORAL at 16:15

## 2020-04-30 RX ADMIN — SODIUM CHLORIDE 75 ML/HR: 900 INJECTION, SOLUTION INTRAVENOUS at 23:30

## 2020-04-30 RX ADMIN — HYDROCODONE BITARTRATE AND ACETAMINOPHEN 1 TABLET: 10; 325 TABLET ORAL at 09:18

## 2020-04-30 RX ADMIN — DULOXETINE HYDROCHLORIDE 30 MG: 30 CAPSULE, DELAYED RELEASE ORAL at 09:05

## 2020-04-30 RX ADMIN — GABAPENTIN 400 MG: 400 CAPSULE ORAL at 21:10

## 2020-04-30 RX ADMIN — HYDROCODONE BITARTRATE AND ACETAMINOPHEN 1 TABLET: 10; 325 TABLET ORAL at 16:18

## 2020-04-30 RX ADMIN — ONDANSETRON 4 MG: 2 INJECTION INTRAMUSCULAR; INTRAVENOUS at 23:19

## 2020-05-01 DIAGNOSIS — R31.9 URINARY TRACT INFECTION WITH HEMATURIA, SITE UNSPECIFIED: ICD-10-CM

## 2020-05-01 DIAGNOSIS — N39.0 URINARY TRACT INFECTION WITH HEMATURIA, SITE UNSPECIFIED: ICD-10-CM

## 2020-05-01 LAB
ALBUMIN SERPL-MCNC: 2.8 G/DL (ref 3.5–5.2)
ALBUMIN/GLOB SERPL: 1 G/DL
ALP SERPL-CCNC: 294 U/L (ref 39–117)
ALT SERPL W P-5'-P-CCNC: 98 U/L (ref 1–33)
ANION GAP SERPL CALCULATED.3IONS-SCNC: 13 MMOL/L (ref 5–15)
AST SERPL-CCNC: 149 U/L (ref 1–32)
BASOPHILS # BLD AUTO: 0.03 10*3/MM3 (ref 0–0.2)
BASOPHILS NFR BLD AUTO: 0.7 % (ref 0–1.5)
BILIRUB SERPL-MCNC: 0.8 MG/DL (ref 0.2–1.2)
BUN BLD-MCNC: 9 MG/DL (ref 6–20)
BUN/CREAT SERPL: 32.1 (ref 7–25)
CALCIUM SPEC-SCNC: 8 MG/DL (ref 8.6–10.5)
CHLORIDE SERPL-SCNC: 103 MMOL/L (ref 98–107)
CO2 SERPL-SCNC: 19 MMOL/L (ref 22–29)
CREAT BLD-MCNC: 0.28 MG/DL (ref 0.57–1)
DEPRECATED RDW RBC AUTO: 44.8 FL (ref 37–54)
EOSINOPHIL # BLD AUTO: 0 10*3/MM3 (ref 0–0.4)
EOSINOPHIL NFR BLD AUTO: 0 % (ref 0.3–6.2)
ERYTHROCYTE [DISTWIDTH] IN BLOOD BY AUTOMATED COUNT: 13.8 % (ref 12.3–15.4)
GFR SERPL CREATININE-BSD FRML MDRD: >150 ML/MIN/1.73
GLOBULIN UR ELPH-MCNC: 2.9 GM/DL
GLUCOSE BLD-MCNC: 92 MG/DL (ref 65–99)
HCT VFR BLD AUTO: 33.2 % (ref 34–46.6)
HGB BLD-MCNC: 11.5 G/DL (ref 12–15.9)
IMM GRANULOCYTES # BLD AUTO: 0.04 10*3/MM3 (ref 0–0.05)
IMM GRANULOCYTES NFR BLD AUTO: 0.9 % (ref 0–0.5)
LYMPHOCYTES # BLD AUTO: 1.97 10*3/MM3 (ref 0.7–3.1)
LYMPHOCYTES NFR BLD AUTO: 44.4 % (ref 19.6–45.3)
MCH RBC QN AUTO: 30.7 PG (ref 26.6–33)
MCHC RBC AUTO-ENTMCNC: 34.6 G/DL (ref 31.5–35.7)
MCV RBC AUTO: 88.8 FL (ref 79–97)
MONOCYTES # BLD AUTO: 0.42 10*3/MM3 (ref 0.1–0.9)
MONOCYTES NFR BLD AUTO: 9.5 % (ref 5–12)
NEUTROPHILS # BLD AUTO: 1.98 10*3/MM3 (ref 1.7–7)
NEUTROPHILS NFR BLD AUTO: 44.5 % (ref 42.7–76)
NRBC BLD AUTO-RTO: 0 /100 WBC (ref 0–0.2)
PLATELET # BLD AUTO: 57 10*3/MM3 (ref 140–450)
PMV BLD AUTO: 12.9 FL (ref 6–12)
POTASSIUM BLD-SCNC: 3.4 MMOL/L (ref 3.5–5.2)
POTASSIUM BLD-SCNC: 3.9 MMOL/L (ref 3.5–5.2)
PROT SERPL-MCNC: 5.7 G/DL (ref 6–8.5)
RBC # BLD AUTO: 3.74 10*6/MM3 (ref 3.77–5.28)
SODIUM BLD-SCNC: 135 MMOL/L (ref 136–145)
WBC NRBC COR # BLD: 4.44 10*3/MM3 (ref 3.4–10.8)

## 2020-05-01 PROCEDURE — 97116 GAIT TRAINING THERAPY: CPT

## 2020-05-01 PROCEDURE — 25010000002 CEFTRIAXONE PER 250 MG: Performed by: INTERNAL MEDICINE

## 2020-05-01 PROCEDURE — 25010000002 ONDANSETRON PER 1 MG: Performed by: INTERNAL MEDICINE

## 2020-05-01 PROCEDURE — 85025 COMPLETE CBC W/AUTO DIFF WBC: CPT | Performed by: INTERNAL MEDICINE

## 2020-05-01 PROCEDURE — 97166 OT EVAL MOD COMPLEX 45 MIN: CPT

## 2020-05-01 PROCEDURE — 97530 THERAPEUTIC ACTIVITIES: CPT

## 2020-05-01 PROCEDURE — 80053 COMPREHEN METABOLIC PANEL: CPT | Performed by: INTERNAL MEDICINE

## 2020-05-01 PROCEDURE — 84132 ASSAY OF SERUM POTASSIUM: CPT | Performed by: INTERNAL MEDICINE

## 2020-05-01 RX ADMIN — ROPINIROLE 1 MG: 1 TABLET, FILM COATED ORAL at 08:42

## 2020-05-01 RX ADMIN — HYDROCODONE BITARTRATE AND ACETAMINOPHEN 1 TABLET: 10; 325 TABLET ORAL at 06:01

## 2020-05-01 RX ADMIN — POTASSIUM CHLORIDE 40 MEQ: 10 CAPSULE, COATED, EXTENDED RELEASE ORAL at 13:25

## 2020-05-01 RX ADMIN — PROPRANOLOL HYDROCHLORIDE 20 MG: 20 TABLET ORAL at 08:42

## 2020-05-01 RX ADMIN — ACETAMINOPHEN 650 MG: 325 TABLET, FILM COATED ORAL at 10:28

## 2020-05-01 RX ADMIN — CEFTRIAXONE 1 G: 1 INJECTION, POWDER, FOR SOLUTION INTRAMUSCULAR; INTRAVENOUS at 14:32

## 2020-05-01 RX ADMIN — ONDANSETRON 4 MG: 2 INJECTION INTRAMUSCULAR; INTRAVENOUS at 18:36

## 2020-05-01 RX ADMIN — ONDANSETRON 4 MG: 2 INJECTION INTRAMUSCULAR; INTRAVENOUS at 12:27

## 2020-05-01 RX ADMIN — FAMOTIDINE 20 MG: 20 TABLET, FILM COATED ORAL at 08:42

## 2020-05-01 RX ADMIN — LOPERAMIDE HYDROCHLORIDE 2 MG: 2 CAPSULE ORAL at 10:28

## 2020-05-01 RX ADMIN — LOPERAMIDE HYDROCHLORIDE 2 MG: 2 CAPSULE ORAL at 19:30

## 2020-05-01 RX ADMIN — DULOXETINE HYDROCHLORIDE 30 MG: 30 CAPSULE, DELAYED RELEASE ORAL at 08:42

## 2020-05-01 RX ADMIN — HYDROCODONE BITARTRATE AND ACETAMINOPHEN 1 TABLET: 10; 325 TABLET ORAL at 18:36

## 2020-05-01 RX ADMIN — GABAPENTIN 400 MG: 400 CAPSULE ORAL at 21:02

## 2020-05-01 RX ADMIN — POTASSIUM CHLORIDE 40 MEQ: 10 CAPSULE, COATED, EXTENDED RELEASE ORAL at 08:42

## 2020-05-01 RX ADMIN — PROPRANOLOL HYDROCHLORIDE 20 MG: 20 TABLET ORAL at 17:35

## 2020-05-01 RX ADMIN — SODIUM CHLORIDE 75 ML/HR: 900 INJECTION, SOLUTION INTRAVENOUS at 12:27

## 2020-05-01 RX ADMIN — NICOTINE 1 PATCH: 21 PATCH, EXTENDED RELEASE TRANSDERMAL at 14:32

## 2020-05-01 RX ADMIN — ACETAMINOPHEN 650 MG: 325 TABLET, FILM COATED ORAL at 19:43

## 2020-05-01 RX ADMIN — GABAPENTIN 400 MG: 400 CAPSULE ORAL at 06:01

## 2020-05-01 RX ADMIN — PROPRANOLOL HYDROCHLORIDE 20 MG: 20 TABLET ORAL at 21:09

## 2020-05-01 RX ADMIN — ROPINIROLE 1 MG: 1 TABLET, FILM COATED ORAL at 20:13

## 2020-05-01 RX ADMIN — AMITRIPTYLINE HYDROCHLORIDE 50 MG: 50 TABLET, FILM COATED ORAL at 20:13

## 2020-05-01 RX ADMIN — HYDROCODONE BITARTRATE AND ACETAMINOPHEN 1 TABLET: 10; 325 TABLET ORAL at 12:27

## 2020-05-01 RX ADMIN — GABAPENTIN 400 MG: 400 CAPSULE ORAL at 14:31

## 2020-05-01 RX ADMIN — ONDANSETRON 4 MG: 2 INJECTION INTRAMUSCULAR; INTRAVENOUS at 06:01

## 2020-05-01 NOTE — SIGNIFICANT NOTE
05/01/20 0928   Rehab Treatment   Reason Treatment Not Performed patient/family declined treatment  (pt requested that PTA check back later this am.)

## 2020-05-01 NOTE — THERAPY EVALUATION
Acute Care - Occupational Therapy Initial Evaluation  [facility]     Patient Name: Cally Olsen  : 1990  MRN: [id number]  Today's Date: 2020  Onset of Illness/Injury or Date of Surgery: 20  Date of Referral to OT: 20  Referring Physician: KWASI Robertson MD    Admit Date: 2020       ICD-10-CM ICD-9-CM   1. Non-intractable vomiting with nausea, unspecified vomiting type R11.2 787.01   2. Tachycardia R00.0 785.0   3. Dehydration E86.0 276.51   4. Impaired functional mobility, balance, gait, and endurance Z74.09 V49.89   5. Impaired mobility and activities of daily living Z74.09 799.89     Patient Active Problem List   Diagnosis   • Menorrhagia with regular cycle   • DDD (degenerative disc disease), lumbar   • Moderate episode of recurrent major depressive disorder (CMS/HCC)   • Generalized anxiety disorder   • Essential hypertension   • Tobacco dependence in remission   • Bilateral hip pain   • Chronic midline low back pain without sciatica   • History of loop electrosurgical excision procedure (LEEP)   • Chronic pelvic pain in female   • Primary insomnia   • Overweight (BMI 25.0-29.9)   • Non-intractable vomiting     Past Medical History:   Diagnosis Date   • Abnormal Pap smear of cervix    • Acute pharyngitis     unspecified     • Adjustment disorder with anxious mood     Celexa     • Allergic rhinitis    • Asthma    • Backache    • Candidiasis    • Cervical intraepithelial neoplasia grade 1    • Contraception     Desires permanent sterilization    • Depressive disorder    • Dysphagia     unspecified     • Encounter for gynecological examination without abnormal finding    • Encounter for  visit      visit status    • Excessive and frequent menstruation with irregular cycle    • Excessive and frequent menstruation with regular cycle    • Fatigue    • Generalized abdominal pain    • Generalized anxiety disorder    • Headache    • Heartburn    • Hip pain    •  Hypertension    • Insomnia      Trazodone   • Low grade squamous intraepithelial lesion (LGSIL) on cervicovaginal cytologic smear    • Malaise    • Organic anxiety disorder    • Spasm of back muscles    • Surgical follow-up care    • Tobacco dependence syndrome    • Urinary tract infection      Past Surgical History:   Procedure Laterality Date   •  SECTION  2015    Repeat   • INJECTION OF MEDICATION  2015    Kenalog (1)  -  SEFERINO Perla   • LEEP     • OTHER SURGICAL HISTORY  2015    Laparoscopy; tubal cautery (1)  - Exam under anesthesia and laparoscopic tubal fulguration.   • TUBAL ABDOMINAL LIGATION            OT ASSESSMENT FLOWSHEET (last 12 hours)      Occupational Therapy Evaluation     Row Name 20 0854                   OT Evaluation Time/Intention    Subjective Information  complains of;pain  -RB        Document Type  evaluation  -RB        Mode of Treatment  individual therapy;occupational therapy  -RB           General Information    Patient Profile Reviewed?  yes  -RB        Onset of Illness/Injury or Date of Surgery  20  -RB        Referring Physician  KWASI Robertson MD  -RB        Patient Observations  alert;cooperative;agree to therapy  -RB        General Observations of Patient  Supine in bed on room air.  -RB        Prior Level of Function  independent:  -RB        Equipment Currently Used at Home  none  -RB        Existing Precautions/Restrictions  fall  -RB        Risks Reviewed  patient:;LOB  -RB        Benefits Reviewed  patient:;improve function;increase independence;increase strength  -RB        Barriers to Rehab  none identified  -RB           Relationship/Environment    Primary Source of Support/Comfort  spouse  -RB        Lives With  spouse;child(jessika), dependent  -RB           Home Main Entrance    Number of Stairs, Main Entrance  five  -RB        Stair Railings, Main Entrance  railings on both sides of stairs  -RB           Cognitive Assessment/Intervention-  PT/OT    Orientation Status (Cognition)  oriented x 4  -RB           Safety Issues, Functional Mobility    Impairments Affecting Function (Mobility)  endurance/activity tolerance;strength  -RB           Bed Mobility Assessment/Treatment    Bed Mobility Assessment/Treatment  supine-sit;sit-supine  -RB        Supine-Sit [place] (Bed Mobility)  supervision  -RB        Sit-Supine [place] (Bed Mobility)  supervision  -RB        Assistive Device (Bed Mobility)  head of bed elevated;bed rails  -RB           Functional Mobility    Functional Mobility- Ind. Level  contact guard assist  -RB        Functional Mobility-Distance (Feet)  40  -RB        Functional Mobility- Safety Issues  step length decreased;balance decreased during turns  -RB           Transfer Assessment/Treatment    Transfer Assessment/Treatment  sit-stand transfer;stand-sit transfer;toilet transfer  -RB           Bed-Chair Transfer    Bed-Chair [place] (Transfers)  --  -RB           Sit-Stand Transfer    Sit-Stand [place] (Transfers)  contact guard  -RB           Stand-Sit Transfer    Stand-Sit [place] (Transfers)  supervision  -RB           Toilet Transfer    Type (Toilet Transfer)  sit-stand;stand-sit  -RB        [place] Level (Toilet Transfer)  contact guard  -RB           ADL Assessment/Intervention    BADL Assessment/Intervention  lower body dressing  -RB           Lower Body Dressing Assessment/Training    Lower Body Dressing [place] Level  doff;don;socks;set up;supervision  -RB        Lower Body Dressing Position  edge of bed sitting  -RB           BADL Safety/Performance    Impairments, BADL Safety/Performance  balance;endurance/activity tolerance;coordination;strength  -RB           General ROM    GENERAL ROM COMMENTS  B UE AROM was WNLs.  -RB           MMT (Manual Muscle Testing)    General MMT Comments  B UE strength was 4-/5 grossly  -RB           Dynamic Standing Balance    Level of [place], Reaches  Outside Midline (Standing, Dynamic Balance)  contact guard assist  -RB        Time Able to Maintain Position, Reaches Outside Midline (Standing, Dynamic Balance)  45 to 60 seconds  -RB           Sensory Assessment/Intervention    Sensory General Assessment  no sensation deficits identified to light touch  -RB           Positioning and Restraints    Pre-Treatment Position  in bed  -RB        Post Treatment Position  bed  -RB        In Bed  call light within reach;supine;encouraged to call for assist  -RB           Pain Scale: Numbers Pre/Post-Treatment    Pain Scale: Numbers, Pretreatment  6/10  -RB        Pain Scale: Numbers, Post-Treatment  6/10  -RB        Pain Location  head  -RB        Pain Intervention(s)  Ambulation/increased activity  -RB           Clinical Impression (OT)    Date of Referral to OT  04/30/20  -RB        OT Diagnosis  Impaired mob and ADLs.  -RB        Functional Level at Time of Evaluation (OT Eval)  Impaired mob and ADLs.  -RB        Criteria for Skilled Therapeutic Interventions Met (OT Eval)  yes;treatment indicated  -RB        Rehab Potential (OT Eval)  good, to achieve stated therapy goals  -RB        Therapy Frequency (OT Eval)  other (see comments) 5-7 days/wk.  -RB        Predicted Duration of Therapy Intervention (Therapy Eval)  Untill D/C or goals met.  -RB        Care Plan Review (OT)  evaluation/treatment results reviewed;care plan/treatment goals reviewed;risks/benefits reviewed;patient/other agree to care plan  -RB        Anticipated Equipment Needs at Discharge (OT)  shower chair toilet bars  -RB        Anticipated Discharge Disposition (OT)  home with home health  -RB           Vital Signs    Pre Systolic BP Rehab  124  -RB        Pre Treatment Diastolic BP  72  -RB        Post Systolic BP Rehab  129  -RB        Post Treatment Diastolic BP  78  -RB        Pretreatment Heart Rate (beats/min)  80  -RB        Posttreatment Heart Rate (beats/min)  81  -RB        Pre SpO2 (%)  98   -RB        O2 Delivery Pre Treatment  room air  -RB        Post SpO2 (%)  98  -RB        O2 Delivery Post Treatment  room air  -RB        Pre Patient Position  Supine  -RB        Intra Patient Position  Standing  -RB        Post Patient Position  Supine  -RB           Planned OT Interventions    Planned Therapy Interventions (OT Eval)  activity tolerance training;adaptive equipment training;BADL retraining;functional balance retraining;occupation/activity based interventions;transfer/mobility retraining;strengthening exercise;ROM/therapeutic exercise;patient/caregiver education/training  -RB           OT Goals    Transfer Goal Selection (OT)  transfer, OT goal 1  -RB        Bathing Goal Selection (OT)  bathing, OT goal 1  -RB        Dressing Goal Selection (OT)  dressing, OT goal 1  -RB        Toileting Goal Selection (OT)  toileting, OT goal 1  -RB        Strength Goal Selection (OT)  strength, OT goal 1  -RB        Additional Documentation  Strength Goal Selection (OT) (Row);Activity Tolerance Goal Selection (OT) (Row)  -RB           Transfer Goal 1 (OT)    Activity/Assistive Device (Transfer Goal 1, OT)  transfers, all  -RB        [place] Level/Cues Needed (Transfer Goal 1, OT)  conditional independence  -RB        Time Frame (Transfer Goal 1, OT)  long term goal (LTG)  -RB        Progress/Outcome (Transfer Goal 1, OT)  goal not met  -RB           Bathing Goal 1 (OT)    Activity/Assistive Device (Bathing Goal 1, OT)  bathing skills, all  -RB        [place] Level/Cues Needed (Bathing Goal 1, OT)  conditional independence  -RB        Time Frame (Bathing Goal 1, OT)  long term goal (LTG)  -RB        Progress/Outcomes (Bathing Goal 1, OT)  goal not met  -RB           Dressing Goal 1 (OT)    Activity/Assistive Device (Dressing Goal 1, OT)  dressing skills, all  -RB        [place]/Cues Needed (Dressing Goal 1, OT)  conditional independence  -RB        Time Frame (Dressing Goal 1, OT)  long term goal  (LTG)  -RB        Progress/Outcome (Dressing Goal 1, OT)  goal not met  -RB           Toileting Goal 1 (OT)    Activity/Device (Toileting Goal 1, OT)  toileting skills, all  -RB        [place] Level/Cues Needed (Toileting Goal 1, OT)  conditional independence  -RB        Time Frame (Toileting Goal 1, OT)  long term goal (LTG)  -RB        Progress/Outcome (Toileting Goal 1, OT)  goal not met  -RB           Strength Goal 1 (OT)    Strength Goal 1 (OT)  Increase B UE strength by 1/2 grade to increase safety/independence with ADLs and functional mobility.  -RB        Time Frame (Strength Goal 1, OT)  long term goal (LTG)  -RB        Progress/Outcome (Strength Goal 1, OT)  goal not met  -RB           Patient Education Goal (OT)    Activity (Patient Education Goal, OT)  Home safety/fall prev and EC/WS.  -RB        [place]/Cues/Accuracy (Memory Goal 2, OT)  demonstrates adequately;verbalizes understanding  -RB        Time Frame (Patient Education Goal, OT)  long term goal (LTG)  -RB        Progress/Outcome (Patient Education Goal, OT)  goal not met  -RB           Living Environment    Home Accessibility  stairs to enter home  -RB          User Key  (r) = Recorded By, (t) = Taken By, (c) = Cosigned By    Initials Name Effective Dates    RB Alirio Rhodes, ARIANA 07/24/19 -                OT Recommendation and Plan  Outcome Summary/Treatment Plan (OT)  Anticipated Equipment Needs at Discharge (OT): shower chair(toilet bars)  Anticipated Discharge Disposition (OT): home with home health  Planned Therapy Interventions (OT Eval): activity tolerance training, adaptive equipment training, BADL retraining, functional balance retraining, occupation/activity based interventions, transfer/mobility retraining, strengthening exercise, ROM/therapeutic exercise, patient/caregiver education/training  Therapy Frequency (OT Eval): other (see comments)(5-7 days/wk.)  Plan of Care Review  Plan of Care Reviewed With: patient  Plan of Care  Reviewed With: patient  Outcome Summary: OT eval on this date.  Pt was independent with bed mobility.  Carlene was CGA for 40' ambulation and toilet transfer.  Set up supervision for donning socks.  Pt exhibited decreased UB strength.  She could benefit from OT services to increase strength, safety and independence with ADLs and functional mobility.    Outcome Measures     Row Name 05/01/20 0854             How much help from another is currently needed...    Putting on and taking off regular lower body clothing?  3  -RB      Bathing (including washing, rinsing, and drying)  3  -RB      Toileting (which includes using toilet bed pan or urinal)  3  -RB      Putting on and taking off regular upper body clothing  4  -RB      Taking care of personal grooming (such as brushing teeth)  4  -RB      Eating meals  4  -RB      AM-PAC 6 Clicks Score (OT)  21  -RB         Functional Assessment    Outcome Measure Options  AM-PAC 6 Clicks Daily Activity (OT)  -RB        User Key  (r) = Recorded By, (t) = Taken By, (c) = Cosigned By    Initials Name Provider Type    RB Alirio Rhodes OT Occupational Therapist          Time Calculation:   Time Calculation- OT     Row Name 05/01/20 1121             Time Calculation- OT    OT Start Time  0854  -RB      OT Stop Time  0924  -RB      OT Time Calculation (min)  30 min  -RB      OT Received On  05/01/20  -RB      OT Goal Re-Cert Due Date  05/14/20  -RB        User Key  (r) = Recorded By, (t) = Taken By, (c) = Cosigned By    Initials Name Provider Type    Alirio Stoll OT Occupational Therapist        Therapy Charges for Today     Code Description Service Date Service Provider Modifiers Qty    [id number]  OT EVAL MOD COMPLEXITY 2 5/1/2020 Alirio Rhodes OT GO 1               Alirio Rhodes OT  5/1/2020

## 2020-05-01 NOTE — PROGRESS NOTES
[facility] Medicine Services  INPATIENT PROGRESS NOTE    Length of Stay: 3  Date of Admission: 4/28/2020  Primary Care Physician: Shun, HITESH Guerrero    Subjective   Chief Complaint: No new complaints.      HPI:    Patient is a 30 y.o. female with history of nicotine dependence, hypertension, depressive/anxiety disorder, restless leg syndrome, presents with 3 to 5-day history of progressively worsening right upper quadrant abdominal discomfort, nausea, vomiting, low-grade fever, generalized headache,malaise, generalized body aches and pain and decreased oral intake.     Patient was seen at emergency department on 4/27/2020  for the same complaints.  She was treated symptomatically and discharged.  Noncontrast CT scan of the head was unremarkable.     On triage her heart rate ranged from  with a T-max of 99.5.  Chest x-ray was unremarkable, UDS was positive for opiates and TCA, lactic acid was 2.4 and CBC showed leukopenia and thrombocytopenia of 33,000.  Patient was started on IV hydration, antiemetics, pain control and referred for admission and to rule out COVID-19.     Patient is seen for follow-up today. She is much improved, remains afebrile, less symptomatic, tolerating prescribed diet and diarrhea has resolved.      Review of Systems   Constitutional: Negative for activity change, appetite change, chills, diaphoresis, fatigue and fever.   HENT: Negative for trouble swallowing and voice change.    Eyes: Negative for photophobia and visual disturbance.   Respiratory: Negative for cough, choking, chest tightness, shortness of breath, wheezing and stridor.    Cardiovascular: Negative for chest pain, palpitations and leg swelling.   Gastrointestinal: Negative for abdominal distention, abdominal pain, blood in stool, constipation, diarrhea, nausea and vomiting.   Endocrine: Negative for cold intolerance, heat intolerance, polydipsia, polyphagia and polyuria.      Genitourinary: Negative for decreased urine volume, difficulty urinating, dysuria, enuresis, flank pain, frequency, hematuria and urgency.   Musculoskeletal: Negative for arthralgias, gait problem, myalgias, neck pain and neck stiffness.   Skin: Negative for pallor, rash and wound.   Neurological: Negative for dizziness, tremors, seizures, syncope, facial asymmetry, speech difficulty, weakness, light-headedness, numbness and headaches.   Hematological: Does not bruise/bleed easily.   Psychiatric/Behavioral: Negative for agitation, behavioral problems and confusion.       Objective    Temp:  [96.1 °F (35.6 °C)-97.4 °F (36.3 °C)] 96.7 °F (35.9 °C)  Heart Rate:  [73-77] 73  Resp:  [16-18] 16  BP: (126-151)/(80-90) 132/83    Physical Exam   Constitutional: She is oriented to person, place, and time. She appears well-developed and well-nourished. She is cooperative. No distress.   HENT:   Head: Normocephalic and atraumatic.   Right Ear: External ear normal.   Left Ear: External ear normal.   Nose: Nose normal.   Mouth/Throat: Oropharynx is clear and moist.   Eyes: Pupils are equal, round, and reactive to light. Conjunctivae and EOM are normal.   Neck: Normal range of motion. Neck supple. No JVD present. No thyromegaly present.   Cardiovascular: Normal rate, regular rhythm, normal heart sounds and intact distal pulses. Exam reveals no gallop and no friction rub.   No murmur heard.  Pulmonary/Chest: Effort normal and breath sounds normal. No stridor. No respiratory distress. She has no wheezes. She has no rales. She exhibits no tenderness.   Abdominal: Soft. Bowel sounds are normal. She exhibits no distension and no mass. There is no tenderness. There is no rebound and no guarding. No hernia.   Musculoskeletal: Normal range of motion. She exhibits no edema, tenderness or deformity.   Neurological: She is alert and oriented to person, place, and time. She has normal reflexes. She displays normal reflexes. No cranial nerve  deficit or sensory deficit. She exhibits normal muscle tone. Coordination normal.   Skin: Skin is warm and dry. No rash noted. She is not diaphoretic. No erythema. No pallor.   Psychiatric: She has a normal mood and affect. Her behavior is normal. Judgment and thought content normal.   Nursing note and vitals reviewed.        Medication Review:    Current Facility-Administered Medications:   •  acetaminophen (TYLENOL) tablet 650 mg, 650 mg, Oral, Q4H PRN, 650 mg at 05/01/20 1028 **OR** acetaminophen (TYLENOL) 160 MG/5ML solution 650 mg, 650 mg, Oral, Q4H PRN **OR** acetaminophen (TYLENOL) suppository 650 mg, 650 mg, Rectal, Q4H PRN, Zach Bryant MD  •  albuterol sulfate HFA (PROVENTIL HFA;VENTOLIN HFA;PROAIR HFA) inhaler 2 puff, 2 puff, Inhalation, Q6H PRN, Zach Bryant MD  •  amitriptyline (ELAVIL) tablet 50 mg, 50 mg, Oral, Nightly, Zach Bryant MD, 50 mg at 04/30/20 2110  •  cefTRIAXone (ROCEPHIN) 1 g/100 mL 0.9% NS (MBP), 1 g, Intravenous, Q24H, Zach Bryant MD, 1 g at 04/30/20 1615  •  DULoxetine (CYMBALTA) DR capsule 30 mg, 30 mg, Oral, Daily, Zach Bryant MD, 30 mg at 05/01/20 0842  •  famotidine (PEPCID) tablet 20 mg, 20 mg, Oral, Daily, Zach Bryant MD, 20 mg at 05/01/20 0842  •  gabapentin (NEURONTIN) capsule 400 mg, 400 mg, Oral, Q8H, Zach Bryant MD, 400 mg at 05/01/20 0601  •  HYDROcodone-acetaminophen (NORCO)  MG per tablet 1 tablet, 1 tablet, Oral, Q6H PRN, Zach Bryant MD, 1 tablet at 05/01/20 0601  •  insulin aspart (novoLOG) injection 0-7 Units, 0-7 Units, Subcutaneous, TID AC, Zach Bryant MD  •  loperamide (IMODIUM) capsule 2 mg, 2 mg, Oral, TID PRN, Zach Bryant MD, 2 mg at 05/01/20 1028  •  nicotine (NICODERM CQ) 21 MG/24HR patch 1 patch, 1 patch, Transdermal, Q24H, Zach Bryant MD, 1 patch at 04/30/20 1326  •  ondansetron (ZOFRAN) tablet 4 mg, 4 mg, Oral, Q6H PRN **OR** ondansetron (ZOFRAN) injection 4 mg, 4 mg,  Intravenous, Q6H PRN, Zach Bryant MD, 4 mg at 05/01/20 0601  •  potassium chloride (MICRO-K) CR capsule 40 mEq, 40 mEq, Oral, PRN, 40 mEq at 05/01/20 0842 **OR** potassium chloride (KLOR-CON) packet 40 mEq, 40 mEq, Oral, PRN **OR** potassium chloride 10 mEq in 100 mL IVPB, 10 mEq, Intravenous, Q1H PRN, Zach Bryant MD  •  propranolol (INDERAL) tablet 20 mg, 20 mg, Oral, TID, Zach Bryant MD, 20 mg at 05/01/20 0842  •  rOPINIRole (REQUIP) tablet 1 mg, 1 mg, Oral, BID, Zach Bryant MD, 1 mg at 05/01/20 0842  •  sodium chloride 0.9 % bolus 1,000 mL, 1,000 mL, Intravenous, Once, Zach Bryant MD  •  sodium chloride 0.9 % flush 10 mL, 10 mL, Intravenous, PRN, Zach Bryant MD  •  sodium chloride 0.9 % flush 10 mL, 10 mL, Intravenous, PRN, Zach Bryant MD  •  sodium chloride 0.9 % flush 10 mL, 10 mL, Intravenous, Q12H, Zach Bryant MD, 10 mL at 04/30/20 2110  •  sodium chloride 0.9 % flush 10 mL, 10 mL, Intravenous, PRN, Zach Bryant MD  •  sodium chloride 0.9 % infusion, 75 mL/hr, Intravenous, Continuous, Zach Bryant MD, Last Rate: 75 mL/hr at 04/30/20 2330, 75 mL/hr at 04/30/20 2330  •  SUMAtriptan (IMITREX) tablet 50 mg, 50 mg, Oral, Once PRN, Zach Bryant MD    Results Review:  I have reviewed the labs, radiology results, and diagnostic studies.    Laboratory Data:   Results from last 7 days   Lab Units 05/01/20  0637 04/30/20  1047 04/30/20  0616  04/29/20  0621   SODIUM mmol/L 135*  --  134*  --  135*   POTASSIUM mmol/L 3.4* 3.8 3.7   < > 2.9*   CHLORIDE mmol/L 103  --  102  --  100   CO2 mmol/L 19.0*  --  19.0*  --  22.0   BUN mg/dL 9  --  11  --  13   CREATININE mg/dL 0.28*  --  0.34*  --  0.39*   GLUCOSE mg/dL 92  --  96  --  118*   CALCIUM mg/dL 8.0*  --  7.9*  --  7.8*   BILIRUBIN mg/dL 0.8  --  1.2  --  1.7*   ALK PHOS U/L 294*  --  313*  --  266*   ALT (SGPT) U/L 98*  --  90*  --  87*   AST (SGOT) U/L 149*  --  144*  --  132*   ANION  GAP mmol/L 13.0  --  13.0  --  13.0    < > = values in this interval not displayed.     Estimated Creatinine Clearance: 306.9 mL/min (A) (by C-G formula based on SCr of 0.28 mg/dL (L)).  Results from last 7 days   Lab Units 04/30/20  1047 04/28/20  0643   MAGNESIUM mg/dL 2.1 1.7         Results from last 7 days   Lab Units 05/01/20  0637 04/30/20  0615 04/29/20  0621 04/28/20  1535 04/28/20  0751   WBC 10*3/mm3 4.44 4.49 3.65 3.32* 2.60*   HEMOGLOBIN g/dL 11.5* 11.6* 11.4* 12.1 12.5   HEMATOCRIT % 33.2* 32.6* 32.3* 35.2 34.6   PLATELETS 10*3/mm3 57* 50* 34* 31* 33*     Results from last 7 days   Lab Units 04/28/20  1535   INR  1.12       Culture Data:   No results found for: BLOODCX  No results found for: URINECX  No results found for: RESPCX  No results found for: WOUNDCX  No results found for: STOOLCX  No components found for: BODYFLD    Radiology Data:   Imaging Results (Last 24 Hours)     ** No results found for the last 24 hours. **          I have reviewed the patient's current medications.     Assessment/Plan     Hospital Problem List:  Active Problems:    Non-intractable vomiting    Sepsis secondary to acute cystitis/possible acalculous cholecystitis/possible COVID-19 viral infection: Improving.  Continue IV hydration, IV antibiotics, antiemetics and pain control.  Respiratory PCR was unremarkable, initial blood culture showed no growth and COVID-19 PCR was negative.  Lactic acidosis has resolved.  Leukopenia has resolved, thrombocytopenia and elevated LFTs are reactive, improving and will be monitored.  Hepatitis profile is unremarkable.  Input by Dr. Ghotra is appreciated.      Hypokalemia: Continue potassium repletion protocol.        Elevated d-dimer is likely reactive.  Patient is  asymptomatic and CT pulmonary angiogram is negative for pulmonary embolism.     Generalized headache: This may be secondary to viral syndrome/sinus headaches.  Patient is significantly less symptomatic.      Nicotine  dependence: Continue nicotine patch.  Nicotine cessation counseling has been discussed with the patient.     Hypertension: Stable.  Continue propranolol.     Depressive disorder: Continue home medications.     Diarrhea: Resolved.  GI panel is unremarkable.       Continue  DVT prophylaxis with SCD and GI prophylaxis.     Deconditioning: Continue PT and OT.      Discharge Planning: Likely discharge in 1 to 2 days.      Zach Bryant MD   05/01/20   10:31

## 2020-05-01 NOTE — THERAPY TREATMENT NOTE
Acute Care - Physical Therapy Treatment Note  [facility]     Patient Name: Cally Olsen  : 1990  MRN: [id number]  Today's Date: 2020  Onset of Illness/Injury or Date of Surgery: 20     Referring Physician: KWASI Robertson MD    Admit Date: 2020    Visit Dx:    ICD-10-CM ICD-9-CM   1. Non-intractable vomiting with nausea, unspecified vomiting type R11.2 787.01   2. Tachycardia R00.0 785.0   3. Dehydration E86.0 276.51   4. Impaired functional mobility, balance, gait, and endurance Z74.09 V49.89   5. Impaired mobility and activities of daily living Z74.09 799.89     Patient Active Problem List   Diagnosis   • Menorrhagia with regular cycle   • DDD (degenerative disc disease), lumbar   • Moderate episode of recurrent major depressive disorder (CMS/HCC)   • Generalized anxiety disorder   • Essential hypertension   • Tobacco dependence in remission   • Bilateral hip pain   • Chronic midline low back pain without sciatica   • History of loop electrosurgical excision procedure (LEEP)   • Chronic pelvic pain in female   • Primary insomnia   • Overweight (BMI 25.0-29.9)   • Non-intractable vomiting       Therapy Treatment    Rehabilitation Treatment Summary     Row Name 20 1416             Treatment Time/Intention    Discipline  physical therapy assistant  -TA      Document Type  therapy note (daily note)  -TA      Subjective Information  complains of;pain  -TA      Mode of Treatment  physical therapy  -TA      Therapy Frequency (PT Clinical Impression)  2 times/day  -TA      Recorded by [TA] Neida Birch, PTA 20 1541      Row Name 20 1416             Vital Signs    Pre Systolic BP Rehab  157  -TA      Pre Treatment Diastolic BP  93  -TA      Post Systolic BP Rehab  148  -TA      Post Treatment Diastolic BP  88  -TA      Pretreatment Heart Rate (beats/min)  74  -TA      Intratreatment Heart Rate (beats/min)  81  -TA      Posttreatment Heart Rate (beats/min)  79  -TA       Pre SpO2 (%)  97  -TA      O2 Delivery Pre Treatment  room air  -TA      Intra SpO2 (%)  97  -TA      O2 Delivery Intra Treatment  room air  -TA      Post SpO2 (%)  96  -TA      O2 Delivery Post Treatment  room air  -TA      Pre Patient Position  Supine  -TA      Post Patient Position  Supine  -TA      Recorded by [TA] Neida Birch, [facility] 05/01/20 1541      Row Name 05/01/20 1416             Cognitive Assessment/Intervention- PT/OT    Affect/Mental Status (Cognitive)  WFL  -TA      Personal Safety Interventions  fall prevention program maintained;gait belt;nonskid shoes/slippers when out of bed;supervised activity  -TA      Recorded by [TA] Neida Birch, [facility] 05/01/20 1541      Row Name 05/01/20 1416             Bed Mobility Assessment/Treatment    Supine-Sit [place] (Bed Mobility)  independent  -TA      Sit-Supine [place] (Bed Mobility)  independent  -TA      Assistive Device (Bed Mobility)  bed rails;head of bed elevated  -TA      Recorded by [TA] Neida Birch, [facility] 05/01/20 1541      Row Name 05/01/20 1416             Functional Mobility    Functional Mobility- Ind. Level  contact guard assist;standby assist  -TA      Functional Mobility- Device  -- No AD  -TA      Functional Mobility-Distance (Feet)  8  -TA      Recorded by [TA] Neida Birch, [facility] 05/01/20 1541      Row Name 05/01/20 1416             Bed-Chair Transfer    Bed-Chair [place] (Transfers)  contact guard;stand by assist  -TA      Recorded by [TA] Neida Birch, [facility] 05/01/20 1541      Row Name 05/01/20 1416             Sit-Stand Transfer    Sit-Stand [place] (Transfers)  contact guard;stand by assist  -TA      Recorded by [TA] Neida Birch, [facility] 05/01/20 1541      Row Name 05/01/20 1416             Toilet Transfer    Type (Toilet Transfer)  sit-stand;stand-sit  -TA      Recorded by [TA] Neida Birch, [facility] 05/01/20 1541      Row Name 05/01/20 1416             Gait/Stairs Assessment/Training    [place] Level (Gait)   contact guard;stand by assist  -TA      Assistive Device (Gait)  -- No AD  -TA      Distance in Feet (Gait)  150`  -TA      Recorded by [TA] Neida Birch, [facility] 05/01/20 1541      Row Name 05/01/20 1416             Therapeutic Exercise    Lower Extremity Range of Motion (Therapeutic Exercise)  ankle dorsiflexion/plantar flexion, bilateral  -TA      Exercise Type (Therapeutic Exercise)  AROM (active range of motion)  -TA      Position (Therapeutic Exercise)  seated  -TA      Sets/Reps (Therapeutic Exercise)  10  -TA      Expected Outcome (Therapeutic Exercise)  improve functional stability;improve motor control;increase active range of motion  -TA      Recorded by [TA] Neida Birch, PTA 05/01/20 1541      Row Name 05/01/20 1416             Positioning and Restraints    Pre-Treatment Position  in bed  -TA      Post Treatment Position  bed  -TA      In Bed  supine;call light within reach  -TA      Recorded by [TA] Neida Birch, PTA 05/01/20 1541      Row Name 05/01/20 1416             Pain Scale: Numbers Pre/Post-Treatment    Pain Scale: Numbers, Pretreatment  5/10  -TA      Pain Scale: Numbers, Post-Treatment  5/10  -TA      Pain Location - Side  --  -TA      Pain Location  head  -TA      Recorded by [TA] Neida Birch, [facility] 05/01/20 1541      Row Name 05/01/20 1416             Outcome Summary/Treatment Plan (PT)    Daily Summary of Progress (PT)  progress toward functional goals is good  -TA      Plan for Continued Treatment (PT)  continue  -TA      Anticipated Discharge Disposition (PT)  home with assist  -TA      Recorded by [TA] Neida Birch, [facility] 05/01/20 1541        User Key  (r) = Recorded By, (t) = Taken By, (c) = Cosigned By    Initials Name Effective Dates Discipline    TA Neida Birch, PTA 03/07/18 -  PT               Rehab Goal Summary     Row Name 05/01/20 0854             Occupational Therapy Goals    Transfer Goal Selection (OT)  transfer, OT goal 1  -RB      Bathing Goal Selection (OT)   bathing, OT goal 1  -RB      Dressing Goal Selection (OT)  dressing, OT goal 1  -RB      Toileting Goal Selection (OT)  toileting, OT goal 1  -RB      Strength Goal Selection (OT)  strength, OT goal 1  -RB         Transfer Goal 1 (OT)    Activity/Assistive Device (Transfer Goal 1, OT)  transfers, all  -RB      Hall Level/Cues Needed (Transfer Goal 1, OT)  conditional independence  -RB      Time Frame (Transfer Goal 1, OT)  long term goal (LTG)  -RB      Progress/Outcome (Transfer Goal 1, OT)  goal not met  -RB         Bathing Goal 1 (OT)    Activity/Assistive Device (Bathing Goal 1, OT)  bathing skills, all  -RB      Hall Level/Cues Needed (Bathing Goal 1, OT)  conditional independence  -RB      Time Frame (Bathing Goal 1, OT)  long term goal (LTG)  -RB      Progress/Outcomes (Bathing Goal 1, OT)  goal not met  -RB         Dressing Goal 1 (OT)    Activity/Assistive Device (Dressing Goal 1, OT)  dressing skills, all  -RB      Hall/Cues Needed (Dressing Goal 1, OT)  conditional independence  -RB      Time Frame (Dressing Goal 1, OT)  long term goal (LTG)  -RB      Progress/Outcome (Dressing Goal 1, OT)  goal not met  -RB         Toileting Goal 1 (OT)    Activity/Device (Toileting Goal 1, OT)  toileting skills, all  -RB      Hall Level/Cues Needed (Toileting Goal 1, OT)  conditional independence  -RB      Time Frame (Toileting Goal 1, OT)  long term goal (LTG)  -RB      Progress/Outcome (Toileting Goal 1, OT)  goal not met  -RB         Strength Goal 1 (OT)    Strength Goal 1 (OT)  Increase B UE strength by 1/2 grade to increase safety/independence with ADLs and functional mobility.  -RB      Time Frame (Strength Goal 1, OT)  long term goal (LTG)  -RB      Progress/Outcome (Strength Goal 1, OT)  goal not met  -RB         Patient Education Goal (OT)    Activity (Patient Education Goal, OT)  Home safety/fall prev and EC/WS.  -RB      Hall/Cues/Accuracy (Memory Goal 2, OT)   demonstrates adequately;verbalizes understanding  -RB      Time Frame (Patient Education Goal, OT)  long term goal (LTG)  -RB      Progress/Outcome (Patient Education Goal, OT)  goal not met  -RB        User Key  (r) = Recorded By, (t) = Taken By, (c) = Cosigned By    Initials Name Provider Type Discipline    Alirio Stoll OT Occupational Therapist OT              PT Recommendation and Plan  Anticipated Discharge Disposition (PT): home with assist  Therapy Frequency (PT Clinical Impression): 2 times/day  Outcome Summary/Treatment Plan (PT)  Daily Summary of Progress (PT): progress toward functional goals is good  Plan for Continued Treatment (PT): continue  Anticipated Discharge Disposition (PT): home with assist  Progress: improving  Outcome Summary: pt sup<>sit with independence, sit<>stand with SBA, pt ambulated 150` without AD with CGA-SBA. pt would benefit from home with assistance & continued PT services @ D/C  Outcome Measures     Row Name 05/01/20 0854             How much help from another is currently needed...    Putting on and taking off regular lower body clothing?  3  -RB      Bathing (including washing, rinsing, and drying)  3  -RB      Toileting (which includes using toilet bed pan or urinal)  3  -RB      Putting on and taking off regular upper body clothing  4  -RB      Taking care of personal grooming (such as brushing teeth)  4  -RB      Eating meals  4  -RB      AM-PAC 6 Clicks Score (OT)  21  -RB         Functional Assessment    Outcome Measure Options  AM-PAC 6 Clicks Daily Activity (OT)  -RB        User Key  (r) = Recorded By, (t) = Taken By, (c) = Cosigned By    Initials Name Provider Type    RB Alirio Rhodes OT Occupational Therapist         Time Calculation:   PT Charges     Row Name 05/01/20 1543             Time Calculation    Start Time  1416  -TA      Stop Time  1456  -TA      Time Calculation (min)  40 min  -TA      PT Received On  05/01/20  -TA         Time Calculation- PT     Total Timed Code Minutes- PT  40 minute(s)  -PEDRO        User Key  (r) = Recorded By, (t) = Taken By, (c) = Cosigned By    Initials Name Provider Type    Neida Peters PTA Physical Therapy Assistant        Therapy Charges for Today     Code Description Service Date Service Provider Modifiers Qty    87168588817 HC GAIT TRAINING EA 15 MIN 5/1/2020 Neida Birch, SAVANNA GP 1    91099892698 HC PT THERAPEUTIC ACT EA 15 MIN 5/1/2020 Neida Birch PTA GP 1          PT G-Codes  Outcome Measure Options: AM-PAC 6 Clicks Daily Activity (OT)  AM-PAC 6 Clicks Score (PT): 19  AM-PAC 6 Clicks Score (OT): 21    Neida Birch PTA  5/1/2020

## 2020-05-01 NOTE — PLAN OF CARE
Problem: Patient Care Overview  Goal: Plan of Care Review  Outcome: Ongoing (interventions implemented as appropriate)  Flowsheets (Taken 5/1/2020 1416)  Progress: improving  Outcome Summary: pt sup<>sit with independence, sit<>stand with SBA, pt ambulated 150` without AD with CGA-SBA. pt would benefit from home with assistance & continued PT services @ D/C

## 2020-05-01 NOTE — SIGNIFICANT NOTE
05/01/20 1020   Rehab Treatment   Discipline physical therapy assistant   Reason Treatment Not Performed patient/family declined treatment

## 2020-05-01 NOTE — PLAN OF CARE
Problem: Patient Care Overview  Goal: Plan of Care Review  Outcome: Ongoing (interventions implemented as appropriate)  Flowsheets (Taken 5/1/2020 1117)  Plan of Care Reviewed With: patient  Outcome Summary: OT eval on this date.  Pt was independent with bed mobility.  Carlene was CGA for 40' ambulation and toilet transfer.  Set up supervision for donning socks.  Pt exhibited decreased UB strength.  She could benefit from OT services to increase strength, safety and independence with ADLs and functional mobility.

## 2020-05-02 ENCOUNTER — READMISSION MANAGEMENT (OUTPATIENT)
Dept: CALL CENTER | Facility: HOSPITAL | Age: 30
End: 2020-05-02

## 2020-05-02 VITALS
HEART RATE: 90 BPM | HEIGHT: 63 IN | WEIGHT: 196.7 LBS | BODY MASS INDEX: 34.85 KG/M2 | DIASTOLIC BLOOD PRESSURE: 90 MMHG | RESPIRATION RATE: 18 BRPM | SYSTOLIC BLOOD PRESSURE: 159 MMHG | OXYGEN SATURATION: 96 % | TEMPERATURE: 97.2 F

## 2020-05-02 LAB
ALBUMIN SERPL-MCNC: 2.9 G/DL (ref 3.5–5.2)
ALBUMIN SERPL-MCNC: 2.9 G/DL (ref 3.5–5.2)
ALBUMIN/GLOB SERPL: 1 G/DL
ALP SERPL-CCNC: 351 U/L (ref 39–117)
ALP SERPL-CCNC: 354 U/L (ref 39–117)
ALT SERPL W P-5'-P-CCNC: 136 U/L (ref 1–33)
ALT SERPL W P-5'-P-CCNC: 136 U/L (ref 1–33)
ANION GAP SERPL CALCULATED.3IONS-SCNC: 12 MMOL/L (ref 5–15)
AST SERPL-CCNC: 196 U/L (ref 1–32)
AST SERPL-CCNC: 201 U/L (ref 1–32)
BASOPHILS # BLD AUTO: 0.06 10*3/MM3 (ref 0–0.2)
BASOPHILS NFR BLD AUTO: 1 % (ref 0–1.5)
BILIRUB CONJ SERPL-MCNC: 0.4 MG/DL (ref 0.2–0.3)
BILIRUB INDIRECT SERPL-MCNC: 0.3 MG/DL
BILIRUB SERPL-MCNC: 0.7 MG/DL (ref 0.2–1.2)
BILIRUB SERPL-MCNC: 0.7 MG/DL (ref 0.2–1.2)
BUN BLD-MCNC: 6 MG/DL (ref 6–20)
BUN/CREAT SERPL: 17.6 (ref 7–25)
CALCIUM SPEC-SCNC: 8.2 MG/DL (ref 8.6–10.5)
CHLORIDE SERPL-SCNC: 103 MMOL/L (ref 98–107)
CO2 SERPL-SCNC: 22 MMOL/L (ref 22–29)
CREAT BLD-MCNC: 0.34 MG/DL (ref 0.57–1)
DEPRECATED RDW RBC AUTO: 45.6 FL (ref 37–54)
EOSINOPHIL # BLD AUTO: 0 10*3/MM3 (ref 0–0.4)
EOSINOPHIL NFR BLD AUTO: 0 % (ref 0.3–6.2)
ERYTHROCYTE [DISTWIDTH] IN BLOOD BY AUTOMATED COUNT: 14.2 % (ref 12.3–15.4)
GFR SERPL CREATININE-BSD FRML MDRD: >150 ML/MIN/1.73
GLOBULIN UR ELPH-MCNC: 3 GM/DL
GLUCOSE BLD-MCNC: 85 MG/DL (ref 65–99)
HCT VFR BLD AUTO: 33.8 % (ref 34–46.6)
HGB BLD-MCNC: 11.6 G/DL (ref 12–15.9)
IMM GRANULOCYTES # BLD AUTO: 0.12 10*3/MM3 (ref 0–0.05)
IMM GRANULOCYTES NFR BLD AUTO: 2.1 % (ref 0–0.5)
LYMPHOCYTES # BLD AUTO: 3.3 10*3/MM3 (ref 0.7–3.1)
LYMPHOCYTES NFR BLD AUTO: 56.8 % (ref 19.6–45.3)
MCH RBC QN AUTO: 30.5 PG (ref 26.6–33)
MCHC RBC AUTO-ENTMCNC: 34.3 G/DL (ref 31.5–35.7)
MCV RBC AUTO: 88.9 FL (ref 79–97)
MONOCYTES # BLD AUTO: 0.51 10*3/MM3 (ref 0.1–0.9)
MONOCYTES NFR BLD AUTO: 8.8 % (ref 5–12)
NEUTROPHILS # BLD AUTO: 1.82 10*3/MM3 (ref 1.7–7)
NEUTROPHILS NFR BLD AUTO: 31.3 % (ref 42.7–76)
NRBC BLD AUTO-RTO: 0 /100 WBC (ref 0–0.2)
PLATELET # BLD AUTO: 92 10*3/MM3 (ref 140–450)
PMV BLD AUTO: 12.4 FL (ref 6–12)
POTASSIUM BLD-SCNC: 3.7 MMOL/L (ref 3.5–5.2)
PROT SERPL-MCNC: 5.9 G/DL (ref 6–8.5)
PROT SERPL-MCNC: 5.9 G/DL (ref 6–8.5)
RBC # BLD AUTO: 3.8 10*6/MM3 (ref 3.77–5.28)
SODIUM BLD-SCNC: 137 MMOL/L (ref 136–145)
WBC NRBC COR # BLD: 5.81 10*3/MM3 (ref 3.4–10.8)

## 2020-05-02 PROCEDURE — 80076 HEPATIC FUNCTION PANEL: CPT | Performed by: INTERNAL MEDICINE

## 2020-05-02 PROCEDURE — 85025 COMPLETE CBC W/AUTO DIFF WBC: CPT | Performed by: INTERNAL MEDICINE

## 2020-05-02 PROCEDURE — 63710000001 ONDANSETRON PER 8 MG: Performed by: INTERNAL MEDICINE

## 2020-05-02 PROCEDURE — 25010000002 ONDANSETRON PER 1 MG: Performed by: INTERNAL MEDICINE

## 2020-05-02 PROCEDURE — 97535 SELF CARE MNGMENT TRAINING: CPT

## 2020-05-02 PROCEDURE — 80053 COMPREHEN METABOLIC PANEL: CPT | Performed by: INTERNAL MEDICINE

## 2020-05-02 RX ORDER — CEFDINIR 300 MG/1
300 CAPSULE ORAL EVERY 12 HOURS SCHEDULED
Status: DISCONTINUED | OUTPATIENT
Start: 2020-05-02 | End: 2020-05-02 | Stop reason: HOSPADM

## 2020-05-02 RX ORDER — CEFDINIR 300 MG/1
300 CAPSULE ORAL EVERY 12 HOURS SCHEDULED
Qty: 6 CAPSULE | Refills: 0 | Status: SHIPPED | OUTPATIENT
Start: 2020-05-02 | End: 2020-05-05

## 2020-05-02 RX ADMIN — ONDANSETRON HYDROCHLORIDE 4 MG: 4 TABLET, FILM COATED ORAL at 02:07

## 2020-05-02 RX ADMIN — GABAPENTIN 400 MG: 400 CAPSULE ORAL at 05:21

## 2020-05-02 RX ADMIN — ROPINIROLE 1 MG: 1 TABLET, FILM COATED ORAL at 09:23

## 2020-05-02 RX ADMIN — SODIUM CHLORIDE 75 ML/HR: 900 INJECTION, SOLUTION INTRAVENOUS at 02:10

## 2020-05-02 RX ADMIN — HYDROCODONE BITARTRATE AND ACETAMINOPHEN 1 TABLET: 10; 325 TABLET ORAL at 02:07

## 2020-05-02 RX ADMIN — HYDROCODONE BITARTRATE AND ACETAMINOPHEN 1 TABLET: 10; 325 TABLET ORAL at 09:22

## 2020-05-02 RX ADMIN — FAMOTIDINE 20 MG: 20 TABLET, FILM COATED ORAL at 09:22

## 2020-05-02 RX ADMIN — DULOXETINE HYDROCHLORIDE 30 MG: 30 CAPSULE, DELAYED RELEASE ORAL at 09:23

## 2020-05-02 RX ADMIN — PROPRANOLOL HYDROCHLORIDE 20 MG: 20 TABLET ORAL at 09:22

## 2020-05-02 RX ADMIN — CEFDINIR 300 MG: 300 CAPSULE ORAL at 12:49

## 2020-05-02 RX ADMIN — ONDANSETRON 4 MG: 2 INJECTION INTRAMUSCULAR; INTRAVENOUS at 09:23

## 2020-05-02 NOTE — PLAN OF CARE
Problem: Patient Care Overview  Goal: Plan of Care Review  Outcome: Ongoing (interventions implemented as appropriate)  Flowsheets (Taken 5/2/2020 0404)  Progress: improving  Plan of Care Reviewed With: patient  Outcome Summary: pt resting. compalins of nausea, zofran given and effective, v/s stable. will continue to monitor

## 2020-05-02 NOTE — SIGNIFICANT NOTE
05/02/20 [facility]   Rehab Treatment   Discipline occupational therapy assistant   Reason Treatment Not Performed patient/family declined treatment  (pt declined tx secondary to d/c.)

## 2020-05-02 NOTE — DISCHARGE SUMMARY
[facility] Medicine Services  DISCHARGE SUMMARY       Date of Admission: 4/28/2020  Date of Discharge:  5/2/2020  Primary Care Physician: Sima Hoffmann APRN    Presenting Problem/History of Present Illness:  Dehydration [E86.0]  Tachycardia [R00.0]  Non-intractable vomiting with nausea, unspecified vomiting type [R11.2]  Non-intractable vomiting with nausea, unspecified vomiting type [R11.2]     Patient is a 30 y.o. female with history of nicotine dependence, hypertension, depressive/anxiety disorder, restless leg syndrome, presents with 3 to 5-day history of progressively worsening right upper quadrant abdominal discomfort, nausea, vomiting, low-grade fever, generalized headache,malaise, generalized body aches and pain and decreased oral intake.     Patient was seen at emergency department yesterday for the same complaints.  She was treated symptomatically and discharged.  Noncontrast CT scan of the head was unremarkable.     On triage her heart rate ranged from  with a T-max of 99.5.  Chest x-ray was unremarkable, UDS was positive for opiates and TCA, lactic acid was 2.4 and CBC showed leukopenia and thrombocytopenia of 33,000.  Patient was started on IV hydration, antiemetics, pain control and referred for admission and to rule out COVID-19.     Final Discharge Diagnoses:  Active Hospital Problems    Diagnosis   • Non-intractable vomiting       Consults:   Consults     Date and Time Order Name Status Description    4/28/2020 1306 Hospitalist (on-call MD unless specified)      4/28/2020 1306 Surgery (on-call MD unless specified)            Procedures Performed: None.                Pertinent Test Results:   Lab Results (last 7 days)     Procedure Component Value Units Date/Time    CBC & Differential [[id number]] Collected:  05/02/20 0740    Specimen:  Blood Updated:  05/02/20 0802    Narrative:       The following orders were created for panel order CBC &  Differential.  Procedure                               Abnormality         Status                     ---------                               -----------         ------                     CBC Auto Differential[[id number]]        Abnormal            Final result                 Please view results for these tests on the individual orders.    CBC Auto Differential [[id number]]  (Abnormal) Collected:  05/02/20 0740    Specimen:  Blood Updated:  05/02/20 0802     WBC 5.81 10*3/mm3      RBC 3.80 10*6/mm3      Hemoglobin 11.6 g/dL      Hematocrit 33.8 %      MCV 88.9 fL      MCH 30.5 pg      MCHC 34.3 g/dL      RDW 14.2 %      RDW-SD 45.6 fl      MPV 12.4 fL      Platelets 92 10*3/mm3      Neutrophil % 31.3 %      Lymphocyte % 56.8 %      Monocyte % 8.8 %      Eosinophil % 0.0 %      Basophil % 1.0 %      Immature Grans % 2.1 %      Neutrophils, Absolute 1.82 10*3/mm3      Lymphocytes, Absolute 3.30 10*3/mm3      Monocytes, Absolute 0.51 10*3/mm3      Eosinophils, Absolute 0.00 10*3/mm3      Basophils, Absolute 0.06 10*3/mm3      Immature Grans, Absolute 0.12 10*3/mm3      nRBC 0.0 /100 WBC     Comprehensive Metabolic Panel [[id number]]  (Abnormal) Collected:  05/02/20 0740    Specimen:  Blood Updated:  05/02/20 0802     Glucose 85 mg/dL      BUN 6 mg/dL      Creatinine 0.34 mg/dL      Sodium 137 mmol/L      Potassium 3.7 mmol/L      Chloride 103 mmol/L      CO2 22.0 mmol/L      Calcium 8.2 mg/dL      Total Protein 5.9 g/dL      Albumin 2.90 g/dL      ALT (SGPT) 136 U/L      AST (SGOT) 196 U/L      Alkaline Phosphatase 354 U/L      Total Bilirubin 0.7 mg/dL      eGFR Non African Amer >150 mL/min/1.73      Globulin 3.0 gm/dL      A/G Ratio 1.0 g/dL      BUN/Creatinine Ratio 17.6     Anion Gap 12.0 mmol/L     Narrative:       GFR Normal >60  Chronic Kidney Disease <60  Kidney Failure <15      Blood Culture - Blood, Wrist, Right [[id number]] Collected:  04/28/20 0754    Specimen:  Blood from Wrist, Right Updated:  05/02/20 0800      Blood Culture No growth at 4 days    Blood Culture - Blood, Arm, Left [[id number]] Collected:  04/28/20 0649    Specimen:  Blood from Arm, Left Updated:  05/02/20 0700     Blood Culture No growth at 4 days    Potassium [[id number]]  (Normal) Collected:  05/01/20 1722    Specimen:  Blood Updated:  05/01/20 1740     Potassium 3.9 mmol/L     Comprehensive Metabolic Panel [[id number]]  (Abnormal) Collected:  05/01/20 0637    Specimen:  Blood Updated:  05/01/20 0718     Glucose 92 mg/dL      BUN 9 mg/dL      Creatinine 0.28 mg/dL      Sodium 135 mmol/L      Potassium 3.4 mmol/L      Chloride 103 mmol/L      CO2 19.0 mmol/L      Calcium 8.0 mg/dL      Total Protein 5.7 g/dL      Albumin 2.80 g/dL      ALT (SGPT) 98 U/L      AST (SGOT) 149 U/L      Alkaline Phosphatase 294 U/L      Total Bilirubin 0.8 mg/dL      eGFR Non African Amer >150 mL/min/1.73      Globulin 2.9 gm/dL      A/G Ratio 1.0 g/dL      BUN/Creatinine Ratio 32.1     Anion Gap 13.0 mmol/L     Narrative:       GFR Normal >60  Chronic Kidney Disease <60  Kidney Failure <15      CBC & Differential [[id number]] Collected:  05/01/20 0637    Specimen:  Blood Updated:  05/01/20 0654    Narrative:       The following orders were created for panel order CBC & Differential.  Procedure                               Abnormality         Status                     ---------                               -----------         ------                     CBC Auto Differential[[id number]]        Abnormal            Final result                 Please view results for these tests on the individual orders.    CBC Auto Differential [[id number]]  (Abnormal) Collected:  05/01/20 0637    Specimen:  Blood Updated:  05/01/20 0654     WBC 4.44 10*3/mm3      RBC 3.74 10*6/mm3      Hemoglobin 11.5 g/dL      Hematocrit 33.2 %      MCV 88.8 fL      MCH 30.7 pg      MCHC 34.6 g/dL      RDW 13.8 %      RDW-SD 44.8 fl      MPV 12.9 fL      Platelets 57 10*3/mm3      Neutrophil % 44.5 %       Lymphocyte % 44.4 %      Monocyte % 9.5 %      Eosinophil % 0.0 %      Basophil % 0.7 %      Immature Grans % 0.9 %      Neutrophils, Absolute 1.98 10*3/mm3      Lymphocytes, Absolute 1.97 10*3/mm3      Monocytes, Absolute 0.42 10*3/mm3      Eosinophils, Absolute 0.00 10*3/mm3      Basophils, Absolute 0.03 10*3/mm3      Immature Grans, Absolute 0.04 10*3/mm3      nRBC 0.0 /100 WBC     POC Glucose Once [[id number]]  (Normal) Collected:  04/30/20 1927    Specimen:  Blood Updated:  04/30/20 1940     Glucose 83 mg/dL      Comment: : [id number] AVA Marks ID: [id number]       Peripheral Blood Smear [[id number]] Collected:  04/28/20 0751    Specimen:  Blood Updated:  04/30/20 1654     Performed by: Anshul Keller M.D.     Pathologist Interpretation --     Red cells are normocytic and normochromic.  Leukocyte count is borderline decreased, and my differential count follows:  neutrophils 65%, bands 9%, lymphocytes 18%, monocytes 8%.  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  Platelets are decreased in number, but are normal in size and granulation.  The findings appear reactive; the cause of thrombocytopenia is not evident on morphologic review of the peripheral smear.    Impression:  Thrombocytopenia, moderate-severe.  Leukopenia, probably reactive.    Fibrin Split Products [[id number]]  (Abnormal) Collected:  04/29/20 0106    Specimen:  Blood Updated:  04/30/20 1208     FDP 10 ug/mL     Narrative:       Performed at:  [address] [facility]  [id number]  : Byron Curry MD, Phone:  [phone]    Magnesium [[id number]]  (Normal) Collected:  04/30/20 1047    Specimen:  Blood Updated:  04/30/20 1156     Magnesium 2.1 mg/dL     POC Glucose Once [[id number]]  (Normal) Collected:  04/30/20 1114    Specimen:  Blood Updated:  04/30/20 1128     Glucose 90 mg/dL      Comment: :  [phone] HUDDLESTON ALISONMeter ID: [id number]       Potassium [[id number]]  (Normal) Collected:  04/30/20 1047    Specimen:  Blood Updated:  04/30/20 1118     Potassium 3.8 mmol/L     POC Glucose Once [[id number]]  (Normal) Collected:  04/30/20 0705    Specimen:  Blood Updated:  04/30/20 0802     Glucose 103 mg/dL      Comment: : [id number] HUDDLESTON ALISONMeter ID: [id number]       Manual Differential [[id number]]  (Abnormal) Collected:  04/30/20 0615    Specimen:  Blood Updated:  04/30/20 0707     Neutrophil % 59.0 %      Lymphocyte % 31.0 %      Monocyte % 7.0 %      Atypical Lymphocyte % 1.0 %      Plasma Cells % 2.0 %      Neutrophils Absolute 2.65 10*3/mm3      Lymphocytes Absolute 1.39 10*3/mm3      Monocytes Absolute 0.31 10*3/mm3      Anisocytosis Slight/1+     WBC Morphology Normal     Platelet Estimate Decreased    Comprehensive Metabolic Panel [[id number]]  (Abnormal) Collected:  04/30/20 0616    Specimen:  Blood Updated:  04/30/20 0658     Glucose 96 mg/dL      BUN 11 mg/dL      Creatinine 0.34 mg/dL      Sodium 134 mmol/L      Potassium 3.7 mmol/L      Chloride 102 mmol/L      CO2 19.0 mmol/L      Calcium 7.9 mg/dL      Total Protein 5.6 g/dL      Albumin 2.90 g/dL      ALT (SGPT) 90 U/L      AST (SGOT) 144 U/L      Alkaline Phosphatase 313 U/L      Total Bilirubin 1.2 mg/dL      eGFR Non African Amer >150 mL/min/1.73      Globulin 2.7 gm/dL      A/G Ratio 1.1 g/dL      BUN/Creatinine Ratio 32.4     Anion Gap 13.0 mmol/L     Narrative:       GFR Normal >60  Chronic Kidney Disease <60  Kidney Failure <15      CBC & Differential [[id number]] Collected:  04/30/20 0615    Specimen:  Blood Updated:  04/30/20 0625    Narrative:       The following orders were created for panel order CBC & Differential.  Procedure                               Abnormality         Status                     ---------                               -----------         ------                     CBC Auto  Differential[[id number]]        Abnormal            Final result                 Please view results for these tests on the individual orders.    CBC Auto Differential [[id number]]  (Abnormal) Collected:  04/30/20 0615    Specimen:  Blood Updated:  04/30/20 0625     WBC 4.49 10*3/mm3      RBC 3.71 10*6/mm3      Hemoglobin 11.6 g/dL      Hematocrit 32.6 %      MCV 87.9 fL      MCH 31.3 pg      MCHC 35.6 g/dL      RDW 13.5 %      RDW-SD 43.5 fl      MPV 13.0 fL      Platelets 50 10*3/mm3     Gastrointestinal Panel, PCR - Stool, Per Rectum [[id number]]  (Normal) Collected:  04/30/20 0245    Specimen:  Stool from Per Rectum Updated:  04/30/20 0420     Campylobacter Not Detected     Plesiomonas shigelloides Not Detected     Salmonella Not Detected     Vibrio Not Detected     Vibrio cholerae Not Detected     Yersinia enterocolitica Not Detected     Enteroaggregative E. coli (EAEC) Not Detected     Enteropathogenic E. coli (EPEC) Not Detected     Enterotoxigenic E. coli (ETEC) lt/st Not Detected     Shiga-like toxin-producing E. coli (STEC) stx1/stx2 Not Detected     E. coli O157 Not Detected     Shigella/Enteroinvasive E. coli (EIEC) Not Detected     Cryptosporidium Not Detected     Cyclospora cayetanensis Not Detected     Entamoeba histolytica Not Detected     Giardia lamblia Not Detected     Adenovirus F40/41 Not Detected     Astrovirus Not Detected     Norovirus GI/GII Not Detected     Rotavirus A Not Detected     Sapovirus (I, II, IV or V) Not Detected    Narrative:       Testing performed by multiplex PCR system.    Potassium [[id number]]  (Abnormal) Collected:  04/29/20 2120    Specimen:  Blood Updated:  04/29/20 2155     Potassium 2.9 mmol/L     POC Glucose Once [[id number]]  (Normal) Collected:  04/29/20 2007    Specimen:  Blood Updated:  04/29/20 2036     Glucose 99 mg/dL      Comment: : [id number] MEHRAN VERONICAMeter ID: [id number]       POC Glucose Once [[id number]]  (Normal) Collected:  04/29/20 1633     Specimen:  Blood Updated:  04/29/20 1702     Glucose 95 mg/dL      Comment: RN NotifiedOperator: [id number] NIKKI VICKERYMeter ID: [id number]       POC Glucose Once [[id number]]  (Normal) Collected:  04/29/20 1331    Specimen:  Blood Updated:  04/29/20 1344     Glucose 120 mg/dL      Comment: RN NotifiedOperator: [id number] NIKKI VICKERYMeter ID: [id number]       SARS-COV-2 PCR ([facility] IN-HOUSE PERFORMED), NP SWAB IN TRANSPORT MEDIA - Swab, Nasopharynx [[id number]]  (Normal) Collected:  04/29/20 0509    Specimen:  Swab from Nasopharynx Updated:  04/29/20 0839     COVID19 Not Detected    Comprehensive Metabolic Panel [[id number]]  (Abnormal) Collected:  04/29/20 0621    Specimen:  Blood Updated:  04/29/20 0714     Glucose 118 mg/dL      BUN 13 mg/dL      Creatinine 0.39 mg/dL      Sodium 135 mmol/L      Potassium 2.9 mmol/L      Chloride 100 mmol/L      CO2 22.0 mmol/L      Calcium 7.8 mg/dL      Total Protein 5.5 g/dL      Albumin 3.00 g/dL      ALT (SGPT) 87 U/L      AST (SGOT) 132 U/L      Alkaline Phosphatase 266 U/L      Total Bilirubin 1.7 mg/dL      eGFR Non African Amer >150 mL/min/1.73      Globulin 2.5 gm/dL      A/G Ratio 1.2 g/dL      BUN/Creatinine Ratio 33.3     Anion Gap 13.0 mmol/L     Narrative:       GFR Normal >60  Chronic Kidney Disease <60  Kidney Failure <15      CBC & Differential [[id number]] Collected:  04/29/20 0621    Specimen:  Blood Updated:  04/29/20 0654    Narrative:       The following orders were created for panel order CBC & Differential.  Procedure                               Abnormality         Status                     ---------                               -----------         ------                     CBC Auto Differential[[id number]]        Abnormal            Final result                 Please view results for these tests on the individual orders.    CBC Auto Differential [[id number]]  (Abnormal) Collected:  04/29/20 0621    Specimen:  Blood Updated:  04/29/20  0654     WBC 3.65 10*3/mm3      RBC 3.68 10*6/mm3      Hemoglobin 11.4 g/dL      Hematocrit 32.3 %      MCV 87.8 fL      MCH 31.0 pg      MCHC 35.3 g/dL      RDW 13.3 %      RDW-SD 42.9 fl      MPV 13.2 fL      Platelets 34 10*3/mm3      Neutrophil % 67.1 %      Lymphocyte % 27.4 %      Monocyte % 4.7 %      Eosinophil % 0.0 %      Basophil % 0.3 %      Immature Grans % 0.5 %      Neutrophils, Absolute 2.45 10*3/mm3      Lymphocytes, Absolute 1.00 10*3/mm3      Monocytes, Absolute 0.17 10*3/mm3      Eosinophils, Absolute 0.00 10*3/mm3      Basophils, Absolute 0.01 10*3/mm3      Immature Grans, Absolute 0.02 10*3/mm3      nRBC 0.0 /100 WBC     Respiratory Panel, PCR - Swab, Nasopharynx [[id number]]  (Normal) Collected:  04/29/20 0509    Specimen:  Swab from Nasopharynx Updated:  04/29/20 0630     ADENOVIRUS, PCR Not Detected     Coronavirus 229E Not Detected     Coronavirus HKU1 Not Detected     Coronavirus NL63 Not Detected     Coronavirus OC43 Not Detected     Human Metapneumovirus Not Detected     Human Rhinovirus/Enterovirus Not Detected     Influenza B PCR Not Detected     Parainfluenza Virus 1 Not Detected     Parainfluenza Virus 2 Not Detected     Parainfluenza Virus 3 Not Detected     Parainfluenza Virus 4 Not Detected     Bordetella pertussis pcr Not Detected     Influenza A H1 2009 PCR Not Detected     Chlamydophila pneumoniae PCR Not Detected     Mycoplasma pneumo by PCR Not Detected     Influenza A PCR Not Detected     Influenza A H3 Not Detected     Influenza A H1 Not Detected     RSV, PCR Not Detected    Narrative:       The coronavirus on the RVP is NOT COVID-19 and is NOT indicative of infection with COVID-19.     Potassium [[id number]]  (Abnormal) Collected:  04/29/20 0106    Specimen:  Blood Updated:  04/29/20 0125     Potassium 3.0 mmol/L     Hepatitis Panel, Acute [[id number]] Collected:  04/28/20 0643    Specimen:  Blood Updated:  04/28/20 1922    Narrative:       The following orders were created  for panel order Hepatitis Panel, Acute.  Procedure                               Abnormality         Status                     ---------                               -----------         ------                     Hepatitis Panel, Acute[[id number]]       Normal              Final result               Hep B Confirmation Tube[[id number]]                                                       Please view results for these tests on the individual orders.    Potassium [[id number]]  (Abnormal) Collected:  04/28/20 1535    Specimen:  Blood Updated:  04/28/20 1920     Potassium 3.4 mmol/L     Fibrinogen [[id number]]  (Normal) Collected:  04/28/20 1535    Specimen:  Blood Updated:  04/28/20 1656     Fibrinogen 361 mg/dL     aPTT [[id number]]  (Normal) Collected:  04/28/20 1535    Specimen:  Blood Updated:  04/28/20 1656     PTT 37.3 seconds     Narrative:       The recommended Heparin therapeutic range is 68-97 seconds.    Protime-INR [[id number]]  (Normal) Collected:  04/28/20 1535    Specimen:  Blood Updated:  04/28/20 1656     Protime 14.2 Seconds      INR 1.12    Narrative:       Therapeutic range for most indications is 2.0-3.0 INR,  or 2.5-3.5 for mechanical heart valves.    hCG, Serum, Qualitative [[id number]]  (Normal) Collected:  04/28/20 0643    Specimen:  Blood Updated:  04/28/20 1655     HCG Qualitative Negative    D-dimer, Quantitative [[id number]]  (Abnormal) Collected:  04/28/20 1535    Specimen:  Blood Updated:  04/28/20 1625     D-Dimer, Quantitative >4,000 ng/mL (FEU)     Narrative:       Dimer values <500 ng/ml FEU are FDA approved as aid in diagnosis of deep venous thrombosis and pulmonary embolism.  This test should not be used in an exclusion strategy with pretest probability alone.    A recent guideline regarding diagnosis for pulmonary thromboembolism recommends an adjusted exclusion criterion of age x 10 ng/ml FEU for patients >50 years of age ([employer] Med 2015; 163: 701-711).      Procalcitonin  "[[id number]]  (Abnormal) Collected:  04/28/20 1535    Specimen:  Blood Updated:  04/28/20 1621     Procalcitonin 0.40 ng/mL     Narrative:       As a Marker for Sepsis (Non-Neonates):   1. <0.5 ng/mL represents a low risk of severe sepsis and/or septic shock.  1. >2 ng/mL represents a high risk of severe sepsis and/or septic shock.    As a Marker for Lower Respiratory Tract Infections that require antibiotic therapy:  PCT on Admission     Antibiotic Therapy             6-12 Hrs later  > 0.5                Strongly Recommended            >0.25 - <0.5         Recommended  0.1 - 0.25           Discouraged                   Remeasure/reassess PCT  <0.1                 Strongly Discouraged          Remeasure/reassess PCT      As 28 day mortality risk marker: \"Change in Procalcitonin Result\" (> 80 % or <=80 %) if Day 0 (or Day 1) and Day 4 values are available. Refer to http://[email or web address]-[email or web address]/   Change in PCT <=80 %   A decrease of PCT levels below or equal to 80 % defines a positive change in PCT test result representing a higher risk for 28-day all-cause mortality of patients diagnosed with severe sepsis or septic shock.  Change in PCT > 80 %   A decrease of PCT levels of more than 80 % defines a negative change in PCT result representing a lower risk for 28-day all-cause mortality of patients diagnosed with severe sepsis or septic shock.                Results may be falsely decreased if patient taking Biotin.     Bilirubin, Direct [[id number]]  (Abnormal) Collected:  04/28/20 1535    Specimen:  Blood Updated:  04/28/20 1614     Bilirubin, Direct 1.4 mg/dL     CBC & Differential [[id number]] Collected:  04/28/20 1535    Specimen:  Blood Updated:  04/28/20 1607    Narrative:       The following orders were created for panel order CBC & Differential.  Procedure                               Abnormality         Status                     ---------                               -----------         ------         "             CBC Auto Differential[[id number]]        Abnormal            Final result                 Please view results for these tests on the individual orders.    CBC Auto Differential [[id number]]  (Abnormal) Collected:  04/28/20 1535    Specimen:  Blood Updated:  04/28/20 1607     WBC 3.32 10*3/mm3      RBC 3.85 10*6/mm3      Hemoglobin 12.1 g/dL      Hematocrit 35.2 %      MCV 91.4 fL      MCH 31.4 pg      MCHC 34.4 g/dL      RDW 13.7 %      RDW-SD 45.7 fl      MPV 12.7 fL      Platelets 31 10*3/mm3      Neutrophil % 72.9 %      Lymphocyte % 21.4 %      Monocyte % 4.8 %      Eosinophil % 0.0 %      Basophil % 0.3 %      Immature Grans % 0.6 %      Neutrophils, Absolute 2.42 10*3/mm3      Lymphocytes, Absolute 0.71 10*3/mm3      Monocytes, Absolute 0.16 10*3/mm3      Eosinophils, Absolute 0.00 10*3/mm3      Basophils, Absolute 0.01 10*3/mm3      Immature Grans, Absolute 0.02 10*3/mm3      nRBC 0.0 /100 WBC     Ferritin [[id number]]  (Abnormal) Collected:  04/28/20 0643    Specimen:  Blood Updated:  04/28/20 1544     Ferritin 2,373.00 ng/mL     Narrative:       Results may be falsely decreased if patient taking Biotin.      C-reactive Protein [[id number]]  (Abnormal) Collected:  04/28/20 0643    Specimen:  Blood Updated:  04/28/20 1516     C-Reactive Protein 19.48 mg/dL     Hemoglobin A1c [[id number]]  (Abnormal) Collected:  04/28/20 0751    Specimen:  Blood Updated:  04/28/20 1515     Hemoglobin A1C 5.70 %     Narrative:       Hemoglobin A1C Ranges:    Increased Risk for Diabetes  5.7% to 6.4%  Diabetes                     >= 6.5%  Diabetic Goal                < 7.0%    Hepatitis Panel, Acute [[id number]]  (Normal) Collected:  04/28/20 0643    Specimen:  Blood Updated:  04/28/20 1512     Hepatitis B Surface Ag Non-Reactive     Hep A IgM Non-Reactive     Hep B C IgM Non-Reactive     Hepatitis C Ab Non-Reactive    Narrative:       Results may be falsely decreased if patient taking Biotin.     Lactic Acid, Reflex  [[id number]]  (Normal) Collected:  04/28/20 1030    Specimen:  Blood Updated:  04/28/20 1103     Lactate 1.7 mmol/L     Lactic Acid, Reflex Timer (This will reflex a repeat order 3-3:15 hours after ordered.) [[id number]] Collected:  04/28/20 0649    Specimen:  Blood Updated:  04/28/20 1030     Hold Tube Hold for add-ons.     Comment: Auto resulted.       Manual Differential [[id number]]  (Abnormal) Collected:  04/28/20 0751    Specimen:  Blood Updated:  04/28/20 1030     Neutrophil % 70.0 %      Lymphocyte % 10.0 %      Monocyte % 5.0 %      Basophil % 1.0 %      Bands %  7.0 %      Atypical Lymphocyte % 7.0 %      Neutrophils Absolute 2.00 10*3/mm3      Lymphocytes Absolute 0.26 10*3/mm3      Monocytes Absolute 0.13 10*3/mm3      Basophils Absolute 0.03 10*3/mm3      Anisocytosis Slight/1+     Macrocytes Slight/1+     WBC Morphology Normal     Platelet Estimate Decreased     Comment: RARE GIANT PLATELET SEEN       Urinalysis, Microscopic Only - Urine, Clean Catch [[id number]]  (Abnormal) Collected:  04/28/20 0901    Specimen:  Urine, Clean Catch Updated:  04/28/20 1028     RBC, UA 0-2 /HPF      WBC, UA 3-5 /HPF      Bacteria, UA 1+ /HPF      Squamous Epithelial Cells, UA 6-12 /HPF      Hyaline Casts, UA None Seen /LPF      Methodology Manual Light Microscopy    Urinalysis With Culture If Indicated - Urine, Clean Catch [[id number]]  (Abnormal) Collected:  04/28/20 0901    Specimen:  Urine, Clean Catch Updated:  04/28/20 1001     Color, UA Traverse     Appearance, UA Clear     pH, UA 6.0     Specific Gravity, UA 1.029     Glucose,  mg/dL (2+)     Ketones, UA Trace     Bilirubin, UA Moderate (2+)     Blood, UA Negative     Protein, UA 30 mg/dL (1+)     Leuk Esterase, UA Trace     Nitrite, UA Negative     Urobilinogen, UA >=8.0 E.U./dL    Urine Drug Screen - Urine, Clean Catch [[id number]]  (Abnormal) Collected:  04/28/20 0901    Specimen:  Urine, Clean Catch Updated:  04/28/20 0951     THC, Screen, Urine Negative      Phencyclidine (PCP), Urine Negative     Cocaine Screen, Urine Negative     Methamphetamine, Ur Negative     Opiate Screen Positive     Amphetamine Screen, Urine Negative     Benzodiazepine Screen, Urine Negative     Tricyclic Antidepressants Screen Positive     Methadone Screen, Urine Negative     Barbiturates Screen, Urine Negative     Oxycodone Screen, Urine Negative     Propoxyphene Screen Negative     Buprenorphine, Screen, Urine Negative    Narrative:       Cutoff For Drugs Screened:    Amphetamines               500 ng/ml  Barbiturates               200 ng/ml  Benzodiazepines            150 ng/ml  Cocaine                    150 ng/ml  Methadone                  200 ng/ml  Opiates                    100 ng/ml  Phencyclidine               25 ng/ml  THC                            50 ng/ml  Methamphetamine            500 ng/ml  Tricyclic Antidepressants  300 ng/ml  Oxycodone                  100 ng/ml  Propoxyphene               300 ng/ml  Buprenorphine               10 ng/ml    The normal value for all drugs tested is negative. This report includes unconfirmed screening results, with the cutoff values listed, to be used for medical treatment purposes only.  Unconfirmed results must not be used for non-medical purposes such as employment or legal testing.  Clinical consideration should be applied to any drug of abuse test, particularly when unconfirmed results are used.      CK [[id number]]  (Normal) Collected:  04/28/20 0643    Specimen:  Blood Updated:  04/28/20 0817     Creatine Kinase 39 U/L     Magnesium [[id number]]  (Normal) Collected:  04/28/20 0643    Specimen:  Blood Updated:  04/28/20 0817     Magnesium 1.7 mg/dL     CBC & Differential [[id number]] Collected:  04/28/20 0751    Specimen:  Blood Updated:  04/28/20 0813    Narrative:       The following orders were created for panel order CBC & Differential.  Procedure                               Abnormality         Status                     ---------                                -----------         ------                     CBC Auto Differential[[id number]]        Abnormal            Final result                 Please view results for these tests on the individual orders.    CBC Auto Differential [[id number]]  (Abnormal) Collected:  04/28/20 0751    Specimen:  Blood Updated:  04/28/20 0813     WBC 2.60 10*3/mm3      RBC 3.94 10*6/mm3      Hemoglobin 12.5 g/dL      Hematocrit 34.6 %      MCV 87.8 fL      MCH 31.7 pg      MCHC 36.1 g/dL      RDW 13.2 %      RDW-SD 42.8 fl      MPV 12.3 fL      Platelets 33 10*3/mm3      Comment: SPECIMEN RECOLLECTED/REANALYZED TO CONFIRM HGB RESULTS       [place] [facility] [[id number]] Collected:  04/28/20 0643    Specimen:  Blood Updated:  04/28/20 0745    Narrative:       The following orders were created for panel order [place] [facility].  Procedure                               Abnormality         Status                     ---------                               -----------         ------                     Light Blue Top[[id number]]                                   Final result               Green Top (Gel)[[id number]]                                  Final result               Lavender Top[[id number]]                                     Final result               Gold Top - SST[[id number]]                                   Final result                 Please view results for these tests on the individual orders.    Light Blue Top [[id number]] Collected:  04/28/20 0643    Specimen:  Blood Updated:  04/28/20 0745     Extra Tube hold for add-on     Comment: Auto resulted       Green Top (Gel) [[id number]] Collected:  04/28/20 0643    Specimen:  Blood Updated:  04/28/20 0745     Extra Tube Hold for add-ons.     Comment: Auto resulted.       Lavender Top [[id number]] Collected:  04/28/20 0643    Specimen:  Blood Updated:  04/28/20 0745     Extra Tube hold for add-on     Comment: Auto resulted       Gold Top - SST [[id number]] Collected:  04/28/20  0643    Specimen:  Blood Updated:  04/28/20 0745     Extra Tube Hold for add-ons.     Comment: Auto resulted.       Lipase [[id number]]  (Abnormal) Collected:  04/28/20 0643    Specimen:  Blood Updated:  04/28/20 0742     Lipase 6 U/L     Ethanol [[id number]] Collected:  04/28/20 0643    Specimen:  Blood Updated:  04/28/20 0742     Ethanol <10 mg/dL      Ethanol % <0.010 %     Lactic Acid, Plasma [[id number]]  (Abnormal) Collected:  04/28/20 0649    Specimen:  Blood Updated:  04/28/20 0726     Lactate 2.4 mmol/L     Comprehensive Metabolic Panel [[id number]]  (Abnormal) Collected:  04/28/20 0643    Specimen:  Blood Updated:  04/28/20 0719     Glucose 195 mg/dL      BUN 13 mg/dL      Creatinine 0.52 mg/dL      Sodium 136 mmol/L      Potassium 3.2 mmol/L      Chloride 97 mmol/L      CO2 22.0 mmol/L      Calcium 9.0 mg/dL      Total Protein 6.7 g/dL      Albumin 3.60 g/dL      ALT (SGPT) 110 U/L      AST (SGOT) 141 U/L      Alkaline Phosphatase 337 U/L      Total Bilirubin 2.0 mg/dL      eGFR Non African Amer 137 mL/min/1.73      Globulin 3.1 gm/dL      A/G Ratio 1.2 g/dL      BUN/Creatinine Ratio 25.0     Anion Gap 17.0 mmol/L     Narrative:       GFR Normal >60  Chronic Kidney Disease <60  Kidney Failure <15          Imaging Results (Last 7 Days)     Procedure Component Value Units Date/Time    CT Angiogram Chest With & Without Contrast [[id number]] Collected:  04/29/20 1206     Updated:  04/29/20 1306    Narrative:       EXAM: CT ANGIOGRAM CHEST W WO CONTRAST    DATE: 4/29/2020 12:06 PM CDT    TECHNIQUE:  Axial images were obtained at 2 mm intervals from the  level of the thoracic inlet through the upper abdomen during  bolus infusion of IV contrast. Multiplanar reformatted images  were obtained and reviewed.      This exam was performed according to our departmental  dose-optimization program, which includes automated exposure  control, adjustment of the mA and/or kV according to patient size  and/or use of iterative  reconstruction technique.    CLINICAL INDICATION: Chest pain, acute, PE suspected, intermed  prob, negative D-dimer, R11.2 Nausea with vomiting, unspecified  R00.0 Tachycardia, unspecified E86.0 Dehydration    COMPARISON: Chest x-ray April 28, 2020    FINDINGS: The pulmonary arteries are well-opacified. There are no  focal filling defects identified with no evidence of a pulmonary  embolus. The distribution of the pulmonary arteries is within  normal limits without evidence of a vascular anomaly or  congenital malformation seen.    Parenchymal images demonstrate minimal paraseptal emphysema in  the left upper lobe. No focal infiltrate, pleural effusion, or  pulmonary nodules seen. There is minimal dependent bibasilar  atelectasis.    Limited images of the upper abdomen demonstrate diffuse fatty  infiltration of the liver. There is an abnormal appearance of the  gallbladder again noted which has been seen/addressed on the  prior CT the abdomen and pelvis as well as ultrasound of the  gallbladder.      Impression:       1. No evidence of a pulmonary embolus.  2. Minimal dependent bibasilar atelectasis.  3. Abnormal appearance of the gallbladder. The patient has had a  recent ultrasound of the gallbladder demonstrating abnormal wall  thickening of the gallbladder.    Electronically signed by:  Winter Elizabeth MD  4/29/2020 1:05 PM  CDT Workstation: [id number]    US Gallbladder [[id number]] Collected:  04/28/20 1114     Updated:  04/28/20 1204    Narrative:       EXAMINATION:  ultrasounds abdomen, limited (RUQ)    CLINICAL INDICATION / HISTORY:  RUQ pain, recent CT abdomen today  with possible cholecystitis    COMPARISON:  none    TECHNIQUE:  ultrasound, limited, right upper quadrant    FULL RESULTS / FINDINGS:    Liver:      size: limited evaluation, grossly negative      echotexture wnl      no focal mass or intrahepatic biliary ductal dilatation     Biliary:    Gall bladder:  no cholelithiasis                             Diffuse gallbladder wall thickening.      Common bile duct:  normal caliber      Pancreas (visualized portions):      normal size and echotexture for age    no focal mass or ductal dilatation      Kidney, right (limited):      size:  normal    echotexture:  normal    no nephrolithiasis, solid mass, or collecting system dilation        Impression:       CONCLUSION:   1.  Diffusely thickened gallbladder walls of uncertain etiology.  The differential would include acute or chronic acalculous  cholecystitis versus diffuse adenomyomatosis of gallbladder  versus secondary thickening (sequela from hepatic cirrhosis,  ascites, renal failure, hypoalbuminemia, congestive right heart  failure) versus less likely gallbladder carcinoma. Recommend  clinical correlation.  2.  Otherwise unremarkable ultrasound right upper abdominal  quadrant.    Electronically signed by:  Chapito Hilario MD  4/28/2020 12:03 PM CDT  Workstation: [id number]    CT Abdomen Pelvis With Contrast [[id number]] Collected:  04/28/20 1007     Updated:  04/28/20 1041    Narrative:         PROCEDURE: CT ABDOMEN PELVIS W CONTRAST    COMPARISON: Report from a prior exam dated February 7, 2013    HISTORY: Abd pain, gastroenteritis or colitis suspected  RUQ pain , flank pain, NV x 5 days.]    This exam was performed using radiation doses that are as low as  reasonably achievable (ALARA).  This exam was performed according to our departmental dose  optimization program, which includes automated exposure control,  adjustment of the mA and/or KV according to patient size and/or  use of iterative reconstruction technique.    CONTRAST:    100 cc intravenous Isovue 300     TECHNIQUE: Axial images were obtained from level of the lung  bases through the ischial tuberosities. IV contrast was  administered. No oral contrast was given.     FINDINGS:     LOWER CHEST: Unremarkable.    HEPATOBILIARY: The gallbladder has an abnormal appearance with  high density fluid and/or debris within  the gallbladder. This may  represent intraluminal sludge. No definitive evidence of  cholelithiasis is seen. Pericholecystic fluid. There is no  gallbladder wall thickening. Further evaluation with an  ultrasound gallbladder strongly recommended. The liver  demonstrates no focal abnormality. There is no evidence of intra  or extrahepatic biliary dilatation.  SPLEEN: Unremarkable.  PANCREAS: Unremarkable  ADRENAL GLANDS: Unremarkable.  KIDNEYS/URETERS: There is a small 2 mm nonobstructing left renal  calculus. There is a small right renal cyst noted in the lower  pole the right kidney. The kidneys demonstrate bilateral function  without definitive evidence of obstruction.    GASTROINTESTINAL: The small and large intestine are normal in  caliber. There are no obstructive changes seen. No abnormal bowel  wall thickening is identified.  REPRODUCTIVE ORGANS: There is a 3.4 x 3.5 x 3.3 cm right ovarian  cyst. There is a trace amount of free intracranial fluid in the  pelvis.  URINARY BLADDER: The bladder is not fully distended and therefore  not optimally evaluated.    VASCULAR: Unremarkable  LYMPH NODES: No pathologically enlarged nodes by size criteria.  PERITONEUM/RETROPERITONEUM: Unremarkable.     OSSEOUS STRUCTURES: There is degenerative disc disease at L5-S1.        Impression:         1. Abnormal appearance of the gallbladder as detailed in the body  of the report with suspected intraluminal sludge and  pericholecystic fluid. Further evaluation with ultrasound is  recommended.  2. 3.5 x 3.4 x 3.3 cm right adnexal cyst with a trace amount of  free intraperitoneal fluid.    3.5 cm benign appearing ovarian cyst. No follow-up imaging is  recommended.  Reference: J Am Antonio Radiol 2013;[id number]      Electronically signed by:  Winter Elizabeth MD  4/28/2020 10:40 AM  CDT Workstation: [id number]    XR Chest 1 View [[id number]] Collected:  04/28/20 0917     Updated:  04/28/20 0933    Narrative:       EXAM: XR CHEST 1  "VW    DATE:  4/28/2020 9:17 AM CDT    CLINICAL INDICATION: weakness    TECHNIQUE: One view-AP portable radiograph of the chest    COMPARISON: February 20, 2020    FINDINGS:  The lungs are well-expanded and clear. The cardiac  silhouette and pulmonary vasculature are within normal limits.  There are no pleural effusions.    The bony thorax is intact with no focal abnormality.      Impression:          1. No radiographic evidence of acute cardiopulmonary disease.    Electronically signed by:  Winter Elizabeth MD  4/28/2020 9:32 AM  CDT Workstation: [id number]            Chief Complaint on Day of Discharge: None.    Hospital Course:  Patient was admitted for sepsis secondary to acute cystitis/possible acalculous cholecystitis/possible COVID-19 viral infection and started on IV hydration, IV antibiotics, antiemetics and pain control.  Respiratory PCR was unremarkable,blood culture showed no growth and COVID-19 PCR was negative.  Lactic acidosis and reactive leukopenia resolved and thrombocytopenia was much improved prior to discharge. She did improve, became asymptomatic and headache, diarrhea, nausea and vomiting did resolve.  She was able to tolerate diet before discharge.  Antibiotic was de-escalated to Omnicef on discharge.  She also had reactive elevation of LFTs which started trending downwards.  Hepatitis profile was unremarkable.  She will be scheduled for follow-up LFTs and CBC as outpatient.    She also had potassium repletion secondary to hypokalemia and was continued on her outpatient medications for depressive disorder.      Condition on Discharge: Stable and improved.    Physical Exam on Discharge:  /89 (BP Location: Left arm, Patient Position: Lying)   Pulse 88   Temp 96.9 °F (36.1 °C) (Oral)   Resp 16   Ht 160 cm (63\")   Wt 89.2 kg (196 lb 11.2 oz)   SpO2 95%   BMI 34.84 kg/m²   Physical Exam   Constitutional: She is oriented to person, place, and time. She appears well-developed and " well-nourished. She is cooperative. No distress.   HENT:   Head: Normocephalic and atraumatic.   Right Ear: External ear normal.   Left Ear: External ear normal.   Nose: Nose normal.   Mouth/Throat: Oropharynx is clear and moist.   Eyes: Pupils are equal, round, and reactive to light. Conjunctivae and EOM are normal.   Neck: Normal range of motion. Neck supple. No JVD present. No thyromegaly present.   Cardiovascular: Normal rate, regular rhythm, normal heart sounds and intact distal pulses. Exam reveals no gallop and no friction rub.   No murmur heard.  Pulmonary/Chest: Effort normal and breath sounds normal. No stridor. No respiratory distress. She has no wheezes. She has no rales. She exhibits no tenderness.   Abdominal: Soft. Bowel sounds are normal. She exhibits no distension and no mass. There is no tenderness. There is no rebound and no guarding. No hernia.   Musculoskeletal: Normal range of motion. She exhibits no edema, tenderness or deformity.   Neurological: She is alert and oriented to person, place, and time. She has normal reflexes. She displays normal reflexes. No cranial nerve deficit or sensory deficit. She exhibits normal muscle tone. Coordination normal.   Skin: Skin is warm and dry. No rash noted. She is not diaphoretic. No erythema. No pallor.   Psychiatric: She has a normal mood and affect. Her behavior is normal. Judgment and thought content normal.   Nursing note and vitals reviewed.        Discharge Disposition:  Home or Self Care    Discharge Medications:     Discharge Medications      New Medications      Instructions Start Date   cefdinir 300 MG capsule  Commonly known as:  OMNICEF   300 mg, Oral, Every 12 Hours Scheduled         Continue These Medications      Instructions Start Date   albuterol sulfate  (90 Base) MCG/ACT inhaler  Commonly known as:  PROVENTIL HFA;VENTOLIN HFA;PROAIR HFA   2 puffs, Inhalation, Every 6 Hours PRN      amitriptyline 50 MG tablet  Commonly known as:   ELAVIL   50 mg, Oral, Nightly      cetirizine 10 MG tablet  Commonly known as:  zyrTEC   10 mg, Oral, Daily      DULoxetine 30 MG capsule  Commonly known as:  CYMBALTA   30 mg, Oral, Daily      gabapentin 400 MG capsule  Commonly known as:  NEURONTIN   400 mg, Oral, 4 Times Daily      guaiFENesin-dextromethorphan 100-10 MG/5ML syrup  Commonly known as:  ROBITUSSIN DM   10 mL, Oral, 4 Times Daily PRN      HYDROcodone-acetaminophen  MG per tablet  Commonly known as:  NORCO   TK 1 TABLETY BY MOUTH FOUR TIMES DAILY AS NEEDED FOR PAIN      ondansetron 4 MG tablet  Commonly known as:  ZOFRAN   4 mg, Oral, Every 8 Hours PRN      phenazopyridine 100 MG tablet  Commonly known as:  PYRIDIUM   100 mg, Oral, 2 Times Daily PRN      propranolol 20 MG tablet  Commonly known as:  INDERAL   20 mg, Oral, 3 Times Daily      rOPINIRole 1 MG tablet  Commonly known as:  REQUIP   1 mg, Oral, 2 Times Daily, Take 1 hour before bedtime.      SUMAtriptan 50 MG tablet  Commonly known as:  IMITREX   Take one tablet at onset of headache. May repeat dose one time in 2 hours if headache not relieved.             Discharge Diet: Cardiac.    Activity at Discharge: As tolerated.    Discharge Care Plan/Instructions: Patient has been advised to take her medications as prescribed and to return to the emergency room in the event of any worsening symptoms.    Follow-up Appointments:   Future Appointments   Date Time Provider Department Center   5/6/2020  9:00 AM [facility] P[facility] US 1 [facility] US [facility] [place]   5/6/2020 10:00 AM [facility] US 1 [facility] US [facility] [place]       Test Results Pending at Discharge:    Order Current Status    Blood Culture - Blood, Arm, Left Preliminary result    Blood Culture - Blood, Wrist, Right Preliminary result          Zach Bryant MD  05/02/20  09:44    Time: 35 minutes

## 2020-05-02 NOTE — SIGNIFICANT NOTE
05/02/20 1023   Rehab Treatment   Discipline physical therapy assistant   Reason Treatment Not Performed patient/family declined treatment  (pt states she is going to do plenty of walking w/ gettting to go home today, pt defers gt, t/fers and ther ex)

## 2020-05-02 NOTE — THERAPY TREATMENT NOTE
Acute Care - Occupational Therapy Treatment Note  [facility]     Patient Name: Cally Olsen  : 1990  MRN: [id number]  Today's Date: 2020  Onset of Illness/Injury or Date of Surgery: 20  Date of Referral to OT: 20  Referring Physician: KWASI Robertson MD    Admit Date: 2020       ICD-10-CM ICD-9-CM   1. Non-intractable vomiting with nausea, unspecified vomiting type R11.2 787.01   2. Tachycardia R00.0 785.0   3. Dehydration E86.0 276.51   4. Impaired functional mobility, balance, gait, and endurance Z74.09 V49.89   5. Impaired mobility and activities of daily living Z74.09 799.89     Patient Active Problem List   Diagnosis   • Menorrhagia with regular cycle   • DDD (degenerative disc disease), lumbar   • Moderate episode of recurrent major depressive disorder (CMS/HCC)   • Generalized anxiety disorder   • Essential hypertension   • Tobacco dependence in remission   • Bilateral hip pain   • Chronic midline low back pain without sciatica   • History of loop electrosurgical excision procedure (LEEP)   • Chronic pelvic pain in female   • Primary insomnia   • Overweight (BMI 25.0-29.9)   • Non-intractable vomiting     Past Medical History:   Diagnosis Date   • Abnormal Pap smear of cervix    • Acute pharyngitis     unspecified     • Adjustment disorder with anxious mood     Celexa     • Allergic rhinitis    • Asthma    • Backache    • Candidiasis    • Cervical intraepithelial neoplasia grade 1    • Contraception     Desires permanent sterilization    • Depressive disorder    • Dysphagia     unspecified     • Encounter for gynecological examination without abnormal finding    • Encounter for  visit      visit status    • Excessive and frequent menstruation with irregular cycle    • Excessive and frequent menstruation with regular cycle    • Fatigue    • Generalized abdominal pain    • Generalized anxiety disorder    • Headache    • Heartburn    • Hip pain    •  Hypertension    • Insomnia      Trazodone   • Low grade squamous intraepithelial lesion (LGSIL) on cervicovaginal cytologic smear    • Malaise    • Organic anxiety disorder    • Spasm of back muscles    • Surgical follow-up care    • Tobacco dependence syndrome    • Urinary tract infection      Past Surgical History:   Procedure Laterality Date   •  SECTION  2015    Repeat   • INJECTION OF MEDICATION  2015    Kenalog (1)  -  SEFERINO Perla   • LEEP     • OTHER SURGICAL HISTORY  2015    Laparoscopy; tubal cautery (1)  - Exam under anesthesia and laparoscopic tubal fulguration.   • TUBAL ABDOMINAL LIGATION         Therapy Treatment    Rehabilitation Treatment Summary     Row Name 20 1135 20 1024          Treatment Time/Intention    Discipline  occupational therapy assistant  -PREETHI  --  -PREETHI     Document Type  therapy note (daily note)  -PREETHI  --     Subjective Information  no complaints  -PREETHI  --     Mode of Treatment  individual therapy;occupational therapy  -PREETHI  --     Care Plan Review  care plan/treatment goals reviewed;risks/benefits reviewed;current/potential barriers reviewed;patient/other agree to care plan  -PREETHI  --     Therapy Frequency (OT Eval)  -- 5-7x/wk  -PREETHI  --     Patient Effort  good  -PREETHI  --     Reason Treatment Not Performed  --  --  -PREETHI     Existing Precautions/Restrictions  fall  -PREETHI  --     Recorded by [PREETHI] Sole Tovar OTA 20 1207 [PREETHI] Sole Tovar OTA 20 1207     Row Name 20 1135             Vital Signs    Pre Systolic BP Rehab  139  -PREETHI      Pre Treatment Diastolic BP  86  -PREETHI      Post Systolic BP Rehab  160  -PREETHI      Post Treatment Diastolic BP  89  -PREETHI      Pretreatment Heart Rate (beats/min)  86  -PREETHI      Posttreatment Heart Rate (beats/min)  96  -PREETHI      Pre SpO2 (%)  99  -PREETHI      O2 Delivery Pre Treatment  room air  -PREETHI      Post SpO2 (%)  100  -PREETHI      O2 Delivery Post Treatment  room air  -PREETHI      Pre Patient Position  Sitting  -PREETHI       Intra Patient Position  Sitting  -PREETHI      Post Patient Position  Sitting  -PREETHI      Recorded by [PREETHI] Sole Tovar [facility] 05/02/20 1207      Row Name 05/02/20 1135             Cognitive Assessment/Intervention- PT/OT    Affect/Mental Status (Cognitive)  WFL  -PREETHI      Orientation Status (Cognition)  oriented x 4  -PREETHI      Follows Commands (Cognition)  WFL  -PREETHI      Recorded by [PREETHI] Sole Tovar [facility] 05/02/20 1207      Row Name 05/02/20 1135             Safety Issues, Functional Mobility    Impairments Affecting Function (Mobility)  endurance/activity tolerance  -PREETHI      Recorded by [PREETHI] Sole Tovar [facility] 05/02/20 1207      Row Name 05/02/20 1135             Bed Mobility Assessment/Treatment    Bed Mobility Assessment/Treatment  scooting/bridging  -PREETHI      Scooting/Bridging [place] (Bed Mobility)  independent  -PREETHI      Recorded by [PREETHI] Sole Tovar [facility] 05/02/20 1207      Row Name 05/02/20 1135             Functional Mobility    Functional Mobility- Ind. Level  independent  -PREETHI      Functional Mobility-Distance (Feet)  10  -PREETHI      Recorded by [PREETHI] Sole Tovar OTA 05/02/20 1207      Row Name 05/02/20 1135             Transfer Assessment/Treatment    Transfer Assessment/Treatment  sit-stand transfer;stand-sit transfer;toilet transfer  -PREETHI      Recorded by [PREETHI] Sole Tovar OTA 05/02/20 1207      Row Name 05/02/20 1135             Sit-Stand Transfer    Sit-Stand [place] (Transfers)  independent  -PREETHI      Recorded by [PREETHI] Sole Tovar OTA 05/02/20 1207      Row Name 05/02/20 1135             Stand-Sit Transfer    Stand-Sit [place] (Transfers)  independent  -PREETHI      Recorded by [PREETHI] Sole Tovar [facility] 05/02/20 1207      Row Name 05/02/20 1135             Toilet Transfer    Type (Toilet Transfer)  sit-stand;stand-sit  -PREETHI      [place] Level (Toilet Transfer)  independent  -PREETHI      Recorded by [PREETHI] Sole Tovar OTA 05/02/20 1207      Row Name 05/02/20 1135             ADL  Assessment/Intervention    BADL Assessment/Intervention  upper body dressing;lower body dressing;toileting  -PREETHI      Recorded by [PREETHI] Sole Tovar OTA 05/02/20 1207      Row Name 05/02/20 1135             Upper Body Dressing Assessment/Training    Upper Body Dressing [place] Level  doff;don;independent HG  -PREETHI      Upper Body Dressing Position  edge of bed sitting  -PREETHI      Recorded by [PREETHI] Sole Tovar OTA 05/02/20 1207      Row Name 05/02/20 1135             Toileting Assessment/Training    [place] Level (Toileting)  adjust/manage clothing;change pad/brief;perform perineal hygiene;independent  -PREETHI      Toileting Position  unsupported sitting  -PREETHI      Recorded by [PREETHI] Sole Tovar OTA 05/02/20 1213      Row Name 05/02/20 1135             Outcome Summary/Treatment Plan (OT)    Daily Summary of Progress (OT)  progress toward functional goals as expected  -PREETHI      Anticipated Discharge Disposition (OT)  anticipate therapy at next level of care  -PREETHI      Recorded by [PREETHI] Sole Tovar OTA 05/02/20 1213        User Key  (r) = Recorded By, (t) = Taken By, (c) = Cosigned By    Initials Name Effective Dates Discipline    PREETHI Sole Tovar OTA 11/05/19 -  OT           Rehab Goal Summary     Row Name 05/02/20 1135             Occupational Therapy Goals    Transfer Goal Selection (OT)  transfer, OT goal 1  -PREETHI      Bathing Goal Selection (OT)  bathing, OT goal 1  -PREETHI      Dressing Goal Selection (OT)  dressing, OT goal 1  -PREETHI      Toileting Goal Selection (OT)  toileting, OT goal 1  -PREETHI      Strength Goal Selection (OT)  strength, OT goal 1  -PREETHI         Transfer Goal 1 (OT)    Activity/Assistive Device (Transfer Goal 1, OT)  transfers, all  -PREETHI      [place] Level/Cues Needed (Transfer Goal 1, OT)  conditional independence  -PREETHI      Time Frame (Transfer Goal 1, OT)  long term goal (LTG)  -PREETHI      Progress/Outcome (Transfer Goal 1, OT)  (S) goal met  -PREETHI         Bathing Goal 1 (OT)     Activity/Assistive Device (Bathing Goal 1, OT)  bathing skills, all  -PREETHI      Jud Level/Cues Needed (Bathing Goal 1, OT)  conditional independence  -PREETHI      Time Frame (Bathing Goal 1, OT)  long term goal (LTG)  -PREETHI      Progress/Outcomes (Bathing Goal 1, OT)  goal not met  -PREETHI         Dressing Goal 1 (OT)    Activity/Assistive Device (Dressing Goal 1, OT)  dressing skills, all  -PREETHI      Jud/Cues Needed (Dressing Goal 1, OT)  conditional independence  -PREETHI      Time Frame (Dressing Goal 1, OT)  long term goal (LTG)  -PREETHI      Progress/Outcome (Dressing Goal 1, OT)  (S) goal met  -PREETHI         Toileting Goal 1 (OT)    Activity/Device (Toileting Goal 1, OT)  toileting skills, all  -PREETHI      Jud Level/Cues Needed (Toileting Goal 1, OT)  conditional independence  -PREETHI      Time Frame (Toileting Goal 1, OT)  long term goal (LTG)  -PREETHI      Progress/Outcome (Toileting Goal 1, OT)  (S) goal met  -PREETHI         Strength Goal 1 (OT)    Strength Goal 1 (OT)  Increase B UE strength by 1/2 grade to increase safety/independence with ADLs and functional mobility.  -PREETHI      Time Frame (Strength Goal 1, OT)  long term goal (LTG)  -PREETHI      Progress/Outcome (Strength Goal 1, OT)  goal not met  -PREETHI         Patient Education Goal (OT)    Activity (Patient Education Goal, OT)  Home safety/fall prev and EC/WS.  -PREETHI      Jud/Cues/Accuracy (Memory Goal 2, OT)  demonstrates adequately;verbalizes understanding  -PREETHI      Time Frame (Patient Education Goal, OT)  long term goal (LTG)  -PREETHI      Progress/Outcome (Patient Education Goal, OT)  goal not met  -PREETHI        User Key  (r) = Recorded By, (t) = Taken By, (c) = Cosigned By    Initials Name Provider Type Discipline    Sole Kenney OTA Occupational Therapy Assistant OT            OT Recommendation and Plan  Outcome Summary/Treatment Plan (OT)  Daily Summary of Progress (OT): progress toward functional goals as expected  Anticipated Discharge Disposition (OT):  anticipate therapy at next level of care  Therapy Frequency (OT Eval): (5-7x/wk)  Daily Summary of Progress (OT): progress toward functional goals as expected  Plan of Care Review  Plan of Care Reviewed With: patient  Plan of Care Reviewed With: patient  Outcome Measures     Row Name 05/02/20 1135 05/01/20 0854          How much help from another is currently needed...    Putting on and taking off regular lower body clothing?  4  -PREETHI  3  -RB     Bathing (including washing, rinsing, and drying)  3  -PREETHI  3  -RB     Toileting (which includes using toilet bed pan or urinal)  4  -PREETHI  3  -RB     Putting on and taking off regular upper body clothing  4  -PREETHI  4  -RB     Taking care of personal grooming (such as brushing teeth)  4  -PREETHI  4  -RB     Eating meals  4  -PREETHI  4  -RB     AM-PAC 6 Clicks Score (OT)  23  -PREETHI  21  -RB        Functional Assessment    Outcome Measure Options  AM-PAC 6 Clicks Daily Activity (OT)  -PREETHI  AM-PAC 6 Clicks Daily Activity (OT)  -RB       User Key  (r) = Recorded By, (t) = Taken By, (c) = Cosigned By    Initials Name Provider Type    RB Alirio Rhodes OT Occupational Therapist    Sole Kenney OTA Occupational Therapy Assistant           Time Calculation:   Time Calculation- OT     Row Name 05/02/20 1217             Time Calculation- OT    OT Start Time  1135  -PREETHI      OT Stop Time  1159  -PREETHI      OT Time Calculation (min)  24 min  -PREETHI      Total Timed Code Minutes- OT  24 minute(s)  -PREETHI      OT Received On  05/02/20  -        User Key  (r) = Recorded By, (t) = Taken By, (c) = Cosigned By    Initials Name Provider Type    Sole Kenney OTA Occupational Therapy Assistant        Therapy Charges for Today     Code Description Service Date Service Provider Modifiers Qty    33113657166 HC OT SELF CARE/MGMT/TRAIN EA 15 MIN 5/2/2020 Sole Tovar OTA GO 2               NELSON Reyes  5/2/2020

## 2020-05-03 LAB
BACTERIA SPEC AEROBE CULT: NORMAL
BACTERIA SPEC AEROBE CULT: NORMAL

## 2020-05-03 NOTE — OUTREACH NOTE
Prep Survey      Responses   [facility] facility patient discharged from?  [place]   Is LACE score < 7 ?  No   Eligibility  Readm Mgmt   Discharge diagnosis  Non-intractable vomiting with nausea   COVID-19 Test Status  Negative   Does the patient have one of the following disease processes/diagnoses(primary or secondary)?  Other   Does the patient have Home health ordered?  No   Is there a DME ordered?  No   Prep survey completed?  Yes          Sydni Cummins RN

## 2020-05-04 ENCOUNTER — READMISSION MANAGEMENT (OUTPATIENT)
Dept: CALL CENTER | Facility: HOSPITAL | Age: 30
End: 2020-05-04

## 2020-05-04 NOTE — OUTREACH NOTE
Medical Week 1 Survey      Responses   [facility] patient discharged from?  [place]   COVID-19 Test Status  Negative   Does the patient have one of the following disease processes/diagnoses(primary or secondary)?  Other   Is there a successful TCM telephone encounter documented?  No   Week 1 attempt successful?  No   Unsuccessful attempts  Attempt 1          Radha Olmedo RN

## 2020-05-06 ENCOUNTER — READMISSION MANAGEMENT (OUTPATIENT)
Dept: CALL CENTER | Facility: HOSPITAL | Age: 30
End: 2020-05-06

## 2020-05-06 NOTE — OUTREACH NOTE
Medical Week 1 Survey      Responses   [facility] patient discharged from?  [place]   COVID-19 Test Status  Negative   Does the patient have one of the following disease processes/diagnoses(primary or secondary)?  Other   Is there a successful TCM telephone encounter documented?  No   Week 1 attempt successful?  No   Unsuccessful attempts  Attempt 2          Tamika Parks RN

## 2020-05-08 ENCOUNTER — READMISSION MANAGEMENT (OUTPATIENT)
Dept: CALL CENTER | Facility: HOSPITAL | Age: 30
End: 2020-05-08

## 2020-05-08 NOTE — OUTREACH NOTE
Medical Week 1 Survey      Responses   [facility]   COVID-19 Test Status  Negative   Does the patient have one of the following disease processes/diagnoses(primary or secondary)?  Other   Is there a successful TCM telephone encounter documented?  No   Week 1 attempt successful?  Yes   Call start time  1020   Call end time  1022   Discharge diagnosis  Non-intractable vomiting with nausea   Meds reviewed with patient/caregiver?  Yes   Is the patient having any side effects they believe may be caused by any medication additions or changes?  No   Does the patient have all medications ordered at discharge?  Yes   Is the patient taking all medications as directed (includes completed medication regime)?  Yes   Does the patient have a primary care provider?   Yes   Does the patient have an appointment with their PCP within 7 days of discharge?  Yes   Has the patient kept scheduled appointments due by today?  Yes   Has home health visited the patient within 72 hours of discharge?  N/A   Psychosocial issues?  No   Did the patient receive a copy of their discharge instructions?  Yes   Nursing interventions  Reviewed instructions with patient   What is the patient's perception of their health status since discharge?  Improving   Is the patient/caregiver able to teach back signs and symptoms related to disease process for when to call PCP?  Yes   Is the patient/caregiver able to teach back signs and symptoms related to disease process for when to call 911?  Yes   Is the patient/caregiver able to teach back the hierarchy of who to call/visit for symptoms/problems? PCP, Specialist, Home health nurse, Urgent Care, ED, 911  Yes   Week 1 call completed?  Yes          Nick Kee RN

## 2020-05-18 ENCOUNTER — READMISSION MANAGEMENT (OUTPATIENT)
Dept: CALL CENTER | Facility: HOSPITAL | Age: 30
End: 2020-05-18

## 2020-05-18 NOTE — OUTREACH NOTE
Medical Week 2 Survey      Responses   [facility] patient discharged from?  [place]   COVID-19 Test Status  Negative   Does the patient have one of the following disease processes/diagnoses(primary or secondary)?  Other   Week 2 attempt successful?  No   Unsuccessful attempts  Attempt 1          Shannen Velasco RN

## 2020-05-19 DIAGNOSIS — M54.50 CHRONIC MIDLINE LOW BACK PAIN WITHOUT SCIATICA: ICD-10-CM

## 2020-05-19 DIAGNOSIS — M51.36 DDD (DEGENERATIVE DISC DISEASE), LUMBAR: ICD-10-CM

## 2020-05-19 DIAGNOSIS — F33.1 MODERATE EPISODE OF RECURRENT MAJOR DEPRESSIVE DISORDER (HCC): ICD-10-CM

## 2020-05-19 DIAGNOSIS — G89.29 CHRONIC MIDLINE LOW BACK PAIN WITHOUT SCIATICA: ICD-10-CM

## 2020-05-21 ENCOUNTER — TELEPHONE (OUTPATIENT)
Dept: FAMILY MEDICINE CLINIC | Facility: CLINIC | Age: 30
End: 2020-05-21

## 2020-05-21 ENCOUNTER — READMISSION MANAGEMENT (OUTPATIENT)
Dept: CALL CENTER | Facility: HOSPITAL | Age: 30
End: 2020-05-21

## 2020-05-21 RX ORDER — GABAPENTIN 400 MG/1
400 CAPSULE ORAL 4 TIMES DAILY
Qty: 120 CAPSULE | Refills: 2 | Status: SHIPPED | OUTPATIENT
Start: 2020-05-21 | End: 2021-02-23 | Stop reason: SDUPTHER

## 2020-05-21 NOTE — OUTREACH NOTE
"Medical Week 2 Survey      Responses   [facility] patient discharged from?  [place]   COVID-19 Test Status  Negative   Does the patient have one of the following disease processes/diagnoses(primary or secondary)?  Other   Week 2 attempt successful?  Yes   Call start time  1439   Discharge diagnosis  Non-intractable vomiting with nausea   Call end time  1445   Meds reviewed with patient/caregiver?  Yes   Is the patient taking all medications as directed (includes completed medication regime)?  Yes   Medication comments  Pt reports she finished her ABX   Does the patient have an appointment with their PCP within 7 days of discharge?  No   Comments regarding PCP  Pt reports \"I've tried to get in. I reported to them that I'm sick.\" PCP appt 5/26/20.   Has the patient kept scheduled appointments due by today?  N/A   Pulse Ox monitoring  None   Nursing interventions  Educated on MyChart   What is the patient's perception of their health status since discharge?  Same [Pt reports, \"I've still felt off and sick everyday. I still get slight headaches.\"]   Week 2 Call Completed?  Yes          Tamika Parks RN  "

## 2020-05-21 NOTE — TELEPHONE ENCOUNTER
Patient has chronic sciatica, neuropathy or RLS requiring gabapentin use, concurrently with chronic pain requiring opiate pain medication. Patient has been educated regarding potential dangers of oversedation and accidental overdose with use of both medications concurrently. Serial assessments of patient has yielded no sign of oversedation or adverse effects of patient, and he/she is advised and aware to notify my office immediately if symptoms do occur, as well as to discontinue use of  gabapentin and opiate medication immediately if that occurs, pending medical evaluation and advice. Patient has been compliant with use of medications, UDS, visits with no adverse effects noted. Patient understands the risks associated with this controlled medication, including tolerance and addiction.  Patient also agrees to only obtain this medication from me, and not from a another provider, unless that provider is covering for me in my absence.  Patient also agrees to be compliant in dosing, and not self adjust the dose of medication.  A signed controlled substance agreement is on file, and the patient has received a controlled substance education sheet at this a previous visit.  The patient has also signed a consent for treatment with a controlled substance as per [facility] policy. RORY was obtained. Refills were sent for: gabapentin.     This document has been electronically signed by HITESH Parra on May 21, 2020 08:30

## 2020-05-28 ENCOUNTER — READMISSION MANAGEMENT (OUTPATIENT)
Dept: CALL CENTER | Facility: HOSPITAL | Age: 30
End: 2020-05-28

## 2020-05-28 NOTE — OUTREACH NOTE
Medical Week 3 Survey      Responses   [facility] patient discharged from?  [place]   COVID-19 Test Status  Negative   Does the patient have one of the following disease processes/diagnoses(primary or secondary)?  Other   Week 3 attempt successful?  No   Unsuccessful attempts  Attempt 1          Radha Olmedo RN

## 2020-06-12 DIAGNOSIS — R11.0 NAUSEA: ICD-10-CM

## 2020-06-12 RX ORDER — ONDANSETRON 4 MG/1
TABLET, FILM COATED ORAL
Qty: 30 TABLET | Refills: 2 | Status: SHIPPED | OUTPATIENT
Start: 2020-06-12 | End: 2020-09-08

## 2020-06-13 DIAGNOSIS — J44.9 CHRONIC OBSTRUCTIVE PULMONARY DISEASE, UNSPECIFIED COPD TYPE (HCC): ICD-10-CM

## 2020-06-15 RX ORDER — ALBUTEROL SULFATE 90 UG/1
AEROSOL, METERED RESPIRATORY (INHALATION)
Qty: 54 G | Refills: 2 | Status: SHIPPED | OUTPATIENT
Start: 2020-06-15 | End: 2021-07-15

## 2020-08-01 DIAGNOSIS — F41.1 GENERALIZED ANXIETY DISORDER: ICD-10-CM

## 2020-08-03 RX ORDER — PROPRANOLOL HYDROCHLORIDE 20 MG/1
TABLET ORAL
Qty: 270 TABLET | Refills: 1 | Status: SHIPPED | OUTPATIENT
Start: 2020-08-03 | End: 2021-03-08

## 2020-09-06 DIAGNOSIS — R11.0 NAUSEA: ICD-10-CM

## 2020-09-08 RX ORDER — ONDANSETRON 4 MG/1
TABLET, FILM COATED ORAL
Qty: 30 TABLET | Refills: 2 | Status: SHIPPED | OUTPATIENT
Start: 2020-09-08 | End: 2020-11-23

## 2020-09-16 DIAGNOSIS — G89.29 CHRONIC MIDLINE LOW BACK PAIN WITHOUT SCIATICA: ICD-10-CM

## 2020-09-16 DIAGNOSIS — F33.1 MODERATE EPISODE OF RECURRENT MAJOR DEPRESSIVE DISORDER (HCC): ICD-10-CM

## 2020-09-16 DIAGNOSIS — M51.36 DDD (DEGENERATIVE DISC DISEASE), LUMBAR: ICD-10-CM

## 2020-09-16 DIAGNOSIS — M54.50 CHRONIC MIDLINE LOW BACK PAIN WITHOUT SCIATICA: ICD-10-CM

## 2020-09-22 RX ORDER — GABAPENTIN 400 MG/1
CAPSULE ORAL
Qty: 120 CAPSULE | OUTPATIENT
Start: 2020-09-22

## 2020-09-27 DIAGNOSIS — F41.1 GENERALIZED ANXIETY DISORDER: ICD-10-CM

## 2020-09-27 DIAGNOSIS — F51.01 PRIMARY INSOMNIA: Chronic | ICD-10-CM

## 2020-09-27 DIAGNOSIS — F33.1 MODERATE EPISODE OF RECURRENT MAJOR DEPRESSIVE DISORDER (HCC): ICD-10-CM

## 2020-09-28 RX ORDER — AMITRIPTYLINE HYDROCHLORIDE 50 MG/1
50 TABLET, FILM COATED ORAL NIGHTLY
Qty: 30 TABLET | Refills: 0 | Status: SHIPPED | OUTPATIENT
Start: 2020-09-28 | End: 2021-03-22

## 2020-09-28 RX ORDER — DULOXETIN HYDROCHLORIDE 30 MG/1
30 CAPSULE, DELAYED RELEASE ORAL DAILY
Qty: 30 CAPSULE | Refills: 0 | Status: SHIPPED | OUTPATIENT
Start: 2020-09-28 | End: 2020-11-04 | Stop reason: SDUPTHER

## 2020-10-07 ENCOUNTER — APPOINTMENT (OUTPATIENT)
Dept: MRI IMAGING | Facility: HOSPITAL | Age: 30
End: 2020-10-07

## 2020-10-07 ENCOUNTER — HOSPITAL ENCOUNTER (EMERGENCY)
Facility: HOSPITAL | Age: 30
Discharge: ANOTHER HOSPITAL | End: 2020-10-07
Attending: EMERGENCY MEDICINE | Admitting: EMERGENCY MEDICINE

## 2020-10-07 ENCOUNTER — APPOINTMENT (OUTPATIENT)
Dept: GENERAL RADIOLOGY | Facility: HOSPITAL | Age: 30
End: 2020-10-07

## 2020-10-07 ENCOUNTER — APPOINTMENT (OUTPATIENT)
Dept: CT IMAGING | Facility: HOSPITAL | Age: 30
End: 2020-10-07

## 2020-10-07 VITALS
TEMPERATURE: 98 F | HEIGHT: 63 IN | OXYGEN SATURATION: 100 % | HEART RATE: 100 BPM | BODY MASS INDEX: 33.49 KG/M2 | DIASTOLIC BLOOD PRESSURE: 98 MMHG | WEIGHT: 189 LBS | SYSTOLIC BLOOD PRESSURE: 189 MMHG | RESPIRATION RATE: 18 BRPM

## 2020-10-07 DIAGNOSIS — I63.9 ACUTE ISCHEMIC STROKE (HCC): Primary | ICD-10-CM

## 2020-10-07 LAB
ABO GROUP BLD: NORMAL
ALBUMIN SERPL-MCNC: 4.8 G/DL (ref 3.5–5.2)
ALBUMIN/GLOB SERPL: 1.7 G/DL
ALP SERPL-CCNC: 70 U/L (ref 39–117)
ALT SERPL W P-5'-P-CCNC: 26 U/L (ref 1–33)
AMPHET+METHAMPHET UR QL: NEGATIVE
AMPHETAMINES UR QL: NEGATIVE
ANION GAP SERPL CALCULATED.3IONS-SCNC: 9 MMOL/L (ref 5–15)
AST SERPL-CCNC: 28 U/L (ref 1–32)
BARBITURATES UR QL SCN: NEGATIVE
BASOPHILS # BLD AUTO: 0.03 10*3/MM3 (ref 0–0.2)
BASOPHILS NFR BLD AUTO: 0.3 % (ref 0–1.5)
BENZODIAZ UR QL SCN: NEGATIVE
BILIRUB SERPL-MCNC: 0.3 MG/DL (ref 0–1.2)
BLD GP AB SCN SERPL QL: NEGATIVE
BUN SERPL-MCNC: 6 MG/DL (ref 6–20)
BUN/CREAT SERPL: 9.1 (ref 7–25)
BUPRENORPHINE SERPL-MCNC: NEGATIVE NG/ML
CALCIUM SPEC-SCNC: 9.9 MG/DL (ref 8.6–10.5)
CANNABINOIDS SERPL QL: NEGATIVE
CHLORIDE SERPL-SCNC: 101 MMOL/L (ref 98–107)
CO2 SERPL-SCNC: 27 MMOL/L (ref 22–29)
COCAINE UR QL: NEGATIVE
CREAT SERPL-MCNC: 0.66 MG/DL (ref 0.57–1)
DEPRECATED RDW RBC AUTO: 42.1 FL (ref 37–54)
EOSINOPHIL # BLD AUTO: 0.05 10*3/MM3 (ref 0–0.4)
EOSINOPHIL NFR BLD AUTO: 0.5 % (ref 0.3–6.2)
ERYTHROCYTE [DISTWIDTH] IN BLOOD BY AUTOMATED COUNT: 12.8 % (ref 12.3–15.4)
GFR SERPL CREATININE-BSD FRML MDRD: 104 ML/MIN/1.73
GLOBULIN UR ELPH-MCNC: 2.8 GM/DL
GLUCOSE BLDC GLUCOMTR-MCNC: 118 MG/DL (ref 70–130)
GLUCOSE SERPL-MCNC: 98 MG/DL (ref 65–99)
HCT VFR BLD AUTO: 41.5 % (ref 34–46.6)
HGB BLD-MCNC: 13.8 G/DL (ref 12–15.9)
HOLD SPECIMEN: NORMAL
IMM GRANULOCYTES # BLD AUTO: 0.02 10*3/MM3 (ref 0–0.05)
IMM GRANULOCYTES NFR BLD AUTO: 0.2 % (ref 0–0.5)
INR PPP: 0.94 (ref 0.8–1.2)
LYMPHOCYTES # BLD AUTO: 3.25 10*3/MM3 (ref 0.7–3.1)
LYMPHOCYTES NFR BLD AUTO: 33.5 % (ref 19.6–45.3)
Lab: NORMAL
MCH RBC QN AUTO: 30.2 PG (ref 26.6–33)
MCHC RBC AUTO-ENTMCNC: 33.3 G/DL (ref 31.5–35.7)
MCV RBC AUTO: 90.8 FL (ref 79–97)
METHADONE UR QL SCN: NEGATIVE
MONOCYTES # BLD AUTO: 0.58 10*3/MM3 (ref 0.1–0.9)
MONOCYTES NFR BLD AUTO: 6 % (ref 5–12)
NEUTROPHILS NFR BLD AUTO: 5.78 10*3/MM3 (ref 1.7–7)
NEUTROPHILS NFR BLD AUTO: 59.5 % (ref 42.7–76)
NRBC BLD AUTO-RTO: 0 /100 WBC (ref 0–0.2)
OPIATES UR QL: POSITIVE
OXYCODONE UR QL SCN: NEGATIVE
PCP UR QL SCN: NEGATIVE
PLATELET # BLD AUTO: 317 10*3/MM3 (ref 140–450)
PMV BLD AUTO: 9.1 FL (ref 6–12)
POTASSIUM SERPL-SCNC: 3.8 MMOL/L (ref 3.5–5.2)
PROPOXYPH UR QL: NEGATIVE
PROT SERPL-MCNC: 7.6 G/DL (ref 6–8.5)
PROTHROMBIN TIME: 12.9 SECONDS (ref 11.1–15.3)
RBC # BLD AUTO: 4.57 10*6/MM3 (ref 3.77–5.28)
RH BLD: POSITIVE
SODIUM SERPL-SCNC: 137 MMOL/L (ref 136–145)
T&S EXPIRATION DATE: NORMAL
TRICYCLICS UR QL SCN: POSITIVE
WBC # BLD AUTO: 9.71 10*3/MM3 (ref 3.4–10.8)
WHOLE BLOOD HOLD SPECIMEN: NORMAL
WHOLE BLOOD HOLD SPECIMEN: NORMAL

## 2020-10-07 PROCEDURE — 82962 GLUCOSE BLOOD TEST: CPT

## 2020-10-07 PROCEDURE — 85300 ANTITHROMBIN III ACTIVITY: CPT | Performed by: STUDENT IN AN ORGANIZED HEALTH CARE EDUCATION/TRAINING PROGRAM

## 2020-10-07 PROCEDURE — 85306 CLOT INHIBIT PROT S FREE: CPT | Performed by: STUDENT IN AN ORGANIZED HEALTH CARE EDUCATION/TRAINING PROGRAM

## 2020-10-07 PROCEDURE — 83520 IMMUNOASSAY QUANT NOS NONAB: CPT | Performed by: STUDENT IN AN ORGANIZED HEALTH CARE EDUCATION/TRAINING PROGRAM

## 2020-10-07 PROCEDURE — 86147 CARDIOLIPIN ANTIBODY EA IG: CPT | Performed by: STUDENT IN AN ORGANIZED HEALTH CARE EDUCATION/TRAINING PROGRAM

## 2020-10-07 PROCEDURE — 70544 MR ANGIOGRAPHY HEAD W/O DYE: CPT

## 2020-10-07 PROCEDURE — 80053 COMPREHEN METABOLIC PANEL: CPT | Performed by: EMERGENCY MEDICINE

## 2020-10-07 PROCEDURE — 99282 EMERGENCY DEPT VISIT SF MDM: CPT | Performed by: NURSE PRACTITIONER

## 2020-10-07 PROCEDURE — 70549 MR ANGIOGRAPH NECK W/O&W/DYE: CPT

## 2020-10-07 PROCEDURE — 71045 X-RAY EXAM CHEST 1 VIEW: CPT

## 2020-10-07 PROCEDURE — 86148 ANTI-PHOSPHOLIPID ANTIBODY: CPT | Performed by: STUDENT IN AN ORGANIZED HEALTH CARE EDUCATION/TRAINING PROGRAM

## 2020-10-07 PROCEDURE — 85613 RUSSELL VIPER VENOM DILUTED: CPT | Performed by: STUDENT IN AN ORGANIZED HEALTH CARE EDUCATION/TRAINING PROGRAM

## 2020-10-07 PROCEDURE — 85305 CLOT INHIBIT PROT S TOTAL: CPT | Performed by: STUDENT IN AN ORGANIZED HEALTH CARE EDUCATION/TRAINING PROGRAM

## 2020-10-07 PROCEDURE — 86900 BLOOD TYPING SEROLOGIC ABO: CPT | Performed by: NURSE PRACTITIONER

## 2020-10-07 PROCEDURE — 85303 CLOT INHIBIT PROT C ACTIVITY: CPT | Performed by: STUDENT IN AN ORGANIZED HEALTH CARE EDUCATION/TRAINING PROGRAM

## 2020-10-07 PROCEDURE — 85670 THROMBIN TIME PLASMA: CPT | Performed by: STUDENT IN AN ORGANIZED HEALTH CARE EDUCATION/TRAINING PROGRAM

## 2020-10-07 PROCEDURE — 93005 ELECTROCARDIOGRAM TRACING: CPT | Performed by: NURSE PRACTITIONER

## 2020-10-07 PROCEDURE — 85220 BLOOC CLOT FACTOR V TEST: CPT | Performed by: STUDENT IN AN ORGANIZED HEALTH CARE EDUCATION/TRAINING PROGRAM

## 2020-10-07 PROCEDURE — 86146 BETA-2 GLYCOPROTEIN ANTIBODY: CPT | Performed by: STUDENT IN AN ORGANIZED HEALTH CARE EDUCATION/TRAINING PROGRAM

## 2020-10-07 PROCEDURE — 80306 DRUG TEST PRSMV INSTRMNT: CPT | Performed by: NURSE PRACTITIONER

## 2020-10-07 PROCEDURE — 83090 ASSAY OF HOMOCYSTEINE: CPT | Performed by: STUDENT IN AN ORGANIZED HEALTH CARE EDUCATION/TRAINING PROGRAM

## 2020-10-07 PROCEDURE — 85732 THROMBOPLASTIN TIME PARTIAL: CPT | Performed by: STUDENT IN AN ORGANIZED HEALTH CARE EDUCATION/TRAINING PROGRAM

## 2020-10-07 PROCEDURE — 85025 COMPLETE CBC W/AUTO DIFF WBC: CPT | Performed by: EMERGENCY MEDICINE

## 2020-10-07 PROCEDURE — 70498 CT ANGIOGRAPHY NECK: CPT

## 2020-10-07 PROCEDURE — 85302 CLOT INHIBIT PROT C ANTIGEN: CPT | Performed by: STUDENT IN AN ORGANIZED HEALTH CARE EDUCATION/TRAINING PROGRAM

## 2020-10-07 PROCEDURE — 0 IOPAMIDOL PER 1 ML: Performed by: EMERGENCY MEDICINE

## 2020-10-07 PROCEDURE — 99284 EMERGENCY DEPT VISIT MOD MDM: CPT

## 2020-10-07 PROCEDURE — 86850 RBC ANTIBODY SCREEN: CPT | Performed by: NURSE PRACTITIONER

## 2020-10-07 PROCEDURE — A9579 GAD-BASE MR CONTRAST NOS,1ML: HCPCS | Performed by: EMERGENCY MEDICINE

## 2020-10-07 PROCEDURE — 85610 PROTHROMBIN TIME: CPT | Performed by: NURSE PRACTITIONER

## 2020-10-07 PROCEDURE — 86038 ANTINUCLEAR ANTIBODIES: CPT | Performed by: STUDENT IN AN ORGANIZED HEALTH CARE EDUCATION/TRAINING PROGRAM

## 2020-10-07 PROCEDURE — 86901 BLOOD TYPING SEROLOGIC RH(D): CPT | Performed by: NURSE PRACTITIONER

## 2020-10-07 PROCEDURE — 85705 THROMBOPLASTIN INHIBITION: CPT | Performed by: STUDENT IN AN ORGANIZED HEALTH CARE EDUCATION/TRAINING PROGRAM

## 2020-10-07 PROCEDURE — 93010 ELECTROCARDIOGRAM REPORT: CPT | Performed by: INTERNAL MEDICINE

## 2020-10-07 PROCEDURE — 70496 CT ANGIOGRAPHY HEAD: CPT

## 2020-10-07 PROCEDURE — 70551 MRI BRAIN STEM W/O DYE: CPT

## 2020-10-07 PROCEDURE — 25010000002 GADOTERIDOL PER 1 ML: Performed by: EMERGENCY MEDICINE

## 2020-10-07 PROCEDURE — 70450 CT HEAD/BRAIN W/O DYE: CPT

## 2020-10-07 RX ORDER — ATORVASTATIN CALCIUM 40 MG/1
80 TABLET, FILM COATED ORAL NIGHTLY
Status: DISCONTINUED | OUTPATIENT
Start: 2020-10-07 | End: 2020-10-08 | Stop reason: HOSPADM

## 2020-10-07 RX ORDER — ASPIRIN 300 MG/1
300 SUPPOSITORY RECTAL DAILY
Status: DISCONTINUED | OUTPATIENT
Start: 2020-10-07 | End: 2020-10-08 | Stop reason: HOSPADM

## 2020-10-07 RX ORDER — CLOPIDOGREL BISULFATE 75 MG/1
75 TABLET ORAL DAILY
Status: DISCONTINUED | OUTPATIENT
Start: 2020-10-07 | End: 2020-10-08 | Stop reason: HOSPADM

## 2020-10-07 RX ORDER — SODIUM CHLORIDE 0.9 % (FLUSH) 0.9 %
10 SYRINGE (ML) INJECTION AS NEEDED
Status: DISCONTINUED | OUTPATIENT
Start: 2020-10-07 | End: 2020-10-08 | Stop reason: HOSPADM

## 2020-10-07 RX ORDER — SODIUM CHLORIDE 0.9 % (FLUSH) 0.9 %
10 SYRINGE (ML) INJECTION EVERY 12 HOURS SCHEDULED
Status: DISCONTINUED | OUTPATIENT
Start: 2020-10-07 | End: 2020-10-08 | Stop reason: HOSPADM

## 2020-10-07 RX ORDER — ASPIRIN 325 MG
325 TABLET ORAL DAILY
Status: DISCONTINUED | OUTPATIENT
Start: 2020-10-07 | End: 2020-10-08 | Stop reason: HOSPADM

## 2020-10-07 RX ADMIN — GADOTERIDOL 20 ML: 279.3 INJECTION, SOLUTION INTRAVENOUS at 16:27

## 2020-10-07 RX ADMIN — ASPIRIN 325 MG: 325 TABLET ORAL at 17:56

## 2020-10-07 RX ADMIN — Medication 10 ML: at 21:00

## 2020-10-07 RX ADMIN — IOPAMIDOL 90 ML: 755 INJECTION, SOLUTION INTRAVENOUS at 16:31

## 2020-10-08 LAB — HCYS SERPL-MCNC: 8.2 UMOL/L (ref 0–15)

## 2020-10-09 LAB
ANA SER QL: NEGATIVE
AT III ACT/NOR PPP CHRO: 121 % (ref 75–135)
CARDIOLIPIN IGG SER IA-ACNC: <9 GPL U/ML (ref 0–14)
CARDIOLIPIN IGM SER IA-ACNC: 49 MPL U/ML (ref 0–12)
PROT S AG ACT/NOR PPP IA: 125 % (ref 60–150)
PROT S FREE AG ACT/NOR PPP IA: 134 % (ref 57–157)

## 2020-10-10 LAB
APTT SCREEN TO CONFIRM RATIO: 1.1 RATIO (ref 0–1.4)
APTT-LA 1H NP PPP: 42.8 SEC (ref 0–48.9)
B2 GLYCOPROT1 IGA SER-ACNC: <9 GPI IGA UNITS (ref 0–25)
B2 GLYCOPROT1 IGG SER-ACNC: <9 GPI IGG UNITS (ref 0–20)
B2 GLYCOPROT1 IGM SER-ACNC: 37 GPI IGM UNITS (ref 0–32)
CONFIRM APTT/NORMAL: 42.3 SEC (ref 0–55)
FACT V ACT/NOR PPP: 129 % (ref 70–150)
LA 2 SCREEN W REFLEX-IMP: ABNORMAL
MIXING APTT: 45.6 SEC (ref 0–48.9)
MIXING DRVVT: 40.4 SEC (ref 0–47)
PROT C AG ACT/NOR PPP IA: 111 % (ref 60–150)
PROT S ACT/NOR PPP: 132 % (ref 63–140)
SCREEN APTT: 52.8 SEC (ref 0–51.9)
SCREEN DRVVT: 54.3 SEC (ref 0–47)
THROMBIN TIME: 16.6 SEC (ref 0–23)

## 2020-10-12 LAB — PROT C ACT/NOR PPP CHRO: 135 %

## 2020-10-13 LAB
F5 GENE MUT ANL BLD/T: NORMAL
PS IGA SER-ACNC: 1 APS IGA (ref 0–20)

## 2020-10-14 LAB
ANTI-PHOSPHATIDIC ACID: ABNORMAL
ANTI-PHOSPHATIDYL GLYCEROL: ABNORMAL
ANTI-PHOSPHATIDYL INOSITOL: ABNORMAL
ANTI-PHOSPHATIDYLETHANOLAMINE: ABNORMAL
F2 GENE MUT ANL BLD/T: NORMAL
PE IGA SER-ACNC: 0.8 U/ML
PE IGG SER-ACNC: 0.1 U/ML
PE IGM SER-ACNC: 4.3 U/ML
PG IGA SER-ACNC: 1.9 U/ML
PG IGG SER-ACNC: 4 U/ML
PG IGM SER-ACNC: 10.3 U/ML
PHOSPHATIDATE IGA SER-ACNC: 4.8 U/ML
PHOSPHATIDATE IGG SER-ACNC: 9.5 U/ML
PHOSPHATIDATE IGM SER-ACNC: 11.8 U/ML
PI IGA SER-ACNC: 4.6 U/ML
PI IGG SER-ACNC: 5.1 U/ML
PI IGM SER-ACNC: 16.5 U/ML
PS IGG SER-ACNC: 5 GPS IGG (ref 0–11)
PS IGM SER-ACNC: 37 MPS IGM (ref 0–25)

## 2020-10-15 ENCOUNTER — OFFICE VISIT (OUTPATIENT)
Dept: FAMILY MEDICINE CLINIC | Facility: CLINIC | Age: 30
End: 2020-10-15

## 2020-10-15 DIAGNOSIS — N39.0 ACUTE UTI: Primary | ICD-10-CM

## 2020-10-15 PROCEDURE — G2025 DIS SITE TELE SVCS RHC/FQHC: HCPCS | Performed by: PHYSICIAN ASSISTANT

## 2020-10-15 RX ORDER — PHENAZOPYRIDINE HYDROCHLORIDE 100 MG/1
100 TABLET, FILM COATED ORAL 3 TIMES DAILY PRN
Qty: 10 TABLET | Refills: 0 | Status: SHIPPED | OUTPATIENT
Start: 2020-10-15 | End: 2021-03-08

## 2020-10-15 RX ORDER — LISINOPRIL 5 MG/1
5 TABLET ORAL DAILY
COMMUNITY
Start: 2020-10-10 | End: 2021-09-15 | Stop reason: SDUPTHER

## 2020-10-15 RX ORDER — ATORVASTATIN CALCIUM 40 MG/1
40 TABLET, FILM COATED ORAL DAILY
COMMUNITY
Start: 2020-10-09 | End: 2021-09-13 | Stop reason: SDUPTHER

## 2020-10-15 RX ORDER — ASPIRIN 325 MG
325 TABLET ORAL DAILY
COMMUNITY
Start: 2020-10-10 | End: 2021-04-07

## 2020-10-15 RX ORDER — SULFAMETHOXAZOLE AND TRIMETHOPRIM 800; 160 MG/1; MG/1
1 TABLET ORAL 2 TIMES DAILY
Qty: 14 TABLET | Refills: 0 | Status: SHIPPED | OUTPATIENT
Start: 2020-10-15 | End: 2020-10-22

## 2020-10-15 NOTE — PROGRESS NOTES
Subjective   Cally Olsen is a 30 y.o. female.   You have chosen to receive care through a telephone visit. Do you consent to use a telephone visit for your medical care today? Yes This visit has been rescheduled as a phone visit to comply with patient safety concerns in accordance with CDC recommendations. Total time of discussion was 15 minutes.     Urinary Tract Infection   This is a new problem. The current episode started 1 to 4 weeks ago. The problem occurs every urination. The problem has been gradually worsening. The quality of the pain is described as burning and aching. The pain is at a severity of 5/10. The pain is mild. There has been no fever. Associated symptoms include frequency, hesitancy, nausea and urgency. Pertinent negatives include no chills, discharge, flank pain, hematuria, possible pregnancy, sweats or vomiting.      Pt presents today via telephone encounter with a c/c of dysuria x 1-2 weeks. Admits to suprapubic pain, nausea, increased urinary frequency.  Denies fever, chills, myalgias, hematuria. Recently completed cipro course x 5 days through external facility with no improvement in sx. Has been taking azo otc with no improvement.     The following portions of the patient's history were reviewed and updated as appropriate: allergies, current medications, past family history, past medical history, past social history, past surgical history and problem list.    Review of Systems   Constitutional: Negative for activity change, appetite change, chills, fatigue, fever, unexpected weight gain and unexpected weight loss.   HENT: Negative.    Eyes: Negative.    Respiratory: Negative for apnea, cough, choking, chest tightness, shortness of breath, wheezing and stridor.    Cardiovascular: Negative.  Negative for chest pain, palpitations and leg swelling.   Gastrointestinal: Positive for nausea. Negative for vomiting.   Genitourinary: Positive for dysuria, frequency, hesitancy, pelvic pain,  pelvic pressure and urgency. Negative for decreased urine volume, difficulty urinating, flank pain, hematuria, urinary incontinence, vaginal bleeding, vaginal discharge and vaginal pain.   Skin: Negative.  Negative for rash.   Psychiatric/Behavioral: Negative.        Objective   Physical Exam   Constitutional: She appears well-developed and well-nourished. No distress.   HENT:   Head: Normocephalic and atraumatic.   Pulmonary/Chest: Effort normal. No stridor.  No respiratory distress. She no audible wheeze...She exhibits no tenderness.   Abdominal: Soft. She exhibits no distension. There is abdominal tenderness (suprapubic).   Neurological: She is alert.   Skin: Skin is warm and dry. No rash noted. She is not diaphoretic.   Psychiatric: She has a normal mood and affect. She mood appears normal. Her affect is normal. Her behavior is normal. Thought content is normal. She does not express abnormal judgement.        PE limited d/t telehealth encounter.    Assessment/Plan   Diagnoses and all orders for this visit:    1. Acute UTI (Primary)  -     Urinalysis With Culture If Indicated -; Future  -     phenazopyridine (Pyridium) 100 MG tablet; Take 1 tablet by mouth 3 (Three) Times a Day As Needed for Bladder Spasms.  Dispense: 10 tablet; Refill: 0  -     sulfamethoxazole-trimethoprim (Bactrim DS) 800-160 MG per tablet; Take 1 tablet by mouth 2 (Two) Times a Day for 7 days.  Dispense: 14 tablet; Refill: 0      Symptoms clinically compatible with acute uti. No improvement with cipro. Begin bactrim and pyridium as above for treatment. Continue supportive care, increased fluid intake. avoid bladder irritants, including caffeine. Maintain good I&O. Discussed good personal hygiene. To notify office if sx worsen or fail to improve in 3 days. To present to clinic for urinalysis with culture and sensitivity. Will notify pt of results when received.    Patient educated to follow up sooner than next scheduled appointment if symptoms  worsen or do not improve in 3 days. Patient stated understanding and has agreed with plan of care. After visit summary was printed and given to patient.      This document has been electronically signed by Laquita Valdez PA-C on October 15, 2020 13:23 CDT,.

## 2020-11-03 DIAGNOSIS — F33.1 MODERATE EPISODE OF RECURRENT MAJOR DEPRESSIVE DISORDER (HCC): ICD-10-CM

## 2020-11-04 RX ORDER — DULOXETIN HYDROCHLORIDE 30 MG/1
30 CAPSULE, DELAYED RELEASE ORAL DAILY
Qty: 30 CAPSULE | Refills: 5 | Status: SHIPPED | OUTPATIENT
Start: 2020-11-04 | End: 2021-04-29

## 2020-11-19 ENCOUNTER — PROCEDURE VISIT (OUTPATIENT)
Dept: OBSTETRICS AND GYNECOLOGY | Facility: CLINIC | Age: 30
End: 2020-11-19

## 2020-11-19 VITALS
BODY MASS INDEX: 33.31 KG/M2 | SYSTOLIC BLOOD PRESSURE: 120 MMHG | DIASTOLIC BLOOD PRESSURE: 78 MMHG | HEIGHT: 63 IN | WEIGHT: 188 LBS

## 2020-11-19 DIAGNOSIS — Z01.419 WOMEN'S ANNUAL ROUTINE GYNECOLOGICAL EXAMINATION: Primary | ICD-10-CM

## 2020-11-19 PROCEDURE — G0101 CA SCREEN;PELVIC/BREAST EXAM: HCPCS | Performed by: NURSE PRACTITIONER

## 2020-11-19 NOTE — PROGRESS NOTES
Subjective   Cally Olsen is a 30 y.o. Annual gynecological exam    LMP: 10/07/2020  Pap: LGSIL, 2015  BC: BTL    Pt presents for annual gynecological exam with no complaints.  She reports history of abnormal pap smears which required leep and cone procedure.  Also, she had an endometrial ablation a few years ago.  She has occasional vaginal spotting since ablation.  Last month, pt states she had several strokes and is being followed by a specialist at [facility] in [place], [place].        Gynecologic Exam  The patient's pertinent negatives include no genital itching, genital lesions, genital odor, genital rash, missed menses, pelvic pain, vaginal bleeding or vaginal discharge. The patient is experiencing no pain. She is not pregnant. Pertinent negatives include no abdominal pain, chills, constipation, diarrhea, dysuria, fever, flank pain, frequency, headaches, hematuria, rash or urgency. She is sexually active. No, her partner does not have an STD. She uses tubal ligation for contraception. Her past medical history is significant for a  section, a gynecological surgery and menorrhagia.       The following portions of the patient's history were reviewed and updated as appropriate: allergies, current medications, past family history, past medical history, past social history, past surgical history and problem list.    Review of Systems   Constitutional: Negative for chills, diaphoresis, fatigue, fever and unexpected weight change.   Respiratory: Negative for apnea and shortness of breath.    Cardiovascular: Negative for chest pain, palpitations and leg swelling.   Gastrointestinal: Negative for abdominal distention, abdominal pain, constipation and diarrhea.   Genitourinary: Positive for menorrhagia. Negative for decreased urine volume, difficulty urinating, dyspareunia, dysuria, enuresis, flank pain, frequency, genital sores, hematuria, menstrual problem, missed menses, pelvic pain, urgency, vaginal  bleeding, vaginal discharge and vaginal pain.   Skin: Negative for rash.   Neurological: Negative for headaches.   Psychiatric/Behavioral: Negative for sleep disturbance and suicidal ideas.         Objective   Physical Exam  Vitals signs and nursing note reviewed. Exam conducted with a chaperone present.   Constitutional:       General: She is not in acute distress.     Appearance: She is well-developed. She is not diaphoretic.   Cardiovascular:      Rate and Rhythm: Normal rate and regular rhythm.      Heart sounds: Normal heart sounds.   Pulmonary:      Effort: Pulmonary effort is normal.      Breath sounds: Normal breath sounds.   Chest:      Comments: Pt declines breast exam  Abdominal:      General: Bowel sounds are normal.      Palpations: Abdomen is soft.   Genitourinary:     General: Normal vulva.      Exam position: Lithotomy position.      Labia:         Right: No rash, tenderness, lesion or injury.         Left: No rash, tenderness, lesion or injury.       Urethra: No prolapse, urethral pain, urethral swelling or urethral lesion.      Vagina: Normal.      Cervix: Normal.      Uterus: Normal.       Comments: Pap smear obtained, adnexa non-palpable 2/2 body habitus  Skin:     General: Skin is warm and dry.   Neurological:      Mental Status: She is alert and oriented to person, place, and time.   Psychiatric:         Behavior: Behavior normal.           Assessment/Plan   Diagnoses and all orders for this visit:    1. Women's annual routine gynecological examination (Primary)  -     Liquid-based Pap Smear, Screening        Patient educated and encouraged to do monthly self breast exam. If pap smear is normal patient will receive a letter in the mail in about two weeks.  If pap smear is abnormal we will call patient and follow up with plan.  RTC in 1 year for annual gynecological exam or sooner if needed.

## 2020-11-20 DIAGNOSIS — R11.0 NAUSEA: ICD-10-CM

## 2020-11-23 RX ORDER — ONDANSETRON 4 MG/1
TABLET, FILM COATED ORAL
Qty: 30 TABLET | Refills: 2 | Status: SHIPPED | OUTPATIENT
Start: 2020-11-23 | End: 2020-12-23

## 2020-11-30 ENCOUNTER — TELEPHONE (OUTPATIENT)
Dept: OBSTETRICS AND GYNECOLOGY | Facility: CLINIC | Age: 30
End: 2020-11-30

## 2020-11-30 DIAGNOSIS — N39.0 ACUTE UTI: ICD-10-CM

## 2020-11-30 LAB
LAB AP CASE REPORT: NORMAL
PATH INTERP SPEC-IMP: NORMAL

## 2020-11-30 RX ORDER — SULFAMETHOXAZOLE AND TRIMETHOPRIM 800; 160 MG/1; MG/1
TABLET ORAL
Qty: 14 TABLET | Refills: 0 | OUTPATIENT
Start: 2020-11-30

## 2020-11-30 RX ORDER — FLUCONAZOLE 150 MG/1
TABLET ORAL
Qty: 2 TABLET | Refills: 0 | Status: SHIPPED | OUTPATIENT
Start: 2020-11-30 | End: 2021-01-08

## 2020-11-30 NOTE — TELEPHONE ENCOUNTER
----- Message from HITESH Obregon sent at 11/30/2020  9:23 AM CST -----  Pap normal, +yeast cells noted will you let pt know and send in diflucan 150 mg PO take 1 tablet then repeat in 4 days  ----- Message -----  From: Lab, Background User  Sent: 11/30/2020   9:21 AM CST  To: HITESH Obregon

## 2020-12-18 DIAGNOSIS — R11.0 NAUSEA: ICD-10-CM

## 2020-12-23 RX ORDER — ONDANSETRON 4 MG/1
TABLET, FILM COATED ORAL
Qty: 30 TABLET | Refills: 2 | Status: SHIPPED | OUTPATIENT
Start: 2020-12-23 | End: 2021-01-27

## 2021-01-08 ENCOUNTER — OFFICE VISIT (OUTPATIENT)
Dept: FAMILY MEDICINE CLINIC | Facility: CLINIC | Age: 31
End: 2021-01-08

## 2021-01-08 ENCOUNTER — LAB (OUTPATIENT)
Dept: LAB | Facility: HOSPITAL | Age: 31
End: 2021-01-08

## 2021-01-08 VITALS
DIASTOLIC BLOOD PRESSURE: 78 MMHG | WEIGHT: 193.8 LBS | BODY MASS INDEX: 34.34 KG/M2 | HEIGHT: 63 IN | TEMPERATURE: 96.8 F | OXYGEN SATURATION: 98 % | SYSTOLIC BLOOD PRESSURE: 122 MMHG | HEART RATE: 109 BPM

## 2021-01-08 DIAGNOSIS — N94.9 VAGINAL BURNING: ICD-10-CM

## 2021-01-08 DIAGNOSIS — N39.0 ACUTE UTI: ICD-10-CM

## 2021-01-08 DIAGNOSIS — N89.8 VAGINAL ODOR: Primary | ICD-10-CM

## 2021-01-08 DIAGNOSIS — R35.0 URINARY FREQUENCY: ICD-10-CM

## 2021-01-08 LAB
BILIRUB BLD-MCNC: NEGATIVE MG/DL
CLARITY, POC: ABNORMAL
COLOR UR: YELLOW
GLUCOSE UR STRIP-MCNC: NEGATIVE MG/DL
KETONES UR QL: NEGATIVE
LEUKOCYTE EST, POC: NEGATIVE
NITRITE UR-MCNC: NEGATIVE MG/ML
PH UR: 9 [PH] (ref 5–8)
PROT UR STRIP-MCNC: ABNORMAL MG/DL
RBC # UR STRIP: ABNORMAL /UL
SP GR UR: 1.01 (ref 1–1.03)
UROBILINOGEN UR QL: NORMAL

## 2021-01-08 PROCEDURE — 81001 URINALYSIS AUTO W/SCOPE: CPT

## 2021-01-08 PROCEDURE — 87480 CANDIDA DNA DIR PROBE: CPT | Performed by: NURSE PRACTITIONER

## 2021-01-08 PROCEDURE — 87660 TRICHOMONAS VAGIN DIR PROBE: CPT | Performed by: NURSE PRACTITIONER

## 2021-01-08 PROCEDURE — 81002 URINALYSIS NONAUTO W/O SCOPE: CPT | Performed by: NURSE PRACTITIONER

## 2021-01-08 PROCEDURE — 99213 OFFICE O/P EST LOW 20 MIN: CPT | Performed by: NURSE PRACTITIONER

## 2021-01-08 PROCEDURE — 87510 GARDNER VAG DNA DIR PROBE: CPT | Performed by: NURSE PRACTITIONER

## 2021-01-08 RX ORDER — METRONIDAZOLE 500 MG/1
500 TABLET ORAL 2 TIMES DAILY
Qty: 14 TABLET | Refills: 0 | Status: SHIPPED | OUTPATIENT
Start: 2021-01-08 | End: 2021-03-08

## 2021-01-08 NOTE — PROGRESS NOTES
"Subjective   Cally Olsen is a 30 y.o. female. Vaginal odor    History of Present Illness   Patient presents today for vaginal odor. She says she has noticed a \"fishy\" vaginal odor the past few days. Other associated symptoms include: dysuria, vaginal spotting, and urinary frequency. No history of recurrent UTIs. Denies any need for STD testing. She has not tried any OTC remedies for her symptoms.    The following portions of the patient's history were reviewed and updated as appropriate: allergies, current medications, past family history, past medical history, past social history, past surgical history, and problem list.    Review of Systems   Constitutional: Negative for chills, fatigue and fever.   HENT: Negative for congestion, ear pain, rhinorrhea and sore throat.    Eyes: Negative for blurred vision, double vision and visual disturbance.   Respiratory: Negative for cough, chest tightness, shortness of breath and wheezing.    Cardiovascular: Negative for chest pain, palpitations and leg swelling.   Gastrointestinal: Negative for abdominal pain, diarrhea, nausea and vomiting.   Endocrine: Negative for cold intolerance and heat intolerance.   Genitourinary: Positive for dysuria, frequency, vaginal bleeding (spotting) and vaginal pain (burning and odor). Negative for difficulty urinating and hematuria.   Musculoskeletal: Negative for arthralgias, back pain, neck pain and neck stiffness.   Skin: Negative for dry skin, pallor, rash, skin lesions and bruise.   Allergic/Immunologic: Negative for environmental allergies, food allergies and immunocompromised state.   Neurological: Negative for dizziness, syncope, weakness, light-headedness, headache and confusion.   Hematological: Negative for adenopathy. Does not bruise/bleed easily.   Psychiatric/Behavioral: Negative for self-injury, sleep disturbance, suicidal ideas, depressed mood and stress. The patient is not nervous/anxious.        Vitals:    01/08/21 1450 "   BP: 122/78   Pulse: 109   Temp: 96.8 °F (36 °C)   SpO2: 98%     Body mass index is 34.33 kg/m².    Objective   Physical Exam  Vitals signs and nursing note reviewed.   Constitutional:       General: She is not in acute distress.     Appearance: She is well-developed. She is not ill-appearing.   HENT:      Head: Normocephalic.   Eyes:      Conjunctiva/sclera: Conjunctivae normal.   Neck:      Musculoskeletal: Full passive range of motion without pain, normal range of motion and neck supple.   Cardiovascular:      Rate and Rhythm: Normal rate and regular rhythm.      Heart sounds: Normal heart sounds and S2 normal. No murmur.   Pulmonary:      Effort: Pulmonary effort is normal.      Breath sounds: Normal breath sounds and air entry. No decreased breath sounds, wheezing, rhonchi or rales.   Abdominal:      General: Abdomen is flat. Bowel sounds are normal.      Palpations: Abdomen is soft.      Tenderness: There is no abdominal tenderness. There is no right CVA tenderness, left CVA tenderness, guarding or rebound. Negative signs include Oneal's sign, Rovsing's sign, McBurney's sign, psoas sign and obturator sign.   Musculoskeletal: Normal range of motion.   Skin:     General: Skin is warm and dry.   Neurological:      Mental Status: She is alert and oriented to person, place, and time.      Coordination: Coordination normal.      Gait: Gait normal.   Psychiatric:         Speech: Speech normal.         Behavior: Behavior normal. Behavior is cooperative.         Thought Content: Thought content normal.         Judgment: Judgment normal.           Assessment/Plan   Diagnoses and all orders for this visit:    1. Vaginal odor (Primary)  -     Cancel: Chlamydia trachomatis, Neisseria gonorrhoeae, Trichomonas vaginalis, PCR - Swab, Urine, Clean Catch  -     metroNIDAZOLE (Flagyl) 500 MG tablet; Take 1 tablet by mouth 2 (Two) Times a Day.  Dispense: 14 tablet; Refill: 0  -     Gardnerella vaginalis, Trichomonas vaginalis,  Candida albicans, DNA - Swab, Vagina    2. Vaginal burning  -     Gardnerella vaginalis, Trichomonas vaginalis, Candida albicans, DNA - Swab, Vagina    3. Urinary frequency  -     POCT urinalysis dipstick, manual  -     Urine Culture - Urine, Urine, Clean Catch; Future    Urinalysis revealed: 3+ RBC's.  Urine sent off for culture, will call pt with results.  Pt's symptoms more consistent with bacterial vaginosis. Pt instructed to take flagyl 500 mg BID for 7 days, warned not to drink any alcohol while taking medication.  Vaginitis swab collected, will call pt with results.  If symptoms do not improve or worsen, patient was instructed to return to clinic for further evaluation.         This document has been electronically signed by HITESH Crisostomo on  January 8, 2021 15:39 CST

## 2021-01-11 LAB
BACTERIA UR QL AUTO: ABNORMAL /HPF
BILIRUB UR QL STRIP: NEGATIVE
CANDIDA ALBICANS: NEGATIVE
CLARITY UR: ABNORMAL
COLOR UR: YELLOW
GARDNERELLA VAGINALIS: NEGATIVE
GLUCOSE UR STRIP-MCNC: NEGATIVE MG/DL
HGB UR QL STRIP.AUTO: ABNORMAL
HYALINE CASTS UR QL AUTO: ABNORMAL /LPF
KETONES UR QL STRIP: NEGATIVE
LEUKOCYTE ESTERASE UR QL STRIP.AUTO: NEGATIVE
NITRITE UR QL STRIP: NEGATIVE
PH UR STRIP.AUTO: >=9 [PH] (ref 5–8)
PROT UR QL STRIP: NEGATIVE
RBC # UR: ABNORMAL /HPF
REF LAB TEST METHOD: ABNORMAL
SP GR UR STRIP: 1.02 (ref 1–1.03)
SQUAMOUS #/AREA URNS HPF: ABNORMAL /HPF
T VAGINALIS DNA VAG QL PROBE+SIG AMP: NEGATIVE
UROBILINOGEN UR QL STRIP: ABNORMAL
WBC UR QL AUTO: ABNORMAL /HPF

## 2021-01-26 DIAGNOSIS — R11.0 NAUSEA: ICD-10-CM

## 2021-01-27 RX ORDER — ONDANSETRON 4 MG/1
TABLET, FILM COATED ORAL
Qty: 30 TABLET | Refills: 2 | Status: SHIPPED | OUTPATIENT
Start: 2021-01-27 | End: 2021-05-28

## 2021-02-23 DIAGNOSIS — G89.29 CHRONIC MIDLINE LOW BACK PAIN WITHOUT SCIATICA: ICD-10-CM

## 2021-02-23 DIAGNOSIS — F33.1 MODERATE EPISODE OF RECURRENT MAJOR DEPRESSIVE DISORDER (HCC): ICD-10-CM

## 2021-02-23 DIAGNOSIS — M51.36 DDD (DEGENERATIVE DISC DISEASE), LUMBAR: ICD-10-CM

## 2021-02-23 DIAGNOSIS — M54.50 CHRONIC MIDLINE LOW BACK PAIN WITHOUT SCIATICA: ICD-10-CM

## 2021-02-24 ENCOUNTER — TELEPHONE (OUTPATIENT)
Dept: FAMILY MEDICINE CLINIC | Facility: CLINIC | Age: 31
End: 2021-02-24

## 2021-02-24 RX ORDER — GABAPENTIN 400 MG/1
400 CAPSULE ORAL 4 TIMES DAILY
Qty: 120 CAPSULE | Refills: 2 | Status: SHIPPED | OUTPATIENT
Start: 2021-02-24 | End: 2021-10-19 | Stop reason: DRUGHIGH

## 2021-02-24 NOTE — TELEPHONE ENCOUNTER
Patient has chronic sciatica, neuropathy or RLS requiring gabapentin use, concurrently with chronic pain requiring opiate pain medication. Patient has been educated regarding potential dangers of oversedation and accidental overdose with use of both medications concurrently. Serial assessments of patient has yielded no sign of oversedation or adverse effects of patient, and he/she is advised and aware to notify my office immediately if symptoms do occur, as well as to discontinue use of  gabapentin and opiate medication immediately if that occurs, pending medical evaluation and advice. Patient has been compliant with use of medications, UDS, visits with no adverse effects noted. Patient understands the risks associated with this controlled medication, including tolerance and addiction.  Patient also agrees to only obtain this medication from me, and not from a another provider, unless that provider is covering for me in my absence.  Patient also agrees to be compliant in dosing, and not self adjust the dose of medication.  A signed controlled substance agreement is on file, and the patient has received a controlled substance education sheet at this a previous visit.  The patient has also signed a consent for treatment with a controlled substance as per [facility] policy. RORY was obtained. Refills were sent for: gabapentin.     This document has been electronically signed by HITESH Parra on February 24, 2021 11:04 CST

## 2021-03-08 ENCOUNTER — OFFICE VISIT (OUTPATIENT)
Dept: FAMILY MEDICINE CLINIC | Facility: CLINIC | Age: 31
End: 2021-03-08

## 2021-03-08 VITALS
HEART RATE: 106 BPM | WEIGHT: 188.8 LBS | HEIGHT: 63 IN | SYSTOLIC BLOOD PRESSURE: 138 MMHG | TEMPERATURE: 97.5 F | OXYGEN SATURATION: 97 % | DIASTOLIC BLOOD PRESSURE: 88 MMHG | BODY MASS INDEX: 33.45 KG/M2

## 2021-03-08 DIAGNOSIS — H65.01 RIGHT ACUTE SEROUS OTITIS MEDIA, RECURRENCE NOT SPECIFIED: Primary | ICD-10-CM

## 2021-03-08 DIAGNOSIS — R05.9 COUGH: ICD-10-CM

## 2021-03-08 PROCEDURE — 99213 OFFICE O/P EST LOW 20 MIN: CPT | Performed by: NURSE PRACTITIONER

## 2021-03-08 RX ORDER — AZITHROMYCIN 250 MG/1
TABLET, FILM COATED ORAL
Qty: 6 TABLET | Refills: 0 | Status: SHIPPED | OUTPATIENT
Start: 2021-03-08 | End: 2021-09-02

## 2021-03-08 RX ORDER — GUAIFENESIN/DEXTROMETHORPHAN 100-10MG/5
10 SYRUP ORAL 4 TIMES DAILY PRN
Qty: 240 ML | Refills: 1 | Status: SHIPPED | OUTPATIENT
Start: 2021-03-08 | End: 2021-10-05 | Stop reason: SDUPTHER

## 2021-03-08 NOTE — PROGRESS NOTES
Subjective   Cally Olsen is a 30 y.o. female. Ear pain (right)    History of Present Illness   Ear Pain  Patient complains of ear pain and possible ear infection. Symptoms include right ear pain, congestion and cough. Onset of symptoms was several days ago, and have been gradually worsening since that time. Associated symptoms include: congestion and productive cough. Patient denies: achiness, chills, fever  and low grade fever. She is drinking plenty of fluids.    The following portions of the patient's history were reviewed and updated as appropriate: allergies, current medications, past family history, past medical history, past social history, past surgical history, and problem list.    Review of Systems   Constitutional: Negative for chills, fatigue and fever.   HENT: Positive for congestion and ear pain (right). Negative for rhinorrhea and sore throat.    Eyes: Negative for blurred vision, double vision and visual disturbance.   Respiratory: Negative for cough, chest tightness, shortness of breath and wheezing.    Cardiovascular: Negative for chest pain, palpitations and leg swelling.   Gastrointestinal: Negative for abdominal pain, diarrhea, nausea and vomiting.   Endocrine: Negative for cold intolerance and heat intolerance.   Genitourinary: Negative for difficulty urinating, dysuria, frequency and hematuria.   Musculoskeletal: Negative for arthralgias, back pain, neck pain and neck stiffness.   Skin: Negative for dry skin, pallor, rash, skin lesions and bruise.   Allergic/Immunologic: Negative for environmental allergies, food allergies and immunocompromised state.   Neurological: Negative for dizziness, syncope, weakness, light-headedness, headache and confusion.   Hematological: Negative for adenopathy. Does not bruise/bleed easily.   Psychiatric/Behavioral: Negative for self-injury, sleep disturbance, suicidal ideas, depressed mood and stress. The patient is not nervous/anxious.        Vitals:     03/08/21 1112   BP: 138/88   Pulse: 106   Temp: 97.5 °F (36.4 °C)   SpO2: 97%     Body mass index is 33.44 kg/m².    Objective   Physical Exam  Vitals reviewed.   Constitutional:       General: She is not in acute distress.     Appearance: Normal appearance. She is well-developed. She is not ill-appearing.   HENT:      Head: Normocephalic.      Right Ear: Hearing, ear canal and external ear normal. Tympanic membrane is erythematous and bulging.      Left Ear: Hearing, tympanic membrane, ear canal and external ear normal.      Nose: Mucosal edema present.      Mouth/Throat:      Lips: Pink.      Mouth: Mucous membranes are moist.      Pharynx: Oropharynx is clear. Uvula midline. No oropharyngeal exudate.      Tonsils: No tonsillar exudate or tonsillar abscesses.   Eyes:      General: Lids are normal. Gaze aligned appropriately.      Extraocular Movements: Extraocular movements intact.      Conjunctiva/sclera: Conjunctivae normal.      Pupils: Pupils are equal, round, and reactive to light.   Neck:      Thyroid: No thyroid mass, thyromegaly or thyroid tenderness.   Cardiovascular:      Rate and Rhythm: Normal rate and regular rhythm.      Heart sounds: Normal heart sounds, S1 normal and S2 normal. No murmur.   Pulmonary:      Effort: Pulmonary effort is normal.      Breath sounds: Normal breath sounds and air entry. No decreased breath sounds, wheezing, rhonchi or rales.   Abdominal:      General: Abdomen is flat.      Palpations: Abdomen is soft.      Tenderness: There is no abdominal tenderness.   Musculoskeletal:         General: Normal range of motion.      Cervical back: Full passive range of motion without pain, normal range of motion and neck supple.   Lymphadenopathy:      Cervical: No cervical adenopathy.   Skin:     General: Skin is warm and dry.   Neurological:      General: No focal deficit present.      Mental Status: She is alert and oriented to person, place, and time.      Coordination: Coordination is  intact.      Gait: Gait is intact.   Psychiatric:         Attention and Perception: Attention normal.         Mood and Affect: Mood normal.         Speech: Speech normal.         Behavior: Behavior normal. Behavior is cooperative.         Thought Content: Thought content normal.         Cognition and Memory: Cognition normal.         Judgment: Judgment normal.           Assessment/Plan   Diagnoses and all orders for this visit:    1. Right acute serous otitis media, recurrence not specified (Primary)  -     azithromycin (Zithromax Z-Yang) 250 MG tablet; Take 2 tablets the first day, then 1 tablet daily for 4 days.  Dispense: 6 tablet; Refill: 0    2. Cough  -     guaiFENesin-dextromethorphan (ROBITUSSIN DM) 100-10 MG/5ML syrup; Take 10 mL by mouth 4 (Four) Times a Day As Needed for Cough.  Dispense: 240 mL; Refill: 1    Right acute serous otitis media- Pt instructed to take azithromycin take 2 tablets on the first day then 1 tablet daily for 4 days.   She can take robitussin DM 10 ml QID PRN for cough.  Recommended she take daily antihistamine and use flonase daily for possible allergic rhinitis.  If symptoms do not improve or worsen, patient was instructed to return to clinic for further evaluation.         This document has been electronically signed by HITESH Crisostomo on  March 8, 2021 14:52 CST

## 2021-03-22 DIAGNOSIS — F41.1 GENERALIZED ANXIETY DISORDER: ICD-10-CM

## 2021-03-22 DIAGNOSIS — F51.01 PRIMARY INSOMNIA: Chronic | ICD-10-CM

## 2021-03-22 RX ORDER — AMITRIPTYLINE HYDROCHLORIDE 50 MG/1
50 TABLET, FILM COATED ORAL NIGHTLY
Qty: 30 TABLET | Refills: 0 | Status: SHIPPED | OUTPATIENT
Start: 2021-03-22 | End: 2021-04-22

## 2021-04-19 PROBLEM — F17.210 CIGARETTE NICOTINE DEPENDENCE: Status: ACTIVE | Noted: 2020-10-08

## 2021-04-19 PROBLEM — Z79.01 CHRONIC ANTICOAGULATION: Chronic | Status: ACTIVE | Noted: 2021-04-19

## 2021-04-19 PROBLEM — Z86.73 HISTORY OF CVA (CEREBROVASCULAR ACCIDENT): Chronic | Status: ACTIVE | Noted: 2021-04-19

## 2021-04-22 DIAGNOSIS — F41.1 GENERALIZED ANXIETY DISORDER: ICD-10-CM

## 2021-04-22 DIAGNOSIS — F51.01 PRIMARY INSOMNIA: Chronic | ICD-10-CM

## 2021-04-22 RX ORDER — AMITRIPTYLINE HYDROCHLORIDE 50 MG/1
50 TABLET, FILM COATED ORAL NIGHTLY
Qty: 30 TABLET | Refills: 0 | Status: SHIPPED | OUTPATIENT
Start: 2021-04-22 | End: 2021-06-01

## 2021-04-29 DIAGNOSIS — F33.1 MODERATE EPISODE OF RECURRENT MAJOR DEPRESSIVE DISORDER (HCC): ICD-10-CM

## 2021-04-29 RX ORDER — DULOXETIN HYDROCHLORIDE 30 MG/1
30 CAPSULE, DELAYED RELEASE ORAL DAILY
Qty: 30 CAPSULE | Refills: 5 | Status: SHIPPED | OUTPATIENT
Start: 2021-04-29 | End: 2021-09-27 | Stop reason: SDUPTHER

## 2021-05-28 DIAGNOSIS — R11.0 NAUSEA: ICD-10-CM

## 2021-05-28 RX ORDER — ONDANSETRON 4 MG/1
TABLET, FILM COATED ORAL
Qty: 30 TABLET | Refills: 0 | Status: SHIPPED | OUTPATIENT
Start: 2021-05-28 | End: 2021-07-01

## 2021-05-30 DIAGNOSIS — R11.0 NAUSEA: ICD-10-CM

## 2021-05-30 DIAGNOSIS — F41.1 GENERALIZED ANXIETY DISORDER: ICD-10-CM

## 2021-05-30 DIAGNOSIS — F51.01 PRIMARY INSOMNIA: Chronic | ICD-10-CM

## 2021-06-01 RX ORDER — AMITRIPTYLINE HYDROCHLORIDE 50 MG/1
50 TABLET, FILM COATED ORAL NIGHTLY
Qty: 30 TABLET | Refills: 0 | Status: SHIPPED | OUTPATIENT
Start: 2021-06-01 | End: 2021-07-29

## 2021-06-01 RX ORDER — ONDANSETRON 4 MG/1
TABLET, FILM COATED ORAL
Qty: 30 TABLET | Refills: 0 | OUTPATIENT
Start: 2021-06-01

## 2021-06-30 DIAGNOSIS — R11.0 NAUSEA: ICD-10-CM

## 2021-07-01 RX ORDER — ONDANSETRON 4 MG/1
TABLET, FILM COATED ORAL
Qty: 30 TABLET | Refills: 0 | Status: SHIPPED | OUTPATIENT
Start: 2021-07-01 | End: 2021-07-15

## 2021-07-15 DIAGNOSIS — R11.0 NAUSEA: ICD-10-CM

## 2021-07-15 DIAGNOSIS — J44.9 CHRONIC OBSTRUCTIVE PULMONARY DISEASE, UNSPECIFIED COPD TYPE (HCC): ICD-10-CM

## 2021-07-15 RX ORDER — ONDANSETRON 4 MG/1
TABLET, FILM COATED ORAL
Qty: 30 TABLET | Refills: 0 | Status: SHIPPED | OUTPATIENT
Start: 2021-07-15 | End: 2021-09-02 | Stop reason: SDUPTHER

## 2021-07-15 RX ORDER — ALBUTEROL SULFATE 90 UG/1
AEROSOL, METERED RESPIRATORY (INHALATION)
Qty: 54 G | Refills: 2 | Status: SHIPPED | OUTPATIENT
Start: 2021-07-15 | End: 2021-11-04 | Stop reason: SDUPTHER

## 2021-07-28 DIAGNOSIS — F41.1 GENERALIZED ANXIETY DISORDER: ICD-10-CM

## 2021-07-28 DIAGNOSIS — F51.01 PRIMARY INSOMNIA: Chronic | ICD-10-CM

## 2021-07-29 DIAGNOSIS — F51.01 PRIMARY INSOMNIA: Chronic | ICD-10-CM

## 2021-07-29 DIAGNOSIS — F41.1 GENERALIZED ANXIETY DISORDER: ICD-10-CM

## 2021-07-29 RX ORDER — AMITRIPTYLINE HYDROCHLORIDE 50 MG/1
50 TABLET, FILM COATED ORAL NIGHTLY
Qty: 30 TABLET | Refills: 0 | Status: SHIPPED | OUTPATIENT
Start: 2021-07-29 | End: 2021-07-29 | Stop reason: SDUPTHER

## 2021-07-29 RX ORDER — AMITRIPTYLINE HYDROCHLORIDE 50 MG/1
50 TABLET, FILM COATED ORAL NIGHTLY
Qty: 30 TABLET | Refills: 5 | Status: SHIPPED | OUTPATIENT
Start: 2021-07-29 | End: 2021-12-13 | Stop reason: SDUPTHER

## 2021-09-02 ENCOUNTER — TELEMEDICINE (OUTPATIENT)
Dept: FAMILY MEDICINE CLINIC | Facility: CLINIC | Age: 31
End: 2021-09-02

## 2021-09-02 VITALS — DIASTOLIC BLOOD PRESSURE: 123 MMHG | SYSTOLIC BLOOD PRESSURE: 164 MMHG

## 2021-09-02 DIAGNOSIS — F41.1 GAD (GENERALIZED ANXIETY DISORDER): Primary | ICD-10-CM

## 2021-09-02 DIAGNOSIS — I10 ESSENTIAL HYPERTENSION: ICD-10-CM

## 2021-09-02 DIAGNOSIS — R11.0 NAUSEA: ICD-10-CM

## 2021-09-02 PROCEDURE — 99214 OFFICE O/P EST MOD 30 MIN: CPT | Performed by: NURSE PRACTITIONER

## 2021-09-02 RX ORDER — DILTIAZEM HYDROCHLORIDE 120 MG/1
120 CAPSULE, COATED, EXTENDED RELEASE ORAL DAILY
Qty: 60 CAPSULE | Refills: 0 | Status: SHIPPED | OUTPATIENT
Start: 2021-09-02 | End: 2021-11-04 | Stop reason: SDUPTHER

## 2021-09-02 RX ORDER — CLONAZEPAM 1 MG/1
1 TABLET ORAL 2 TIMES DAILY PRN
Qty: 60 TABLET | Refills: 0 | Status: SHIPPED | OUTPATIENT
Start: 2021-09-02 | End: 2021-09-28

## 2021-09-02 RX ORDER — ONDANSETRON 4 MG/1
4 TABLET, FILM COATED ORAL EVERY 8 HOURS PRN
Qty: 30 TABLET | Refills: 5 | Status: SHIPPED | OUTPATIENT
Start: 2021-09-02 | End: 2021-10-08 | Stop reason: SDUPTHER

## 2021-09-02 NOTE — PROGRESS NOTES
"Subjective   Cally Olsen is a 31 y.o. female.   You have chosen to receive care through a telehealth visit.  Do you consent to use a video/audio connection for your medical care today? Yes  This was an audio and video enabled telemedicine encounter.        Chief Complaint   Patient presents with   • Hypertension     f/u strokes        History of Present Illness   She comes today with follow up of long term effects of right MCA stroke follow-up  Chief Complaint   antiphospholipid syndrome with chronic Pain in upper extrem. and shoulder area.  Seen by Dr Jimbo Nina MD,neurology [facility].     BP not well controllled with lisinopril as monoptherapy.   B/P has been running high. Anxiety level very high.   .    HPI: copied from Dr Nina notes of 5/10/21.     \"30-year-old woman presented with left-sided weakness in tingling numbness and weakness in the arms and leg and along with she also had transient vertigo. At outside facility MRI of the brain was done and it did show right MCA and FREDERICK distribution watershed infarcts. CT angiogram of head and neck showed right supraclinoid internal carotid artery stenosis in the intracranial compartment. Patient was started on aspirin as well as high-dose statin therapy. 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 Currently she is doing better and does not have any neurological deficits. She does feel there is mild dysesthesia on the left side from time to time. She does have history of migraine from time to time and currently taking Imitrex for abortive therapy.  She will require repeat anti cardiolipin antibody labs and if it is positive then I will consider starting her on anticoagulation namely Eliquis 5 mg twice daily.  She had repeat cardiolipin antibody done and IgM was elevated 46. She will be on life time anticoagulation. Her " "lisinopril is increased to 10 mg daily. She also has significant anxiety. Currently she is on Cymbalta\"      Patient's medications, allergies, past medical, surgical, social and family histories were reviewed and updated as appropriate.     Past Surgical History:   Procedure Laterality Date   •  SECTION  2015    Repeat   • INJECTION OF MEDICATION  2015    Kenalog (1)  -  SEFERINO Perla   • LEEP     • OTHER SURGICAL HISTORY  2015    Laparoscopy; tubal cautery (1)  - Exam under anesthesia and laparoscopic tubal fulguration.   • TUBAL ABDOMINAL LIGATION        Social History     Socioeconomic History   • Marital status:      Spouse name: Not on file   • Number of children: Not on file   • Years of education: Not on file   • Highest education level: Not on file   Tobacco Use   • Smoking status: Current Some Day Smoker     Packs/day: 1.50     Years: 17.00     Pack years: 25.50     Types: Cigarettes     Start date:    • Smokeless tobacco: Never Used   Substance and Sexual Activity   • Alcohol use: No   • Drug use: No   • Sexual activity: Yes     Partners: Male     Birth control/protection: Tubal ligation      The following portions of the patient's history were reviewed and updated as appropriate: She  has a past medical history of Abnormal Pap smear of cervix, Acute pharyngitis, Adjustment disorder with anxious mood, Allergic rhinitis, Asthma, Backache, Candidiasis, Cervical intraepithelial neoplasia grade 1, Contraception, Depressive disorder, Dysphagia, Encounter for gynecological examination without abnormal finding, Encounter for  visit, Excessive and frequent menstruation with irregular cycle, Excessive and frequent menstruation with regular cycle, Fatigue, Generalized abdominal pain, Generalized anxiety disorder, Headache, Heartburn, Hip pain, History of CVA (cerebrovascular accident) (2021), Hypertension, Insomnia, Low grade squamous intraepithelial lesion (LGSIL) on " cervicovaginal cytologic smear, Malaise, Organic anxiety disorder, Spasm of back muscles, Stroke (CMS/HCC), Surgical follow-up care, Tobacco dependence syndrome, and Urinary tract infection..    Review of Systems   Constitutional: Negative.    HENT: Negative.  Negative for congestion.    Eyes: Negative.  Negative for blurred vision, double vision, photophobia and visual disturbance.   Respiratory: Negative.  Negative for cough, shortness of breath, wheezing and stridor.    Cardiovascular: Negative.  Negative for chest pain, palpitations and leg swelling.   Gastrointestinal: Negative.  Negative for abdominal distention, abdominal pain, blood in stool, constipation, diarrhea, nausea, vomiting, GERD and indigestion.   Endocrine: Negative.  Negative for cold intolerance and heat intolerance.   Genitourinary: Negative.  Negative for dysuria, flank pain, frequency and urinary incontinence.   Musculoskeletal: Negative.  Negative for arthralgias and back pain.   Skin: Negative.    Allergic/Immunologic: Negative.  Negative for immunocompromised state.   Neurological: Positive for headache.   Hematological: Negative.    Psychiatric/Behavioral: Negative.  Negative for agitation, behavioral problems, decreased concentration, dysphoric mood, hallucinations, self-injury, sleep disturbance, suicidal ideas, negative for hyperactivity, depressed mood and stress. The patient is not nervous/anxious.    All other systems reviewed and are negative.    PHQ-9 Depression Screening  Little interest or pleasure in doing things? 0   Feeling down, depressed, or hopeless? 0   Trouble falling or staying asleep, or sleeping too much?     Feeling tired or having little energy?     Poor appetite or overeating?     Feeling bad about yourself - or that you are a failure or have let yourself or your family down?     Trouble concentrating on things, such as reading the newspaper or watching television?     Moving or speaking so slowly that other people  could have noticed? Or the opposite - being so fidgety or restless that you have been moving around a lot more than usual?     Thoughts that you would be better off dead, or of hurting yourself in some way?     PHQ-9 Total Score 0   If you checked off any problems, how difficult have these problems made it for you to do your work, take care of things at home, or get along with other people?      Patient understands the risks associated with this controlled medication, including tolerance and addiction.  Patient also agrees to only obtain this medication from me, and not from a another provider, unless that provider is covering for me in my absence.  Patient also agrees to be compliant in dosing, and not self adjust the dose of medication.  A signed controlled substance agreement is on file, and the patient has received a controlled substance education sheet at this a previous visit.  The patient has also signed a consent for treatment with a controlled substance as per [facility] policy. RORY was obtained.   Objective    Vitals:    09/02/21 0829   BP: (!) 164/123  Comment: per patient       Physical Exam  Vitals and nursing note reviewed.   Constitutional:       General: She is not in acute distress.     Appearance: Normal appearance. She is well-developed. She is obese. She is not ill-appearing or diaphoretic.   HENT:      Head: Normocephalic and atraumatic.   Eyes:      General: No scleral icterus.        Right eye: No discharge.         Left eye: No discharge.      Conjunctiva/sclera: Conjunctivae normal.      Pupils: Pupils are equal, round, and reactive to light.   Neck:      Thyroid: No thyromegaly.      Vascular: No carotid bruit or JVD.      Trachea: No tracheal deviation.   Cardiovascular:      Rate and Rhythm: Normal rate and regular rhythm.      Pulses: Normal pulses.      Heart sounds: Normal heart sounds. No murmur heard.   No friction rub. No gallop.    Pulmonary:      Effort: Pulmonary effort is  normal. No respiratory distress.      Breath sounds: Normal breath sounds. No stridor. No wheezing, rhonchi or rales.   Chest:      Chest wall: No tenderness.   Abdominal:      General: Bowel sounds are normal.      Palpations: Abdomen is soft.   Musculoskeletal:         General: No tenderness or deformity. Normal range of motion.      Cervical back: Normal range of motion and neck supple. No rigidity or tenderness.      Right lower leg: No edema.      Left lower leg: No edema.   Lymphadenopathy:      Cervical: No cervical adenopathy.   Skin:     General: Skin is warm and dry.      Capillary Refill: Capillary refill takes 2 to 3 seconds.      Coloration: Skin is not jaundiced or pale.      Findings: No bruising, erythema, lesion or rash.   Neurological:      General: No focal deficit present.      Mental Status: She is alert and oriented to person, place, and time. Mental status is at baseline.      Motor: No weakness.      Gait: Gait normal.   Psychiatric:         Mood and Affect: Mood normal.         Behavior: Behavior normal.         Thought Content: Thought content normal.         Judgment: Judgment normal.       Poorly controllled HTN in setting of frequent CVAs and antiphospholipid syndrome. Add Diltiazem 12 hour 120mg BID today.   Follow up 2-3 weeks HTN    Worsening anxiety due to failing physical health.   Add clonazepam 1mg BID prn and also follow up 2-3 weeks will need contracted and narcan inhaler Rx.     continue other meds as prescribed.       Assessment/Plan   Diagnoses and all orders for this visit:    1. HAYLEE (generalized anxiety disorder) (Primary)  -     clonazePAM (KlonoPIN) 1 MG tablet; Take 1 tablet by mouth 2 (Two) Times a Day As Needed for Anxiety.  Dispense: 60 tablet; Refill: 0    2. Nausea  -     ondansetron (ZOFRAN) 4 MG tablet; Take 1 tablet by mouth Every 8 (Eight) Hours As Needed for Nausea or Vomiting.  Dispense: 30 tablet; Refill: 5    3. Essential hypertension  -     dilTIAZem CD  (Cardizem CD) 120 MG 24 hr capsule; Take 1 capsule by mouth Daily. For High blood pressure in addition to the lisinopril  Dispense: 60 capsule; Refill: 0    Return in about 2 weeks (around 9/16/2021) for anxiety and HTN FU.                 This document has been electronically signed by HITESH Parra on September 2, 2021 11:27 CDT

## 2021-09-13 ENCOUNTER — TELEPHONE (OUTPATIENT)
Dept: FAMILY MEDICINE CLINIC | Facility: CLINIC | Age: 31
End: 2021-09-13

## 2021-09-13 RX ORDER — ATORVASTATIN CALCIUM 40 MG/1
40 TABLET, FILM COATED ORAL DAILY
Qty: 7 TABLET | Refills: 0 | Status: SHIPPED | OUTPATIENT
Start: 2021-09-13 | End: 2021-10-14

## 2021-09-13 NOTE — TELEPHONE ENCOUNTER
Incoming Refill Request      Medication requested (name and dose):   atorvastatin (LIPITOR) 40 MG tablet       Pharmacy where request should be sent:   [facility]     Additional details provided by patient:   [facility]   Best call back number:     Does the patient have less than a 3 day supply:  [] Yes  [] No    Silva Kimball  09/13/21, 09:25 CDT

## 2021-09-15 ENCOUNTER — LAB (OUTPATIENT)
Dept: LAB | Facility: OTHER | Age: 31
End: 2021-09-15

## 2021-09-15 ENCOUNTER — OFFICE VISIT (OUTPATIENT)
Dept: OBSTETRICS AND GYNECOLOGY | Facility: CLINIC | Age: 31
End: 2021-09-15

## 2021-09-15 VITALS
HEART RATE: 87 BPM | SYSTOLIC BLOOD PRESSURE: 140 MMHG | HEIGHT: 63 IN | WEIGHT: 199.2 LBS | DIASTOLIC BLOOD PRESSURE: 88 MMHG | BODY MASS INDEX: 35.3 KG/M2

## 2021-09-15 DIAGNOSIS — Z11.3 SCREEN FOR SEXUALLY TRANSMITTED DISEASES: ICD-10-CM

## 2021-09-15 DIAGNOSIS — N63.10 LUMP OF RIGHT BREAST: Primary | ICD-10-CM

## 2021-09-15 DIAGNOSIS — N64.4 DIFFUSE NON-CYCLICAL BREAST PAIN: ICD-10-CM

## 2021-09-15 DIAGNOSIS — B96.89 BV (BACTERIAL VAGINOSIS): ICD-10-CM

## 2021-09-15 DIAGNOSIS — N64.4 PAINFUL LUMPY LEFT BREAST: ICD-10-CM

## 2021-09-15 DIAGNOSIS — N76.0 BV (BACTERIAL VAGINOSIS): ICD-10-CM

## 2021-09-15 DIAGNOSIS — R30.9 PAIN WITH URINATION: ICD-10-CM

## 2021-09-15 DIAGNOSIS — Z78.9 EXCESSIVE CAFFEINE INTAKE: ICD-10-CM

## 2021-09-15 DIAGNOSIS — N63.20 PAINFUL LUMPY LEFT BREAST: ICD-10-CM

## 2021-09-15 LAB
BACTERIA UR QL AUTO: ABNORMAL /HPF
BILIRUB UR QL STRIP: NEGATIVE
CLARITY UR: CLEAR
COLOR UR: YELLOW
GLUCOSE UR STRIP-MCNC: NEGATIVE MG/DL
HGB UR QL STRIP.AUTO: ABNORMAL
HYALINE CASTS UR QL AUTO: ABNORMAL /LPF
KETONES UR QL STRIP: NEGATIVE
LEUKOCYTE ESTERASE UR QL STRIP.AUTO: NEGATIVE
MUCOUS THREADS URNS QL MICRO: ABNORMAL /HPF
NITRITE UR QL STRIP: NEGATIVE
PH UR STRIP.AUTO: 8.5 [PH] (ref 5.5–8)
PROT UR QL STRIP: NEGATIVE
RBC # UR: ABNORMAL /HPF
REF LAB TEST METHOD: ABNORMAL
SP GR UR STRIP: 1.01 (ref 1–1.03)
SQUAMOUS #/AREA URNS HPF: ABNORMAL /HPF
UROBILINOGEN UR QL STRIP: ABNORMAL
WBC UR QL AUTO: ABNORMAL /HPF

## 2021-09-15 PROCEDURE — 87661 TRICHOMONAS VAGINALIS AMPLIF: CPT | Performed by: NURSE PRACTITIONER

## 2021-09-15 PROCEDURE — 99214 OFFICE O/P EST MOD 30 MIN: CPT | Performed by: NURSE PRACTITIONER

## 2021-09-15 PROCEDURE — 81001 URINALYSIS AUTO W/SCOPE: CPT | Performed by: NURSE PRACTITIONER

## 2021-09-15 PROCEDURE — 87491 CHLMYD TRACH DNA AMP PROBE: CPT | Performed by: NURSE PRACTITIONER

## 2021-09-15 PROCEDURE — 87591 N.GONORRHOEAE DNA AMP PROB: CPT | Performed by: NURSE PRACTITIONER

## 2021-09-15 RX ORDER — METRONIDAZOLE 500 MG/1
500 TABLET ORAL 2 TIMES DAILY
Qty: 14 TABLET | Refills: 0 | Status: SHIPPED | OUTPATIENT
Start: 2021-09-15 | End: 2021-09-22

## 2021-09-15 RX ORDER — FLUCONAZOLE 150 MG/1
TABLET ORAL
Qty: 2 TABLET | Refills: 0 | Status: SHIPPED | OUTPATIENT
Start: 2021-09-15 | End: 2021-11-04

## 2021-09-15 NOTE — PROGRESS NOTES
"Subjective   Cally Olsen is a 31 y.o. female.     History of Present Illness   Pt presents with concerns about bilateral breast lumps. She states there is a golf ball size lump in the left breast that is not painful. Right breast has a small painful lump. Pt states it has been present x 3 months. No nipple discharge.  She has never had breast problems before. Never had MMG or US of the breast. Last [employer] in 2020 states that pt declined a CBE. MGM and Maternal aunt had breast cancer. Pt states she drinks \"a lot\" of caffeine. She further describes that as at least 2 monster energy drinks and 3 mellow yellows daily at least. Pt also smokes.     Pt also complains of vaginal pain and burning and odor. Has hx of vaginitis. When asked what she washes with she names \"natural stuff\" like kids soap and dial.     Pt has hx of CVA. States she has had 19 strokes and struggles with memory. Some details are difficult for pt to explain.     The following portions of the patient's history were reviewed and updated as appropriate: allergies, current medications, past family history, past medical history, past social history, past surgical history and problem list.    Review of Systems   Constitutional: Negative.  Negative for chills and fever.   Respiratory: Negative.    Cardiovascular: Negative.    Genitourinary: Positive for breast lump, breast pain, dysuria, vaginal bleeding (frequent spotting ), vaginal discharge and vaginal pain. Negative for breast discharge, difficulty urinating, dyspareunia, frequency and urgency.   Skin: Negative for color change, rash, skin lesions and bruise.   Neurological: Positive for memory problem and confusion.        Hx of CVA       Objective   Physical Exam  Vitals reviewed. Exam conducted with a chaperone present.   Constitutional:       Appearance: Normal appearance. She is obese.   Cardiovascular:      Rate and Rhythm: Normal rate and regular rhythm.      Heart sounds: Normal heart sounds. "   Pulmonary:      Effort: Pulmonary effort is normal.      Breath sounds: Normal breath sounds.   Chest:      Breasts:         Right: Mass and tenderness present. No swelling, bleeding, inverted nipple, nipple discharge or skin change.         Left: Mass and tenderness present. No swelling, bleeding, inverted nipple, nipple discharge or skin change.          Comments: Generalized tenderness and fibrocystic tissue noted. BB size nodule in the Lower, outer quadrant of the right breast. No palpable masses in the left breast but dense tissue noted in the upper outer quadrant. Pt demonstrates palpation by pressing in both sides of the breast together to feel what she is referring to.   Genitourinary:     General: Normal vulva.      Labia:         Right: No rash, tenderness, lesion or injury.         Left: No rash, tenderness, lesion or injury.       Vagina: No signs of injury and foreign body. Vaginal discharge (small amount of white discharge, malodorous, wet prep and G/C/T obtained) present. No erythema, tenderness, bleeding or lesions.      Cervix: Normal.      Uterus: Normal.       Adnexa: Right adnexa normal and left adnexa normal.      Comments: Wet prep: positive for clue cells and excessive bacteria TNTC, no yeast buds, hyphae or trichomonads. Evaluated by CRUZ Fajardo.   Skin:     General: Skin is warm and dry.   Neurological:      Mental Status: She is alert and oriented to person, place, and time.   Psychiatric:         Mood and Affect: Affect is flat.         Cognition and Memory: Memory is impaired.       Brief Urine Lab Results  (Last result in the past 365 days)      Color   Clarity   Blood   Leuk Est   Nitrite   Protein   CREAT   Urine HCG        09/15/21 0946 Yellow Clear Moderate (2+) Negative Negative Negative               Assessment/Plan   Diagnoses and all orders for this visit:    1. Lump of right breast (Primary)  -     Mammo Diagnostic Digital Tomosynthesis Bilateral With CAD;  Future  -     US Breast Bilateral Limited; Future    2. Painful lumpy left breast  -     Mammo Diagnostic Digital Tomosynthesis Bilateral With CAD; Future    3. Diffuse non-cyclical breast pain  -     Mammo Diagnostic Digital Tomosynthesis Bilateral With CAD; Future  -     US Breast Bilateral Limited; Future    4. BV (bacterial vaginosis)  -     metroNIDAZOLE (FLAGYL) 500 MG tablet; Take 1 tablet by mouth 2 (Two) Times a Day for 7 days. No alcohol up to 48 hours after last dose  Dispense: 14 tablet; Refill: 0  -     fluconazole (DIFLUCAN) 150 MG tablet; Take 1 tablet PO on day 3 and day 7 of antibiotic  Dispense: 2 tablet; Refill: 0    5. Screen for sexually transmitted diseases  -     Chlamydia trachomatis, Neisseria gonorrhoeae, Trichomonas vaginalis, PCR - Swab, Cervix    6. Pain with urination  -     Urinalysis With Culture If Indicated - Urine, Clean Catch  -     Urinalysis, Microscopic Only - Urine, Clean Catch  -     US Breast Bilateral Limited; Future    7. Excessive caffeine intake        Discussed findings on exam. I do palpate the right smaller, painful nodule but I believe pt is just feeling the dense fibrocystic tissue pressed together based on her demonstration of how she is feeling this golf ball size lump. I suspect his may be a result of excessive caffeine intake. I strongly encouraged her to cut back to 1 single soda or cup of coffee a day. I recommend against Monster energy drinks. I also encouraged pt to quit smoking. She states she is motivated to quit smoking and possibly work on caffeine intake given her CVA hx. I recommend a bilateral MMG and US PRN for further evaluation and management PRN. Consider Vit E PRN as well if imaging is clear. Scheduled for Monday at 2PM.     Discussed findings on exam and wet prep consistent with BV. Treat with Flagyl 500mg BID x 7 days. No alcohol was stressed. Take Diflucan on day 3 and 7 to prevent a secondary candida infection. I counseled pt on vulvar skin  care guidelines and recommend against dial and other irritants. UA is negative for infection. STI testing collected as precaution since she mentioned frequent, daily spotting x 2 months S/P ablation. I will call with results and Rx PRN.

## 2021-09-16 LAB
C TRACH RRNA CVX QL NAA+PROBE: NEGATIVE
N GONORRHOEA RRNA SPEC QL NAA+PROBE: NEGATIVE
TRICHOMONAS VAGINALIS PCR: NEGATIVE

## 2021-09-20 DIAGNOSIS — R92.8 ABNORMAL MAMMOGRAM OF BOTH BREASTS: Primary | ICD-10-CM

## 2021-09-20 NOTE — PROGRESS NOTES
CC and MLO views are obtained of both breasts. Additional spot  compression views were also obtained of both breasts.  Parenchymal pattern: There are scattered areas of fibroglandular  density.    Triangular markers were placed over both breasts to indicate the  sites of palpable concern (upper outer right breast 9 cm from the  nipple, and slightly upper and slightly outer left breast 6 cm  from the nipple.  Nodular focal asymmetries are seen in both of these locations.     Ultrasound was performed of both breasts in the areas of concern.        In the right breast 10:00 axis, 8 cm from the nipple there is a  peripherally hypoechoic nodule with the appearance of an  intramammary lymph node. The mammographic appearance is more  suspicious than the sonographic. Recommend biopsy.     In the left breast 12:00 axis, 5 cm from the nipple there is a  hypoechoic solid nodule with well-circumscribed margins.  Recommend biopsy.     IMPRESSION:  CONCLUSION:    In the right breast 10:00 axis, 8 cm from the nipple there is a  peripherally hypoechoic nodule with the appearance of an  intramammary lymph node. The mammographic appearance is more  suspicious than the sonographic. Recommend biopsy.     In the left breast 12:00 axis, 5 cm from the nipple there is a  hypoechoic solid nodule with well-circumscribed margins.  Recommend biopsy.     BI-RADS Category 4: Suspicious abnormality.  Biopsy should be  considered.      Findings and recommendations were discussed with the patient at  the time of the exam.   A request was sent to the [facility] communication team  requesting a critical conference with the ordering licensed  caregiver concerning these findings.     Electronically signed by:  Popeye Morales MD  9/20/2021 3:17 PM CDT  Workstation: [id number]        Specimen Collected: 09/20/21 14:00

## 2021-09-27 ENCOUNTER — OFFICE VISIT (OUTPATIENT)
Dept: SURGERY | Facility: CLINIC | Age: 31
End: 2021-09-27

## 2021-09-27 VITALS
BODY MASS INDEX: 35.08 KG/M2 | HEIGHT: 63 IN | WEIGHT: 198 LBS | TEMPERATURE: 97.1 F | DIASTOLIC BLOOD PRESSURE: 98 MMHG | HEART RATE: 99 BPM | SYSTOLIC BLOOD PRESSURE: 150 MMHG

## 2021-09-27 DIAGNOSIS — R92.8 ABNORMAL MAMMOGRAM: Primary | ICD-10-CM

## 2021-09-27 DIAGNOSIS — F41.1 GAD (GENERALIZED ANXIETY DISORDER): ICD-10-CM

## 2021-09-27 PROCEDURE — 99214 OFFICE O/P EST MOD 30 MIN: CPT | Performed by: SURGERY

## 2021-09-27 RX ORDER — LISINOPRIL 10 MG/1
TABLET ORAL
COMMUNITY
Start: 2021-09-24 | End: 2021-12-13 | Stop reason: SDUPTHER

## 2021-09-27 RX ORDER — LIDOCAINE HYDROCHLORIDE AND EPINEPHRINE 10; 10 MG/ML; UG/ML
20 INJECTION, SOLUTION INFILTRATION; PERINEURAL ONCE
Status: CANCELLED | OUTPATIENT
Start: 2021-09-27 | End: 2021-09-27

## 2021-09-27 RX ORDER — DULOXETIN HYDROCHLORIDE 30 MG/1
30 CAPSULE, DELAYED RELEASE ORAL DAILY
COMMUNITY
End: 2021-11-04 | Stop reason: SDUPTHER

## 2021-09-27 NOTE — PROGRESS NOTES
Subjective   Cally Olsen is a 31 y.o. female     Chief Complaint: Bilateral breast abnormalities on recent screening mammogram and then diagnostic mammogram with ultrasounds    History of Present Illness referred after patient underwent bilateral screening mammogram and subsequent bilateral diagnostic mammograms with ultrasound demonstrated a hypoechoic lesion in the right breast at 10:00 axis VIII centimeters from the nipple that is 6 x 8 mm in size. And in the left breast at 12 o'clock axis V centimeters from the nipple 5.5 x 4 mm hypoechoic mass.  Patient has an aunt and a grandmother who had breast cancer but no first-degree relatives that she is aware of.  She also has a history of multiple strokes last of which was over a year ago.  She is followed by neurologist in [place] and she takes Eliquis daily.  She has been able to come off the Eliquis for procedures in the past.  Is questionable area of a palpable mass in the right and left breast.  No prior mammograms or ultrasounds done.  No nipple discharge.    Review of Systems   Eyes: Negative.    Respiratory: Negative.    Cardiovascular: Negative.    Gastrointestinal: Negative.    Endocrine: Negative.    Genitourinary: Negative.    Musculoskeletal: Positive for arthralgias and back pain.   Skin: Negative.    Allergic/Immunologic: Negative.    Neurological: Positive for weakness and headaches.        Left side weakness   Hematological: Negative.    Psychiatric/Behavioral: Negative.      Past Medical History:   Diagnosis Date   • Abnormal Pap smear of cervix    • Acute pharyngitis     unspecified     • Adjustment disorder with anxious mood     Celexa     • Allergic rhinitis    • Asthma    • Backache    • Candidiasis    • Cervical intraepithelial neoplasia grade 1    • Contraception     Desires permanent sterilization    • Depressive disorder    • Dysphagia     unspecified     • Encounter for gynecological examination without abnormal finding    •  Encounter for  visit      visit status    • Excessive and frequent menstruation with irregular cycle    • Excessive and frequent menstruation with regular cycle    • Fatigue    • Generalized abdominal pain    • Generalized anxiety disorder    • Headache    • Heartburn    • Hip pain    • History of CVA (cerebrovascular accident) 2021   • Hypertension    • Insomnia      Trazodone   • Low grade squamous intraepithelial lesion (LGSIL) on cervicovaginal cytologic smear    • Malaise    • Organic anxiety disorder    • Ovarian cancer (CMS/HCC)    • Spasm of back muscles    • Stroke (CMS/HCC)    • Surgical follow-up care    • Tobacco dependence syndrome    • Urinary tract infection      Past Surgical History:   Procedure Laterality Date   •  SECTION  2015    Repeat   • ENDOMETRIAL ABLATION     • INJECTION OF MEDICATION  2015    Kenalog (1)  -  SEFERINO Perla   • LEEP     • OTHER SURGICAL HISTORY  2015    Laparoscopy; tubal cautery (1)  - Exam under anesthesia and laparoscopic tubal fulguration.   • TUBAL ABDOMINAL LIGATION       Family History   Problem Relation Age of Onset   • Seizures Mother    • Alcohol abuse Father    • Stroke Other    • Breast cancer Maternal Grandmother    • Ovarian cancer Maternal Grandmother    • Breast cancer Maternal Aunt      Social History     Socioeconomic History   • Marital status:      Spouse name: Not on file   • Number of children: Not on file   • Years of education: Not on file   • Highest education level: Not on file   Tobacco Use   • Smoking status: Current Some Day Smoker     Packs/day: 1.50     Years: 17.00     Pack years: 25.50     Types: Cigarettes     Start date:    • Smokeless tobacco: Never Used   Substance and Sexual Activity   • Alcohol use: No   • Drug use: No   • Sexual activity: Not Currently     Partners: Male     Birth control/protection: Tubal ligation     Allergies   Allergen Reactions   • Amoxil [Amoxicillin]  Anaphylaxis     Anaphylaxis    • Latex, Natural Rubber Swelling     Had swelling when she wore gloves   • Hydroxyzine Hcl Rash and Swelling   • Latex Hives   • Nickel Rash   • Paroxetine Hcl Rash and Swelling   • Paxil [Paroxetine] Rash   • Vistaril [Hydroxyzine] Rash     Vitals:    09/27/21 1357   BP: 150/98   Pulse: 99   Temp: 97.1 °F (36.2 °C)       Home Medications:  Prior to Admission medications    Medication Sig Start Date End Date Taking? Authorizing Provider   albuterol sulfate  (90 Base) MCG/ACT inhaler INHALE 2 PUFFS BY MOUTH EVERY 6 HOURS AS NEEDED FOR WHEEZING OR SHORTNESS OF BREATH 7/15/21  Yes Sima Hoffmann APRN   amitriptyline (ELAVIL) 50 MG tablet Take 1 tablet by mouth Every Night. 7/29/21  Yes Sima Hoffmann APRN   atorvastatin (LIPITOR) 40 MG tablet Take 1 tablet by mouth Daily. 9/13/21 9/13/22 Yes Sima Hoffmann APRN   cetirizine (zyrTEC) 10 MG tablet Take 1 tablet by mouth Daily. 8/12/19  Yes Ruth Billings APRN   clonazePAM (KlonoPIN) 1 MG tablet Take 1 tablet by mouth 2 (Two) Times a Day As Needed for Anxiety. 9/2/21  Yes Sima Hoffmann APRN   dilTIAZem CD (Cardizem CD) 120 MG 24 hr capsule Take 1 capsule by mouth Daily. For High blood pressure in addition to the lisinopril 9/2/21  Yes Sima Hoffmann APRN   DULoxetine (CYMBALTA) 30 MG capsule Take 30 mg by mouth Daily.   Yes Marylou French MD   Eliquis 5 MG tablet tablet  3/31/21  Yes Marylou French MD   fluconazole (DIFLUCAN) 150 MG tablet Take 1 tablet PO on day 3 and day 7 of antibiotic 9/15/21  Yes Sole De Leon APRN   gabapentin (NEURONTIN) 400 MG capsule Take 1 capsule by mouth 4 (Four) Times a Day. 2/24/21  Yes Sima Hoffmann APRN   guaiFENesin-dextromethorphan (ROBITUSSIN DM) 100-10 MG/5ML syrup Take 10 mL by mouth 4 (Four) Times a Day As Needed for Cough. 3/8/21  Yes Ruth Billings APRN   HYDROcodone-acetaminophen (NORCO)  MG per tablet TK 1 TABLETY BY  MOUTH FOUR TIMES DAILY AS NEEDED FOR PAIN 2/16/19  Yes Provider, MD Marylou   lisinopril (PRINIVIL,ZESTRIL) 10 MG tablet  9/24/21  Yes Marylou French MD   ondansetron (ZOFRAN) 4 MG tablet Take 1 tablet by mouth Every 8 (Eight) Hours As Needed for Nausea or Vomiting. 9/2/21  Yes Hoffmann, HITESH Guerrero   rOPINIRole (REQUIP) 1 MG tablet Take 1 tablet by mouth 2 (Two) Times a Day. Take 1 hour before bedtime. 11/5/19  Yes Hoffmann, HITESH Guerrero   apixaban (ELIQUIS) 5 MG tablet tablet Take 5 mg by mouth. 12/7/20 9/27/21  ProviderMarylou MD   DULoxetine (CYMBALTA) 30 MG capsule TAKE 1 CAPSULE BY MOUTH DAILY 9/28/20   Hoffmann, HITESH Guerrero   DULoxetine (CYMBALTA) 30 MG capsule TAKE 1 CAPSULE BY MOUTH DAILY 4/29/21 9/27/21  Hoffmann, HITESH Guerrero   lisinopril (PRINIVIL,ZESTRIL) 5 MG tablet Take 10 mg by mouth Daily. 3/26/21 9/27/21  Provider, MD Marylou        Objective   Physical Exam  Vitals reviewed.   Constitutional:       General: She is not in acute distress.     Appearance: She is well-developed.   HENT:      Head: Normocephalic and atraumatic.      Nose: Nose normal.   Eyes:      Conjunctiva/sclera: Conjunctivae normal.   Neck:      Thyroid: No thyromegaly.      Trachea: No tracheal deviation.   Cardiovascular:      Rate and Rhythm: Normal rate and regular rhythm.      Heart sounds: Normal heart sounds. No murmur heard.     Pulmonary:      Effort: Pulmonary effort is normal. No respiratory distress.      Breath sounds: Normal breath sounds. No wheezing or rales.   Chest:      Chest wall: No tenderness.       Abdominal:      General: There is no distension.      Palpations: Abdomen is soft.      Tenderness: There is no abdominal tenderness. There is no guarding or rebound.      Hernia: No hernia is present.   Musculoskeletal:         General: No tenderness or deformity.      Cervical back: Normal range of motion.   Skin:     General: Skin is warm and dry.      Findings: No rash.    Neurological:      Mental Status: She is alert and oriented to person, place, and time.   Psychiatric:         Behavior: Behavior normal.         Thought Content: Thought content normal.         Judgment: Judgment normal.         Assessment/Plan Recommend bilateral ultrasound mammotome biopsies and leave a clip.  I fully discussed the procedures as well as alternatives risk benefits patient clearly understands and wishes to proceed.  She will plan on holding her Eliquis for 24 hours prior to the procedure.      There were no encounter diagnoses.                     This document has been electronically signed by Farzana Hicks CSA on September 27, 2021 14:08 CDT

## 2021-09-27 NOTE — PATIENT INSTRUCTIONS
"BMI for Adults  What is BMI?  Body mass index (BMI) is a number that is calculated from a person's weight and height. BMI can help estimate how much of a person's weight is composed of fat. BMI does not measure body fat directly. Rather, it is an alternative to procedures that directly measure body fat, which can be difficult and expensive.  BMI can help identify people who may be at higher risk for certain medical problems.  What are BMI measurements used for?  BMI is used as a screening tool to identify possible weight problems. It helps determine whether a person is obese, overweight, a healthy weight, or underweight.  BMI is useful for:  · Identifying a weight problem that may be related to a medical condition or may increase the risk for medical problems.  · Promoting changes, such as changes in diet and exercise, to help reach a healthy weight. BMI screening can be repeated to see if these changes are working.  How is BMI calculated?  BMI involves measuring your weight in relation to your height. Both height and weight are measured, and the BMI is calculated from those numbers. This can be done either in English (U[place] or metric measurements. Note that charts and online BMI calculators are available to help you find your BMI quickly and easily without having to do these calculations yourself.  To calculate your BMI in English ([place] measurements:    1. Measure your weight in pounds (lb).  2. Multiply the number of pounds by 703.  ? For example, for a person who weighs 180 lb, multiply that number by 703, which equals 126,540.  3. Measure your height in inches. Then multiply that number by itself to get a measurement called \"inches squared.\"  ? For example, for a person who is 70 inches tall, the \"inches squared\" measurement is 70 inches x 70 inches, which equals 4,900 inches squared.  4. Divide the total from step 2 (number of lb x 703) by the total from step 3 (inches squared): 126,540 ÷ 4,900 = 25.8. This is " "your BMI.    To calculate your BMI in metric measurements:  1. Measure your weight in kilograms (kg).  2. Measure your height in meters (m). Then multiply that number by itself to get a measurement called \"meters squared.\"  ? For example, for a person who is 1.75 m tall, the \"meters squared\" measurement is 1.75 m x 1.75 m, which is equal to 3.1 meters squared.  3. Divide the number of kilograms (your weight) by the meters squared number. In this example: 70 ÷ 3.1 = 22.6. This is your BMI.  What do the results mean?  BMI charts are used to identify whether you are underweight, normal weight, overweight, or obese. The following guidelines will be used:  · Underweight: BMI less than 18.5.  · Normal weight: BMI between 18.5 and 24.9.  · Overweight: BMI between 25 and 29.9.  · Obese: BMI of 30 or above.  Keep these notes in mind:  · Weight includes both fat and muscle, so someone with a muscular build, such as an athlete, may have a BMI that is higher than 24.9. In cases like these, BMI is not an accurate measure of body fat.  · To determine if excess body fat is the cause of a BMI of 25 or higher, further assessments may need to be done by a health care provider.  · BMI is usually interpreted in the same way for men and women.  Where to find more information  For more information about BMI, including tools to quickly calculate your BMI, go to these websites:  · Centers for Disease Control and Prevention: www.cdc.gov  · American Heart Association: www.heart.org  · National Heart, Lung, and Blood [place]: www.nhlbi.nih.gov  Summary  · Body mass index (BMI) is a number that is calculated from a person's weight and height.  · BMI may help estimate how much of a person's weight is composed of fat. BMI can help identify those who may be at higher risk for certain medical problems.  · BMI can be measured using English measurements or metric measurements.  · BMI charts are used to identify whether you are underweight, normal " weight, overweight, or obese.  This information is not intended to replace advice given to you by your health care provider. Make sure you discuss any questions you have with your health care provider.  Document Revised: 09/09/2020 Document Reviewed: 07/17/2020  Elsevier Patient Education © 2021 [employer]  For more information:    Quit Now [place]  1-800-QUIT-NOW  https://[place].quitlogix.org/en-[place]/  Steps to Quit Smoking  Smoking tobacco can be harmful to your health and can affect almost every organ in your body. Smoking puts you, and those around you, at risk for developing many serious chronic diseases. Quitting smoking is difficult, but it is one of the best things that you can do for your health. It is never too late to quit.  What are the benefits of quitting smoking?  When you quit smoking, you lower your risk of developing serious diseases and conditions, such as:  · Lung cancer or lung disease, such as COPD.  · Heart disease.  · Stroke.  · Heart attack.  · Infertility.  · Osteoporosis and bone fractures.  Additionally, symptoms such as coughing, wheezing, and shortness of breath may get better when you quit. You may also find that you get sick less often because your body is stronger at fighting off colds and infections. If you are pregnant, quitting smoking can help to reduce your chances of having a baby of low birth weight.  How do I get ready to quit?  When you decide to quit smoking, create a plan to make sure that you are successful. Before you quit:  · Pick a date to quit. Set a date within the next two weeks to give you time to prepare.  · Write down the reasons why you are quitting. Keep this list in places where you will see it often, such as on your bathroom mirror or in your car or wallet.  · Identify the people, places, things, and activities that make you want to smoke (triggers) and avoid them. Make sure to take these actions:  ¨ Throw away all cigarettes at home, at work, and in your  car.  ¨ Throw away smoking accessories, such as ashtrays and lighters.  ¨ Clean your car and make sure to empty the ashtray.  ¨ Clean your home, including curtains and carpets.  · Tell your family, friends, and coworkers that you are quitting. Support from your loved ones can make quitting easier.  · Talk with your health care provider about your options for quitting smoking.  · Find out what treatment options are covered by your health insurance.  What strategies can I use to quit smoking?  Talk with your healthcare provider about different strategies to quit smoking. Some strategies include:  · Quitting smoking altogether instead of gradually lessening how much you smoke over a period of time. Research shows that quitting “cold turkey” is more successful than gradually quitting.  · Attending in-person counseling to help you build problem-solving skills. You are more likely to have success in quitting if you attend several counseling sessions. Even short sessions of 10 minutes can be effective.  · Finding resources and support systems that can help you to quit smoking and remain smoke-free after you quit. These resources are most helpful when you use them often. They can include:  ¨ Online chats with a counselor.  ¨ Telephone quitlines.  ¨ Printed self-help materials.  ¨ Support groups or group counseling.  ¨ Text messaging programs.  ¨ Mobile phone applications.  · Taking medicines to help you quit smoking. (If you are pregnant or breastfeeding, talk with your health care provider first.) Some medicines contain nicotine and some do not. Both types of medicines help with cravings, but the medicines that include nicotine help to relieve withdrawal symptoms. Your health care provider may recommend:  ¨ Nicotine patches, gum, or lozenges.  ¨ Nicotine inhalers or sprays.  ¨ Non-nicotine medicine that is taken by mouth.  Talk with your health care provider about combining strategies, such as taking medicines while you  are also receiving in-person counseling. Using these two strategies together makes you more likely to succeed in quitting than if you used either strategy on its own.  If you are pregnant or breastfeeding, talk with your health care provider about finding counseling or other support strategies to quit smoking. Do not take medicine to help you quit smoking unless told to do so by your health care provider.  What things can I do to make it easier to quit?  Quitting smoking might feel overwhelming at first, but there is a lot that you can do to make it easier. Take these important actions:  · Reach out to your family and friends and ask that they support and encourage you during this time. Call telephone quitlines, reach out to support groups, or work with a counselor for support.  · Ask people who smoke to avoid smoking around you.  · Avoid places that trigger you to smoke, such as bars, parties, or smoke-break areas at work.  · Spend time around people who do not smoke.  · Lessen stress in your life, because stress can be a smoking trigger for some people. To lessen stress, try:  ¨ Exercising regularly.  ¨ Deep-breathing exercises.  ¨ Yoga.  ¨ Meditating.  ¨ Performing a body scan. This involves closing your eyes, scanning your body from head to toe, and noticing which parts of your body are particularly tense. Purposefully relax the muscles in those areas.  · Download or purchase mobile phone or tablet apps (applications) that can help you stick to your quit plan by providing reminders, tips, and encouragement. There are many free apps, such as QuitGuide from the CDC (Centers for Disease Control and Prevention). You can find other support for quitting smoking (smoking cessation) through smokefree.gov and other websites.  How will I feel when I quit smoking?  Within the first 24 hours of quitting smoking, you may start to feel some withdrawal symptoms. These symptoms are usually most noticeable 2-3 days after  quitting, but they usually do not last beyond 2-3 weeks. Changes or symptoms that you might experience include:  · Mood swings.  · Restlessness, anxiety, or irritation.  · Difficulty concentrating.  · Dizziness.  · Strong cravings for sugary foods in addition to nicotine.  · Mild weight gain.  · Constipation.  · Nausea.  · Coughing or a sore throat.  · Changes in how your medicines work in your body.  · A depressed mood.  · Difficulty sleeping (insomnia).  After the first 2-3 weeks of quitting, you may start to notice more positive results, such as:  · Improved sense of smell and taste.  · Decreased coughing and sore throat.  · Slower heart rate.  · Lower blood pressure.  · Clearer skin.  · The ability to breathe more easily.  · Fewer sick days.  Quitting smoking is very challenging for most people. Do not get discouraged if you are not successful the first time. Some people need to make many attempts to quit before they achieve long-term success. Do your best to stick to your quit plan, and talk with your health care provider if you have any questions or concerns.  This information is not intended to replace advice given to you by your health care provider. Make sure you discuss any questions you have with your health care provider.  Document Released: 12/12/2002 Document Revised: 08/15/2017 Document Reviewed: 05/03/2016  Elsevier Interactive Patient Education © 2017 [employer] Inc.

## 2021-09-28 ENCOUNTER — TELEPHONE (OUTPATIENT)
Dept: FAMILY MEDICINE CLINIC | Facility: CLINIC | Age: 31
End: 2021-09-28

## 2021-09-28 RX ORDER — CLONAZEPAM 1 MG/1
TABLET ORAL
Qty: 60 TABLET | Refills: 0 | Status: SHIPPED | OUTPATIENT
Start: 2021-09-28 | End: 2021-10-26 | Stop reason: SDUPTHER

## 2021-09-28 NOTE — TELEPHONE ENCOUNTER
Patient seen in office every 3 months for chronic anxiety requiring benzodiazepine anxiolytic. Compliant with medication, visits with no adverse effects noted. RORY and UDS current and appropriate. Patient called requesting scheduled refill at appropriate interval. Patient understands the risks associated with this controlled medication, including tolerance and addiction.  Patient also agrees to only obtain this medication from me, and not from a another provider, unless that provider is covering for me in my absence.  Patient also agrees to be compliant in dosing, and not self adjust the dose of medication.  A signed controlled substance agreement is on file, and the patient has received a controlled substance education sheet at this a previous visit.  The patient has also signed a consent for treatment with a controlled substance as per [facility] policy. RORY was obtained. Refill sent for clonazepam.       This document has been electronically signed by HITESH Parra on September 28, 2021 18:12 CDT

## 2021-09-29 ENCOUNTER — LAB (OUTPATIENT)
Dept: LAB | Facility: HOSPITAL | Age: 31
End: 2021-09-29

## 2021-09-29 ENCOUNTER — OFFICE VISIT (OUTPATIENT)
Dept: FAMILY MEDICINE CLINIC | Facility: CLINIC | Age: 31
End: 2021-09-29

## 2021-09-29 VITALS
SYSTOLIC BLOOD PRESSURE: 140 MMHG | HEART RATE: 96 BPM | TEMPERATURE: 97.7 F | DIASTOLIC BLOOD PRESSURE: 82 MMHG | OXYGEN SATURATION: 95 %

## 2021-09-29 DIAGNOSIS — R05.9 COUGH: ICD-10-CM

## 2021-09-29 DIAGNOSIS — R53.81 MALAISE: ICD-10-CM

## 2021-09-29 DIAGNOSIS — J20.9 ACUTE BRONCHITIS, UNSPECIFIED ORGANISM: Primary | ICD-10-CM

## 2021-09-29 LAB — SARS-COV-2 N GENE RESP QL NAA+PROBE: DETECTED

## 2021-09-29 PROCEDURE — 99213 OFFICE O/P EST LOW 20 MIN: CPT | Performed by: FAMILY MEDICINE

## 2021-09-29 PROCEDURE — 87635 SARS-COV-2 COVID-19 AMP PRB: CPT | Performed by: FAMILY MEDICINE

## 2021-09-29 PROCEDURE — 96372 THER/PROPH/DIAG INJ SC/IM: CPT | Performed by: FAMILY MEDICINE

## 2021-09-29 RX ORDER — GUAIFENESIN/DEXTROMETHORPHAN 100-10MG/5
10 SYRUP ORAL 4 TIMES DAILY PRN
Qty: 240 ML | Refills: 1 | Status: SHIPPED | OUTPATIENT
Start: 2021-09-29 | End: 2021-10-05 | Stop reason: SDUPTHER

## 2021-09-29 RX ORDER — BENZONATATE 100 MG/1
100 CAPSULE ORAL 3 TIMES DAILY PRN
Qty: 60 CAPSULE | Refills: 0 | Status: SHIPPED | OUTPATIENT
Start: 2021-09-29 | End: 2021-11-04

## 2021-09-29 RX ORDER — TRIAMCINOLONE ACETONIDE 40 MG/ML
80 INJECTION, SUSPENSION INTRA-ARTICULAR; INTRAMUSCULAR ONCE
Status: COMPLETED | OUTPATIENT
Start: 2021-09-29 | End: 2021-09-29

## 2021-09-29 RX ADMIN — TRIAMCINOLONE ACETONIDE 80 MG: 40 INJECTION, SUSPENSION INTRA-ARTICULAR; INTRAMUSCULAR at 12:28

## 2021-09-29 NOTE — PROGRESS NOTES
Subjective   Cally Olsen is a 31 y.o. female.     Chief Complaint   Patient presents with   • RIB PAIN   • Cough   • Back Pain             History of Present Illness     2-3 days cough and rib pain with breathing.  Not a sharp pain.   Has new inhaler, hasn't started using it  Smokes  Has lupus  History of several strokes.   No direct exposure to covid    Review of Systems   Constitutional: Negative for chills, fatigue and fever.   HENT: Negative for congestion, ear discharge, ear pain, facial swelling, hearing loss, postnasal drip, rhinorrhea, sinus pressure, sore throat, trouble swallowing and voice change.    Eyes: Negative for discharge, redness and visual disturbance.   Respiratory: Positive for cough. Negative for chest tightness, shortness of breath and wheezing.    Cardiovascular: Negative for chest pain and palpitations.   Gastrointestinal: Negative for abdominal pain, blood in stool, constipation, diarrhea, nausea and vomiting.   Endocrine: Negative for polydipsia and polyuria.   Genitourinary: Negative for dysuria, flank pain, hematuria and urgency.   Musculoskeletal: Negative for arthralgias, back pain, joint swelling and myalgias.   Skin: Negative for rash.   Neurological: Negative for dizziness, weakness, numbness and headaches.   Hematological: Negative for adenopathy.   Psychiatric/Behavioral: Negative for confusion and sleep disturbance. The patient is not nervous/anxious.            /82 (BP Location: Right arm, Patient Position: Sitting, Cuff Size: Adult)   Pulse 96   Temp 97.7 °F (36.5 °C) (Temporal)   SpO2 95%       Objective     Physical Exam  Vitals and nursing note reviewed.   Constitutional:       Appearance: Normal appearance. She is well-developed.   HENT:      Head: Normocephalic and atraumatic.      Right Ear: External ear normal.      Left Ear: External ear normal.      Nose: Nose normal. No rhinorrhea.   Eyes:      General: No scleral icterus.     Extraocular Movements:  Extraocular movements intact.      Conjunctiva/sclera: Conjunctivae normal.      Pupils: Pupils are equal, round, and reactive to light.   Cardiovascular:      Rate and Rhythm: Normal rate and regular rhythm.      Heart sounds: Normal heart sounds. No friction rub. No gallop.    Pulmonary:      Effort: Pulmonary effort is normal.      Comments: Diminished but clear, prolong exp phase  Abdominal:      General: Bowel sounds are normal. There is no distension.      Palpations: Abdomen is soft.      Tenderness: There is no abdominal tenderness.   Musculoskeletal:         General: No deformity. Normal range of motion.      Cervical back: Normal range of motion and neck supple.   Skin:     General: Skin is warm and dry.      Findings: No erythema or rash.   Neurological:      Mental Status: She is alert and oriented to person, place, and time.      Cranial Nerves: No cranial nerve deficit.   Psychiatric:         Behavior: Behavior normal.         Thought Content: Thought content normal.         Judgment: Judgment normal.             PAST MEDICAL HISTORY     Past Medical History:   Diagnosis Date   • Abnormal Pap smear of cervix    • Acute pharyngitis     unspecified     • Adjustment disorder with anxious mood     Celexa     • Allergic rhinitis    • Asthma    • Backache    • Candidiasis    • Cervical intraepithelial neoplasia grade 1    • Contraception     Desires permanent sterilization    • Depressive disorder    • Dysphagia     unspecified     • Encounter for gynecological examination without abnormal finding    • Encounter for  visit      visit status    • Excessive and frequent menstruation with irregular cycle    • Excessive and frequent menstruation with regular cycle    • Fatigue    • Generalized abdominal pain    • Generalized anxiety disorder    • Headache    • Heartburn    • Hip pain    • History of CVA (cerebrovascular accident) 2021   • Hypertension    • Insomnia      Trazodone   • Low  grade squamous intraepithelial lesion (LGSIL) on cervicovaginal cytologic smear    • Malaise    • Organic anxiety disorder    • Ovarian cancer (CMS/HCC)    • Spasm of back muscles    • Stroke (CMS/HCC)    • Surgical follow-up care    • Tobacco dependence syndrome    • Urinary tract infection       PAST SURGICAL HISTORY     Past Surgical History:   Procedure Laterality Date   •  SECTION  2015    Repeat   • ENDOMETRIAL ABLATION     • INJECTION OF MEDICATION  2015    Kenalog (1)  -  SEFERINO Perla   • LEEP     • OTHER SURGICAL HISTORY  2015    Laparoscopy; tubal cautery (1)  - Exam under anesthesia and laparoscopic tubal fulguration.   • TUBAL ABDOMINAL LIGATION        SOCIAL HISTORY     Social History     Socioeconomic History   • Marital status:      Spouse name: Not on file   • Number of children: Not on file   • Years of education: Not on file   • Highest education level: Not on file   Tobacco Use   • Smoking status: Current Some Day Smoker     Packs/day: 1.50     Years: 17.00     Pack years: 25.50     Types: Cigarettes     Start date:    • Smokeless tobacco: Never Used   Substance and Sexual Activity   • Alcohol use: No   • Drug use: No   • Sexual activity: Not Currently     Partners: Male     Birth control/protection: Tubal ligation      ALLERGIES   Amoxil [amoxicillin]; Latex, natural rubber; Hydroxyzine hcl; Latex; Nickel; Paroxetine hcl; Paxil [paroxetine]; and Vistaril [hydroxyzine]   MEDICATIONS     Current Outpatient Medications   Medication Sig Dispense Refill   • albuterol sulfate  (90 Base) MCG/ACT inhaler INHALE 2 PUFFS BY MOUTH EVERY 6 HOURS AS NEEDED FOR WHEEZING OR SHORTNESS OF BREATH 54 g 2   • amitriptyline (ELAVIL) 50 MG tablet Take 1 tablet by mouth Every Night. 30 tablet 5   • atorvastatin (LIPITOR) 40 MG tablet Take 1 tablet by mouth Daily. 7 tablet 0   • cetirizine (zyrTEC) 10 MG tablet Take 1 tablet by mouth Daily. 30 tablet 11   • clonazePAM (KlonoPIN) 1  MG tablet TAKE 1 TABLET BY MOUTH TWICE DAILY AS NEEDED FOR ANXIETY 60 tablet 0   • dilTIAZem CD (Cardizem CD) 120 MG 24 hr capsule Take 1 capsule by mouth Daily. For High blood pressure in addition to the lisinopril 60 capsule 0   • DULoxetine (CYMBALTA) 30 MG capsule Take 30 mg by mouth Daily.     • Eliquis 5 MG tablet tablet      • fluconazole (DIFLUCAN) 150 MG tablet Take 1 tablet PO on day 3 and day 7 of antibiotic 2 tablet 0   • gabapentin (NEURONTIN) 400 MG capsule Take 1 capsule by mouth 4 (Four) Times a Day. 120 capsule 2   • guaiFENesin-dextromethorphan (ROBITUSSIN DM) 100-10 MG/5ML syrup Take 10 mL by mouth 4 (Four) Times a Day As Needed for Cough. 240 mL 1   • HYDROcodone-acetaminophen (NORCO)  MG per tablet TK 1 TABLETY BY MOUTH FOUR TIMES DAILY AS NEEDED FOR PAIN  0   • lisinopril (PRINIVIL,ZESTRIL) 10 MG tablet      • ondansetron (ZOFRAN) 4 MG tablet Take 1 tablet by mouth Every 8 (Eight) Hours As Needed for Nausea or Vomiting. 30 tablet 5   • rOPINIRole (REQUIP) 1 MG tablet Take 1 tablet by mouth 2 (Two) Times a Day. Take 1 hour before bedtime. 180 tablet 1   • benzonatate (Tessalon Perles) 100 MG capsule Take 1 capsule by mouth 3 (Three) Times a Day As Needed for Cough. 60 capsule 0   • guaiFENesin-dextromethorphan (ROBITUSSIN DM) 100-10 MG/5ML syrup Take 10 mL by mouth 4 (Four) Times a Day As Needed for Cough. 240 mL 1     No current facility-administered medications for this visit.        The following portions of the patient's history were reviewed and updated as appropriate: allergies, current medications, past family history, past medical history, past social history, past surgical history and problem list.        Assessment/Plan   Diagnoses and all orders for this visit:    1. Acute bronchitis, unspecified organism (Primary)    2. Cough  -     COVID-19, [facility] IN-HOUSE, NP SWAB IN TRANSPORT MEDIA 8-10 HR TAT - Swab, Oropharynx  -     XR Chest PA & Lateral (In Office)  -      triamcinolone acetonide (KENALOG-40) injection 80 mg    3. Malaise  -     COVID-19, [facility] IN[facility]HOUSE, NP SWAB IN TRANSPORT MEDIA 8-10 HR TAT - Swab, Oropharynx    Other orders  -     benzonatate (Tessalon Perles) 100 MG capsule; Take 1 capsule by mouth 3 (Three) Times a Day As Needed for Cough.  Dispense: 60 capsule; Refill: 0  -     guaiFENesin-dextromethorphan (ROBITUSSIN DM) 100-10 MG/5ML syrup; Take 10 mL by mouth 4 (Four) Times a Day As Needed for Cough.  Dispense: 240 mL; Refill: 1        Chest xray looks ok.                No follow-ups on file.                  This document has been electronically signed by Quincy Perla MD on September 29, 2021 12:41 CDT

## 2021-09-29 NOTE — PATIENT INSTRUCTIONS
"BMI for Adults  What is BMI?  Body mass index (BMI) is a number that is calculated from a person's weight and height. BMI can help estimate how much of a person's weight is composed of fat. BMI does not measure body fat directly. Rather, it is an alternative to procedures that directly measure body fat, which can be difficult and expensive.  BMI can help identify people who may be at higher risk for certain medical problems.  What are BMI measurements used for?  BMI is used as a screening tool to identify possible weight problems. It helps determine whether a person is obese, overweight, a healthy weight, or underweight.  BMI is useful for:  · Identifying a weight problem that may be related to a medical condition or may increase the risk for medical problems.  · Promoting changes, such as changes in diet and exercise, to help reach a healthy weight. BMI screening can be repeated to see if these changes are working.  How is BMI calculated?  BMI involves measuring your weight in relation to your height. Both height and weight are measured, and the BMI is calculated from those numbers. This can be done either in English (U[place] or metric measurements. Note that charts and online BMI calculators are available to help you find your BMI quickly and easily without having to do these calculations yourself.  To calculate your BMI in English ([place] measurements:    1. Measure your weight in pounds (lb).  2. Multiply the number of pounds by 703.  ? For example, for a person who weighs 180 lb, multiply that number by 703, which equals 126,540.  3. Measure your height in inches. Then multiply that number by itself to get a measurement called \"inches squared.\"  ? For example, for a person who is 70 inches tall, the \"inches squared\" measurement is 70 inches x 70 inches, which equals 4,900 inches squared.  4. Divide the total from step 2 (number of lb x 703) by the total from step 3 (inches squared): 126,540 ÷ 4,900 = 25.8. This is " "your BMI.    To calculate your BMI in metric measurements:  1. Measure your weight in kilograms (kg).  2. Measure your height in meters (m). Then multiply that number by itself to get a measurement called \"meters squared.\"  ? For example, for a person who is 1.75 m tall, the \"meters squared\" measurement is 1.75 m x 1.75 m, which is equal to 3.1 meters squared.  3. Divide the number of kilograms (your weight) by the meters squared number. In this example: 70 ÷ 3.1 = 22.6. This is your BMI.  What do the results mean?  BMI charts are used to identify whether you are underweight, normal weight, overweight, or obese. The following guidelines will be used:  · Underweight: BMI less than 18.5.  · Normal weight: BMI between 18.5 and 24.9.  · Overweight: BMI between 25 and 29.9.  · Obese: BMI of 30 or above.  Keep these notes in mind:  · Weight includes both fat and muscle, so someone with a muscular build, such as an athlete, may have a BMI that is higher than 24.9. In cases like these, BMI is not an accurate measure of body fat.  · To determine if excess body fat is the cause of a BMI of 25 or higher, further assessments may need to be done by a health care provider.  · BMI is usually interpreted in the same way for men and women.  Where to find more information  For more information about BMI, including tools to quickly calculate your BMI, go to these websites:  · Centers for Disease Control and Prevention: www.cdc.gov  · American Heart Association: www.heart.org  · National Heart, Lung, and Blood [place]: www.nhlbi.nih.gov  Summary  · Body mass index (BMI) is a number that is calculated from a person's weight and height.  · BMI may help estimate how much of a person's weight is composed of fat. BMI can help identify those who may be at higher risk for certain medical problems.  · BMI can be measured using English measurements or metric measurements.  · BMI charts are used to identify whether you are underweight, normal " weight, overweight, or obese.  This information is not intended to replace advice given to you by your health care provider. Make sure you discuss any questions you have with your health care provider.  Document Revised: 09/09/2020 Document Reviewed: 07/17/2020  Elsevier Patient Education © 2021 [employer]

## 2021-09-30 ENCOUNTER — TELEPHONE (OUTPATIENT)
Dept: FAMILY MEDICINE CLINIC | Facility: CLINIC | Age: 31
End: 2021-09-30

## 2021-09-30 RX ORDER — AZITHROMYCIN 250 MG/1
TABLET, FILM COATED ORAL
Qty: 6 TABLET | Refills: 0 | Status: SHIPPED | OUTPATIENT
Start: 2021-09-30 | End: 2021-11-04

## 2021-10-01 ENCOUNTER — HOSPITAL ENCOUNTER (EMERGENCY)
Facility: HOSPITAL | Age: 31
Discharge: HOME | End: 2021-10-01
Admitting: FAMILY MEDICINE

## 2021-10-01 VITALS
HEIGHT: 63 IN | DIASTOLIC BLOOD PRESSURE: 91 MMHG | RESPIRATION RATE: 18 BRPM | SYSTOLIC BLOOD PRESSURE: 134 MMHG | HEART RATE: 74 BPM | BODY MASS INDEX: 35.26 KG/M2 | WEIGHT: 199 LBS | OXYGEN SATURATION: 99 % | TEMPERATURE: 98.4 F

## 2021-10-01 PROCEDURE — 25010000006 INJECTION, CASIRIVIMAB AND IMDEVIMAB, 1200 MG: Performed by: FAMILY MEDICINE

## 2021-10-01 PROCEDURE — M0243 CASIRIVI AND IMDEVI INFUSION: HCPCS | Performed by: FAMILY MEDICINE

## 2021-10-01 PROCEDURE — 99202 OFFICE O/P NEW SF 15 MIN: CPT

## 2021-10-01 RX ORDER — METHYLPREDNISOLONE SODIUM SUCCINATE 125 MG/2ML
125 INJECTION, POWDER, LYOPHILIZED, FOR SOLUTION INTRAMUSCULAR; INTRAVENOUS ONCE AS NEEDED
Status: DISCONTINUED | OUTPATIENT
Start: 2021-10-01 | End: 2021-10-01 | Stop reason: HOSPADM

## 2021-10-01 RX ORDER — EPINEPHRINE 1 MG/ML
0.3 INJECTION, SOLUTION INTRAMUSCULAR; SUBCUTANEOUS ONCE AS NEEDED
Status: DISCONTINUED | OUTPATIENT
Start: 2021-10-01 | End: 2021-10-01 | Stop reason: HOSPADM

## 2021-10-01 RX ORDER — DIPHENHYDRAMINE HYDROCHLORIDE 50 MG/ML
50 INJECTION INTRAMUSCULAR; INTRAVENOUS ONCE AS NEEDED
Status: DISCONTINUED | OUTPATIENT
Start: 2021-10-01 | End: 2021-10-01 | Stop reason: HOSPADM

## 2021-10-01 RX ORDER — SODIUM CHLORIDE 9 MG/ML
30 INJECTION, SOLUTION INTRAVENOUS ONCE
Status: COMPLETED | OUTPATIENT
Start: 2021-10-01 | End: 2021-10-01

## 2021-10-01 RX ORDER — DIPHENHYDRAMINE HCL 50 MG
50 CAPSULE ORAL ONCE AS NEEDED
Status: DISCONTINUED | OUTPATIENT
Start: 2021-10-01 | End: 2021-10-01 | Stop reason: HOSPADM

## 2021-10-01 RX ADMIN — SODIUM CHLORIDE 30 ML: 9 INJECTION, SOLUTION INTRAVENOUS at 09:53

## 2021-10-01 RX ADMIN — CASIRIVIMAB AND IMDEVIMAB: 600; 600 INJECTION, SOLUTION, CONCENTRATE INTRAVENOUS at 09:31

## 2021-10-05 ENCOUNTER — APPOINTMENT (OUTPATIENT)
Dept: ULTRASOUND IMAGING | Facility: HOSPITAL | Age: 31
End: 2021-10-05

## 2021-10-05 ENCOUNTER — TELEMEDICINE (OUTPATIENT)
Dept: FAMILY MEDICINE CLINIC | Facility: CLINIC | Age: 31
End: 2021-10-05

## 2021-10-05 VITALS — HEIGHT: 63 IN | BODY MASS INDEX: 35.26 KG/M2 | WEIGHT: 199 LBS

## 2021-10-05 DIAGNOSIS — J06.9 ACUTE URI: ICD-10-CM

## 2021-10-05 DIAGNOSIS — R05.9 COUGH: ICD-10-CM

## 2021-10-05 DIAGNOSIS — J12.82 PNEUMONIA DUE TO COVID-19 VIRUS: Primary | ICD-10-CM

## 2021-10-05 DIAGNOSIS — R06.02 SOB (SHORTNESS OF BREATH): ICD-10-CM

## 2021-10-05 DIAGNOSIS — U07.1 PNEUMONIA DUE TO COVID-19 VIRUS: Primary | ICD-10-CM

## 2021-10-05 PROCEDURE — 99214 OFFICE O/P EST MOD 30 MIN: CPT | Performed by: NURSE PRACTITIONER

## 2021-10-05 RX ORDER — ALBUTEROL SULFATE 2.5 MG/.5ML
1 SOLUTION RESPIRATORY (INHALATION) 4 TIMES DAILY PRN
Qty: 50 ML | Refills: 5 | Status: SHIPPED | OUTPATIENT
Start: 2021-10-05 | End: 2021-10-08 | Stop reason: SDUPTHER

## 2021-10-05 RX ORDER — LEVOFLOXACIN 500 MG/1
500 TABLET, FILM COATED ORAL DAILY
Qty: 7 TABLET | Refills: 0 | Status: SHIPPED | OUTPATIENT
Start: 2021-10-05 | End: 2021-11-04

## 2021-10-05 RX ORDER — GUAIFENESIN AND CODEINE PHOSPHATE 100; 10 MG/5ML; MG/5ML
5 SOLUTION ORAL 4 TIMES DAILY PRN
Qty: 240 ML | Refills: 0 | Status: SHIPPED | OUTPATIENT
Start: 2021-10-05 | End: 2021-10-06 | Stop reason: SDUPTHER

## 2021-10-05 NOTE — PROGRESS NOTES
Subjective   Cally Olsen is a 31 y.o. female.   You have chosen to receive care through a telehealth visit.  Do you consent to use a video/audio connection for your medical care today? Yes  This was an audio and video enabled telemedicine encounter.        Chief Complaint   Patient presents with   • Covid-19 Home Monitoring Video Visit     had infusion 10/1/21        History of Present Illness   Symptoms began with shortness of breath, chest congestion and cough 21.   Tested positive for Covid 19 on 21.  Had azithromycin and steroids beginning 21 from Dr Perla for worsening URI symptoms.   Felt a little better at first but began getting more SOB again and with chest congestion, still nasally congested. Wheezing. Feels awful. Reports more than anything just needs cough medicine. Something stronger than the robitussin plain she had before.   She does not have a nebulizer in the home.   No diarrhea or abd pain now.. reports daily nausea no vomiting.     Nj to eat and drink but appetite less.    No other complaints today.     Past Surgical History:   Procedure Laterality Date   •  SECTION  2015    Repeat   • ENDOMETRIAL ABLATION     • INJECTION OF MEDICATION  2015    Kenalog (1)  -  SEFERINO Perla   • RAYA     • OTHER SURGICAL HISTORY  2015    Laparoscopy; tubal cautery (1)  - Exam under anesthesia and laparoscopic tubal fulguration.   • TUBAL ABDOMINAL LIGATION        Social History     Socioeconomic History   • Marital status:      Spouse name: Not on file   • Number of children: Not on file   • Years of education: Not on file   • Highest education level: Not on file   Tobacco Use   • Smoking status: Current Some Day Smoker     Packs/day: 1.50     Years: 17.00     Pack years: 25.50     Types: Cigarettes     Start date:    • Smokeless tobacco: Never Used   Substance and Sexual Activity   • Alcohol use: No   • Drug use: No   • Sexual activity: Not Currently      Partners: Male     Birth control/protection: Tubal ligation      The following portions of the patient's history were reviewed and updated as appropriate: allergies, current medications, past family history, past medical history, past social history, past surgical history and problem list.    Review of Systems   Constitutional: Positive for fever. Negative for chills and fatigue.   HENT: Negative.  Negative for congestion.    Eyes: Negative.  Negative for blurred vision, double vision, photophobia and visual disturbance.   Respiratory: Positive for cough and shortness of breath. Negative for wheezing and stridor.    Cardiovascular: Negative.  Negative for chest pain, palpitations and leg swelling.   Gastrointestinal: Negative.  Negative for abdominal distention, abdominal pain, blood in stool, constipation, diarrhea, nausea, vomiting, GERD and indigestion.   Endocrine: Negative.  Negative for cold intolerance and heat intolerance.   Genitourinary: Negative.  Negative for dysuria, flank pain, frequency and urinary incontinence.   Musculoskeletal: Positive for myalgias. Negative for arthralgias and back pain.   Skin: Negative.    Allergic/Immunologic: Negative.  Negative for immunocompromised state.   Neurological: Negative.    Hematological: Negative.    Psychiatric/Behavioral: Negative.  Negative for agitation, behavioral problems, decreased concentration, dysphoric mood, hallucinations, self-injury, sleep disturbance, suicidal ideas, negative for hyperactivity, depressed mood and stress. The patient is not nervous/anxious.    All other systems reviewed and are negative.    PHQ-9 Depression Screening  Little interest or pleasure in doing things?     Feeling down, depressed, or hopeless?     Trouble falling or staying asleep, or sleeping too much?     Feeling tired or having little energy?     Poor appetite or overeating?     Feeling bad about yourself - or that you are a failure or have let yourself or your family  "down?     Trouble concentrating on things, such as reading the newspaper or watching television?     Moving or speaking so slowly that other people could have noticed? Or the opposite - being so fidgety or restless that you have been moving around a lot more than usual?     Thoughts that you would be better off dead, or of hurting yourself in some way?     PHQ-9 Total Score     If you checked off any problems, how difficult have these problems made it for you to do your work, take care of things at home, or get along with other people?        Objective    Vitals:    10/05/21 1204   Weight: 90.3 kg (199 lb)   Height: 160 cm (62.99\")       Physical Exam  Vitals and nursing note reviewed.   Constitutional:       General: She is not in acute distress.     Appearance: She is well-developed. She is not diaphoretic.   HENT:      Head: Normocephalic and atraumatic.   Eyes:      General: No scleral icterus.        Right eye: No discharge.         Left eye: No discharge.      Conjunctiva/sclera: Conjunctivae normal.      Pupils: Pupils are equal, round, and reactive to light.   Neck:      Trachea: No tracheal deviation.   Pulmonary:      Effort: Pulmonary effort is normal. No respiratory distress.      Breath sounds: No stridor. No wheezing.   Musculoskeletal:         General: No tenderness or deformity. Normal range of motion.      Cervical back: Normal range of motion and neck supple.   Skin:     General: Skin is dry.      Coloration: Skin is not pale.      Findings: No erythema or rash.   Neurological:      Mental Status: She is alert and oriented to person, place, and time.      Cranial Nerves: No cranial nerve deficit.   Psychiatric:         Behavior: Behavior normal.         Thought Content: Thought content normal.           Assessment/Plan   Diagnoses and all orders for this visit:    1. Pneumonia due to COVID-19 virus (Primary)  -     Home Nebulizer  -     Albuterol Sulfate 2.5 MG/0.5ML nebulizer solution; Inhale 2.5 " mg 4 (Four) Times a Day As Needed (shortness of breath, wheezing.).  Dispense: 50 mL; Refill: 5  -     guaiFENesin-codeine (GUAIFENESIN AC) 100-10 MG/5ML liquid; Take 5 mL by mouth 4 (Four) Times a Day As Needed for Cough. Insurance will not pay, patient will pay cash  Dispense: 240 mL; Refill: 0  -     levoFLOXacin (Levaquin) 500 MG tablet; Take 1 tablet by mouth Daily.  Dispense: 7 tablet; Refill: 0    2. SOB (shortness of breath)  -     Home Nebulizer  -     Albuterol Sulfate 2.5 MG/0.5ML nebulizer solution; Inhale 2.5 mg 4 (Four) Times a Day As Needed (shortness of breath, wheezing.).  Dispense: 50 mL; Refill: 5  -     guaiFENesin-codeine (GUAIFENESIN AC) 100-10 MG/5ML liquid; Take 5 mL by mouth 4 (Four) Times a Day As Needed for Cough. Insurance will not pay, patient will pay cash  Dispense: 240 mL; Refill: 0  -     levoFLOXacin (Levaquin) 500 MG tablet; Take 1 tablet by mouth Daily.  Dispense: 7 tablet; Refill: 0    3. Cough  -     Home Nebulizer  -     Albuterol Sulfate 2.5 MG/0.5ML nebulizer solution; Inhale 2.5 mg 4 (Four) Times a Day As Needed (shortness of breath, wheezing.).  Dispense: 50 mL; Refill: 5  -     guaiFENesin-codeine (GUAIFENESIN AC) 100-10 MG/5ML liquid; Take 5 mL by mouth 4 (Four) Times a Day As Needed for Cough. Insurance will not pay, patient will pay cash  Dispense: 240 mL; Refill: 0  -     levoFLOXacin (Levaquin) 500 MG tablet; Take 1 tablet by mouth Daily.  Dispense: 7 tablet; Refill: 0    4. Acute URI  -     Home Nebulizer  -     Albuterol Sulfate 2.5 MG/0.5ML nebulizer solution; Inhale 2.5 mg 4 (Four) Times a Day As Needed (shortness of breath, wheezing.).  Dispense: 50 mL; Refill: 5  -     guaiFENesin-codeine (GUAIFENESIN AC) 100-10 MG/5ML liquid; Take 5 mL by mouth 4 (Four) Times a Day As Needed for Cough. Insurance will not pay, patient will pay cash  Dispense: 240 mL; Refill: 0  -     levoFLOXacin (Levaquin) 500 MG tablet; Take 1 tablet by mouth Daily.  Dispense: 7 tablet;  Refill: 0    Return in about 3 days (around 10/8/2021).                 This document has been electronically signed by HITESH Parra on October 5, 2021 17:54 CDT

## 2021-10-06 ENCOUNTER — TELEPHONE (OUTPATIENT)
Dept: FAMILY MEDICINE CLINIC | Facility: CLINIC | Age: 31
End: 2021-10-06

## 2021-10-06 DIAGNOSIS — R06.02 SOB (SHORTNESS OF BREATH): ICD-10-CM

## 2021-10-06 DIAGNOSIS — J06.9 ACUTE URI: ICD-10-CM

## 2021-10-06 DIAGNOSIS — U07.1 PNEUMONIA DUE TO COVID-19 VIRUS: ICD-10-CM

## 2021-10-06 DIAGNOSIS — R05.9 COUGH: ICD-10-CM

## 2021-10-06 DIAGNOSIS — J12.82 PNEUMONIA DUE TO COVID-19 VIRUS: ICD-10-CM

## 2021-10-06 RX ORDER — GUAIFENESIN/DEXTROMETHORPHAN 100-10MG/5
10 SYRUP ORAL 4 TIMES DAILY PRN
Qty: 240 ML | Refills: 1 | Status: SHIPPED | OUTPATIENT
Start: 2021-10-06 | End: 2021-10-06

## 2021-10-06 RX ORDER — GUAIFENESIN AND CODEINE PHOSPHATE 100; 10 MG/5ML; MG/5ML
5 SOLUTION ORAL 4 TIMES DAILY PRN
Qty: 240 ML | Refills: 0 | Status: SHIPPED | OUTPATIENT
Start: 2021-10-06 | End: 2021-11-04

## 2021-10-06 NOTE — TELEPHONE ENCOUNTER
SHE WANTS THE ROBITUSSIN DM SENT TO [facility].  SHE SAID SHE HAS SOME TESSALON PEARLS.

## 2021-10-06 NOTE — TELEPHONE ENCOUNTER
PATIENT SAW YOU LAST MONTH AND HAS COVID.  SHE HAD A TELEHEALTH VISIT WITH ANOTHER DOCTOR YESTERDAY AND WAS PRESCRIBED GUAIFENESIN AC.  SHE STATES THE PHARMACY WAS OUT AND THE DOCTOR IS NOT IN TODAY.  SHE WANTS TO KNOW IF YOU CAN SEND THAT PRESCRIPTION TO [facility].

## 2021-10-08 ENCOUNTER — TELEMEDICINE (OUTPATIENT)
Dept: FAMILY MEDICINE CLINIC | Facility: CLINIC | Age: 31
End: 2021-10-08

## 2021-10-08 VITALS — BODY MASS INDEX: 35.26 KG/M2 | HEIGHT: 63 IN | WEIGHT: 199 LBS

## 2021-10-08 DIAGNOSIS — R11.0 NAUSEA: ICD-10-CM

## 2021-10-08 DIAGNOSIS — U07.1 PNEUMONIA DUE TO COVID-19 VIRUS: ICD-10-CM

## 2021-10-08 DIAGNOSIS — R05.9 COUGH: ICD-10-CM

## 2021-10-08 DIAGNOSIS — J12.82 PNEUMONIA DUE TO COVID-19 VIRUS: ICD-10-CM

## 2021-10-08 DIAGNOSIS — R06.02 SOB (SHORTNESS OF BREATH): ICD-10-CM

## 2021-10-08 DIAGNOSIS — J06.9 ACUTE URI: ICD-10-CM

## 2021-10-08 PROCEDURE — 99214 OFFICE O/P EST MOD 30 MIN: CPT | Performed by: NURSE PRACTITIONER

## 2021-10-08 RX ORDER — PREDNISONE 20 MG/1
TABLET ORAL
Qty: 30 TABLET | Refills: 0 | Status: SHIPPED | OUTPATIENT
Start: 2021-10-08 | End: 2021-10-19

## 2021-10-08 RX ORDER — ONDANSETRON 4 MG/1
4 TABLET, FILM COATED ORAL EVERY 8 HOURS PRN
Qty: 90 TABLET | Refills: 5 | Status: SHIPPED | OUTPATIENT
Start: 2021-10-08 | End: 2021-11-04 | Stop reason: SDUPTHER

## 2021-10-08 RX ORDER — ALBUTEROL SULFATE 2.5 MG/.5ML
1 SOLUTION RESPIRATORY (INHALATION) 4 TIMES DAILY PRN
Qty: 50 ML | Refills: 5 | Status: SHIPPED | OUTPATIENT
Start: 2021-10-08 | End: 2021-10-08 | Stop reason: SDUPTHER

## 2021-10-08 RX ORDER — ALBUTEROL SULFATE 2.5 MG/.5ML
1 SOLUTION RESPIRATORY (INHALATION) 4 TIMES DAILY PRN
Qty: 50 ML | Refills: 5 | Status: SHIPPED | OUTPATIENT
Start: 2021-10-08 | End: 2021-11-04 | Stop reason: SDUPTHER

## 2021-10-08 NOTE — PROGRESS NOTES
Subjective   Cally Olsen is a 31 y.o. female.   You have chosen to receive care through a telehealth visit.  Do you consent to use a video/audio connection for your medical care today? Yes  This was an audio and video enabled telemedicine encounter.        Chief Complaint   Patient presents with   • Covid-19 Home Monitoring Phone Call        History of Present Illness     Today reports her SOB is 50% improved over last visit. Air entry fine, just still some residual SOB. Some walking in the home now, tolerating well. Coughing up sputume some, yellow, to white. Posterior lower ribs are aching due to coughing. Still taking antibiotic and steroid (levaquin and prednisone). Pharmacy did not have a nebulizer so requests new order for this sent to [facility] in [place]. No fever or chills past several days. Some nausea intermittently, no vomiting.     History of Present Illness   Symptoms began with shortness of breath, chest congestion and cough 21.   Tested positive for Covid 19 on 21.  Had azithromycin and steroids beginning 21 from Dr Perla for worsening URI symptoms.   Felt a little better at first but began getting more SOB again and with chest congestion, still nasally congested. Wheezing. Feels awful. Reports more than anything just needs cough medicine. Something stronger than the robitussin plain she had before.   She does not have a nebulizer in the home.   No diarrhea or abd pain now.. reports daily nausea no vomiting.     No other complaints today.  Parts of most recent relevant visit HPI, ROS  and PE may be carried forward and all are updated as appropriate for current situation.    Past Surgical History:   Procedure Laterality Date   •  SECTION  2015    Repeat   • ENDOMETRIAL ABLATION     • INJECTION OF MEDICATION  2015    Kenalog (1)  -  SEFERINO Perla   • RAYA     • OTHER SURGICAL HISTORY  2015    Laparoscopy; tubal cautery (1)  - Exam under anesthesia  and laparoscopic tubal fulguration.   • TUBAL ABDOMINAL LIGATION        Social History     Socioeconomic History   • Marital status:      Spouse name: Not on file   • Number of children: Not on file   • Years of education: Not on file   • Highest education level: Not on file   Tobacco Use   • Smoking status: Current Some Day Smoker     Packs/day: 1.50     Years: 17.00     Pack years: 25.50     Types: Cigarettes     Start date:    • Smokeless tobacco: Never Used   Substance and Sexual Activity   • Alcohol use: No   • Drug use: No   • Sexual activity: Not Currently     Partners: Male     Birth control/protection: Tubal ligation      The following portions of the patient's history were reviewed and updated as appropriate:   She  has a past medical history of Abnormal Pap smear of cervix, Acute pharyngitis, Adjustment disorder with anxious mood, Allergic rhinitis, Asthma, Backache, Candidiasis, Cervical intraepithelial neoplasia grade 1, Contraception, Depressive disorder, Dysphagia, Encounter for gynecological examination without abnormal finding, Encounter for  visit, Excessive and frequent menstruation with irregular cycle, Excessive and frequent menstruation with regular cycle, Fatigue, Generalized abdominal pain, Generalized anxiety disorder, Headache, Heartburn, Hip pain, History of CVA (cerebrovascular accident) (2021), Hypertension, Insomnia, Low grade squamous intraepithelial lesion (LGSIL) on cervicovaginal cytologic smear, Malaise, Organic anxiety disorder, Ovarian cancer (HCC), Spasm of back muscles, Stroke (HCC), Surgical follow-up care, Tobacco dependence syndrome, and Urinary tract infection.  She does not have any pertinent problems on file.  She  has a past surgical history that includes  section (2015); Other surgical history (2015); Injection of Medication (2015); Tubal ligation; LEEP; and Endometrial ablation.  Her family history includes Alcohol  abuse in her father; Breast cancer in her maternal aunt and maternal grandmother; Ovarian cancer in her maternal grandmother; Seizures in her mother; Stroke in an other family member.  She  reports that she has been smoking cigarettes. She started smoking about 17 years ago. She has a 25.50 pack-year smoking history. She has never used smokeless tobacco. She reports that she does not drink alcohol and does not use drugs.  Current Outpatient Medications   Medication Sig Dispense Refill   • Albuterol Sulfate 2.5 MG/0.5ML nebulizer solution Inhale 2.5 mg 4 (Four) Times a Day As Needed (shortness of breath, wheezing.). 50 mL 5   • albuterol sulfate  (90 Base) MCG/ACT inhaler INHALE 2 PUFFS BY MOUTH EVERY 6 HOURS AS NEEDED FOR WHEEZING OR SHORTNESS OF BREATH 54 g 2   • amitriptyline (ELAVIL) 50 MG tablet Take 1 tablet by mouth Every Night. 30 tablet 5   • atorvastatin (LIPITOR) 40 MG tablet Take 1 tablet by mouth Daily. 7 tablet 0   • azithromycin (Zithromax) 250 MG tablet Take 2 tablets the first day, then 1 tablet daily for 4 days. 6 tablet 0   • benzonatate (Tessalon Perles) 100 MG capsule Take 1 capsule by mouth 3 (Three) Times a Day As Needed for Cough. 60 capsule 0   • cetirizine (zyrTEC) 10 MG tablet Take 1 tablet by mouth Daily. 30 tablet 11   • clonazePAM (KlonoPIN) 1 MG tablet TAKE 1 TABLET BY MOUTH TWICE DAILY AS NEEDED FOR ANXIETY 60 tablet 0   • dilTIAZem CD (Cardizem CD) 120 MG 24 hr capsule Take 1 capsule by mouth Daily. For High blood pressure in addition to the lisinopril 60 capsule 0   • DULoxetine (CYMBALTA) 30 MG capsule Take 30 mg by mouth Daily.     • Eliquis 5 MG tablet tablet      • fluconazole (DIFLUCAN) 150 MG tablet Take 1 tablet PO on day 3 and day 7 of antibiotic 2 tablet 0   • gabapentin (NEURONTIN) 400 MG capsule Take 1 capsule by mouth 4 (Four) Times a Day. 120 capsule 2   • guaiFENesin-codeine (GUAIFENESIN AC) 100-10 MG/5ML liquid Take 5 mL by mouth 4 (Four) Times a Day As Needed  for Cough. Insurance will not pay, patient will pay cash 240 mL 0   • HYDROcodone-acetaminophen (NORCO)  MG per tablet TK 1 TABLETY BY MOUTH FOUR TIMES DAILY AS NEEDED FOR PAIN  0   • levoFLOXacin (Levaquin) 500 MG tablet Take 1 tablet by mouth Daily. 7 tablet 0   • lisinopril (PRINIVIL,ZESTRIL) 10 MG tablet      • ondansetron (ZOFRAN) 4 MG tablet Take 1 tablet by mouth Every 8 (Eight) Hours As Needed for Nausea or Vomiting. 90 tablet 5   • rOPINIRole (REQUIP) 1 MG tablet Take 1 tablet by mouth 2 (Two) Times a Day. Take 1 hour before bedtime. 180 tablet 1   • predniSONE (DELTASONE) 20 MG tablet Take 2 tablets by mouth 2 (Two) Times a Day With Meals for 4 days, THEN 1 tablet 2 (Two) Times a Day With Meals for 7 days. 30 tablet 0     No current facility-administered medications for this visit.     Current Outpatient Medications on File Prior to Visit   Medication Sig   • albuterol sulfate  (90 Base) MCG/ACT inhaler INHALE 2 PUFFS BY MOUTH EVERY 6 HOURS AS NEEDED FOR WHEEZING OR SHORTNESS OF BREATH   • amitriptyline (ELAVIL) 50 MG tablet Take 1 tablet by mouth Every Night.   • atorvastatin (LIPITOR) 40 MG tablet Take 1 tablet by mouth Daily.   • azithromycin (Zithromax) 250 MG tablet Take 2 tablets the first day, then 1 tablet daily for 4 days.   • benzonatate (Tessalon Perles) 100 MG capsule Take 1 capsule by mouth 3 (Three) Times a Day As Needed for Cough.   • cetirizine (zyrTEC) 10 MG tablet Take 1 tablet by mouth Daily.   • clonazePAM (KlonoPIN) 1 MG tablet TAKE 1 TABLET BY MOUTH TWICE DAILY AS NEEDED FOR ANXIETY   • dilTIAZem CD (Cardizem CD) 120 MG 24 hr capsule Take 1 capsule by mouth Daily. For High blood pressure in addition to the lisinopril   • DULoxetine (CYMBALTA) 30 MG capsule Take 30 mg by mouth Daily.   • Eliquis 5 MG tablet tablet    • fluconazole (DIFLUCAN) 150 MG tablet Take 1 tablet PO on day 3 and day 7 of antibiotic   • gabapentin (NEURONTIN) 400 MG capsule Take 1 capsule by mouth 4  (Four) Times a Day.   • guaiFENesin-codeine (GUAIFENESIN AC) 100-10 MG/5ML liquid Take 5 mL by mouth 4 (Four) Times a Day As Needed for Cough. Insurance will not pay, patient will pay cash   • HYDROcodone-acetaminophen (NORCO)  MG per tablet TK 1 TABLETY BY MOUTH FOUR TIMES DAILY AS NEEDED FOR PAIN   • levoFLOXacin (Levaquin) 500 MG tablet Take 1 tablet by mouth Daily.   • lisinopril (PRINIVIL,ZESTRIL) 10 MG tablet    • rOPINIRole (REQUIP) 1 MG tablet Take 1 tablet by mouth 2 (Two) Times a Day. Take 1 hour before bedtime.   • [DISCONTINUED] Albuterol Sulfate 2.5 MG/0.5ML nebulizer solution Inhale 2.5 mg 4 (Four) Times a Day As Needed (shortness of breath, wheezing.).   • [DISCONTINUED] ondansetron (ZOFRAN) 4 MG tablet Take 1 tablet by mouth Every 8 (Eight) Hours As Needed for Nausea or Vomiting.   • [DISCONTINUED] DULoxetine (CYMBALTA) 30 MG capsule TAKE 1 CAPSULE BY MOUTH DAILY     No current facility-administered medications on file prior to visit.     She is allergic to amoxil [amoxicillin]; latex, natural rubber; hydroxyzine hcl; latex; nickel; paroxetine hcl; paxil [paroxetine]; and vistaril [hydroxyzine]..    Review of Systems   Constitutional: Positive for fatigue. Negative for chills and fever.   HENT: Negative.  Negative for congestion.    Eyes: Negative.  Negative for blurred vision, double vision, photophobia and visual disturbance.   Respiratory: Positive for cough and shortness of breath. Negative for wheezing and stridor.    Cardiovascular: Negative.  Negative for chest pain, palpitations and leg swelling.   Gastrointestinal: Negative.  Negative for abdominal distention, abdominal pain, blood in stool, constipation, diarrhea, nausea, vomiting, GERD and indigestion.   Endocrine: Negative.  Negative for cold intolerance and heat intolerance.   Genitourinary: Negative.  Negative for dysuria, flank pain, frequency and urinary incontinence.   Musculoskeletal: Negative.  Negative for arthralgias, back  "pain and myalgias.   Skin: Negative.    Allergic/Immunologic: Negative.  Negative for immunocompromised state.   Neurological: Negative.    Hematological: Negative.    Psychiatric/Behavioral: Negative.  Negative for agitation, behavioral problems, decreased concentration, dysphoric mood, hallucinations, self-injury, sleep disturbance, suicidal ideas, negative for hyperactivity, depressed mood and stress. The patient is not nervous/anxious.    All other systems reviewed and are negative.    PHQ-9 Depression Screening  Little interest or pleasure in doing things?     Feeling down, depressed, or hopeless?     Trouble falling or staying asleep, or sleeping too much?     Feeling tired or having little energy?     Poor appetite or overeating?     Feeling bad about yourself - or that you are a failure or have let yourself or your family down?     Trouble concentrating on things, such as reading the newspaper or watching television?     Moving or speaking so slowly that other people could have noticed? Or the opposite - being so fidgety or restless that you have been moving around a lot more than usual?     Thoughts that you would be better off dead, or of hurting yourself in some way?     PHQ-9 Total Score     If you checked off any problems, how difficult have these problems made it for you to do your work, take care of things at home, or get along with other people?        Objective    Vitals:    10/08/21 0837   Weight: 90.3 kg (199 lb)   Height: 160 cm (62.99\")       Physical Exam  Vitals and nursing note reviewed.   Constitutional:       General: She is not in acute distress.     Appearance: Normal appearance. She is well-developed. She is obese. She is ill-appearing. She is not diaphoretic.   HENT:      Head: Normocephalic and atraumatic.   Eyes:      General: No scleral icterus.        Right eye: No discharge.         Left eye: No discharge.      Conjunctiva/sclera: Conjunctivae normal.      Pupils: Pupils are equal, " round, and reactive to light.   Neck:      Trachea: No tracheal deviation.   Pulmonary:      Effort: Pulmonary effort is normal. No respiratory distress.      Breath sounds: No stridor. No wheezing.   Musculoskeletal:         General: No tenderness or deformity. Normal range of motion.      Cervical back: Normal range of motion and neck supple.   Skin:     General: Skin is dry.      Coloration: Skin is not pale.      Findings: No erythema or rash.   Neurological:      General: No focal deficit present.      Mental Status: She is alert and oriented to person, place, and time. Mental status is at baseline.      Cranial Nerves: No cranial nerve deficit.   Psychiatric:         Mood and Affect: Mood normal.         Behavior: Behavior normal.         Thought Content: Thought content normal.         Judgment: Judgment normal.           Assessment/Plan   Diagnoses and all orders for this visit:    1. Pneumonia due to COVID-19 virus  -     Discontinue: Albuterol Sulfate 2.5 MG/0.5ML nebulizer solution; Inhale 2.5 mg 4 (Four) Times a Day As Needed (shortness of breath, wheezing.).  Dispense: 50 mL; Refill: 5  -     Albuterol Sulfate 2.5 MG/0.5ML nebulizer solution; Inhale 2.5 mg 4 (Four) Times a Day As Needed (shortness of breath, wheezing.).  Dispense: 50 mL; Refill: 5  -     predniSONE (DELTASONE) 20 MG tablet; Take 2 tablets by mouth 2 (Two) Times a Day With Meals for 4 days, THEN 1 tablet 2 (Two) Times a Day With Meals for 7 days.  Dispense: 30 tablet; Refill: 0    2. SOB (shortness of breath)  -     Discontinue: Albuterol Sulfate 2.5 MG/0.5ML nebulizer solution; Inhale 2.5 mg 4 (Four) Times a Day As Needed (shortness of breath, wheezing.).  Dispense: 50 mL; Refill: 5  -     Albuterol Sulfate 2.5 MG/0.5ML nebulizer solution; Inhale 2.5 mg 4 (Four) Times a Day As Needed (shortness of breath, wheezing.).  Dispense: 50 mL; Refill: 5  -     predniSONE (DELTASONE) 20 MG tablet; Take 2 tablets by mouth 2 (Two) Times a Day With  Meals for 4 days, THEN 1 tablet 2 (Two) Times a Day With Meals for 7 days.  Dispense: 30 tablet; Refill: 0    3. Cough  -     Discontinue: Albuterol Sulfate 2.5 MG/0.5ML nebulizer solution; Inhale 2.5 mg 4 (Four) Times a Day As Needed (shortness of breath, wheezing.).  Dispense: 50 mL; Refill: 5  -     Albuterol Sulfate 2.5 MG/0.5ML nebulizer solution; Inhale 2.5 mg 4 (Four) Times a Day As Needed (shortness of breath, wheezing.).  Dispense: 50 mL; Refill: 5    4. Acute URI  -     Discontinue: Albuterol Sulfate 2.5 MG/0.5ML nebulizer solution; Inhale 2.5 mg 4 (Four) Times a Day As Needed (shortness of breath, wheezing.).  Dispense: 50 mL; Refill: 5  -     Albuterol Sulfate 2.5 MG/0.5ML nebulizer solution; Inhale 2.5 mg 4 (Four) Times a Day As Needed (shortness of breath, wheezing.).  Dispense: 50 mL; Refill: 5  -     predniSONE (DELTASONE) 20 MG tablet; Take 2 tablets by mouth 2 (Two) Times a Day With Meals for 4 days, THEN 1 tablet 2 (Two) Times a Day With Meals for 7 days.  Dispense: 30 tablet; Refill: 0    5. Nausea  -     ondansetron (ZOFRAN) 4 MG tablet; Take 1 tablet by mouth Every 8 (Eight) Hours As Needed for Nausea or Vomiting.  Dispense: 90 tablet; Refill: 5    Return in about 3 days (around 10/11/2021).                 This document has been electronically signed by HITESH Parra on October 8, 2021 10:13 CDT

## 2021-10-11 ENCOUNTER — TELEMEDICINE (OUTPATIENT)
Dept: FAMILY MEDICINE CLINIC | Facility: CLINIC | Age: 31
End: 2021-10-11

## 2021-10-11 DIAGNOSIS — J12.82 PNEUMONIA DUE TO COVID-19 VIRUS: Primary | ICD-10-CM

## 2021-10-11 DIAGNOSIS — J06.9 ACUTE URI: ICD-10-CM

## 2021-10-11 DIAGNOSIS — R06.02 SOB (SHORTNESS OF BREATH): ICD-10-CM

## 2021-10-11 DIAGNOSIS — R53.81 MALAISE: ICD-10-CM

## 2021-10-11 DIAGNOSIS — R05.9 COUGH: ICD-10-CM

## 2021-10-11 DIAGNOSIS — U07.1 PNEUMONIA DUE TO COVID-19 VIRUS: Primary | ICD-10-CM

## 2021-10-11 DIAGNOSIS — R11.0 NAUSEA: ICD-10-CM

## 2021-10-11 PROCEDURE — 99213 OFFICE O/P EST LOW 20 MIN: CPT | Performed by: NURSE PRACTITIONER

## 2021-10-11 NOTE — PROGRESS NOTES
Subjective   Cally Olsen is a 31 y.o. female.   You have chosen to receive care through a telehealth visit.  Do you consent to use a video/audio connection for your medical care today? Yes  This was an audio and video enabled telemedicine encounter.        No chief complaint on file.       History of Present Illness   Seen by telehealth visit Friday 10/8/21 for follow up of Covid 19 URI/ pneumonia. Reported had been feeling better after antibiotics and then worsened between visits.  A nebulizer was prescribed and she has been using with improvement in SOB and cough, which remain but she reports are not as bad.  Intermittent nausea, also not as bad as it was. Cough is productive of thick yellow sputum. No more generalized malaise.  Continues on prednisone for 4 more days.    No new complaints today.   Parts of most recent relevant visit HPI, ROS  and PE may be carried forward and all are updated as appropriate for current situation.    Past Surgical History:   Procedure Laterality Date   •  SECTION  2015    Repeat   • ENDOMETRIAL ABLATION     • INJECTION OF MEDICATION  2015    Kenalog (1)  -  SEFERINO Perla   • FRIDAP     • OTHER SURGICAL HISTORY  2015    Laparoscopy; tubal cautery (1)  - Exam under anesthesia and laparoscopic tubal fulguration.   • TUBAL ABDOMINAL LIGATION        Social History     Socioeconomic History   • Marital status:    Tobacco Use   • Smoking status: Current Some Day Smoker     Packs/day: 1.50     Years: 17.00     Pack years: 25.50     Types: Cigarettes     Start date:    • Smokeless tobacco: Never Used   Substance and Sexual Activity   • Alcohol use: No   • Drug use: No   • Sexual activity: Not Currently     Partners: Male     Birth control/protection: Tubal ligation      The following portions of the patient's history were reviewed and updated as appropriate: allergies, current medications, past family history, past medical history, past social history,  past surgical history and problem list.    Review of Systems   Constitutional: Positive for fatigue. Negative for chills and fever.   HENT: Negative.  Negative for congestion.    Eyes: Negative.  Negative for blurred vision, double vision, photophobia and visual disturbance.   Respiratory: Positive for cough and shortness of breath. Negative for wheezing and stridor.    Cardiovascular: Negative.  Negative for chest pain, palpitations and leg swelling.   Gastrointestinal: Negative.  Negative for abdominal distention, abdominal pain, blood in stool, constipation, diarrhea, nausea, vomiting, GERD and indigestion.   Endocrine: Negative.  Negative for cold intolerance and heat intolerance.   Genitourinary: Negative.  Negative for dysuria, flank pain, frequency and urinary incontinence.   Musculoskeletal: Negative.  Negative for arthralgias, back pain and myalgias.   Skin: Negative.    Allergic/Immunologic: Negative.  Negative for immunocompromised state.   Neurological: Negative.    Hematological: Negative.    Psychiatric/Behavioral: Negative.  Negative for agitation, behavioral problems, decreased concentration, dysphoric mood, hallucinations, self-injury, sleep disturbance, suicidal ideas, negative for hyperactivity, depressed mood and stress. The patient is not nervous/anxious.    All other systems reviewed and are negative.    PHQ-9 Depression Screening  Little interest or pleasure in doing things?     Feeling down, depressed, or hopeless?     Trouble falling or staying asleep, or sleeping too much?     Feeling tired or having little energy?     Poor appetite or overeating?     Feeling bad about yourself - or that you are a failure or have let yourself or your family down?     Trouble concentrating on things, such as reading the newspaper or watching television?     Moving or speaking so slowly that other people could have noticed? Or the opposite - being so fidgety or restless that you have been moving around a lot  more than usual?     Thoughts that you would be better off dead, or of hurting yourself in some way?     PHQ-9 Total Score     If you checked off any problems, how difficult have these problems made it for you to do your work, take care of things at home, or get along with other people?      Patient understands the risks associated with this controlled medication, including tolerance and addiction.  Patient also agrees to only obtain this medication from me, and not from a another provider, unless that provider is covering for me in my absence.  Patient also agrees to be compliant in dosing, and not self adjust the dose of medication.  A signed controlled substance agreement is on file, and the patient has received a controlled substance education sheet at this a previous visit.  The patient has also signed a consent for treatment with a controlled substance as per [facility] policy. RORY was obtained.   Objective    There were no vitals filed for this visit.    Physical Exam  Vitals and nursing note reviewed.   Constitutional:       General: She is not in acute distress.     Appearance: Normal appearance. She is well-developed. She is obese. She is ill-appearing. She is not diaphoretic.   HENT:      Head: Normocephalic and atraumatic.   Eyes:      General: No scleral icterus.        Right eye: No discharge.         Left eye: No discharge.      Conjunctiva/sclera: Conjunctivae normal.      Pupils: Pupils are equal, round, and reactive to light.   Neck:      Trachea: No tracheal deviation.   Pulmonary:      Effort: Pulmonary effort is normal. No respiratory distress.      Breath sounds: No stridor. No wheezing.   Musculoskeletal:         General: No tenderness or deformity. Normal range of motion.      Cervical back: Normal range of motion and neck supple.   Skin:     General: Skin is dry.      Coloration: Skin is not pale.      Findings: No erythema or rash.   Neurological:      General: No focal deficit  present.      Mental Status: She is alert and oriented to person, place, and time. Mental status is at baseline.      Cranial Nerves: No cranial nerve deficit.   Psychiatric:         Mood and Affect: Mood normal.         Behavior: Behavior normal.         Thought Content: Thought content normal.         Judgment: Judgment normal.       Improving covid 19 pneumonia, cough, nausea, sob. Continue prednisone and albuterol nebs.    Assessment/Plan   Diagnoses and all orders for this visit:    1. Pneumonia due to COVID-19 virus (Primary)    2. SOB (shortness of breath)    3. Acute URI    4. Malaise    5. Nausea    6. Cough        Return in about 4 days (around 10/15/2021), or if symptoms worsen or fail to improve.             This document has been electronically signed by HITSEH Parra on October 11, 2021 08:37 CDT

## 2021-10-12 ENCOUNTER — TELEPHONE (OUTPATIENT)
Dept: FAMILY MEDICINE CLINIC | Facility: CLINIC | Age: 31
End: 2021-10-12

## 2021-10-12 NOTE — TELEPHONE ENCOUNTER
----- Message from HITESH Saul sent at 10/12/2021  1:05 PM CDT -----  Advise should go to ED for evaluation, I have done everything possible from outpatient standpoint.  ----- Message -----  From: Marianela Dawson MA  Sent: 10/12/2021  12:55 PM CDT  To: HITESH Saul    Patient called and said she is having breathing issues and is vomiting she said she has took a breathing treatment and is feeling some better

## 2021-10-13 ENCOUNTER — OFFICE VISIT (OUTPATIENT)
Dept: FAMILY MEDICINE CLINIC | Facility: CLINIC | Age: 31
End: 2021-10-13

## 2021-10-13 VITALS
OXYGEN SATURATION: 99 % | DIASTOLIC BLOOD PRESSURE: 85 MMHG | TEMPERATURE: 97.8 F | HEIGHT: 63 IN | SYSTOLIC BLOOD PRESSURE: 137 MMHG | BODY MASS INDEX: 35.26 KG/M2 | HEART RATE: 90 BPM

## 2021-10-13 DIAGNOSIS — J12.82 PNEUMONIA DUE TO COVID-19 VIRUS: Primary | ICD-10-CM

## 2021-10-13 DIAGNOSIS — U07.1 PNEUMONIA DUE TO COVID-19 VIRUS: Primary | ICD-10-CM

## 2021-10-13 DIAGNOSIS — R11.0 NAUSEA: ICD-10-CM

## 2021-10-13 DIAGNOSIS — R06.02 SOB (SHORTNESS OF BREATH): ICD-10-CM

## 2021-10-13 PROCEDURE — 99213 OFFICE O/P EST LOW 20 MIN: CPT | Performed by: FAMILY MEDICINE

## 2021-10-13 NOTE — PATIENT INSTRUCTIONS
"  BMI for Adults  What is BMI?  Body mass index (BMI) is a number that is calculated from a person's weight and height. BMI can help estimate how much of a person's weight is composed of fat. BMI does not measure body fat directly. Rather, it is an alternative to procedures that directly measure body fat, which can be difficult and expensive.  BMI can help identify people who may be at higher risk for certain medical problems.  What are BMI measurements used for?  BMI is used as a screening tool to identify possible weight problems. It helps determine whether a person is obese, overweight, a healthy weight, or underweight.  BMI is useful for:  · Identifying a weight problem that may be related to a medical condition or may increase the risk for medical problems.  · Promoting changes, such as changes in diet and exercise, to help reach a healthy weight. BMI screening can be repeated to see if these changes are working.  How is BMI calculated?  BMI involves measuring your weight in relation to your height. Both height and weight are measured, and the BMI is calculated from those numbers. This can be done either in English ([place] or metric measurements. Note that charts and online BMI calculators are available to help you find your BMI quickly and easily without having to do these calculations yourself.  To calculate your BMI in English ([place] measurements:    1. Measure your weight in pounds (lb).  2. Multiply the number of pounds by 703.  ? For example, for a person who weighs 180 lb, multiply that number by 703, which equals 126,540.  3. Measure your height in inches. Then multiply that number by itself to get a measurement called \"inches squared.\"  ? For example, for a person who is 70 inches tall, the \"inches squared\" measurement is 70 inches x 70 inches, which equals 4,900 inches squared.  4. Divide the total from step 2 (number of lb x 703) by the total from step 3 (inches squared): 126,540 ÷ 4,900 = 25.8. This " "is your BMI.    To calculate your BMI in metric measurements:  1. Measure your weight in kilograms (kg).  2. Measure your height in meters (m). Then multiply that number by itself to get a measurement called \"meters squared.\"  ? For example, for a person who is 1.75 m tall, the \"meters squared\" measurement is 1.75 m x 1.75 m, which is equal to 3.1 meters squared.  3. Divide the number of kilograms (your weight) by the meters squared number. In this example: 70 ÷ 3.1 = 22.6. This is your BMI.  What do the results mean?  BMI charts are used to identify whether you are underweight, normal weight, overweight, or obese. The following guidelines will be used:  · Underweight: BMI less than 18.5.  · Normal weight: BMI between 18.5 and 24.9.  · Overweight: BMI between 25 and 29.9.  · Obese: BMI of 30 or above.  Keep these notes in mind:  · Weight includes both fat and muscle, so someone with a muscular build, such as an athlete, may have a BMI that is higher than 24.9. In cases like these, BMI is not an accurate measure of body fat.  · To determine if excess body fat is the cause of a BMI of 25 or higher, further assessments may need to be done by a health care provider.  · BMI is usually interpreted in the same way for men and women.  Where to find more information  For more information about BMI, including tools to quickly calculate your BMI, go to these websites:  · Centers for Disease Control and Prevention: www.cdc.gov  · American Heart Association: www.heart.org  · National Heart, Lung, and Blood [place]: www.nhlbi.nih.gov  Summary  · Body mass index (BMI) is a number that is calculated from a person's weight and height.  · BMI may help estimate how much of a person's weight is composed of fat. BMI can help identify those who may be at higher risk for certain medical problems.  · BMI can be measured using English measurements or metric measurements.  · BMI charts are used to identify whether you are underweight, " normal weight, overweight, or obese.  This information is not intended to replace advice given to you by your health care provider. Make sure you discuss any questions you have with your health care provider.  Document Revised: 09/09/2020 Document Reviewed: 07/17/2020  Elsevier Patient Education © 2021 [employer]

## 2021-10-14 RX ORDER — ATORVASTATIN CALCIUM 40 MG/1
40 TABLET, FILM COATED ORAL DAILY
Qty: 7 TABLET | Refills: 0 | Status: SHIPPED | OUTPATIENT
Start: 2021-10-14 | End: 2021-10-19 | Stop reason: SDUPTHER

## 2021-10-15 ENCOUNTER — TELEPHONE (OUTPATIENT)
Dept: FAMILY MEDICINE CLINIC | Facility: CLINIC | Age: 31
End: 2021-10-15

## 2021-10-18 ENCOUNTER — TELEPHONE (OUTPATIENT)
Dept: FAMILY MEDICINE CLINIC | Facility: CLINIC | Age: 31
End: 2021-10-18

## 2021-10-18 DIAGNOSIS — F41.1 GAD (GENERALIZED ANXIETY DISORDER): ICD-10-CM

## 2021-10-18 RX ORDER — CLONAZEPAM 1 MG/1
1 TABLET ORAL 2 TIMES DAILY PRN
Qty: 60 TABLET | Refills: 0 | OUTPATIENT
Start: 2021-10-18

## 2021-10-18 NOTE — TELEPHONE ENCOUNTER
Incoming Refill Request      Medication requested (name and dose):   clonazePAM (KlonoPIN) 1 MG tablet     [facility] where [facility] be sent[facility]     Additional details provided by patient:  Best call back number:     Does the patient have less than a 3 day supply:  [] Yes  [] No    Silva Kimball  10/18/21, 10:29 CDT

## 2021-10-18 NOTE — TELEPHONE ENCOUNTER
Patient seen in office every 3 months for chronic anxiety requiring benzodiazepine anxiolytic. Apparently automatic request for refill clonazepam received. Denied as was just filled on 10/1/21 according to hussein report.     This document has been electronically signed by HITESH Parra on October 18, 2021 16:19 CDT

## 2021-10-19 ENCOUNTER — APPOINTMENT (OUTPATIENT)
Dept: ULTRASOUND IMAGING | Facility: HOSPITAL | Age: 31
End: 2021-10-19

## 2021-10-19 ENCOUNTER — TELEPHONE (OUTPATIENT)
Dept: FAMILY MEDICINE CLINIC | Facility: CLINIC | Age: 31
End: 2021-10-19

## 2021-10-19 DIAGNOSIS — E78.2 MIXED HYPERLIPIDEMIA: Primary | ICD-10-CM

## 2021-10-19 RX ORDER — ATORVASTATIN CALCIUM 40 MG/1
40 TABLET, FILM COATED ORAL DAILY
Qty: 90 TABLET | Refills: 1 | Status: SHIPPED | OUTPATIENT
Start: 2021-10-19 | End: 2021-12-13 | Stop reason: SDUPTHER

## 2021-10-19 RX ORDER — CLONAZEPAM 1 MG/1
TABLET ORAL
Qty: 60 TABLET | OUTPATIENT
Start: 2021-10-19

## 2021-10-19 RX ORDER — GABAPENTIN 300 MG/1
1 CAPSULE ORAL 4 TIMES DAILY
COMMUNITY
Start: 2021-10-13 | End: 2021-12-13 | Stop reason: SDUPTHER

## 2021-10-19 NOTE — TELEPHONE ENCOUNTER
Patient seen in office every 3 months for chronic anxiety requiring benzodiazepine anxiolytic. Compliant with medication, visits with no adverse effects noted. RORY and UDS current and appropriate. Patient called requesting scheduled refill at appropriate interval. Patient understands the risks associated with this controlled medication, including tolerance and addiction.  Patient also agrees to only obtain this medication from me, and not from a another provider, unless that provider is covering for me in my absence.  Patient also agrees to be compliant in dosing, and not self adjust the dose of medication.  A signed controlled substance agreement is on file, and the patient has received a controlled substance education sheet at this a previous visit.  The patient has also signed a consent for treatment with a controlled substance as per [facility] policy. RORY was obtained. Refill sent for

## 2021-10-19 NOTE — TELEPHONE ENCOUNTER
Refilled on Isiah report on 10/1/21. Needs to get it at the pharmacy, not request another refill. Do not request refill sooner than 7 days before needed.

## 2021-10-26 ENCOUNTER — TELEPHONE (OUTPATIENT)
Dept: FAMILY MEDICINE CLINIC | Facility: CLINIC | Age: 31
End: 2021-10-26

## 2021-10-26 DIAGNOSIS — F41.1 GAD (GENERALIZED ANXIETY DISORDER): ICD-10-CM

## 2021-10-26 RX ORDER — CLONAZEPAM 1 MG/1
TABLET ORAL
Qty: 45 TABLET | Refills: 0 | Status: SHIPPED | OUTPATIENT
Start: 2021-10-26 | End: 2021-11-04 | Stop reason: SDUPTHER

## 2021-10-26 NOTE — TELEPHONE ENCOUNTER
Patient seen in office every 3 months for chronic anxiety requiring benzodiazepine anxiolytic. Compliant with medication, visits with no adverse effects noted. RORY and UDS current and appropriate. Patient called requesting scheduled refill at appropriate interval. Patient understands the risks associated with this controlled medication, including tolerance and addiction.  Patient also agrees to only obtain this medication from me, and not from a another provider, unless that provider is covering for me in my absence.  Patient also agrees to be compliant in dosing, and not self adjust the dose of medication.  A signed controlled substance agreement is on file, and the patient has received a controlled substance education sheet at this a previous visit.  The patient has also signed a consent for treatment with a controlled substance as per [facility] policy. RORY was obtained. Refill sent for clonazepam with an initial decrease in number dispensed from 60 to 45, as her pain management provider will start weaning her pain medication if this is not weaned to off within 60 days.     This document has been electronically signed by HITESH Parra on October 26, 2021 16:43 CDT

## 2021-10-26 NOTE — TELEPHONE ENCOUNTER
Incoming Refill Request      Medication requested (name and dose):   clonazePAM (KlonoPIN) 1 MG tablet    [facility]     Additional details provided by patient:     Best call back number:    Does the patient have less than a 3 day supply:  [] Yes  [] No    Silva Kimball  10/26/21, 11:29 CDT

## 2021-11-04 ENCOUNTER — LAB (OUTPATIENT)
Dept: LAB | Facility: OTHER | Age: 31
End: 2021-11-04

## 2021-11-04 ENCOUNTER — OFFICE VISIT (OUTPATIENT)
Dept: FAMILY MEDICINE CLINIC | Facility: CLINIC | Age: 31
End: 2021-11-04

## 2021-11-04 VITALS
WEIGHT: 195.6 LBS | HEIGHT: 63 IN | DIASTOLIC BLOOD PRESSURE: 77 MMHG | OXYGEN SATURATION: 99 % | SYSTOLIC BLOOD PRESSURE: 124 MMHG | HEART RATE: 77 BPM | TEMPERATURE: 98.1 F | BODY MASS INDEX: 34.66 KG/M2 | RESPIRATION RATE: 16 BRPM

## 2021-11-04 DIAGNOSIS — R11.0 NAUSEA: ICD-10-CM

## 2021-11-04 DIAGNOSIS — Z51.81 ENCOUNTER FOR THERAPEUTIC DRUG LEVEL MONITORING: ICD-10-CM

## 2021-11-04 DIAGNOSIS — F41.1 GAD (GENERALIZED ANXIETY DISORDER): ICD-10-CM

## 2021-11-04 DIAGNOSIS — N39.0 URINARY TRACT INFECTION WITHOUT HEMATURIA, SITE UNSPECIFIED: Primary | ICD-10-CM

## 2021-11-04 DIAGNOSIS — E66.1 CLASS 1 DRUG-INDUCED OBESITY WITH SERIOUS COMORBIDITY AND BODY MASS INDEX (BMI) OF 34.0 TO 34.9 IN ADULT: ICD-10-CM

## 2021-11-04 DIAGNOSIS — I10 ESSENTIAL HYPERTENSION: ICD-10-CM

## 2021-11-04 DIAGNOSIS — G25.81 RLS (RESTLESS LEGS SYNDROME): ICD-10-CM

## 2021-11-04 DIAGNOSIS — N39.0 URINARY TRACT INFECTION WITHOUT HEMATURIA, SITE UNSPECIFIED: ICD-10-CM

## 2021-11-04 DIAGNOSIS — R06.02 SOB (SHORTNESS OF BREATH): ICD-10-CM

## 2021-11-04 DIAGNOSIS — Z13.21 ENCOUNTER FOR VITAMIN DEFICIENCY SCREENING: Primary | ICD-10-CM

## 2021-11-04 DIAGNOSIS — N30.01 ACUTE CYSTITIS WITH HEMATURIA: Primary | ICD-10-CM

## 2021-11-04 DIAGNOSIS — J44.9 CHRONIC OBSTRUCTIVE PULMONARY DISEASE, UNSPECIFIED COPD TYPE (HCC): ICD-10-CM

## 2021-11-04 DIAGNOSIS — R05.9 COUGH: ICD-10-CM

## 2021-11-04 LAB
ALBUMIN SERPL-MCNC: 4.8 G/DL (ref 3.5–5)
ALBUMIN/GLOB SERPL: 1.7 G/DL (ref 1.1–1.8)
ALP SERPL-CCNC: 84 U/L (ref 38–126)
ALT SERPL W P-5'-P-CCNC: 40 U/L
ANION GAP SERPL CALCULATED.3IONS-SCNC: 7 MMOL/L (ref 5–15)
AST SERPL-CCNC: 29 U/L (ref 14–36)
BACTERIA UR QL AUTO: ABNORMAL /HPF
BASOPHILS # BLD AUTO: 0.03 10*3/MM3 (ref 0–0.2)
BASOPHILS NFR BLD AUTO: 0.3 % (ref 0–1.5)
BILIRUB SERPL-MCNC: 0.4 MG/DL (ref 0.2–1.3)
BILIRUB UR QL STRIP: NEGATIVE
BUN SERPL-MCNC: 12 MG/DL (ref 7–23)
BUN/CREAT SERPL: 21.8 (ref 7–25)
CALCIUM SPEC-SCNC: 9.7 MG/DL (ref 8.4–10.2)
CHLORIDE SERPL-SCNC: 101 MMOL/L (ref 101–112)
CLARITY UR: ABNORMAL
CO2 SERPL-SCNC: 31 MMOL/L (ref 22–30)
COLOR UR: ABNORMAL
CREAT SERPL-MCNC: 0.55 MG/DL (ref 0.52–1.04)
DEPRECATED RDW RBC AUTO: 45.5 FL (ref 37–54)
EOSINOPHIL # BLD AUTO: 0.08 10*3/MM3 (ref 0–0.4)
EOSINOPHIL NFR BLD AUTO: 0.7 % (ref 0.3–6.2)
ERYTHROCYTE [DISTWIDTH] IN BLOOD BY AUTOMATED COUNT: 13.8 % (ref 12.3–15.4)
GFR SERPL CREATININE-BSD FRML MDRD: 127 ML/MIN/1.73 (ref 64–149)
GLOBULIN UR ELPH-MCNC: 2.9 GM/DL (ref 2.3–3.5)
GLUCOSE SERPL-MCNC: 86 MG/DL (ref 70–99)
GLUCOSE UR STRIP-MCNC: NEGATIVE MG/DL
HCT VFR BLD AUTO: 43.1 % (ref 34–46.6)
HGB BLD-MCNC: 14 G/DL (ref 12–15.9)
HGB UR QL STRIP.AUTO: ABNORMAL
HYALINE CASTS UR QL AUTO: ABNORMAL /LPF
KETONES UR QL STRIP: NEGATIVE
LEUKOCYTE ESTERASE UR QL STRIP.AUTO: NEGATIVE
LYMPHOCYTES # BLD AUTO: 3.6 10*3/MM3 (ref 0.7–3.1)
LYMPHOCYTES NFR BLD AUTO: 30.9 % (ref 19.6–45.3)
MCH RBC QN AUTO: 30.2 PG (ref 26.6–33)
MCHC RBC AUTO-ENTMCNC: 32.5 G/DL (ref 31.5–35.7)
MCV RBC AUTO: 93.1 FL (ref 79–97)
MONOCYTES # BLD AUTO: 0.96 10*3/MM3 (ref 0.1–0.9)
MONOCYTES NFR BLD AUTO: 8.2 % (ref 5–12)
MUCOUS THREADS URNS QL MICRO: ABNORMAL /HPF
NEUTROPHILS NFR BLD AUTO: 59.9 % (ref 42.7–76)
NEUTROPHILS NFR BLD AUTO: 6.98 10*3/MM3 (ref 1.7–7)
NITRITE UR QL STRIP: NEGATIVE
PH UR STRIP.AUTO: 8 [PH] (ref 5.5–8)
PLATELET # BLD AUTO: 310 10*3/MM3 (ref 140–450)
PMV BLD AUTO: 9 FL (ref 6–12)
POTASSIUM SERPL-SCNC: 4.4 MMOL/L (ref 3.4–5)
PROT SERPL-MCNC: 7.7 G/DL (ref 6.3–8.6)
PROT UR QL STRIP: ABNORMAL
RBC # BLD AUTO: 4.63 10*6/MM3 (ref 3.77–5.28)
RBC # UR: ABNORMAL /HPF
REF LAB TEST METHOD: ABNORMAL
SODIUM SERPL-SCNC: 139 MMOL/L (ref 137–145)
SP GR UR STRIP: 1.02 (ref 1–1.03)
SQUAMOUS #/AREA URNS HPF: ABNORMAL /HPF
UROBILINOGEN UR QL STRIP: ABNORMAL
WBC # BLD AUTO: 11.65 10*3/MM3 (ref 3.4–10.8)
WBC UR QL AUTO: ABNORMAL /HPF

## 2021-11-04 PROCEDURE — 81001 URINALYSIS AUTO W/SCOPE: CPT | Performed by: NURSE PRACTITIONER

## 2021-11-04 PROCEDURE — 80053 COMPREHEN METABOLIC PANEL: CPT | Performed by: NURSE PRACTITIONER

## 2021-11-04 PROCEDURE — G0481 DRUG TEST DEF 8-14 CLASSES: HCPCS | Performed by: NURSE PRACTITIONER

## 2021-11-04 PROCEDURE — 80307 DRUG TEST PRSMV CHEM ANLYZR: CPT | Performed by: NURSE PRACTITIONER

## 2021-11-04 PROCEDURE — 36415 COLL VENOUS BLD VENIPUNCTURE: CPT | Performed by: NURSE PRACTITIONER

## 2021-11-04 PROCEDURE — 82306 VITAMIN D 25 HYDROXY: CPT | Performed by: NURSE PRACTITIONER

## 2021-11-04 PROCEDURE — 85025 COMPLETE CBC W/AUTO DIFF WBC: CPT | Performed by: NURSE PRACTITIONER

## 2021-11-04 PROCEDURE — 82607 VITAMIN B-12: CPT | Performed by: NURSE PRACTITIONER

## 2021-11-04 PROCEDURE — 87086 URINE CULTURE/COLONY COUNT: CPT | Performed by: NURSE PRACTITIONER

## 2021-11-04 PROCEDURE — 99214 OFFICE O/P EST MOD 30 MIN: CPT | Performed by: NURSE PRACTITIONER

## 2021-11-04 PROCEDURE — 82746 ASSAY OF FOLIC ACID SERUM: CPT | Performed by: NURSE PRACTITIONER

## 2021-11-04 RX ORDER — DULOXETIN HYDROCHLORIDE 30 MG/1
30 CAPSULE, DELAYED RELEASE ORAL DAILY
Qty: 30 CAPSULE | Refills: 5 | Status: SHIPPED | OUTPATIENT
Start: 2021-11-04 | End: 2021-12-13 | Stop reason: SDUPTHER

## 2021-11-04 RX ORDER — ROPINIROLE 1 MG/1
1 TABLET, FILM COATED ORAL 2 TIMES DAILY
Qty: 180 TABLET | Refills: 1 | Status: SHIPPED | OUTPATIENT
Start: 2021-11-04 | End: 2021-12-13 | Stop reason: SDUPTHER

## 2021-11-04 RX ORDER — CLONAZEPAM 1 MG/1
TABLET ORAL
Qty: 45 TABLET | Refills: 0 | Status: CANCELLED | OUTPATIENT
Start: 2021-11-04

## 2021-11-04 RX ORDER — SULFAMETHOXAZOLE AND TRIMETHOPRIM 800; 160 MG/1; MG/1
1 TABLET ORAL 2 TIMES DAILY
Qty: 14 TABLET | Refills: 0 | Status: SHIPPED | OUTPATIENT
Start: 2021-11-04 | End: 2021-11-11

## 2021-11-04 RX ORDER — APIXABAN 5 MG/1
5 TABLET, FILM COATED ORAL EVERY 12 HOURS SCHEDULED
Qty: 60 TABLET | Refills: 0 | Status: CANCELLED | OUTPATIENT
Start: 2021-11-04

## 2021-11-04 RX ORDER — DILTIAZEM HYDROCHLORIDE 120 MG/1
120 CAPSULE, COATED, EXTENDED RELEASE ORAL DAILY
Qty: 60 CAPSULE | Refills: 5 | Status: SHIPPED | OUTPATIENT
Start: 2021-11-04 | End: 2021-12-13 | Stop reason: SDUPTHER

## 2021-11-04 RX ORDER — ALBUTEROL SULFATE 2.5 MG/.5ML
2.5 SOLUTION RESPIRATORY (INHALATION) EVERY 4 HOURS PRN
Qty: 50 ML | Refills: 5 | Status: SHIPPED | OUTPATIENT
Start: 2021-11-04 | End: 2021-12-13 | Stop reason: SDUPTHER

## 2021-11-04 RX ORDER — ALBUTEROL SULFATE 90 UG/1
2 AEROSOL, METERED RESPIRATORY (INHALATION) EVERY 6 HOURS PRN
Qty: 54 G | Refills: 2 | Status: SHIPPED | OUTPATIENT
Start: 2021-11-04 | End: 2021-12-13 | Stop reason: SDUPTHER

## 2021-11-04 RX ORDER — CLONAZEPAM 1 MG/1
1 TABLET ORAL 2 TIMES DAILY PRN
Qty: 60 TABLET | Refills: 0 | Status: SHIPPED | OUTPATIENT
Start: 2021-11-04 | End: 2021-12-09 | Stop reason: SDUPTHER

## 2021-11-04 RX ORDER — ONDANSETRON 4 MG/1
4 TABLET, FILM COATED ORAL EVERY 8 HOURS PRN
Qty: 90 TABLET | Refills: 5 | Status: SHIPPED | OUTPATIENT
Start: 2021-11-04 | End: 2021-12-13 | Stop reason: SDUPTHER

## 2021-11-04 NOTE — PROGRESS NOTES
Subjective   Cally Olsen is a 31 y.o. female.       Chief Complaint   Patient presents with   • Anxiety        History of Present Illness     Patient here for follow up of chronic anxiety, worsening due to severity of chronic comorbid conditions of coagulopathy with  Multitudes of previous CVAs, Lupus with chronic myalgia and arthralgia pains, decreased exercise tolerance due to pain, associated memory issues, brain fog, and a stressful and non supportive spouse. Her pain provider had notified office to start weaning clonazepam which had been recently prescribed as SSRI, SNRI, and tricyclics were not adequately controlling her anxiety, and she is on hydrocodone for her chronic pain through a pain management clinic/ provider. Advised they will begin weaning hydrocodone if benzo not gone by her next visit in 2 months. Weaning was initiated but patient is at her wit's end with anxiety and having to choose to treat one problem or the other. She is stressed beyond her limits and there is no hope that her physical condition will improve. After much discussion, we have agreed that I will increase her clonazepam back to BID today, and contract with her for hydrocodone.  She will continue with pain management for interventions only. She is compliant with medications and is low risk for abuse. All drug screens and RORY reports as well as visit histories have been compliant to date.    Chronic pain secondary to multiple myalgias and joint pains of SLE and OA with DJD, onset more than 1 year ago and adequately treated with hydrocodone. She is compliant with use and not due for refill at this time. She is to continue with pain management for interventions for back/ hip, other joint pains, and has contracted with me today to continue hydrocodone and her benzodiazepine anxiolytic. Discussed risk of oversedation and prescribed a naloxone spray for emergency use with instructions for use. Will call when refill hydrocodone  is needed next.    UDS needed for baseline today.    No other complaint today.   Parts of most recent relevant visit HPI, ROS  and PE may be carried forward and all are updated as appropriate for current situation.    Past Surgical History:   Procedure Laterality Date   •  SECTION  2015    Repeat   • ENDOMETRIAL ABLATION     • INJECTION OF MEDICATION  2015    Kenalog (1)  -  SEFERINO Perla   • LEEP     • OTHER SURGICAL HISTORY  2015    Laparoscopy; tubal cautery (1)  - Exam under anesthesia and laparoscopic tubal fulguration.   • TUBAL ABDOMINAL LIGATION        Social History     Socioeconomic History   • Marital status:    Tobacco Use   • Smoking status: Current Every Day Smoker     Packs/day: 1.50     Years: 17.00     Pack years: 25.50     Types: Cigarettes     Start date:    • Smokeless tobacco: Never Used   Substance and Sexual Activity   • Alcohol use: No   • Drug use: No   • Sexual activity: Not Currently     Partners: Male     Birth control/protection: Tubal ligation      The following portions of the patient's history were reviewed and updated as appropriate: allergies, current medications, past family history, past medical history, past social history, past surgical history and problem list.    Review of Systems   Constitutional: Negative.    HENT: Negative.  Negative for congestion.    Eyes: Negative.  Negative for blurred vision, double vision, photophobia and visual disturbance.   Respiratory: Negative.  Negative for cough, shortness of breath, wheezing and stridor.    Cardiovascular: Negative.  Negative for chest pain, palpitations and leg swelling.   Gastrointestinal: Negative.  Negative for abdominal distention, abdominal pain, blood in stool, constipation, diarrhea, nausea, vomiting, GERD and indigestion.   Endocrine: Negative.  Negative for cold intolerance and heat intolerance.   Genitourinary: Negative.  Negative for dysuria, flank pain, frequency and urinary  incontinence.   Musculoskeletal: Positive for arthralgias and myalgias. Negative for back pain.   Skin: Negative.    Allergic/Immunologic: Negative.  Negative for immunocompromised state.   Neurological: Positive for memory problem.   Hematological: Negative.    Psychiatric/Behavioral: Negative for agitation, behavioral problems, decreased concentration, dysphoric mood, hallucinations, self-injury, sleep disturbance, suicidal ideas, negative for hyperactivity, depressed mood and stress. The patient is nervous/anxious.    All other systems reviewed and are negative.    PHQ-9 Depression Screening  Little interest or pleasure in doing things? 0   Feeling down, depressed, or hopeless? 0   Trouble falling or staying asleep, or sleeping too much? 0   Feeling tired or having little energy? 0   Poor appetite or overeating? 0   Feeling bad about yourself - or that you are a failure or have let yourself or your family down? 0   Trouble concentrating on things, such as reading the newspaper or watching television? 0   Moving or speaking so slowly that other people could have noticed? Or the opposite - being so fidgety or restless that you have been moving around a lot more than usual? 0   Thoughts that you would be better off dead, or of hurting yourself in some way? 0   PHQ-9 Total Score 0   If you checked off any problems, how difficult have these problems made it for you to do your work, take care of things at home, or get along with other people? Not difficult at all    Patient understands the risks associated with this controlled medication, including tolerance and addiction.  Patient also agrees to only obtain this medication from me, and not from a another provider, unless that provider is covering for me in my absence.  Patient also agrees to be compliant in dosing, and not self adjust the dose of medication.  A signed controlled substance agreement is on file, and the patient has received a controlled substance  "education sheet at this a previous visit.  The patient has also signed a consent for treatment with a controlled substance as per [facility] policy. RORY was obtained.    Objective    Vitals:    11/04/21 0850   BP: 124/77   BP Location: Left arm   Patient Position: Sitting   Cuff Size: Adult   Pulse: 77   Resp: 16   Temp: 98.1 °F (36.7 °C)   SpO2: 99%   Weight: 88.7 kg (195 lb 9.6 oz)   Height: 160 cm (62.99\")   PainSc:   5   PainLoc: Back     Body mass index is 34.66 kg/m².    Physical Exam  Vitals and nursing note reviewed.   Constitutional:       General: She is not in acute distress.     Appearance: Normal appearance. She is well-developed. She is obese. She is not ill-appearing or diaphoretic.   HENT:      Head: Normocephalic and atraumatic.   Eyes:      General: No scleral icterus.        Right eye: No discharge.         Left eye: No discharge.      Conjunctiva/sclera: Conjunctivae normal.      Pupils: Pupils are equal, round, and reactive to light.   Neck:      Thyroid: No thyromegaly.      Vascular: No carotid bruit or JVD.      Trachea: No tracheal deviation.   Cardiovascular:      Rate and Rhythm: Normal rate and regular rhythm.      Pulses: Normal pulses.      Heart sounds: Normal heart sounds. No murmur heard.  No friction rub. No gallop.    Pulmonary:      Effort: Pulmonary effort is normal. No respiratory distress.      Breath sounds: Normal breath sounds. No stridor. No wheezing, rhonchi or rales.   Chest:      Chest wall: No tenderness.   Abdominal:      General: Bowel sounds are normal.      Palpations: Abdomen is soft.   Musculoskeletal:         General: No tenderness or deformity. Normal range of motion.      Cervical back: Normal range of motion and neck supple. No rigidity or tenderness.      Right lower leg: No edema.      Left lower leg: No edema.   Lymphadenopathy:      Cervical: No cervical adenopathy.   Skin:     General: Skin is warm and dry.      Capillary Refill: Capillary refill " takes 2 to 3 seconds.      Coloration: Skin is not jaundiced or pale.      Findings: No bruising, erythema, lesion or rash.   Neurological:      General: No focal deficit present.      Mental Status: She is alert and oriented to person, place, and time. Mental status is at baseline.      Motor: No weakness.      Gait: Gait normal.   Psychiatric:         Mood and Affect: Mood normal.         Behavior: Behavior normal.         Thought Content: Thought content normal.         Judgment: Judgment normal.     stable chronic back pain, myalgias of OA with DJD, SLE. Managed with hydrocodone, duloxetine. Will refill when needed. Controlled substance education and contract reviewed and signed today. Naloxone prescription and education re use today. Due for a few labs to evaluated b12 and v levlels, CBC, CMP and UDS. Refill chronic meds as needed. Refill clonazepam not yet due but sent to pharmacy with increased dispense # and instructions to pharmacist that earlier refill than expected will be needed, to fill when due according to potential for BID use.  Worsening anxiety despite cymbalta, amitriptyline and prn clonazepam 1mg 1-2 tablets available for use daily, did better with BID every day.     Assessment/Plan   Diagnoses and all orders for this visit:    1. Encounter for vitamin deficiency screening (Primary)  -     Vitamin B12 & Folate  -     Vitamin D 25 Hydroxy    2. RLS (restless legs syndrome)  -     rOPINIRole (REQUIP) 1 MG tablet; Take 1 tablet by mouth 2 (Two) Times a Day. Take 1 hour before bedtime.  Dispense: 180 tablet; Refill: 1    3. Essential hypertension  -     dilTIAZem CD (Cardizem CD) 120 MG 24 hr capsule; Take 1 capsule by mouth Daily. For High blood pressure in addition to the lisinopril  Dispense: 60 capsule; Refill: 5  -     Comprehensive Metabolic Panel  -     CBC & Differential    4. HAYLEE (generalized anxiety disorder)  -     DULoxetine (CYMBALTA) 30 MG capsule; Take 1 capsule by mouth Daily.   Dispense: 30 capsule; Refill: 5  -     clonazePAM (KlonoPIN) 1 MG tablet; Take 1 tablet by mouth 2 (Two) Times a Day As Needed for Anxiety. Increased # dispensed 11/4/21. Will need earlier refill than before.  Dispense: 60 tablet; Refill: 0    5. SOB (shortness of breath)  -     albuterol (PROVENTIL) 2.5 MG/0.5ML nebulizer solution; Take 2.5 mg by nebulization Every 4 (Four) Hours As Needed (shortness of breath, wheezing.).  Dispense: 50 mL; Refill: 5    6. Cough  -     albuterol (PROVENTIL) 2.5 MG/0.5ML nebulizer solution; Take 2.5 mg by nebulization Every 4 (Four) Hours As Needed (shortness of breath, wheezing.).  Dispense: 50 mL; Refill: 5    7. Chronic obstructive pulmonary disease, unspecified COPD type (HCC)  -     albuterol sulfate  (90 Base) MCG/ACT inhaler; Inhale 2 puffs Every 6 (Six) Hours As Needed for Wheezing or Shortness of Air.  Dispense: 54 g; Refill: 2    8. Encounter for therapeutic drug level monitoring  -     ToxASSURE Select 13 Discrete -    9. Class 1 drug-induced obesity with serious comorbidity and body mass index (BMI) of 34.0 to 34.9 in adult  -     Vitamin D 25 Hydroxy    10. Nausea  -     ondansetron (ZOFRAN) 4 MG tablet; Take 1 tablet by mouth Every 8 (Eight) Hours As Needed for Nausea or Vomiting.  Dispense: 90 tablet; Refill: 5    Return in about 12 weeks (around 1/27/2022).                 This document has been electronically signed by HITESH Parra on November 4, 2021 09:47 CDT

## 2021-11-05 ENCOUNTER — TELEPHONE (OUTPATIENT)
Dept: FAMILY MEDICINE CLINIC | Facility: CLINIC | Age: 31
End: 2021-11-05

## 2021-11-05 LAB
25(OH)D3 SERPL-MCNC: 33.2 NG/ML
BACTERIA SPEC AEROBE CULT: NORMAL
FOLATE SERPL-MCNC: 6.49 NG/ML (ref 4.78–24.2)
VIT B12 BLD-MCNC: 736 PG/ML (ref 211–946)

## 2021-11-05 NOTE — TELEPHONE ENCOUNTER
----- Message from HITESH Saul sent at 11/5/2021  9:06 AM CDT -----  Regarding: advise labs good except urine, Bactrim sent for UTI, culture pending.  Thanks. No med changes needed at present.

## 2021-11-06 DIAGNOSIS — N76.0 BV (BACTERIAL VAGINOSIS): ICD-10-CM

## 2021-11-06 DIAGNOSIS — B96.89 BV (BACTERIAL VAGINOSIS): ICD-10-CM

## 2021-11-08 RX ORDER — FLUCONAZOLE 150 MG/1
TABLET ORAL
Qty: 2 TABLET | Refills: 0 | Status: SHIPPED | OUTPATIENT
Start: 2021-11-08 | End: 2022-10-04

## 2021-11-08 NOTE — TELEPHONE ENCOUNTER
Patient is currently taking antibiotics for Crystal. She is having some vaginal symptoms and wants the diflucan. I told her to let us know if still having symptoms after taking diflucan.

## 2021-11-12 LAB
6MAM UR QL CFM: NEGATIVE
6MAM/CREAT UR: NOT DETECTED NG/MG CREAT
7AMINOCLONAZEPAM/CREAT UR: 519 NG/MG CREAT
A-OH ALPRAZ/CREAT UR: NOT DETECTED NG/MG CREAT
A-OH-TRIAZOLAM/CREAT UR CFM: NOT DETECTED NG/MG CREAT
ALFENTANIL/CREAT UR CFM: NOT DETECTED NG/MG CREAT
ALPHA-HYDROXYMIDAZOLAM, URINE: NOT DETECTED NG/MG CREAT
ALPRAZ/CREAT UR CFM: NOT DETECTED NG/MG CREAT
AMOBARBITAL UR QL CFM: NOT DETECTED
AMPHET/CREAT UR: NOT DETECTED NG/MG CREAT
AMPHETAMINES UR QL CFM: NEGATIVE
BARBITAL UR QL CFM: NOT DETECTED
BARBITURATES UR QL CFM: NEGATIVE
BENZODIAZ UR QL CFM: NORMAL
BUPRENORPHINE UR QL CFM: NEGATIVE
BUPRENORPHINE/CREAT UR: NOT DETECTED NG/MG CREAT
BUTABARBITAL UR QL CFM: NOT DETECTED
BUTALBITAL UR QL CFM: NOT DETECTED
BZE/CREAT UR: NOT DETECTED NG/MG CREAT
CANNABINOIDS UR QL CFM: NEGATIVE
CARBOXYTHC/CREAT UR: NOT DETECTED NG/MG CREAT
CLONAZEPAM/CREAT UR CFM: NOT DETECTED NG/MG CREAT
COCAETHYLENE/CREAT UR CFM: NOT DETECTED NG/MG CREAT
COCAINE UR QL CFM: NEGATIVE
COCAINE/CREAT UR CFM: NOT DETECTED NG/MG CREAT
CODEINE/CREAT UR: NOT DETECTED NG/MG CREAT
CREAT UR-MCNC: 185 MG/DL
DESALKYLFLURAZ/CREAT UR: NOT DETECTED NG/MG CREAT
DESMETHYLFLUNITRAZEPAM: NOT DETECTED NG/MG CREAT
DHC/CREAT UR: 189 NG/MG CREAT
DIAZEPAM/CREAT UR: NOT DETECTED NG/MG CREAT
DRUGS UR: NORMAL
EDDP/CREAT UR: NOT DETECTED NG/MG CREAT
ETHANOL UR CFM-MCNC: NOT DETECTED G/DL
ETHANOL UR QL CFM: NEGATIVE
FENTANYL UR QL CFM: NEGATIVE
FENTANYL/CREAT UR: NOT DETECTED NG/MG CREAT
FLUNITRAZEPAM UR QL CFM: NOT DETECTED NG/MG CREAT
HYDROCODONE/CREAT UR: 1830 NG/MG CREAT
HYDROMORPHONE/CREAT UR: 134 NG/MG CREAT
LEVEL OF DETECTION:: NORMAL
LORAZEPAM/CREAT UR: NOT DETECTED NG/MG CREAT
MDA/CREAT UR: NOT DETECTED NG/MG CREAT
MDMA/CREAT UR: NOT DETECTED NG/MG CREAT
MEPHOBARBITAL UR QL CFM: NOT DETECTED
METHADONE UR QL CFM: NEGATIVE
METHADONE/CREAT UR: NOT DETECTED NG/MG CREAT
METHAMPHET/CREAT UR: NOT DETECTED NG/MG CREAT
MIDAZOLAM/CREAT UR CFM: NOT DETECTED NG/MG CREAT
MORPHINE/CREAT UR: NOT DETECTED NG/MG CREAT
N-NORTRAMADOL/CREAT UR CFM: NOT DETECTED NG/MG CREAT
NARCOTICS UR: NEGATIVE
NORBUPRENORPHINE/CREAT UR: NOT DETECTED NG/MG CREAT
NORCODEINE/CREAT UR CFM: NOT DETECTED NG/MG CREAT
NORDIAZEPAM/CREAT UR: NOT DETECTED NG/MG CREAT
NORFENTANYL/CREAT UR: NOT DETECTED NG/MG CREAT
NORHYDROCODONE/CREAT UR: >2703 NG/MG CREAT
NORMORPHINE UR-MCNC: NOT DETECTED NG/MG CREAT
NOROXYCODONE/CREAT UR: NOT DETECTED NG/MG CREAT
NOROXYMORPHONE/CREAT UR CFM: NOT DETECTED NG/MG CREAT
O-NORTRAMADOL UR CFM-MCNC: NOT DETECTED NG/MG CREAT
OPIATES UR QL CFM: NORMAL
OXAZEPAM/CREAT UR: NOT DETECTED NG/MG CREAT
OXYCODONE UR QL CFM: NEGATIVE
OXYCODONE/CREAT UR: NOT DETECTED NG/MG CREAT
OXYMORPHONE/CREAT UR: NOT DETECTED NG/MG CREAT
PENTOBARB UR QL CFM: NOT DETECTED
PHENOBARB UR QL CFM: NOT DETECTED
SECOBARBITAL UR QL CFM: NOT DETECTED
SUFENTANIL/CREAT UR CFM: NOT DETECTED NG/MG CREAT
TAPENTADOL UR QL CFM: NEGATIVE
TAPENTADOL/CREAT UR: NOT DETECTED NG/MG CREAT
TEMAZEPAM/CREAT UR: NOT DETECTED NG/MG CREAT
THIOPENTAL UR QL CFM: NOT DETECTED
TRAMADOL UR QL CFM: NOT DETECTED NG/MG CREAT

## 2021-11-15 ENCOUNTER — CLINICAL SUPPORT (OUTPATIENT)
Dept: FAMILY MEDICINE CLINIC | Facility: CLINIC | Age: 31
End: 2021-11-15

## 2021-11-15 ENCOUNTER — LAB (OUTPATIENT)
Dept: LAB | Facility: HOSPITAL | Age: 31
End: 2021-11-15

## 2021-11-15 DIAGNOSIS — R05.9 COUGH: Primary | ICD-10-CM

## 2021-11-15 DIAGNOSIS — J02.9 SORE THROAT: ICD-10-CM

## 2021-11-15 DIAGNOSIS — R05.9 COUGH: ICD-10-CM

## 2021-11-15 DIAGNOSIS — R09.89 RUNNY NOSE: ICD-10-CM

## 2021-11-15 LAB — SARS-COV-2 N GENE RESP QL NAA+PROBE: NOT DETECTED

## 2021-11-15 PROCEDURE — 87635 SARS-COV-2 COVID-19 AMP PRB: CPT

## 2021-11-15 PROCEDURE — 99211 OFF/OP EST MAY X REQ PHY/QHP: CPT | Performed by: FAMILY MEDICINE

## 2021-12-01 ENCOUNTER — OFFICE VISIT (OUTPATIENT)
Dept: SURGERY | Facility: CLINIC | Age: 31
End: 2021-12-01

## 2021-12-01 VITALS
HEIGHT: 63 IN | DIASTOLIC BLOOD PRESSURE: 64 MMHG | WEIGHT: 195 LBS | TEMPERATURE: 96.6 F | BODY MASS INDEX: 34.55 KG/M2 | SYSTOLIC BLOOD PRESSURE: 120 MMHG | HEART RATE: 93 BPM

## 2021-12-01 DIAGNOSIS — R92.8 ABNORMAL MAMMOGRAM: Primary | ICD-10-CM

## 2021-12-01 PROCEDURE — 99213 OFFICE O/P EST LOW 20 MIN: CPT | Performed by: SURGERY

## 2021-12-01 RX ORDER — LIDOCAINE HYDROCHLORIDE AND EPINEPHRINE 10; 10 MG/ML; UG/ML
20 INJECTION, SOLUTION INFILTRATION; PERINEURAL ONCE
Status: CANCELLED | OUTPATIENT
Start: 2021-12-01 | End: 2021-12-01

## 2021-12-01 NOTE — PROGRESS NOTES
Patient was initially seen in September 27, 2021 for this same problem.  She ended up developing Covid and had some other issues and had to cancel the procedure.  She is recovered from the Covid.  We were able to finally contact her after multiple attempts by phone and by mail and she presents today to be set up for surgery as we had originally planned with bilateral breast biopsies using ultrasound guidance.  I examined the patient today and discussed everything with her and below history and physical applies and is unchanged.    I went over again with her what is involved with bilateral ultrasound mammotome biopsies and leave a clip.  She clearly understands the procedure alternatives risk benefits and wishes to proceed        Subjective      Cally Olsen is a 31 y.o. female      Chief Complaint: Bilateral breast abnormalities on recent screening mammogram and then diagnostic mammogram with ultrasounds     History of Present Illness referred after patient underwent bilateral screening mammogram and subsequent bilateral diagnostic mammograms with ultrasound demonstrated a hypoechoic lesion in the right breast at 10:00 axis VIII centimeters from the nipple that is 6 x 8 mm in size. And in the left breast at 12 o'clock axis V centimeters from the nipple 5.5 x 4 mm hypoechoic mass.  Patient has an aunt and a grandmother who had breast cancer but no first-degree relatives that she is aware of.  She also has a history of multiple strokes last of which was over a year ago.  She is followed by neurologist in [place] and she takes Eliquis daily.  She has been able to come off the Eliquis for procedures in the past.  Is questionable area of a palpable mass in the right and left breast.  No prior mammograms or ultrasounds done.  No nipple discharge.     Review of Systems   Eyes: Negative.    Respiratory: Negative.    Cardiovascular: Negative.    Gastrointestinal: Negative.    Endocrine: Negative.    Genitourinary:  Negative.    Musculoskeletal: Positive for arthralgias and back pain.   Skin: Negative.    Allergic/Immunologic: Negative.    Neurological: Positive for weakness and headaches.        Left side weakness   Hematological: Negative.    Psychiatric/Behavioral: Negative.       Medical History        Past Medical History:   Diagnosis Date   • Abnormal Pap smear of cervix     • Acute pharyngitis       unspecified     • Adjustment disorder with anxious mood       Celexa     • Allergic rhinitis     • Asthma     • Backache     • Candidiasis     • Cervical intraepithelial neoplasia grade 1     • Contraception       Desires permanent sterilization    • Depressive disorder     • Dysphagia       unspecified     • Encounter for gynecological examination without abnormal finding     • Encounter for  visit        visit status    • Excessive and frequent menstruation with irregular cycle     • Excessive and frequent menstruation with regular cycle     • Fatigue     • Generalized abdominal pain     • Generalized anxiety disorder     • Headache     • Heartburn     • Hip pain     • History of CVA (cerebrovascular accident) 2021   • Hypertension     • Insomnia        Trazodone   • Low grade squamous intraepithelial lesion (LGSIL) on cervicovaginal cytologic smear     • Malaise     • Organic anxiety disorder     • Ovarian cancer (CMS/HCC)     • Spasm of back muscles     • Stroke (CMS/HCC)     • Surgical follow-up care     • Tobacco dependence syndrome     • Urinary tract infection           Surgical History         Past Surgical History:   Procedure Laterality Date   •  SECTION   2015     Repeat   • ENDOMETRIAL ABLATION       • INJECTION OF MEDICATION   2015     Kenalog (1)  -  SEFERINO Perla   • FRIDAP       • OTHER SURGICAL HISTORY   2015     Laparoscopy; tubal cautery (1)  - Exam under anesthesia and laparoscopic tubal fulguration.   • TUBAL ABDOMINAL LIGATION                   Family History    Problem Relation Age of Onset   • Seizures Mother     • Alcohol abuse Father     • Stroke Other     • Breast cancer Maternal Grandmother     • Ovarian cancer Maternal Grandmother     • Breast cancer Maternal Aunt        Social History   Social History            Socioeconomic History   • Marital status:        Spouse name: Not on file   • Number of children: Not on file   • Years of education: Not on file   • Highest education level: Not on file   Tobacco Use   • Smoking status: Current Some Day Smoker       Packs/day: 1.50       Years: 17.00       Pack years: 25.50       Types: Cigarettes       Start date: 2004   • Smokeless tobacco: Never Used   Substance and Sexual Activity   • Alcohol use: No   • Drug use: No   • Sexual activity: Not Currently       Partners: Male       Birth control/protection: Tubal ligation               Allergies   Allergen Reactions   • Amoxil [Amoxicillin] Anaphylaxis       Anaphylaxis    • Latex, Natural Rubber Swelling       Had swelling when she wore gloves   • Hydroxyzine Hcl Rash and Swelling   • Latex Hives   • Nickel Rash   • Paroxetine Hcl Rash and Swelling   • Paxil [Paroxetine] Rash   • Vistaril [Hydroxyzine] Rash          Vitals:     09/27/21 1357   BP: 150/98   Pulse: 99   Temp: 97.1 °F (36.2 °C)         Home Medications:          Prior to Admission medications    Medication Sig Start Date End Date Taking? Authorizing Provider   albuterol sulfate  (90 Base) MCG/ACT inhaler INHALE 2 PUFFS BY MOUTH EVERY 6 HOURS AS NEEDED FOR WHEEZING OR SHORTNESS OF BREATH 7/15/21   Yes HoffmannSima APRN   amitriptyline (ELAVIL) 50 MG tablet Take 1 tablet by mouth Every Night. 7/29/21   Yes Sima Hoffmann APRN   atorvastatin (LIPITOR) 40 MG tablet Take 1 tablet by mouth Daily. 9/13/21 9/13/22 Yes Sima Hoffmann APRN   cetirizine (zyrTEC) 10 MG tablet Take 1 tablet by mouth Daily. 8/12/19   Yes Ruth Billings APRN   clonazePAM (KlonoPIN) 1 MG tablet Take 1  tablet by mouth 2 (Two) Times a Day As Needed for Anxiety. 9/2/21   Yes Sima Hoffmann APRN   dilTIAZem CD (Cardizem CD) 120 MG 24 hr capsule Take 1 capsule by mouth Daily. For High blood pressure in addition to the lisinopril 9/2/21   Yes Sima Hoffmann APRN   DULoxetine (CYMBALTA) 30 MG capsule Take 30 mg by mouth Daily.     Yes Marylou French MD   Eliquis 5 MG tablet tablet   3/31/21   Yes ProviderMarylou MD   fluconazole (DIFLUCAN) 150 MG tablet Take 1 tablet PO on day 3 and day 7 of antibiotic 9/15/21   Yes oSle De Leon APRN   gabapentin (NEURONTIN) 400 MG capsule Take 1 capsule by mouth 4 (Four) Times a Day. 2/24/21   Yes Sima Hoffmann APRN   guaiFENesin-dextromethorphan (ROBITUSSIN DM) 100-10 MG/5ML syrup Take 10 mL by mouth 4 (Four) Times a Day As Needed for Cough. 3/8/21   Yes Ruth Billings APRN   HYDROcodone-acetaminophen (NORCO)  MG per tablet TK 1 TABLETY BY MOUTH FOUR TIMES DAILY AS NEEDED FOR PAIN 2/16/19   Yes Marylou French MD   lisinopril (PRINIVIL,ZESTRIL) 10 MG tablet   9/24/21   Yes Marylou French MD   ondansetron (ZOFRAN) 4 MG tablet Take 1 tablet by mouth Every 8 (Eight) Hours As Needed for Nausea or Vomiting. 9/2/21   Yes Sima Hoffmann APRN   rOPINIRole (REQUIP) 1 MG tablet Take 1 tablet by mouth 2 (Two) Times a Day. Take 1 hour before bedtime. 11/5/19   Yes Sima Hoffmann APRN   apixaban (ELIQUIS) 5 MG tablet tablet Take 5 mg by mouth. 12/7/20 9/27/21   ProviderMarylou MD   DULoxetine (CYMBALTA) 30 MG capsule TAKE 1 CAPSULE BY MOUTH DAILY 9/28/20     Sima Hoffmann APRN   DULoxetine (CYMBALTA) 30 MG capsule TAKE 1 CAPSULE BY MOUTH DAILY 4/29/21 9/27/21   Sima Hoffmann APRN   lisinopril (PRINIVIL,ZESTRIL) 5 MG tablet Take 10 mg by mouth Daily. 3/26/21 9/27/21   Provider, Historical, MD               Objective      Physical Exam  Vitals reviewed.   Constitutional:       General: She is not in  acute distress.     Appearance: She is well-developed.   HENT:      Head: Normocephalic and atraumatic.      Nose: Nose normal.   Eyes:      Conjunctiva/sclera: Conjunctivae normal.   Neck:      Thyroid: No thyromegaly.      Trachea: No tracheal deviation.   Cardiovascular:      Rate and Rhythm: Normal rate and regular rhythm.      Heart sounds: Normal heart sounds. No murmur heard.     Pulmonary:      Effort: Pulmonary effort is normal. No respiratory distress.      Breath sounds: Normal breath sounds. No wheezing or rales.   Chest:      Chest wall: No tenderness.       Abdominal:      General: There is no distension.      Palpations: Abdomen is soft.      Tenderness: There is no abdominal tenderness. There is no guarding or rebound.      Hernia: No hernia is present.   Musculoskeletal:         General: No tenderness or deformity.      Cervical back: Normal range of motion.   Skin:     General: Skin is warm and dry.      Findings: No rash.   Neurological:      Mental Status: She is alert and oriented to person, place, and time.   Psychiatric:         Behavior: Behavior normal.         Thought Content: Thought content normal.         Judgment: Judgment normal.                  Assessment/Plan    Recommend bilateral ultrasound mammotome biopsies and leave a clip.  I fully discussed the procedures as well as alternatives risk benefits patient clearly understands and wishes to proceed.  She will plan on holding her Eliquis for 24 hours prior to the procedure.        There were no encounter diagnoses.

## 2021-12-01 NOTE — PATIENT INSTRUCTIONS
"BMI for Adults  What is BMI?  Body mass index (BMI) is a number that is calculated from a person's weight and height. BMI can help estimate how much of a person's weight is composed of fat. BMI does not measure body fat directly. Rather, it is an alternative to procedures that directly measure body fat, which can be difficult and expensive.  BMI can help identify people who may be at higher risk for certain medical problems.  What are BMI measurements used for?  BMI is used as a screening tool to identify possible weight problems. It helps determine whether a person is obese, overweight, a healthy weight, or underweight.  BMI is useful for:  · Identifying a weight problem that may be related to a medical condition or may increase the risk for medical problems.  · Promoting changes, such as changes in diet and exercise, to help reach a healthy weight. BMI screening can be repeated to see if these changes are working.  How is BMI calculated?  BMI involves measuring your weight in relation to your height. Both height and weight are measured, and the BMI is calculated from those numbers. This can be done either in English (U[place] or metric measurements. Note that charts and online BMI calculators are available to help you find your BMI quickly and easily without having to do these calculations yourself.  To calculate your BMI in English ([place] measurements:    1. Measure your weight in pounds (lb).  2. Multiply the number of pounds by 703.  ? For example, for a person who weighs 180 lb, multiply that number by 703, which equals 126,540.  3. Measure your height in inches. Then multiply that number by itself to get a measurement called \"inches squared.\"  ? For example, for a person who is 70 inches tall, the \"inches squared\" measurement is 70 inches x 70 inches, which equals 4,900 inches squared.  4. Divide the total from step 2 (number of lb x 703) by the total from step 3 (inches squared): 126,540 ÷ 4,900 = 25.8. This is " "your BMI.    To calculate your BMI in metric measurements:  1. Measure your weight in kilograms (kg).  2. Measure your height in meters (m). Then multiply that number by itself to get a measurement called \"meters squared.\"  ? For example, for a person who is 1.75 m tall, the \"meters squared\" measurement is 1.75 m x 1.75 m, which is equal to 3.1 meters squared.  3. Divide the number of kilograms (your weight) by the meters squared number. In this example: 70 ÷ 3.1 = 22.6. This is your BMI.  What do the results mean?  BMI charts are used to identify whether you are underweight, normal weight, overweight, or obese. The following guidelines will be used:  · Underweight: BMI less than 18.5.  · Normal weight: BMI between 18.5 and 24.9.  · Overweight: BMI between 25 and 29.9.  · Obese: BMI of 30 or above.  Keep these notes in mind:  · Weight includes both fat and muscle, so someone with a muscular build, such as an athlete, may have a BMI that is higher than 24.9. In cases like these, BMI is not an accurate measure of body fat.  · To determine if excess body fat is the cause of a BMI of 25 or higher, further assessments may need to be done by a health care provider.  · BMI is usually interpreted in the same way for men and women.  Where to find more information  For more information about BMI, including tools to quickly calculate your BMI, go to these websites:  · Centers for Disease Control and Prevention: www.cdc.gov  · American Heart Association: www.heart.org  · National Heart, Lung, and Blood [place]: www.nhlbi.nih.gov  Summary  · Body mass index (BMI) is a number that is calculated from a person's weight and height.  · BMI may help estimate how much of a person's weight is composed of fat. BMI can help identify those who may be at higher risk for certain medical problems.  · BMI can be measured using English measurements or metric measurements.  · BMI charts are used to identify whether you are underweight, normal " weight, overweight, or obese.  This information is not intended to replace advice given to you by your health care provider. Make sure you discuss any questions you have with your health care provider.  Document Revised: 09/09/2020 Document Reviewed: 07/17/2020  Elsevier Patient Education © 2021 [employer]  Electronic Cigarette Information    Electronic cigarettes, or e-cigarettes, are battery-operated devices that deliver nicotine--a very addictive drug--to the body. They come in many shapes, including in the shape of a cigarette, pipe, pen, and even a USB memory stick. E-cigarettes have a cartridge that contains a liquid form of nicotine. When a person uses the device, the liquid heats up. It then becomes a vapor. Inhaling this vapor is called vaping.  Nicotine is thought to increase your risk for certain types of cancer. In addition to nicotine, e-cigarettes may contain other harmful and cancer-causing chemicals, including:  · Formaldehyde.  · Acetaldehyde.  · Heavy metals.  · Ultra-fine particles that can get inhaled deep into the lungs.  · Chemical colorings and flavorings.  It is not clear how much nicotine you get when vaping, and it is hard to know what chemicals are in the vaping liquids. The health effects of vaping are not completely known, but you should be aware of the possible dangers of using these products.  Some people may use e-cigarettes in order to quit smoking tobacco. However, this has not been proven to work, and the Food and Drug Administration (FDA) has not approved e-cigarettes for this purpose.  How can using electronic cigarettes affect me?  · You may be at risk for developing a dangerous lung disease. There are reports of an increasing number of cases involving serious lung problems, and even death, associated with e-cigarette use. Your risk may be even higher if you:  ? Buy e-cigarettes or vaping oils off the street.  ? Add any substances to the e-cigarettes that are not intended by  the .  · Vaping may make you crave nicotine. Nicotine does the following:  ? Changes your blood sugar levels.  ? Increases your heart rate, blood pressure, and breathing rate.  ? Increases your risk of developing blood clots (hypercoagulable state) and diabetes.  ? Increases your risk of gum disease that may lead to losing teeth.  · If you smoke e-cigarettes, you may be more likely to start smoking or to smoke more tobacco cigarettes.  · Becoming addicted to nicotine may make your brain more sensitive to other addictive drugs. You may move to other addictive substances.  · You may be in danger of overdosing on nicotine. Nicotine poisoning can cause nausea, vomiting, seizures, and trouble breathing.  · An e-cigarette may explode and cause fires and burn injuries.  · If you are pregnant, the nicotine in e-cigarettes may be harmful to your baby. Nicotine can cause:  ? Brain or lung problems for your baby.  ? Your baby to be born too early.  ? Your baby to be born with a low birth weight.  · Vaping has also been linked to decreases in memory and attention span in children and teens.  What actions can I take to stop vaping?  If you can, stop vaping on your own before you become addicted to nicotine. If you need help quitting, ask your health care provider. There are three effective ways to fight nicotine addiction:  · Nicotine replacement therapy. Using nicotine gum or a nicotine patch blocks your craving for nicotine. Over time, you can reduce the amount of nicotine you use until you can stop using nicotine completely without having cravings.  · Prescription medicines approved to fight nicotine addiction. These stop nicotine cravings or block the effects of nicotine.  · Behavioral therapy. This may include:  ? A self-help smoking cessation program.  ? Individual or group therapy.  ? A smoking cessation support group.  There are several national programs to help you quit smoking or vaping. These  include:  · Text message programs, such as SmokefreeTXT.  · Apps for mobile phones, including the free [employer]in miryam.  · Hotlines, such as 1-800-QUIT-NOW (1-239.222.6904).  Where to find support  You can get support at these sites:  · U.S. Department of Health and Human Services: https://smokefree.gov  · American Lung Association: www.lung.org  Where to find more information  Learn more about e-cigarettes from:  · National [place] on Drug Abuse: www.drugabuse.gov  · Centers for Disease Control and Prevention: www.cdc.gov  Summary  · E-cigarettes can cause nicotine addiction.  · E-cigarettes are not approved as a way to stop smoking. They are not a risk-free alternative to smoking tobacco.  · There are reports of an increasing number of cases involving serious lung problems, and even death, associated with e-cigarette use.  · If you can stop vaping on your own, do it before you become addicted to nicotine. If you need help quitting, ask your health care provider.  · There are various methods and programs that can help you stop smoking or vaping.  This information is not intended to replace advice given to you by your health care provider. Make sure you discuss any questions you have with your health care provider.  Document Revised: 04/14/2020 Document Reviewed: 02/28/2020  [employer] Patient Education © 2021 [employer]  For more information:    Quit Now [place]  1-800-QUIT-NOW  https://[place].quitlogix.org/en-[place]/  Steps to Quit Smoking  Smoking tobacco can be harmful to your health and can affect almost every organ in your body. Smoking puts you, and those around you, at risk for developing many serious chronic diseases. Quitting smoking is difficult, but it is one of the best things that you can do for your health. It is never too late to quit.  What are the benefits of quitting smoking?  When you quit smoking, you lower your risk of developing serious diseases and conditions, such as:  · Lung cancer or lung  disease, such as COPD.  · Heart disease.  · Stroke.  · Heart attack.  · Infertility.  · Osteoporosis and bone fractures.  Additionally, symptoms such as coughing, wheezing, and shortness of breath may get better when you quit. You may also find that you get sick less often because your body is stronger at fighting off colds and infections. If you are pregnant, quitting smoking can help to reduce your chances of having a baby of low birth weight.  How do I get ready to quit?  When you decide to quit smoking, create a plan to make sure that you are successful. Before you quit:  · Pick a date to quit. Set a date within the next two weeks to give you time to prepare.  · Write down the reasons why you are quitting. Keep this list in places where you will see it often, such as on your bathroom mirror or in your car or wallet.  · Identify the people, places, things, and activities that make you want to smoke (triggers) and avoid them. Make sure to take these actions:  ¨ Throw away all cigarettes at home, at work, and in your car.  ¨ Throw away smoking accessories, such as ashtrays and lighters.  ¨ Clean your car and make sure to empty the ashtray.  ¨ Clean your home, including curtains and carpets.  · Tell your family, friends, and coworkers that you are quitting. Support from your loved ones can make quitting easier.  · Talk with your health care provider about your options for quitting smoking.  · Find out what treatment options are covered by your health insurance.  What strategies can I use to quit smoking?  Talk with your healthcare provider about different strategies to quit smoking. Some strategies include:  · Quitting smoking altogether instead of gradually lessening how much you smoke over a period of time. Research shows that quitting “cold turkey” is more successful than gradually quitting.  · Attending in-person counseling to help you build problem-solving skills. You are more likely to have success in quitting  if you attend several counseling sessions. Even short sessions of 10 minutes can be effective.  · Finding resources and support systems that can help you to quit smoking and remain smoke-free after you quit. These resources are most helpful when you use them often. They can include:  ¨ Online chats with a counselor.  ¨ Telephone quitlines.  ¨ Printed self-help materials.  ¨ Support groups or group counseling.  ¨ Text messaging programs.  ¨ Mobile phone applications.  · Taking medicines to help you quit smoking. (If you are pregnant or breastfeeding, talk with your health care provider first.) Some medicines contain nicotine and some do not. Both types of medicines help with cravings, but the medicines that include nicotine help to relieve withdrawal symptoms. Your health care provider may recommend:  ¨ Nicotine patches, gum, or lozenges.  ¨ Nicotine inhalers or sprays.  ¨ Non-nicotine medicine that is taken by mouth.  Talk with your health care provider about combining strategies, such as taking medicines while you are also receiving in-person counseling. Using these two strategies together makes you more likely to succeed in quitting than if you used either strategy on its own.  If you are pregnant or breastfeeding, talk with your health care provider about finding counseling or other support strategies to quit smoking. Do not take medicine to help you quit smoking unless told to do so by your health care provider.  What things can I do to make it easier to quit?  Quitting smoking might feel overwhelming at first, but there is a lot that you can do to make it easier. Take these important actions:  · Reach out to your family and friends and ask that they support and encourage you during this time. Call telephone quitlines, reach out to support groups, or work with a counselor for support.  · Ask people who smoke to avoid smoking around you.  · Avoid places that trigger you to smoke, such as bars, parties, or  smoke-break areas at work.  · Spend time around people who do not smoke.  · Lessen stress in your life, because stress can be a smoking trigger for some people. To lessen stress, try:  ¨ Exercising regularly.  ¨ Deep-breathing exercises.  ¨ Yoga.  ¨ Meditating.  ¨ Performing a body scan. This involves closing your eyes, scanning your body from head to toe, and noticing which parts of your body are particularly tense. Purposefully relax the muscles in those areas.  · Download or purchase mobile phone or tablet apps (applications) that can help you stick to your quit plan by providing reminders, tips, and encouragement. There are many free apps, such as QuitGuide from the CDC (Centers for Disease Control and Prevention). You can find other support for quitting smoking (smoking cessation) through smokefree.gov and other websites.  How will I feel when I quit smoking?  Within the first 24 hours of quitting smoking, you may start to feel some withdrawal symptoms. These symptoms are usually most noticeable 2-3 days after quitting, but they usually do not last beyond 2-3 weeks. Changes or symptoms that you might experience include:  · Mood swings.  · Restlessness, anxiety, or irritation.  · Difficulty concentrating.  · Dizziness.  · Strong cravings for sugary foods in addition to nicotine.  · Mild weight gain.  · Constipation.  · Nausea.  · Coughing or a sore throat.  · Changes in how your medicines work in your body.  · A depressed mood.  · Difficulty sleeping (insomnia).  After the first 2-3 weeks of quitting, you may start to notice more positive results, such as:  · Improved sense of smell and taste.  · Decreased coughing and sore throat.  · Slower heart rate.  · Lower blood pressure.  · Clearer skin.  · The ability to breathe more easily.  · Fewer sick days.  Quitting smoking is very challenging for most people. Do not get discouraged if you are not successful the first time. Some people need to make many attempts to  quit before they achieve long-term success. Do your best to stick to your quit plan, and talk with your health care provider if you have any questions or concerns.  This information is not intended to replace advice given to you by your health care provider. Make sure you discuss any questions you have with your health care provider.  Document Released: 12/12/2002 Document Revised: 08/15/2017 Document Reviewed: 05/03/2016  Elsevier Interactive Patient Education © 2017 Elsevier Inc.

## 2021-12-09 ENCOUNTER — TELEPHONE (OUTPATIENT)
Dept: FAMILY MEDICINE CLINIC | Facility: CLINIC | Age: 31
End: 2021-12-09

## 2021-12-09 DIAGNOSIS — F41.1 GAD (GENERALIZED ANXIETY DISORDER): ICD-10-CM

## 2021-12-09 RX ORDER — CLONAZEPAM 1 MG/1
1 TABLET ORAL 2 TIMES DAILY PRN
Qty: 60 TABLET | Refills: 0 | Status: SHIPPED | OUTPATIENT
Start: 2021-12-09 | End: 2021-12-13 | Stop reason: SDUPTHER

## 2021-12-09 NOTE — TELEPHONE ENCOUNTER
Incoming Refill Request      Medication requested (name and dose):   clonazePAM (KlonoPIN) 1 MG tablet     Pharmacy where request should be sent:   [facility]    Additional details provided by patient:   [facility]AL TAKING OVER NORCO AS WELL BUT THIS WAS RECENTLY REFILL PER RORY ON 11/28/2021    Best call back number:     Does the patient have less than a 3 day supply:  [] Yes  [] No    Silva Kimball  12/09/21, 08:47 CST

## 2021-12-09 NOTE — TELEPHONE ENCOUNTER
Patient seen in office every 3 months for chronic anxiety requiring benzodiazepine anxiolytic. Compliant with medication, visits with no adverse effects noted. RORY and UDS current and appropriate. Patient called requesting scheduled refill at appropriate interval. Patient understands the risks associated with this controlled medication, including tolerance and addiction.  Patient also agrees to only obtain this medication from me, and not from a another provider, unless that provider is covering for me in my absence.  Patient also agrees to be compliant in dosing, and not self adjust the dose of medication.  A signed controlled substance agreement is on file, and the patient has received a controlled substance education sheet at this a previous visit.  The patient has also signed a consent for treatment with a controlled substance as per [facility] policy. RORY was obtained. Refill sent for clonazepam.     This document has been electronically signed by HITESH Parra on December 9, 2021 09:09 CST

## 2021-12-13 DIAGNOSIS — J30.2 SEASONAL ALLERGIC RHINITIS, UNSPECIFIED TRIGGER: ICD-10-CM

## 2021-12-13 DIAGNOSIS — M54.50 CHRONIC MIDLINE LOW BACK PAIN WITHOUT SCIATICA: Primary | ICD-10-CM

## 2021-12-13 DIAGNOSIS — E78.2 MIXED HYPERLIPIDEMIA: ICD-10-CM

## 2021-12-13 DIAGNOSIS — R11.0 NAUSEA: ICD-10-CM

## 2021-12-13 DIAGNOSIS — F41.1 GAD (GENERALIZED ANXIETY DISORDER): ICD-10-CM

## 2021-12-13 DIAGNOSIS — F51.01 PRIMARY INSOMNIA: Chronic | ICD-10-CM

## 2021-12-13 DIAGNOSIS — R05.9 COUGH: ICD-10-CM

## 2021-12-13 DIAGNOSIS — I10 ESSENTIAL HYPERTENSION: ICD-10-CM

## 2021-12-13 DIAGNOSIS — G89.29 CHRONIC MIDLINE LOW BACK PAIN WITHOUT SCIATICA: Primary | ICD-10-CM

## 2021-12-13 DIAGNOSIS — F41.1 GENERALIZED ANXIETY DISORDER: ICD-10-CM

## 2021-12-13 DIAGNOSIS — R06.02 SOB (SHORTNESS OF BREATH): ICD-10-CM

## 2021-12-13 DIAGNOSIS — G25.81 RLS (RESTLESS LEGS SYNDROME): ICD-10-CM

## 2021-12-13 DIAGNOSIS — J44.9 CHRONIC OBSTRUCTIVE PULMONARY DISEASE, UNSPECIFIED COPD TYPE (HCC): ICD-10-CM

## 2021-12-13 NOTE — TELEPHONE ENCOUNTER
Incoming Refill Request      Medication requested (name and dose):   NEEDING ALL MEDICATIONS    [facility] where [facility] be sent[facility]    Additional details provided by patient:   [facility] [place] [facility]     Best call back number:     Does the patient have less than a 3 day supply:  [] Yes  [] No    Silva Kimball  12/13/21, 15:53 CST

## 2021-12-14 RX ORDER — LISINOPRIL 10 MG/1
10 TABLET ORAL DAILY
Qty: 90 TABLET | Refills: 1 | Status: SHIPPED | OUTPATIENT
Start: 2021-12-14 | End: 2022-03-03

## 2021-12-14 RX ORDER — CETIRIZINE HYDROCHLORIDE 10 MG/1
10 TABLET ORAL DAILY
Qty: 30 TABLET | Refills: 11 | Status: SHIPPED | OUTPATIENT
Start: 2021-12-14

## 2021-12-14 RX ORDER — AMITRIPTYLINE HYDROCHLORIDE 50 MG/1
50 TABLET, FILM COATED ORAL NIGHTLY
Qty: 30 TABLET | Refills: 5 | Status: SHIPPED | OUTPATIENT
Start: 2021-12-14 | End: 2022-10-04 | Stop reason: SDUPTHER

## 2021-12-14 RX ORDER — ALBUTEROL SULFATE 90 UG/1
2 AEROSOL, METERED RESPIRATORY (INHALATION) EVERY 6 HOURS PRN
Qty: 54 G | Refills: 2 | Status: SHIPPED | OUTPATIENT
Start: 2021-12-14 | End: 2022-10-04 | Stop reason: SDUPTHER

## 2021-12-14 RX ORDER — GABAPENTIN 300 MG/1
300 CAPSULE ORAL 4 TIMES DAILY
Qty: 120 CAPSULE | Refills: 2 | Status: SHIPPED | OUTPATIENT
Start: 2021-12-14 | End: 2022-10-04 | Stop reason: SDUPTHER

## 2021-12-14 RX ORDER — ATORVASTATIN CALCIUM 40 MG/1
40 TABLET, FILM COATED ORAL DAILY
Qty: 90 TABLET | Refills: 1 | Status: SHIPPED | OUTPATIENT
Start: 2021-12-14 | End: 2022-10-04 | Stop reason: SDUPTHER

## 2021-12-14 RX ORDER — ROPINIROLE 1 MG/1
1 TABLET, FILM COATED ORAL 2 TIMES DAILY
Qty: 180 TABLET | Refills: 1 | Status: SHIPPED | OUTPATIENT
Start: 2021-12-14 | End: 2022-10-04 | Stop reason: SDUPTHER

## 2021-12-14 RX ORDER — ALBUTEROL SULFATE 2.5 MG/.5ML
2.5 SOLUTION RESPIRATORY (INHALATION) EVERY 4 HOURS PRN
Qty: 50 ML | Refills: 5 | Status: SHIPPED | OUTPATIENT
Start: 2021-12-14 | End: 2022-01-27 | Stop reason: SDUPTHER

## 2021-12-14 RX ORDER — CLONAZEPAM 1 MG/1
1 TABLET ORAL 2 TIMES DAILY PRN
Qty: 60 TABLET | Refills: 0 | Status: SHIPPED | OUTPATIENT
Start: 2021-12-14 | End: 2022-01-10 | Stop reason: SDUPTHER

## 2021-12-14 RX ORDER — ONDANSETRON 4 MG/1
4 TABLET, FILM COATED ORAL EVERY 8 HOURS PRN
Qty: 90 TABLET | Refills: 5 | Status: SHIPPED | OUTPATIENT
Start: 2021-12-14 | End: 2023-01-06

## 2021-12-14 RX ORDER — DILTIAZEM HYDROCHLORIDE 120 MG/1
120 CAPSULE, COATED, EXTENDED RELEASE ORAL DAILY
Qty: 60 CAPSULE | Refills: 5 | Status: SHIPPED | OUTPATIENT
Start: 2021-12-14 | End: 2022-10-04 | Stop reason: SDUPTHER

## 2021-12-14 RX ORDER — APIXABAN 5 MG/1
5 TABLET, FILM COATED ORAL EVERY 12 HOURS SCHEDULED
Qty: 60 TABLET | Refills: 5 | Status: SHIPPED | OUTPATIENT
Start: 2021-12-14 | End: 2022-03-01 | Stop reason: SDUPTHER

## 2021-12-14 RX ORDER — DULOXETIN HYDROCHLORIDE 30 MG/1
30 CAPSULE, DELAYED RELEASE ORAL DAILY
Qty: 30 CAPSULE | Refills: 5 | Status: SHIPPED | OUTPATIENT
Start: 2021-12-14 | End: 2022-10-04 | Stop reason: SDUPTHER

## 2021-12-14 RX ORDER — HYDROCODONE BITARTRATE AND ACETAMINOPHEN 10; 325 MG/1; MG/1
1 TABLET ORAL EVERY 6 HOURS PRN
Qty: 120 TABLET | Refills: 0 | Status: SHIPPED | OUTPATIENT
Start: 2021-12-14 | End: 2022-01-10 | Stop reason: SDUPTHER

## 2021-12-17 ENCOUNTER — APPOINTMENT (OUTPATIENT)
Dept: ULTRASOUND IMAGING | Facility: HOSPITAL | Age: 31
End: 2021-12-17

## 2022-01-10 DIAGNOSIS — M54.50 CHRONIC MIDLINE LOW BACK PAIN WITHOUT SCIATICA: ICD-10-CM

## 2022-01-10 DIAGNOSIS — G89.29 CHRONIC MIDLINE LOW BACK PAIN WITHOUT SCIATICA: ICD-10-CM

## 2022-01-10 DIAGNOSIS — F41.1 GAD (GENERALIZED ANXIETY DISORDER): ICD-10-CM

## 2022-01-10 NOTE — TELEPHONE ENCOUNTER
Incoming Refill Request      Medication requested (name and dose):   [facility]     Pharmacy where request should be sent:   [facility]     Additional details provided by patient:    Best call back number:     Does the patient have less than a 3 day supply:  [] Yes  [] No    Silva Kimball  01/10/22, 10:54 CST

## 2022-01-12 ENCOUNTER — TELEPHONE (OUTPATIENT)
Dept: FAMILY MEDICINE CLINIC | Facility: CLINIC | Age: 32
End: 2022-01-12

## 2022-01-12 RX ORDER — CLONAZEPAM 1 MG/1
1 TABLET ORAL 2 TIMES DAILY PRN
Qty: 60 TABLET | Refills: 0 | Status: SHIPPED | OUTPATIENT
Start: 2022-01-12 | End: 2022-02-08 | Stop reason: SDUPTHER

## 2022-01-12 RX ORDER — HYDROCODONE BITARTRATE AND ACETAMINOPHEN 10; 325 MG/1; MG/1
1 TABLET ORAL EVERY 6 HOURS PRN
Qty: 120 TABLET | Refills: 0 | Status: SHIPPED | OUTPATIENT
Start: 2022-01-12 | End: 2022-02-08 | Stop reason: SDUPTHER

## 2022-01-12 NOTE — TELEPHONE ENCOUNTER
Patient has chronic anxiety requiring benzodiazepine use concurrently with chronic pain requiring opiate pain medication and/ or gabapentin. Patient has been educated regarding potential dangers of oversedation and accidental overdose with use of both medications concurrently. Serial assessments of patient has yielded no sign of oversedation or adverse effects of patient, and he/she is advised and aware to notify my office immediately if symptoms do occur, as well as to discontinue use of benzodiazepine medication and opiate medication immediately if that occurs, pending medical evaluation and advice. Patient has been compliant with use of medications, UDS, visits with no adverse effects noted. Patient understands the risks associated with this controlled medication, including tolerance and addiction.  Patient also agrees to only obtain this medication from me, and not from a another provider, unless that provider is covering for me in my absence.  Patient also agrees to be compliant in dosing, and not self adjust the dose of medication.  A signed controlled substance agreement is on file, and the patient has received a controlled substance education sheet at this a previous visit.  The patient has also signed a consent for treatment with a controlled substance as per [facility] policy. RORY was obtained. Refills were sent for: hydrocodone and clonazepam.     This document has been electronically signed by HITESH Parra on January 12, 2022 09:47 CST

## 2022-01-13 ENCOUNTER — CLINICAL SUPPORT (OUTPATIENT)
Dept: FAMILY MEDICINE CLINIC | Facility: CLINIC | Age: 32
End: 2022-01-13

## 2022-01-13 ENCOUNTER — LAB (OUTPATIENT)
Dept: LAB | Facility: HOSPITAL | Age: 32
End: 2022-01-13

## 2022-01-13 DIAGNOSIS — Z20.822 CLOSE EXPOSURE TO COVID-19 VIRUS: Primary | ICD-10-CM

## 2022-01-13 PROCEDURE — 87635 SARS-COV-2 COVID-19 AMP PRB: CPT | Performed by: FAMILY MEDICINE

## 2022-01-13 PROCEDURE — 99211 OFF/OP EST MAY X REQ PHY/QHP: CPT | Performed by: FAMILY MEDICINE

## 2022-01-14 LAB — SARS-COV-2 N GENE RESP QL NAA+PROBE: NOT DETECTED

## 2022-01-27 ENCOUNTER — OFFICE VISIT (OUTPATIENT)
Dept: FAMILY MEDICINE CLINIC | Facility: CLINIC | Age: 32
End: 2022-01-27

## 2022-01-27 VITALS — WEIGHT: 195 LBS | BODY MASS INDEX: 34.55 KG/M2 | HEIGHT: 63 IN

## 2022-01-27 DIAGNOSIS — G89.29 CHRONIC MIDLINE LOW BACK PAIN WITHOUT SCIATICA: ICD-10-CM

## 2022-01-27 DIAGNOSIS — M54.50 CHRONIC MIDLINE LOW BACK PAIN WITHOUT SCIATICA: ICD-10-CM

## 2022-01-27 DIAGNOSIS — R05.9 COUGH: ICD-10-CM

## 2022-01-27 DIAGNOSIS — R06.02 SOB (SHORTNESS OF BREATH): ICD-10-CM

## 2022-01-27 DIAGNOSIS — F41.1 GAD (GENERALIZED ANXIETY DISORDER): ICD-10-CM

## 2022-01-27 DIAGNOSIS — J44.9 CHRONIC OBSTRUCTIVE PULMONARY DISEASE, UNSPECIFIED COPD TYPE: Primary | ICD-10-CM

## 2022-01-27 DIAGNOSIS — J06.9 ACUTE URI: ICD-10-CM

## 2022-01-27 DIAGNOSIS — G25.81 RLS (RESTLESS LEGS SYNDROME): ICD-10-CM

## 2022-01-27 PROCEDURE — G2025 DIS SITE TELE SVCS RHC/FQHC: HCPCS | Performed by: NURSE PRACTITIONER

## 2022-01-27 RX ORDER — AZITHROMYCIN 250 MG/1
TABLET, FILM COATED ORAL
Qty: 6 TABLET | Refills: 0 | Status: SHIPPED | OUTPATIENT
Start: 2022-01-27 | End: 2022-02-28

## 2022-01-27 RX ORDER — PREDNISONE 20 MG/1
20 TABLET ORAL 2 TIMES DAILY
Qty: 14 TABLET | Refills: 0 | Status: SHIPPED | OUTPATIENT
Start: 2022-01-27 | End: 2022-02-03

## 2022-01-27 RX ORDER — ALBUTEROL SULFATE 2.5 MG/.5ML
2.5 SOLUTION RESPIRATORY (INHALATION) EVERY 4 HOURS PRN
Qty: 50 ML | Refills: 5 | Status: SHIPPED | OUTPATIENT
Start: 2022-01-27
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
## 2022-02-01 ENCOUNTER — DOCUMENTATION (OUTPATIENT)
Dept: OBSTETRICS AND GYNECOLOGY | Facility: CLINIC | Age: 32
End: 2022-02-01
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
## 2022-02-08 ENCOUNTER — TELEPHONE (OUTPATIENT)
Dept: FAMILY MEDICINE CLINIC | Facility: CLINIC | Age: 32
End: 2022-02-08

## 2022-02-08 DIAGNOSIS — F41.1 GAD (GENERALIZED ANXIETY DISORDER): ICD-10-CM

## 2022-02-08 DIAGNOSIS — G89.29 CHRONIC MIDLINE LOW BACK PAIN WITHOUT SCIATICA: ICD-10-CM

## 2022-02-08 DIAGNOSIS — M54.50 CHRONIC MIDLINE LOW BACK PAIN WITHOUT SCIATICA: ICD-10-CM

## 2022-02-08 RX ORDER — HYDROCODONE BITARTRATE AND ACETAMINOPHEN 10; 325 MG/1; MG/1
1 TABLET ORAL EVERY 6 HOURS PRN
Qty: 120 TABLET | Refills: 0 | Status: SHIPPED | OUTPATIENT
Start: 2022-02-08 | End: 2022-03-08 | Stop reason: SDUPTHER

## 2022-02-08 RX ORDER — CLONAZEPAM 1 MG/1
1 TABLET ORAL 2 TIMES DAILY PRN
Qty: 60 TABLET | Refills: 0 | Status: SHIPPED | OUTPATIENT
Start: 2022-02-08 | End: 2022-03-08 | Stop reason: SDUPTHER

## 2022-02-08 NOTE — TELEPHONE ENCOUNTER
Incoming Refill Request      Medication requested (name and dose):   [facility]     [facility]     Additional details provided by patient:     Best call back number:     Does the patient have less than a 3 day supply:  [] Yes  [] No    Silva Kimball  02/08/22, 12:52 CST

## 2022-02-09 NOTE — TELEPHONE ENCOUNTER
Patient has chronic anxiety requiring benzodiazepine use concurrently with chronic pain requiring opiate pain medication and/ or gabapentin. Patient has been educated regarding potential dangers of oversedation and accidental overdose with use of both medications concurrently. Serial assessments of patient has yielded no sign of oversedation or adverse effects of patient, and he/she is advised and aware to notify my office immediately if symptoms do occur, as well as to discontinue use of benzodiazepine medication and opiate medication immediately if that occurs, pending medical evaluation and advice. Patient has been compliant with use of medications, UDS, visits with no adverse effects noted. Patient understands the risks associated with this controlled medication, including tolerance and addiction.  Patient also agrees to only obtain this medication from me, and not from a another provider, unless that provider is covering for me in my absence.  Patient also agrees to be compliant in dosing, and not self adjust the dose of medication.  A signed controlled substance agreement is on file, and the patient has received a controlled substance education sheet at this a previous visit.  The patient has also signed a consent for treatment with a controlled substance as per [facility] policy. RORY was obtained. Refills were sent for:   Hydrocodone and clonazepam.     This document has been electronically signed by HITESH Parra on February 8, 2022 20:24 CST

## 2022-02-28 ENCOUNTER — TELEMEDICINE (OUTPATIENT)
Dept: FAMILY MEDICINE CLINIC | Facility: CLINIC | Age: 32
End: 2022-02-28

## 2022-02-28 DIAGNOSIS — J06.9 ACUTE URI: ICD-10-CM

## 2022-02-28 DIAGNOSIS — R05.9 COUGH: Primary | ICD-10-CM

## 2022-02-28 PROCEDURE — 99213 OFFICE O/P EST LOW 20 MIN: CPT | Performed by: NURSE PRACTITIONER

## 2022-02-28 RX ORDER — DEXTROMETHORPHAN HYDROBROMIDE AND PROMETHAZINE HYDROCHLORIDE 15; 6.25 MG/5ML; MG/5ML
5 SYRUP ORAL 4 TIMES DAILY PRN
Qty: 120 ML | Refills: 1 | Status: SHIPPED | OUTPATIENT
Start: 2022-02-28 | End: 2022-04-08

## 2022-02-28 RX ORDER — PREDNISONE 20 MG/1
20 TABLET ORAL 2 TIMES DAILY
Qty: 14 TABLET | Refills: 0 | Status: SHIPPED | OUTPATIENT
Start: 2022-02-28 | End: 2022-03-07

## 2022-02-28 RX ORDER — AZITHROMYCIN 250 MG/1
TABLET, FILM COATED ORAL
Qty: 6 TABLET | Refills: 0 | Status: SHIPPED | OUTPATIENT
Start: 2022-02-28 | End: 2022-04-08

## 2022-02-28 NOTE — PROGRESS NOTES
Subjective   Cally Olsen is a 31 y.o. female.   You have chosen to receive care through a telehealth visit.  Do you consent to use a video/audio connection for your medical care today? Yes  This was an audio and video enabled telemedicine encounter.  has Menorrhagia with regular cycle; DDD (degenerative disc disease), lumbar; Moderate episode of recurrent major depressive disorder (HCC); Generalized anxiety disorder; Essential hypertension; Tobacco dependence in remission; Bilateral hip pain; Chronic midline low back pain without sciatica; History of loop electrosurgical excision procedure (LEEP); Chronic pelvic pain in female; Primary insomnia; Overweight (BMI 25.0-29.9); Non-intractable vomiting; Anxiety; Cigarette nicotine dependence; History of CVA (cerebrovascular accident); Chronic anticoagulation; and Abnormal mammogram on their problem list.       Chief Complaint   Patient presents with   • URI   • Cough   • Earache        URI   This is a new problem. The current episode started in the past 7 days. The problem has been gradually worsening. The maximum temperature recorded prior to her arrival was 101 - 101.9 F. The fever has been present for less than 1 day. Associated symptoms include congestion, coughing, ear pain, nausea, a plugged ear sensation and rhinorrhea. Pertinent negatives include no abdominal pain, chest pain, diarrhea, dysuria, headaches, joint pain, joint swelling, neck pain, rash, sinus pain, sneezing, sore throat, swollen glands, vomiting or wheezing. She has tried acetaminophen and sleep for the symptoms. The treatment provided mild relief.    coughing up and blowing out of nose thick purulent yellow to green mucus. No sore throat.  Left ear pain.   Past Surgical History:   Procedure Laterality Date   •  SECTION  2015    Repeat   • ENDOMETRIAL ABLATION     • INJECTION OF MEDICATION  2015    Kenalog (1)  Ailyn Perla   • LEEP     • OTHER SURGICAL HISTORY  2015     Laparoscopy; tubal cautery (1)  - Exam under anesthesia and laparoscopic tubal fulguration.   • TUBAL ABDOMINAL LIGATION        Social History     Socioeconomic History   • Marital status:    Tobacco Use   • Smoking status: Current Every Day Smoker     Packs/day: 1.50     Years: 17.00     Pack years: 25.50     Types: Cigarettes     Start date: 2004   • Smokeless tobacco: Never Used   Vaping Use   • Vaping Use: Some days   • Substances: Nicotine   Substance and Sexual Activity   • Alcohol use: No   • Drug use: No   • Sexual activity: Not Currently     Partners: Male     Birth control/protection: Tubal ligation      The following portions of the patient's history were reviewed and updated as appropriate: allergies, current medications, past family history, past medical history, past social history, past surgical history and problem list.    Review of Systems   HENT: Positive for congestion, ear pain and rhinorrhea. Negative for sneezing, sore throat and swollen glands.    Eyes: Negative.    Respiratory: Positive for cough. Negative for wheezing.    Cardiovascular: Negative.  Negative for chest pain.   Gastrointestinal: Positive for nausea. Negative for abdominal pain, diarrhea and vomiting.   Endocrine: Negative.    Genitourinary: Negative.  Negative for dysuria.   Musculoskeletal: Negative.  Negative for joint pain and neck pain.   Skin: Negative for rash.   All other systems reviewed and are negative.    PHQ-9 Depression Screening  Little interest or pleasure in doing things?     Feeling down, depressed, or hopeless?     Trouble falling or staying asleep, or sleeping too much?     Feeling tired or having little energy?     Poor appetite or overeating?     Feeling bad about yourself - or that you are a failure or have let yourself or your family down?     Trouble concentrating on things, such as reading the newspaper or watching television?     Moving or speaking so slowly that other people could have  noticed? Or the opposite - being so fidgety or restless that you have been moving around a lot more than usual?     Thoughts that you would be better off dead, or of hurting yourself in some way?     PHQ-9 Total Score     If you checked off any problems, how difficult have these problems made it for you to do your work, take care of things at home, or get along with other people?      Patient understands the risks associated with this controlled medication, including tolerance and addiction.  Patient also agrees to only obtain this medication from me, and not from a another provider, unless that provider is covering for me in my absence.  Patient also agrees to be compliant in dosing, and not self adjust the dose of medication.  A signed controlled substance agreement is on file, and the patient has received a controlled substance education sheet at this a previous visit.  The patient has also signed a consent for treatment with a controlled substance as per [facility] policy. RORY was obtained.   Objective   Physical Exam  Vitals and nursing note reviewed.   Constitutional:       General: She is not in acute distress.     Appearance: She is well-developed. She is obese. She is ill-appearing. She is not diaphoretic.   HENT:      Head: Normocephalic and atraumatic.      Nose: Congestion present.   Eyes:      General: No scleral icterus.        Right eye: No discharge.         Left eye: No discharge.      Conjunctiva/sclera: Conjunctivae normal.      Pupils: Pupils are equal, round, and reactive to light.   Neck:      Trachea: No tracheal deviation.   Pulmonary:      Effort: Pulmonary effort is normal. No respiratory distress.      Breath sounds: No stridor. No wheezing.   Musculoskeletal:         General: No tenderness or deformity. Normal range of motion.      Cervical back: Normal range of motion and neck supple.   Skin:     General: Skin is dry.      Coloration: Skin is not pale.      Findings: No erythema  or rash.   Neurological:      Mental Status: She is alert and oriented to person, place, and time.      Cranial Nerves: No cranial nerve deficit.   Psychiatric:         Mood and Affect: Mood normal.         Behavior: Behavior normal.         Thought Content: Thought content normal.       There were no vitals filed for this visit.   There is no height or weight on file to calculate BMI.      Assessment/Plan   Diagnoses and all orders for this visit:    1. Cough (Primary)  -     promethazine-dextromethorphan (PROMETHAZINE-DM) 6.25-15 MG/5ML syrup; Take 5 mL by mouth 4 (Four) Times a Day As Needed for Cough.  Dispense: 120 mL; Refill: 1    2. Acute URI  -     azithromycin (ZITHROMAX) 250 MG tablet; Take 2 tablets the first day, then 1 tablet daily for 4 days.  Dispense: 6 tablet; Refill: 0  -     predniSONE (DELTASONE) 20 MG tablet; Take 1 tablet by mouth 2 (Two) Times a Day for 7 days.  Dispense: 14 tablet; Refill: 0               Return in about 1 week (around 3/7/2022).  There are no Patient Instructions on file for this visit.        This document has been electronically signed by HITESH Parra on February 28, 2022 20:10 CST

## 2022-03-01 ENCOUNTER — TELEPHONE (OUTPATIENT)
Dept: FAMILY MEDICINE CLINIC | Facility: CLINIC | Age: 32
End: 2022-03-01

## 2022-03-01 RX ORDER — APIXABAN 5 MG/1
5 TABLET, FILM COATED ORAL EVERY 12 HOURS SCHEDULED
Qty: 60 TABLET | Refills: 5 | Status: SHIPPED | OUTPATIENT
Start: 2022-03-01 | End: 2022-09-06 | Stop reason: SDUPTHER

## 2022-03-03 RX ORDER — LISINOPRIL 10 MG/1
TABLET ORAL
Qty: 30 TABLET | Refills: 1 | Status: SHIPPED | OUTPATIENT
Start: 2022-03-03 | End: 2022-06-02

## 2022-03-07 DIAGNOSIS — M54.50 CHRONIC MIDLINE LOW BACK PAIN WITHOUT SCIATICA: ICD-10-CM

## 2022-03-07 DIAGNOSIS — F41.1 GAD (GENERALIZED ANXIETY DISORDER): ICD-10-CM

## 2022-03-07 DIAGNOSIS — G89.29 CHRONIC MIDLINE LOW BACK PAIN WITHOUT SCIATICA: ICD-10-CM

## 2022-03-07 RX ORDER — CLONAZEPAM 1 MG/1
1 TABLET ORAL 2 TIMES DAILY PRN
Qty: 60 TABLET | Refills: 0 | Status: CANCELLED | OUTPATIENT
Start: 2022-03-07

## 2022-03-07 RX ORDER — HYDROCODONE BITARTRATE AND ACETAMINOPHEN 10; 325 MG/1; MG/1
1 TABLET ORAL EVERY 6 HOURS PRN
Qty: 120 TABLET | Refills: 0 | Status: CANCELLED | OUTPATIENT
Start: 2022-03-07

## 2022-03-08 ENCOUNTER — TELEPHONE (OUTPATIENT)
Dept: FAMILY MEDICINE CLINIC | Facility: CLINIC | Age: 32
End: 2022-03-08

## 2022-03-08 DIAGNOSIS — G89.29 CHRONIC MIDLINE LOW BACK PAIN WITHOUT SCIATICA: ICD-10-CM

## 2022-03-08 DIAGNOSIS — M54.50 CHRONIC MIDLINE LOW BACK PAIN WITHOUT SCIATICA: ICD-10-CM

## 2022-03-08 DIAGNOSIS — R05.9 COUGH: Primary | ICD-10-CM

## 2022-03-08 DIAGNOSIS — J06.9 ACUTE URI: Primary | ICD-10-CM

## 2022-03-08 DIAGNOSIS — F41.1 GAD (GENERALIZED ANXIETY DISORDER): ICD-10-CM

## 2022-03-08 RX ORDER — CLONAZEPAM 1 MG/1
1 TABLET ORAL 2 TIMES DAILY PRN
Qty: 60 TABLET | Refills: 0 | Status: SHIPPED | OUTPATIENT
Start: 2022-03-08 | End: 2022-03-10 | Stop reason: SDUPTHER

## 2022-03-08 RX ORDER — DOXYCYCLINE HYCLATE 100 MG/1
100 CAPSULE ORAL 2 TIMES DAILY
Qty: 14 CAPSULE | Refills: 0 | Status: SHIPPED | OUTPATIENT
Start: 2022-03-08 | End: 2022-03-15

## 2022-03-08 RX ORDER — BENZONATATE 200 MG/1
200 CAPSULE ORAL 3 TIMES DAILY PRN
Qty: 60 CAPSULE | Refills: 0 | Status: SHIPPED | OUTPATIENT
Start: 2022-03-08 | End: 2022-04-08 | Stop reason: SDUPTHER

## 2022-03-08 RX ORDER — HYDROCODONE BITARTRATE AND ACETAMINOPHEN 10; 325 MG/1; MG/1
1 TABLET ORAL EVERY 6 HOURS PRN
Qty: 120 TABLET | Refills: 0 | Status: SHIPPED | OUTPATIENT
Start: 2022-03-08 | End: 2022-03-10 | Stop reason: SDUPTHER

## 2022-03-09 NOTE — TELEPHONE ENCOUNTER
Patient seen in office every 3 months for chronic pain requiring opiate pain medication. Compliant with medication, visits with no adverse effects noted. RORY and UDS current and appropriate. Patient called requesting scheduled refill at appropriate interval. Patient understands the risks associated with this controlled medication, including tolerance and addiction.  Patient also agrees to only obtain this medication from me, and not from a another provider, unless that provider is covering for me in my absence.  Patient also agrees to be compliant in dosing, and not self adjust the dose of medication.  A signed controlled substance agreement is on file, and the patient has received a controlled substance education sheet at this a previous visit.  The patient has also signed a consent for treatment with a controlled substance as per [facility] policy. RORY was obtained.   Refill sent for clonazepam and hydrocodone.     This document has been electronically signed by HITESH Parra on March 8, 2022 18:13 CST

## 2022-03-10 DIAGNOSIS — F41.1 GAD (GENERALIZED ANXIETY DISORDER): ICD-10-CM

## 2022-03-10 DIAGNOSIS — M54.50 CHRONIC MIDLINE LOW BACK PAIN WITHOUT SCIATICA: ICD-10-CM

## 2022-03-10 DIAGNOSIS — G89.29 CHRONIC MIDLINE LOW BACK PAIN WITHOUT SCIATICA: ICD-10-CM

## 2022-03-10 NOTE — TELEPHONE ENCOUNTER
Incoming Refill Request      Medication requested (name and dose):   [facility]    [facility] where [facility] be sent[facility]    Additional details provided by patient:   SENT TO [facility] ON 3/8    Best call back number:     Does the patient have less than a 3 day supply:  [] Yes  [] No    Silva Kimball  03/10/22, 12:07 CST

## 2022-03-11 ENCOUNTER — TELEPHONE (OUTPATIENT)
Dept: FAMILY MEDICINE CLINIC | Facility: CLINIC | Age: 32
End: 2022-03-11

## 2022-03-11 RX ORDER — HYDROCODONE BITARTRATE AND ACETAMINOPHEN 10; 325 MG/1; MG/1
1 TABLET ORAL EVERY 6 HOURS PRN
Qty: 120 TABLET | Refills: 0 | Status: SHIPPED | OUTPATIENT
Start: 2022-03-11 | End: 2022-05-09 | Stop reason: SDUPTHER

## 2022-03-11 RX ORDER — CLONAZEPAM 1 MG/1
1 TABLET ORAL 2 TIMES DAILY PRN
Qty: 60 TABLET | Refills: 0 | Status: SHIPPED | OUTPATIENT
Start: 2022-03-11 | End: 2022-05-10

## 2022-03-11 NOTE — TELEPHONE ENCOUNTER
Recent refill prescriptions for hydrocodone and clonazepam were sent to the incorrect pharmacy.  Those prescriptions have been canceled and new refills sent to appropriate pharmacy today.    This document has been electronically signed by HITESH Parra on March 11, 2022 11:39 CST

## 2022-04-08 ENCOUNTER — TELEMEDICINE (OUTPATIENT)
Dept: FAMILY MEDICINE CLINIC | Facility: CLINIC | Age: 32
End: 2022-04-08

## 2022-04-08 VITALS — HEIGHT: 63 IN | WEIGHT: 195 LBS | BODY MASS INDEX: 34.55 KG/M2

## 2022-04-08 DIAGNOSIS — J06.9 ACUTE URI: Primary | ICD-10-CM

## 2022-04-08 DIAGNOSIS — R05.9 COUGH: ICD-10-CM

## 2022-04-08 PROCEDURE — 99214 OFFICE O/P EST MOD 30 MIN: CPT | Performed by: NURSE PRACTITIONER

## 2022-04-08 RX ORDER — GUAIFENESIN AND CODEINE PHOSPHATE 100; 10 MG/5ML; MG/5ML
5 SOLUTION ORAL 4 TIMES DAILY PRN
Qty: 200 ML | Refills: 0 | Status: SHIPPED | OUTPATIENT
Start: 2022-04-08 | End: 2022-04-08 | Stop reason: SDUPTHER

## 2022-04-08 RX ORDER — BENZONATATE 200 MG/1
200 CAPSULE ORAL 3 TIMES DAILY PRN
Qty: 60 CAPSULE | Refills: 0 | Status: SHIPPED | OUTPATIENT
Start: 2022-04-08 | End: 2022-05-27

## 2022-04-08 RX ORDER — PREDNISONE 20 MG/1
20 TABLET ORAL 2 TIMES DAILY
Qty: 14 TABLET | Refills: 0 | Status: SHIPPED | OUTPATIENT
Start: 2022-04-08 | End: 2022-04-15

## 2022-04-08 RX ORDER — CLARITHROMYCIN 500 MG/1
500 TABLET, COATED ORAL 2 TIMES DAILY
Qty: 14 TABLET | Refills: 0 | OUTPATIENT
Start: 2022-04-08 | End: 2022-08-15

## 2022-04-08 RX ORDER — GUAIFENESIN AND CODEINE PHOSPHATE 100; 10 MG/5ML; MG/5ML
5 SOLUTION ORAL 4 TIMES DAILY PRN
Qty: 200 ML | Refills: 0 | Status: SHIPPED | OUTPATIENT
Start: 2022-04-08 | End: 2022-10-04

## 2022-04-08 NOTE — TELEPHONE ENCOUNTER
Incoming Refill Request      Medication requested (name and dose):   benzonatate (TESSALON) 200 MG capsule        [facility]     Additional details provided by patient:     Best call back number:     Does the patient have less than a 3 day supply:  [] Yes  [] No    Silva Kimball  04/08/22, 09:37 CDT

## 2022-04-08 NOTE — PROGRESS NOTES
Subjective   Cally Olsen is a 32 y.o. female.   You have chosen to receive care through a telehealth visit.  Do you consent to use a video/audio connection for your medical care today? Yes  This was an audio and video enabled telemedicine encounter.  has Menorrhagia with regular cycle; DDD (degenerative disc disease), lumbar; Moderate episode of recurrent major depressive disorder (HCC); Generalized anxiety disorder; Essential hypertension; Tobacco dependence in remission; Bilateral hip pain; Chronic midline low back pain without sciatica; History of loop electrosurgical excision procedure (LEEP); Chronic pelvic pain in female; Primary insomnia; Overweight (BMI 25.0-29.9); Non-intractable vomiting; Anxiety; Cigarette nicotine dependence; History of CVA (cerebrovascular accident); Chronic anticoagulation; and Abnormal mammogram on their problem list.       Chief Complaint   Patient presents with   • URI     WANTING COUGH MEDICINE         URI   This is a new problem. The current episode started in the past 7 days. The problem has been gradually worsening. There has been no fever. Associated symptoms include congestion, coughing, ear pain, a plugged ear sensation, rhinorrhea, sinus pain and a sore throat. Pertinent negatives include no abdominal pain, chest pain, diarrhea, dysuria, nausea or vomiting. She has tried increased fluids and sleep for the symptoms. The treatment provided no relief.      Patient reports 3 days ago developed cough, productive of thick tan sputum, rib and chest wall pain from so much coughing and nasal congestion. No sinus pain, pressure, fevers or chills. Sore throat +. Left otalgia and fullness.       Past Surgical History:   Procedure Laterality Date   •  SECTION  2015    Repeat   • ENDOMETRIAL ABLATION     • INJECTION OF MEDICATION  2015    Kenalog (1)  -  SEFERINO Perla   • LEEP     • OTHER SURGICAL HISTORY  2015    Laparoscopy; tubal cautery (1)  - Exam under  anesthesia and laparoscopic tubal fulguration.   • TUBAL ABDOMINAL LIGATION        Social History     Socioeconomic History   • Marital status:    Tobacco Use   • Smoking status: Current Every Day Smoker     Packs/day: 1.50     Years: 17.00     Pack years: 25.50     Types: Cigarettes     Start date: 2004   • Smokeless tobacco: Never Used   Vaping Use   • Vaping Use: Some days   • Substances: Nicotine   Substance and Sexual Activity   • Alcohol use: No   • Drug use: No   • Sexual activity: Not Currently     Partners: Male     Birth control/protection: Tubal ligation      The following portions of the patient's history were reviewed and updated as appropriate: allergies, current medications, past family history, past medical history, past social history, past surgical history and problem list.    Review of Systems   Constitutional: Positive for fever. Negative for chills, diaphoresis and fatigue.   HENT: Positive for congestion, ear pain, postnasal drip, rhinorrhea, sinus pressure and sore throat. Negative for trouble swallowing and voice change.    Eyes: Negative.  Negative for blurred vision, double vision, photophobia and visual disturbance.   Respiratory: Positive for cough and shortness of breath. Negative for stridor.    Cardiovascular: Negative.  Negative for chest pain, palpitations and leg swelling.   Gastrointestinal: Negative.  Negative for abdominal distention, abdominal pain, blood in stool, constipation, diarrhea, nausea, vomiting, GERD and indigestion.   Endocrine: Negative.  Negative for cold intolerance and heat intolerance.   Genitourinary: Negative.  Negative for dysuria, flank pain, frequency and urinary incontinence.   Musculoskeletal: Negative.  Negative for arthralgias and back pain.   Skin: Negative.    Allergic/Immunologic: Negative.  Negative for immunocompromised state.   Neurological: Negative.    Hematological: Negative.    Psychiatric/Behavioral: Negative.  Negative for agitation,  behavioral problems, decreased concentration, dysphoric mood, hallucinations, self-injury, sleep disturbance, suicidal ideas, negative for hyperactivity, depressed mood and stress. The patient is not nervous/anxious.    All other systems reviewed and are negative.    PHQ-9 Depression Screening  Little interest or pleasure in doing things?     Feeling down, depressed, or hopeless?     Trouble falling or staying asleep, or sleeping too much?     Feeling tired or having little energy?     Poor appetite or overeating?     Feeling bad about yourself - or that you are a failure or have let yourself or your family down?     Trouble concentrating on things, such as reading the newspaper or watching television?     Moving or speaking so slowly that other people could have noticed? Or the opposite - being so fidgety or restless that you have been moving around a lot more than usual?     Thoughts that you would be better off dead, or of hurting yourself in some way?     PHQ-9 Total Score     If you checked off any problems, how difficult have these problems made it for you to do your work, take care of things at home, or get along with other people?      Patient understands the risks associated with this controlled medication, including tolerance and addiction.  Patient also agrees to only obtain this medication from me, and not from a another provider, unless that provider is covering for me in my absence.  Patient also agrees to be compliant in dosing, and not self adjust the dose of medication.  A signed controlled substance agreement is on file, and the patient has received a controlled substance education sheet at this a previous visit.  The patient has also signed a consent for treatment with a controlled substance as per [facility] policy. RORY was obtained.   Objective   Physical Exam  Vitals and nursing note reviewed.   Constitutional:       General: She is not in acute distress.     Appearance: Normal  "appearance. She is well-developed. She is obese. She is ill-appearing. She is not diaphoretic.   HENT:      Head: Normocephalic and atraumatic.      Nose: Congestion and rhinorrhea present.   Eyes:      General: No scleral icterus.        Right eye: No discharge.         Left eye: No discharge.      Conjunctiva/sclera: Conjunctivae normal.      Pupils: Pupils are equal, round, and reactive to light.   Neck:      Trachea: No tracheal deviation.   Pulmonary:      Effort: Pulmonary effort is normal. No respiratory distress.      Breath sounds: No stridor. No wheezing.   Musculoskeletal:         General: No tenderness or deformity. Normal range of motion.      Cervical back: Normal range of motion and neck supple.   Skin:     General: Skin is dry.      Coloration: Skin is not pale.      Findings: No erythema or rash.   Neurological:      Mental Status: She is alert and oriented to person, place, and time.      Cranial Nerves: No cranial nerve deficit.   Psychiatric:         Behavior: Behavior normal.         Thought Content: Thought content normal.       Vitals:    04/08/22 1059   Weight: 88.5 kg (195 lb)   Height: 160 cm (63\")      Body mass index is 34.54 kg/m².      Assessment/Plan   Diagnoses and all orders for this visit:    1. Acute URI (Primary)  -     clarithromycin (Biaxin) 500 MG tablet; Take 1 tablet by mouth 2 (Two) Times a Day.  Dispense: 14 tablet; Refill: 0  -     predniSONE (DELTASONE) 20 MG tablet; Take 1 tablet by mouth 2 (Two) Times a Day for 7 days.  Dispense: 14 tablet; Refill: 0  -     Discontinue: guaiFENesin-codeine (GUAIFENESIN AC) 100-10 MG/5ML liquid; Take 5 mL by mouth 4 (Four) Times a Day As Needed for Cough.  Dispense: 200 mL; Refill: 0  -     guaiFENesin-codeine (GUAIFENESIN AC) 100-10 MG/5ML liquid; Take 5 mL by mouth 4 (Four) Times a Day As Needed for Cough.  Dispense: 200 mL; Refill: 0    2. Cough  -     Discontinue: guaiFENesin-codeine (GUAIFENESIN AC) 100-10 MG/5ML liquid; Take 5 mL " by mouth 4 (Four) Times a Day As Needed for Cough.  Dispense: 200 mL; Refill: 0  -     guaiFENesin-codeine (GUAIFENESIN AC) 100-10 MG/5ML liquid; Take 5 mL by mouth 4 (Four) Times a Day As Needed for Cough.  Dispense: 200 mL; Refill: 0               Return in about 1 week (around 4/15/2022).  There are no Patient Instructions on file for this visit.        This document has been electronically signed by HITESH Parra on April 8, 2022 14:37 CDT

## 2022-04-18 ENCOUNTER — TELEPHONE (OUTPATIENT)
Dept: FAMILY MEDICINE CLINIC | Facility: CLINIC | Age: 32
End: 2022-04-18

## 2022-04-23 DIAGNOSIS — J06.9 ACUTE URI: ICD-10-CM

## 2022-04-23 DIAGNOSIS — R05.9 COUGH: ICD-10-CM

## 2022-04-25 RX ORDER — CODEINE PHOSPHATE AND GUAIFENESIN 10; 100 MG/5ML; MG/5ML
SOLUTION ORAL
Qty: 200 ML | OUTPATIENT
Start: 2022-04-25

## 2022-05-09 DIAGNOSIS — M54.50 CHRONIC MIDLINE LOW BACK PAIN WITHOUT SCIATICA: ICD-10-CM

## 2022-05-09 DIAGNOSIS — G89.29 CHRONIC MIDLINE LOW BACK PAIN WITHOUT SCIATICA: ICD-10-CM

## 2022-05-09 RX ORDER — HYDROCODONE BITARTRATE AND ACETAMINOPHEN 10; 325 MG/1; MG/1
1 TABLET ORAL EVERY 6 HOURS PRN
Qty: 120 TABLET | Refills: 0 | Status: SHIPPED | OUTPATIENT
Start: 2022-05-09 | End: 2022-06-06 | Stop reason: SDUPTHER

## 2022-05-09 NOTE — TELEPHONE ENCOUNTER
Incoming Refill Request      Medication requested (name and dose):     HYDROcodone-acetaminophen (NORCO)  MG per tablet     clonazePAM (KlonoPIN) 1 MG tablet         Pharmacy where request should be sent:   [facility]     Additional details provided by patient:   [facility]     Best call back number:     Does the patient have less than a 3 day supply:  [] Yes  [] No    Silva Kimball  05/09/22, 10:49 CDT

## 2022-05-10 DIAGNOSIS — F41.1 GAD (GENERALIZED ANXIETY DISORDER): ICD-10-CM

## 2022-05-10 RX ORDER — CLONAZEPAM 1 MG/1
TABLET ORAL
Qty: 60 TABLET | Refills: 0 | Status: SHIPPED | OUTPATIENT
Start: 2022-05-10 | End: 2022-06-06 | Stop reason: SDUPTHER

## 2022-05-27 ENCOUNTER — LAB (OUTPATIENT)
Dept: LAB | Facility: OTHER | Age: 32
End: 2022-05-27

## 2022-05-27 ENCOUNTER — OFFICE VISIT (OUTPATIENT)
Dept: FAMILY MEDICINE CLINIC | Facility: CLINIC | Age: 32
End: 2022-05-27

## 2022-05-27 VITALS
BODY MASS INDEX: 34.91 KG/M2 | RESPIRATION RATE: 18 BRPM | HEIGHT: 63 IN | DIASTOLIC BLOOD PRESSURE: 80 MMHG | SYSTOLIC BLOOD PRESSURE: 128 MMHG | WEIGHT: 197 LBS | TEMPERATURE: 98.1 F | OXYGEN SATURATION: 99 % | HEART RATE: 86 BPM

## 2022-05-27 DIAGNOSIS — M54.50 CHRONIC MIDLINE LOW BACK PAIN WITHOUT SCIATICA: ICD-10-CM

## 2022-05-27 DIAGNOSIS — F41.1 GAD (GENERALIZED ANXIETY DISORDER): ICD-10-CM

## 2022-05-27 DIAGNOSIS — G45.9 TIA (TRANSIENT ISCHEMIC ATTACK): ICD-10-CM

## 2022-05-27 DIAGNOSIS — G25.81 RLS (RESTLESS LEGS SYNDROME): ICD-10-CM

## 2022-05-27 DIAGNOSIS — G89.29 CHRONIC LEFT HIP PAIN: ICD-10-CM

## 2022-05-27 DIAGNOSIS — R53.1 LEFT-SIDED WEAKNESS: ICD-10-CM

## 2022-05-27 DIAGNOSIS — G89.29 CHRONIC MIDLINE LOW BACK PAIN WITHOUT SCIATICA: ICD-10-CM

## 2022-05-27 DIAGNOSIS — I10 ESSENTIAL HYPERTENSION: Primary | ICD-10-CM

## 2022-05-27 DIAGNOSIS — M79.672 ACUTE FOOT PAIN, LEFT: ICD-10-CM

## 2022-05-27 DIAGNOSIS — Z86.73 HISTORY OF CVA (CEREBROVASCULAR ACCIDENT): Chronic | ICD-10-CM

## 2022-05-27 DIAGNOSIS — E78.2 MIXED HYPERLIPIDEMIA: ICD-10-CM

## 2022-05-27 DIAGNOSIS — R41.3 MEMORY PROBLEM: ICD-10-CM

## 2022-05-27 DIAGNOSIS — Z51.81 ENCOUNTER FOR THERAPEUTIC DRUG LEVEL MONITORING: ICD-10-CM

## 2022-05-27 DIAGNOSIS — M25.552 CHRONIC LEFT HIP PAIN: ICD-10-CM

## 2022-05-27 PROBLEM — F17.210 CIGARETTE NICOTINE DEPENDENCE: Chronic | Status: ACTIVE | Noted: 2020-10-08

## 2022-05-27 PROBLEM — R11.10 NON-INTRACTABLE VOMITING: Chronic | Status: ACTIVE | Noted: 2020-04-28

## 2022-05-27 PROBLEM — F17.201 TOBACCO DEPENDENCE IN REMISSION: Chronic | Status: ACTIVE | Noted: 2018-03-14

## 2022-05-27 PROBLEM — Z98.890 HISTORY OF LOOP ELECTROSURGICAL EXCISION PROCEDURE (LEEP): Chronic | Status: ACTIVE | Noted: 2018-12-10

## 2022-05-27 PROBLEM — R10.2 CHRONIC PELVIC PAIN IN FEMALE: Chronic | Status: ACTIVE | Noted: 2018-12-10

## 2022-05-27 LAB
ALBUMIN SERPL-MCNC: 4.9 G/DL (ref 3.5–5)
ALBUMIN/GLOB SERPL: 1.8 G/DL (ref 1.1–1.8)
ALP SERPL-CCNC: 110 U/L (ref 38–126)
ALT SERPL W P-5'-P-CCNC: 28 U/L
ANION GAP SERPL CALCULATED.3IONS-SCNC: 11 MMOL/L (ref 5–15)
AST SERPL-CCNC: 29 U/L (ref 14–36)
BASOPHILS # BLD AUTO: 0.02 10*3/MM3 (ref 0–0.2)
BASOPHILS NFR BLD AUTO: 0.2 % (ref 0–1.5)
BILIRUB SERPL-MCNC: 0.6 MG/DL (ref 0.2–1.3)
BUN SERPL-MCNC: 15 MG/DL (ref 7–23)
BUN/CREAT SERPL: 28.3 (ref 7–25)
CALCIUM SPEC-SCNC: 10 MG/DL (ref 8.4–10.2)
CHLORIDE SERPL-SCNC: 102 MMOL/L (ref 101–112)
CHOLEST SERPL-MCNC: 143 MG/DL (ref 150–200)
CO2 SERPL-SCNC: 29 MMOL/L (ref 22–30)
CREAT SERPL-MCNC: 0.53 MG/DL (ref 0.52–1.04)
DEPRECATED RDW RBC AUTO: 45.3 FL (ref 37–54)
EGFRCR SERPLBLD CKD-EPI 2021: 124.6 ML/MIN/1.73
EOSINOPHIL # BLD AUTO: 0.08 10*3/MM3 (ref 0–0.4)
EOSINOPHIL NFR BLD AUTO: 0.8 % (ref 0.3–6.2)
ERYTHROCYTE [DISTWIDTH] IN BLOOD BY AUTOMATED COUNT: 13.8 % (ref 12.3–15.4)
GLOBULIN UR ELPH-MCNC: 2.7 GM/DL (ref 2.3–3.5)
GLUCOSE SERPL-MCNC: 95 MG/DL (ref 70–99)
HCT VFR BLD AUTO: 44.9 % (ref 34–46.6)
HDLC SERPL-MCNC: 39 MG/DL (ref 40–59)
HGB BLD-MCNC: 14.6 G/DL (ref 12–15.9)
LDLC SERPL CALC-MCNC: 74 MG/DL
LDLC/HDLC SERPL: 1.75 {RATIO} (ref 0–3.22)
LYMPHOCYTES # BLD AUTO: 3.28 10*3/MM3 (ref 0.7–3.1)
LYMPHOCYTES NFR BLD AUTO: 32.3 % (ref 19.6–45.3)
MCH RBC QN AUTO: 30.1 PG (ref 26.6–33)
MCHC RBC AUTO-ENTMCNC: 32.5 G/DL (ref 31.5–35.7)
MCV RBC AUTO: 92.6 FL (ref 79–97)
MONOCYTES # BLD AUTO: 0.68 10*3/MM3 (ref 0.1–0.9)
MONOCYTES NFR BLD AUTO: 6.7 % (ref 5–12)
NEUTROPHILS NFR BLD AUTO: 6.09 10*3/MM3 (ref 1.7–7)
NEUTROPHILS NFR BLD AUTO: 60 % (ref 42.7–76)
PLATELET # BLD AUTO: 290 10*3/MM3 (ref 140–450)
PMV BLD AUTO: 9.6 FL (ref 6–12)
POTASSIUM SERPL-SCNC: 4.5 MMOL/L (ref 3.4–5)
PROT SERPL-MCNC: 7.6 G/DL (ref 6.3–8.6)
RBC # BLD AUTO: 4.85 10*6/MM3 (ref 3.77–5.28)
SODIUM SERPL-SCNC: 142 MMOL/L (ref 137–145)
TRIGL SERPL-MCNC: 178 MG/DL
VLDLC SERPL-MCNC: 30 MG/DL (ref 5–40)
WBC NRBC COR # BLD: 10.15 10*3/MM3 (ref 3.4–10.8)

## 2022-05-27 PROCEDURE — 85025 COMPLETE CBC W/AUTO DIFF WBC: CPT | Performed by: NURSE PRACTITIONER

## 2022-05-27 PROCEDURE — 99214 OFFICE O/P EST MOD 30 MIN: CPT | Performed by: NURSE PRACTITIONER

## 2022-05-27 PROCEDURE — 80061 LIPID PANEL: CPT | Performed by: NURSE PRACTITIONER

## 2022-05-27 PROCEDURE — 80053 COMPREHEN METABOLIC PANEL: CPT | Performed by: NURSE PRACTITIONER

## 2022-05-27 PROCEDURE — 36415 COLL VENOUS BLD VENIPUNCTURE: CPT | Performed by: NURSE PRACTITIONER

## 2022-05-27 PROCEDURE — 96372 THER/PROPH/DIAG INJ SC/IM: CPT | Performed by: NURSE PRACTITIONER

## 2022-05-27 PROCEDURE — 80307 DRUG TEST PRSMV CHEM ANLYZR: CPT | Performed by: NURSE PRACTITIONER

## 2022-05-27 PROCEDURE — G0481 DRUG TEST DEF 8-14 CLASSES: HCPCS | Performed by: NURSE PRACTITIONER

## 2022-05-27 RX ORDER — TRIAMCINOLONE ACETONIDE 40 MG/ML
80 INJECTION, SUSPENSION INTRA-ARTICULAR; INTRAMUSCULAR ONCE
Status: COMPLETED | OUTPATIENT
Start: 2022-05-27 | End: 2022-05-27

## 2022-05-27 RX ORDER — KETOROLAC TROMETHAMINE 30 MG/ML
60 INJECTION, SOLUTION INTRAMUSCULAR; INTRAVENOUS ONCE
Status: COMPLETED | OUTPATIENT
Start: 2022-05-27 | End: 2022-05-27

## 2022-05-27 RX ADMIN — KETOROLAC TROMETHAMINE 60 MG: 30 INJECTION, SOLUTION INTRAMUSCULAR; INTRAVENOUS at 15:20

## 2022-05-27 RX ADMIN — TRIAMCINOLONE ACETONIDE 80 MG: 40 INJECTION, SUSPENSION INTRA-ARTICULAR; INTRAMUSCULAR at 15:22

## 2022-05-27 NOTE — PROGRESS NOTES
Subjective   Cally Olsen is a 32 y.o. female.     has Menorrhagia with regular cycle; DDD (degenerative disc disease), lumbar; Moderate episode of recurrent major depressive disorder (HCC); Generalized anxiety disorder; Essential hypertension; Tobacco dependence in remission; Bilateral hip pain; Chronic midline low back pain without sciatica; History of loop electrosurgical excision procedure (LEEP); Chronic pelvic pain in female; Primary insomnia; Overweight (BMI 25.0-29.9); Non-intractable vomiting; Anxiety; Cigarette nicotine dependence; History of CVA (cerebrovascular accident); Chronic anticoagulation; Abnormal mammogram; Memory problem; and Left-sided weakness on their problem list.     Chief Complaint   Patient presents with   • Follow-up   • check up        History of Present Illness     Patient presents for multiple complaints.  She feels her memory has suffered significantly since December 2021.  Reports that her left-sided weakness is also worse and has been so since December.  Reports she has to walk with a cane at least half of the time because she has continued balance issues and stumbles.  She thinks that she may have had a stroke or 2 in December and maybe once prior to that since her last MRI imaging of the brain.  She is not happy that her neurologist has not ordered an interval MR of her brain for over a year.  With her hypercoagulopathy and history she is very concerned.  Also complains of left lateral foot pain onset about a week ago.  Not sure how it happened does not remember specific injury but states she stumbles and missteps so often that when they moved households and household belongings a week ago from 1 home to the next that she probably had missed stepped and twisted it or overused it but the pain is left lateral from tip of the fifth toe to the heel.  There is no bruising or swelling its not tender to touch but it hurts when she puts pressure on it to stand and walk.  Also with  worsening of her chronic left hip pain.    Type generalized anxiety disorder chronic worsened by severity of physical problems and declining function.  Anxiety is managed with Cymbalta, gabapentin, amitriptyline and as needed clonazepam.  She feels emotions are as well controlled as can be expected.    Restless leg syndrome, chronic-stable.  Ports adequate suppression of symptoms with current gabapentin.    Midline low back pain without sciatica, chronic-stable.  Pain is managed with hydrocodone and gabapentin with adequate reported relief of pain.  Not due for refill today.      No other complaints today.    Parts of most recent relevant visit HPI, ROS  and PE may be carried forward and all are updated as appropriate for current situation.      Past Surgical History:   Procedure Laterality Date   •  SECTION  2015    Repeat   • ENDOMETRIAL ABLATION     • INJECTION OF MEDICATION  2015    Kenalog (1)  -  SEFERINO Perla   • RAYA     • OTHER SURGICAL HISTORY  2015    Laparoscopy; tubal cautery (1)  - Exam under anesthesia and laparoscopic tubal fulguration.   • TUBAL ABDOMINAL LIGATION        Social History     Socioeconomic History   • Marital status:    Tobacco Use   • Smoking status: Current Every Day Smoker     Packs/day: 1.50     Years: 17.00     Pack years: 25.50     Types: Cigarettes     Start date:    • Smokeless tobacco: Never Used   Vaping Use   • Vaping Use: Some days   • Substances: Nicotine   Substance and Sexual Activity   • Alcohol use: No   • Drug use: No   • Sexual activity: Not Currently     Partners: Male     Birth control/protection: Tubal ligation      The following portions of the patient's history were reviewed and updated as appropriate: allergies, current medications, past family history, past medical history, past social history, past surgical history and problem list.    Review of Systems   Constitutional: Positive for fatigue.   HENT: Negative.  Negative for  congestion.    Eyes: Negative.  Negative for blurred vision, double vision, photophobia and visual disturbance.   Respiratory: Negative.  Negative for cough, shortness of breath, wheezing and stridor.    Cardiovascular: Negative.  Negative for chest pain, palpitations and leg swelling.   Gastrointestinal: Negative.  Negative for abdominal distention, abdominal pain, blood in stool, constipation, diarrhea, nausea, vomiting, GERD and indigestion.   Endocrine: Negative.  Negative for cold intolerance and heat intolerance.   Genitourinary: Negative.  Negative for dysuria, flank pain, frequency and urinary incontinence.   Musculoskeletal: Positive for back pain. Negative for arthralgias.   Skin: Negative.    Allergic/Immunologic: Negative.  Negative for immunocompromised state.   Neurological: Positive for weakness (left side), numbness, headache and memory problem.   Hematological: Negative.    Psychiatric/Behavioral: Negative for agitation, behavioral problems, decreased concentration, dysphoric mood, hallucinations, self-injury, sleep disturbance, suicidal ideas, negative for hyperactivity, depressed mood and stress. The patient is nervous/anxious.    All other systems reviewed and are negative.    PHQ-9 Depression Screening  Little interest or pleasure in doing things?     Feeling down, depressed, or hopeless?     Trouble falling or staying asleep, or sleeping too much?     Feeling tired or having little energy?     Poor appetite or overeating?     Feeling bad about yourself - or that you are a failure or have let yourself or your family down?     Trouble concentrating on things, such as reading the newspaper or watching television?     Moving or speaking so slowly that other people could have noticed? Or the opposite - being so fidgety or restless that you have been moving around a lot more than usual?     Thoughts that you would be better off dead, or of hurting yourself in some way?     PHQ-9 Total Score     If  you checked off any problems, how difficult have these problems made it for you to do your work, take care of things at home, or get along with other people?      Patient understands the risks associated with this controlled medication, including tolerance and addiction.  Patient also agrees to only obtain this medication from me, and not from a another provider, unless that provider is covering for me in my absence.  Patient also agrees to be compliant in dosing, and not self adjust the dose of medication.  A signed controlled substance agreement is on file, and the patient has received a controlled substance education sheet at this a previous visit.  The patient has also signed a consent for treatment with a controlled substance as per [facility] policy. RORY was obtained.    Objective   Physical Exam  Vitals and nursing note reviewed.   Constitutional:       General: She is not in acute distress.     Appearance: Normal appearance. She is well-developed. She is obese. She is not ill-appearing or diaphoretic.   HENT:      Head: Normocephalic and atraumatic.   Eyes:      General: No scleral icterus.        Right eye: No discharge.         Left eye: No discharge.      Conjunctiva/sclera: Conjunctivae normal.      Pupils: Pupils are equal, round, and reactive to light.   Neck:      Thyroid: No thyromegaly.      Vascular: No carotid bruit or JVD.      Trachea: No tracheal deviation.   Cardiovascular:      Rate and Rhythm: Normal rate and regular rhythm.      Pulses: Normal pulses.      Heart sounds: Normal heart sounds. No murmur heard.    No friction rub. No gallop.   Pulmonary:      Effort: Pulmonary effort is normal. No respiratory distress.      Breath sounds: Normal breath sounds. No stridor. No wheezing, rhonchi or rales.   Chest:      Chest wall: No tenderness.   Abdominal:      General: Bowel sounds are normal.      Palpations: Abdomen is soft.   Musculoskeletal:         General: No tenderness or  "deformity. Normal range of motion.      Cervical back: Normal range of motion and neck supple. No rigidity or tenderness.      Right lower leg: No edema.      Left lower leg: No edema.   Lymphadenopathy:      Cervical: No cervical adenopathy.   Skin:     General: Skin is warm and dry.      Capillary Refill: Capillary refill takes 2 to 3 seconds.      Coloration: Skin is not jaundiced or pale.      Findings: No bruising, erythema, lesion or rash.   Neurological:      General: No focal deficit present.      Mental Status: She is alert and oriented to person, place, and time. Mental status is at baseline.      GCS: GCS eye subscore is 4. GCS verbal subscore is 5. GCS motor subscore is 6.      Cranial Nerves: Cranial nerves are intact.      Motor: Weakness (left ankle) present.      Gait: Gait normal.      Comments: Strength right wrist 5/5, left wrist 5/5  Right ankle 5/5 left ankle 4/5 weak dorsiflexion 3/5 weaker plantar flexion. At times reports must walk with cane and frequently has balance issues.   Psychiatric:         Mood and Affect: Mood normal.         Behavior: Behavior normal.         Thought Content: Thought content normal.         Judgment: Judgment normal.       Vitals:    05/27/22 1326   BP: 128/80   Pulse: 86   Resp: 18   Temp: 98.1 °F (36.7 °C)   SpO2: 99%   Weight: 89.4 kg (197 lb)   Height: 160 cm (63\")   PainSc:   7   PainLoc: Hip  Comment: left hip and leg      Body mass index is 34.9 kg/m².      Assessment & Plan   Diagnoses and all orders for this visit:    1. Essential hypertension (Primary)  -     CBC & Differential  -     Comprehensive Metabolic Panel  -     MRI Brain With & Without Contrast; Future    2. Mixed hyperlipidemia  -     Lipid Panel    3. Encounter for therapeutic drug level monitoring  -     ToxASSURE Select 13 Discrete - Urine, Clean Catch    4. History of CVA (cerebrovascular accident)  -     MRI Brain With & Without Contrast; Future    5. Memory problem  -     MRI Brain With & " Without Contrast; Future    6. Left-sided weakness  -     MRI Brain With & Without Contrast; Future    7. TIA (transient ischemic attack)  -     MRI Brain With & Without Contrast; Future    8. Acute foot pain, left  -     triamcinolone acetonide (KENALOG-40) injection 80 mg  -     ketorolac (TORADOL) injection 60 mg  -     XR Foot 3+ View Left; Future    9. Chronic left hip pain  -     triamcinolone acetonide (KENALOG-40) injection 80 mg  -     ketorolac (TORADOL) injection 60 mg    10. HAYLEE (generalized anxiety disorder)    11. RLS (restless legs syndrome)    12. Chronic midline low back pain without sciatica               Return in about 12 weeks (around 8/19/2022).  There are no Patient Instructions on file for this visit.        This document has been electronically signed by HITESH Parra on May 27, 2022 16:27 CDT

## 2022-06-02 RX ORDER — LISINOPRIL 10 MG/1
TABLET ORAL
Qty: 90 TABLET | Refills: 0 | Status: SHIPPED | OUTPATIENT
Start: 2022-06-02 | End: 2022-10-04 | Stop reason: SDUPTHER

## 2022-06-06 DIAGNOSIS — M54.50 CHRONIC MIDLINE LOW BACK PAIN WITHOUT SCIATICA: ICD-10-CM

## 2022-06-06 DIAGNOSIS — G89.29 CHRONIC MIDLINE LOW BACK PAIN WITHOUT SCIATICA: ICD-10-CM

## 2022-06-06 DIAGNOSIS — F41.1 GAD (GENERALIZED ANXIETY DISORDER): ICD-10-CM

## 2022-06-06 LAB
6MAM UR QL CFM: NEGATIVE
6MAM/CREAT UR: NOT DETECTED NG/MG CREAT
7AMINOCLONAZEPAM/CREAT UR: 309 NG/MG CREAT
A-OH ALPRAZ/CREAT UR: NOT DETECTED NG/MG CREAT
A-OH-TRIAZOLAM/CREAT UR CFM: NOT DETECTED NG/MG CREAT
ALFENTANIL/CREAT UR CFM: NOT DETECTED NG/MG CREAT
ALPHA-HYDROXYMIDAZOLAM, URINE: NOT DETECTED NG/MG CREAT
ALPRAZ/CREAT UR CFM: NOT DETECTED NG/MG CREAT
AMOBARBITAL UR QL CFM: NOT DETECTED
AMPHET/CREAT UR: NOT DETECTED NG/MG CREAT
AMPHETAMINES UR QL CFM: NEGATIVE
BARBITAL UR QL CFM: NOT DETECTED
BARBITURATES UR QL CFM: NEGATIVE
BENZODIAZ UR QL CFM: NORMAL
BUPRENORPHINE UR QL CFM: NEGATIVE
BUPRENORPHINE/CREAT UR: NOT DETECTED NG/MG CREAT
BUTABARBITAL UR QL CFM: NOT DETECTED
BUTALBITAL UR QL CFM: NOT DETECTED
BZE/CREAT UR: NOT DETECTED NG/MG CREAT
CANNABINOIDS UR QL CFM: NEGATIVE
CARBOXYTHC/CREAT UR: NOT DETECTED NG/MG CREAT
CLONAZEPAM/CREAT UR CFM: NOT DETECTED NG/MG CREAT
COCAETHYLENE/CREAT UR CFM: NOT DETECTED NG/MG CREAT
COCAINE UR QL CFM: NEGATIVE
COCAINE/CREAT UR CFM: NOT DETECTED NG/MG CREAT
CODEINE/CREAT UR: NOT DETECTED NG/MG CREAT
CREAT UR-MCNC: 127 MG/DL
DESALKYLFLURAZ/CREAT UR: NOT DETECTED NG/MG CREAT
DESMETHYLFLUNITRAZEPAM: NOT DETECTED NG/MG CREAT
DHC/CREAT UR: 243 NG/MG CREAT
DIAZEPAM/CREAT UR: NOT DETECTED NG/MG CREAT
DRUGS UR: NORMAL
EDDP/CREAT UR: NOT DETECTED NG/MG CREAT
ETHANOL UR CFM-MCNC: NOT DETECTED G/DL
ETHANOL UR QL CFM: NEGATIVE
FENTANYL UR QL CFM: NEGATIVE
FENTANYL/CREAT UR: NOT DETECTED NG/MG CREAT
FLUNITRAZEPAM UR QL CFM: NOT DETECTED NG/MG CREAT
HYDROCODONE/CREAT UR: 2148 NG/MG CREAT
HYDROMORPHONE/CREAT UR: 1037 NG/MG CREAT
LEVEL OF DETECTION:: NORMAL
LORAZEPAM/CREAT UR: NOT DETECTED NG/MG CREAT
MDA/CREAT UR: NOT DETECTED NG/MG CREAT
MDMA/CREAT UR: NOT DETECTED NG/MG CREAT
MEPHOBARBITAL UR QL CFM: NOT DETECTED
METHADONE UR QL CFM: NEGATIVE
METHADONE/CREAT UR: NOT DETECTED NG/MG CREAT
METHAMPHET/CREAT UR: NOT DETECTED NG/MG CREAT
MIDAZOLAM/CREAT UR CFM: NOT DETECTED NG/MG CREAT
MORPHINE/CREAT UR: NOT DETECTED NG/MG CREAT
N-NORTRAMADOL/CREAT UR CFM: NOT DETECTED NG/MG CREAT
NARCOTICS UR: NEGATIVE
NORBUPRENORPHINE/CREAT UR: NOT DETECTED NG/MG CREAT
NORCODEINE/CREAT UR CFM: NOT DETECTED NG/MG CREAT
NORDIAZEPAM/CREAT UR: NOT DETECTED NG/MG CREAT
NORFENTANYL/CREAT UR: NOT DETECTED NG/MG CREAT
NORHYDROCODONE/CREAT UR: 3481 NG/MG CREAT
NORMORPHINE UR-MCNC: NOT DETECTED NG/MG CREAT
NOROXYCODONE/CREAT UR: NOT DETECTED NG/MG CREAT
NOROXYMORPHONE/CREAT UR CFM: NOT DETECTED NG/MG CREAT
O-NORTRAMADOL UR CFM-MCNC: NOT DETECTED NG/MG CREAT
OPIATES UR QL CFM: NORMAL
OXAZEPAM/CREAT UR: NOT DETECTED NG/MG CREAT
OXYCODONE UR QL CFM: NEGATIVE
OXYCODONE/CREAT UR: NOT DETECTED NG/MG CREAT
OXYMORPHONE/CREAT UR: NOT DETECTED NG/MG CREAT
PENTOBARB UR QL CFM: NOT DETECTED
PHENOBARB UR QL CFM: NOT DETECTED
SECOBARBITAL UR QL CFM: NOT DETECTED
SUFENTANIL/CREAT UR CFM: NOT DETECTED NG/MG CREAT
TAPENTADOL UR QL CFM: NEGATIVE
TAPENTADOL/CREAT UR: NOT DETECTED NG/MG CREAT
TEMAZEPAM/CREAT UR: NOT DETECTED NG/MG CREAT
THIOPENTAL UR QL CFM: NOT DETECTED
TRAMADOL UR QL CFM: NOT DETECTED NG/MG CREAT

## 2022-06-06 NOTE — TELEPHONE ENCOUNTER
Incoming Refill Request      Medication requested (name and dose):   [facility]    Pharmacy where request should be sent:   [facility]      Additional details provided by patient:     Best call back number:     Does the patient have less than a 3 day supply:  [] Yes  [] No    Silva Kimball  06/06/22, 12:49 CDT

## 2022-06-08 ENCOUNTER — TELEPHONE (OUTPATIENT)
Dept: FAMILY MEDICINE CLINIC | Facility: CLINIC | Age: 32
End: 2022-06-08

## 2022-06-08 RX ORDER — HYDROCODONE BITARTRATE AND ACETAMINOPHEN 10; 325 MG/1; MG/1
1 TABLET ORAL EVERY 6 HOURS PRN
Qty: 120 TABLET | Refills: 0 | Status: SHIPPED | OUTPATIENT
Start: 2022-06-08 | End: 2022-07-07 | Stop reason: SDUPTHER

## 2022-06-08 RX ORDER — CLONAZEPAM 1 MG/1
1 TABLET ORAL 2 TIMES DAILY PRN
Qty: 60 TABLET | Refills: 0 | Status: SHIPPED | OUTPATIENT
Start: 2022-06-08 | End: 2022-06-23 | Stop reason: SDUPTHER

## 2022-06-08 NOTE — TELEPHONE ENCOUNTER
Patient has chronic anxiety requiring benzodiazepine use concurrently with chronic pain requiring opiate pain medication and/ or gabapentin. Patient has been educated regarding potential dangers of oversedation and accidental overdose with use of both medications concurrently. Serial assessments of patient has yielded no sign of oversedation or adverse effects of patient, and he/she is advised and aware to notify my office immediately if symptoms do occur, as well as to discontinue use of benzodiazepine medication and opiate medication immediately if that occurs, pending medical evaluation and advice. Patient has been compliant with use of medications, UDS, visits with no adverse effects noted. Patient understands the risks associated with this controlled medication, including tolerance and addiction.  Patient also agrees to only obtain this medication from me, and not from a another provider, unless that provider is covering for me in my absence.  Patient also agrees to be compliant in dosing, and not self adjust the dose of medication.  A signed controlled substance agreement is on file, and the patient has received a controlled substance education sheet at this a previous visit.  The patient has also signed a consent for treatment with a controlled substance as per [facility] policy. RORY was obtained. Refills were sent for:   Hydrocodone and clonazepam.     This document has been electronically signed by HITESH Parra on June 8, 2022 17:37 CDT

## 2022-06-23 ENCOUNTER — TELEPHONE (OUTPATIENT)
Dept: FAMILY MEDICINE CLINIC | Facility: CLINIC | Age: 32
End: 2022-06-23

## 2022-06-23 DIAGNOSIS — F41.1 GAD (GENERALIZED ANXIETY DISORDER): ICD-10-CM

## 2022-06-23 DIAGNOSIS — G25.81 RLS (RESTLESS LEGS SYNDROME): ICD-10-CM

## 2022-06-23 DIAGNOSIS — M54.50 CHRONIC MIDLINE LOW BACK PAIN WITHOUT SCIATICA: ICD-10-CM

## 2022-06-23 DIAGNOSIS — G89.29 CHRONIC MIDLINE LOW BACK PAIN WITHOUT SCIATICA: ICD-10-CM

## 2022-06-23 RX ORDER — CLONAZEPAM 1 MG/1
1 TABLET ORAL 3 TIMES DAILY PRN
Qty: 90 TABLET | Refills: 0 | Status: SHIPPED | OUTPATIENT
Start: 2022-06-23 | End: 2022-07-25 | Stop reason: SDUPTHER

## 2022-06-23 NOTE — TELEPHONE ENCOUNTER
Patient comes in with her aunt who is in need of hospitalization at present, is caring for her dying mother in [facility] who is at home on hospice, has a current breast cancer scare and due for MRI of head tomorrow for monitoring of recurrent or assessment of recent CVA. She is accompanied by 3 school aged children of her own and is about at her wit's end. She requests a short term increased in her clonazepam from BID to tid today, this is acceptable and new rx sent to pharmacy indicating increased freuqency and # dispensed as of today. Will need earlier fill than usual due to increase.     This document has been electronically signed by HITESH Parra on June 23, 2022 14:29 CDT

## 2022-06-24 RX ORDER — GABAPENTIN 300 MG/1
CAPSULE ORAL
Qty: 120 CAPSULE | OUTPATIENT
Start: 2022-06-24

## 2022-06-30 DIAGNOSIS — R05.9 COUGH: ICD-10-CM

## 2022-06-30 DIAGNOSIS — J06.9 ACUTE URI: ICD-10-CM

## 2022-06-30 RX ORDER — GUAIFENESIN AND CODEINE PHOSPHATE 10; 100 MG/5ML; MG/5ML
LIQUID ORAL
Qty: 240 ML | OUTPATIENT
Start: 2022-06-30

## 2022-07-07 DIAGNOSIS — M54.50 CHRONIC MIDLINE LOW BACK PAIN WITHOUT SCIATICA: ICD-10-CM

## 2022-07-07 DIAGNOSIS — G89.29 CHRONIC MIDLINE LOW BACK PAIN WITHOUT SCIATICA: ICD-10-CM

## 2022-07-07 RX ORDER — HYDROCODONE BITARTRATE AND ACETAMINOPHEN 10; 325 MG/1; MG/1
1 TABLET ORAL EVERY 6 HOURS PRN
Qty: 120 TABLET | Refills: 0 | Status: SHIPPED | OUTPATIENT
Start: 2022-07-07 | End: 2022-08-04 | Stop reason: SDUPTHER

## 2022-07-07 NOTE — TELEPHONE ENCOUNTER
Incoming Refill Request      Medication requested (name and dose):     HYDROcodone-acetaminophen (NORCO)  MG per tablet [[id number]] (Order [id number])        Pharmacy where request should be sent:   [facility]    Additional details provided by patient:     Best call back number:     Does the patient have less than a 3 day supply:  [x] Yes  [] No    Angel OAKES Rep  07/07/22, 11:40 CDT

## 2022-07-25 DIAGNOSIS — F41.1 GAD (GENERALIZED ANXIETY DISORDER): ICD-10-CM

## 2022-07-25 RX ORDER — CLONAZEPAM 1 MG/1
1 TABLET ORAL 3 TIMES DAILY PRN
Qty: 90 TABLET | Refills: 0 | Status: SHIPPED | OUTPATIENT
Start: 2022-07-25 | End: 2022-08-04 | Stop reason: SDUPTHER

## 2022-07-25 NOTE — TELEPHONE ENCOUNTER
Incoming Refill Request      Medication requested (name and dose):   clonazePAM (KlonoPIN) 1 MG tablet      Pharmacy where request should be sent:   [facility]     Additional details provided by patient:  [facility]    Best call back number:     Does the patient have less than a 3 day supply:  [x] Yes  [] No    Silva Kimball  07/25/22, 08:27 CDT

## 2022-08-04 DIAGNOSIS — M54.50 CHRONIC MIDLINE LOW BACK PAIN WITHOUT SCIATICA: ICD-10-CM

## 2022-08-04 DIAGNOSIS — F41.1 GAD (GENERALIZED ANXIETY DISORDER): ICD-10-CM

## 2022-08-04 DIAGNOSIS — G89.29 CHRONIC MIDLINE LOW BACK PAIN WITHOUT SCIATICA: ICD-10-CM

## 2022-08-04 RX ORDER — HYDROCODONE BITARTRATE AND ACETAMINOPHEN 10; 325 MG/1; MG/1
1 TABLET ORAL EVERY 6 HOURS PRN
Qty: 120 TABLET | Refills: 0 | Status: SHIPPED | OUTPATIENT
Start: 2022-08-04 | End: 2022-09-07 | Stop reason: SDUPTHER

## 2022-08-04 RX ORDER — CLONAZEPAM 1 MG/1
1 TABLET ORAL 2 TIMES DAILY PRN
Qty: 60 TABLET | Refills: 0 | Status: SHIPPED | OUTPATIENT
Start: 2022-08-04 | End: 2022-09-30 | Stop reason: SDUPTHER

## 2022-08-04 NOTE — TELEPHONE ENCOUNTER
Incoming Refill Request      Medication requested (name and dose):   HYDROcodone-acetaminophen (NORCO)  MG per tablet       [facility]     Additional details provided by patient:   [facility] pt is on Klonopin and Norco     Best call back number:     Does the patient have less than a 3 day supply:  [] Yes  [] No    Silva Kimball  08/04/22, 11:55 CDT

## 2022-08-05 ENCOUNTER — TELEPHONE (OUTPATIENT)
Dept: FAMILY MEDICINE CLINIC | Facility: CLINIC | Age: 32
End: 2022-08-05

## 2022-08-05 NOTE — TELEPHONE ENCOUNTER
Tried calling the patient about medication Klonopin. Left a voicemail for patient to call the office

## 2022-09-06 RX ORDER — APIXABAN 5 MG/1
TABLET, FILM COATED ORAL
Qty: 60 TABLET | Refills: 5 | OUTPATIENT
Start: 2022-09-06

## 2022-09-06 RX ORDER — APIXABAN 5 MG/1
5 TABLET, FILM COATED ORAL EVERY 12 HOURS SCHEDULED
Qty: 60 TABLET | Refills: 0 | Status: SHIPPED | OUTPATIENT
Start: 2022-09-06 | End: 2022-10-04 | Stop reason: SDUPTHER

## 2022-09-07 DIAGNOSIS — M54.50 CHRONIC MIDLINE LOW BACK PAIN WITHOUT SCIATICA: ICD-10-CM

## 2022-09-07 DIAGNOSIS — G89.29 CHRONIC MIDLINE LOW BACK PAIN WITHOUT SCIATICA: ICD-10-CM

## 2022-09-07 RX ORDER — HYDROCODONE BITARTRATE AND ACETAMINOPHEN 10; 325 MG/1; MG/1
1 TABLET ORAL EVERY 6 HOURS PRN
Qty: 120 TABLET | Refills: 0 | Status: SHIPPED | OUTPATIENT
Start: 2022-09-07 | End: 2022-10-04 | Stop reason: SDUPTHER

## 2022-09-26 DIAGNOSIS — M54.50 CHRONIC MIDLINE LOW BACK PAIN WITHOUT SCIATICA: ICD-10-CM

## 2022-09-26 DIAGNOSIS — G89.29 CHRONIC MIDLINE LOW BACK PAIN WITHOUT SCIATICA: ICD-10-CM

## 2022-09-26 DIAGNOSIS — F41.1 GAD (GENERALIZED ANXIETY DISORDER): ICD-10-CM

## 2022-09-26 NOTE — TELEPHONE ENCOUNTER
Incoming Refill Request      Medication requested (name and dose):   [facility]      Pharmacy where request should be sent:  [facility]    Additional details provided by patient:   [facility]// EST WITH MILO OCT 4TH     Best call back number:     Does the patient have less than a 3 day supply:  [] Yes  [] No    Silva Kimball  09/26/22, 08:45 CDT

## 2022-09-27 RX ORDER — HYDROCODONE BITARTRATE AND ACETAMINOPHEN 10; 325 MG/1; MG/1
1 TABLET ORAL EVERY 6 HOURS PRN
Qty: 120 TABLET | Refills: 0 | OUTPATIENT
Start: 2022-09-27

## 2022-09-27 RX ORDER — CLONAZEPAM 1 MG/1
1 TABLET ORAL 2 TIMES DAILY PRN
Qty: 60 TABLET | Refills: 0 | OUTPATIENT
Start: 2022-09-27

## 2022-09-30 DIAGNOSIS — F41.1 GAD (GENERALIZED ANXIETY DISORDER): ICD-10-CM

## 2022-09-30 RX ORDER — CLONAZEPAM 1 MG/1
TABLET ORAL
Qty: 60 TABLET | Refills: 0 | Status: SHIPPED | OUTPATIENT
Start: 2022-09-30 | End: 2022-11-07 | Stop reason: SDUPTHER

## 2022-10-03 DIAGNOSIS — J44.9 CHRONIC OBSTRUCTIVE PULMONARY DISEASE, UNSPECIFIED COPD TYPE: ICD-10-CM

## 2022-10-04 ENCOUNTER — OFFICE VISIT (OUTPATIENT)
Dept: FAMILY MEDICINE CLINIC | Facility: CLINIC | Age: 32
End: 2022-10-04

## 2022-10-04 VITALS
HEART RATE: 92 BPM | SYSTOLIC BLOOD PRESSURE: 124 MMHG | TEMPERATURE: 98.9 F | HEIGHT: 62 IN | BODY MASS INDEX: 36.25 KG/M2 | WEIGHT: 197 LBS | OXYGEN SATURATION: 100 % | DIASTOLIC BLOOD PRESSURE: 86 MMHG

## 2022-10-04 DIAGNOSIS — I10 ESSENTIAL HYPERTENSION: ICD-10-CM

## 2022-10-04 DIAGNOSIS — F41.1 GENERALIZED ANXIETY DISORDER: ICD-10-CM

## 2022-10-04 DIAGNOSIS — M54.50 CHRONIC MIDLINE LOW BACK PAIN WITHOUT SCIATICA: ICD-10-CM

## 2022-10-04 DIAGNOSIS — F41.1 GAD (GENERALIZED ANXIETY DISORDER): ICD-10-CM

## 2022-10-04 DIAGNOSIS — F51.01 PRIMARY INSOMNIA: Chronic | ICD-10-CM

## 2022-10-04 DIAGNOSIS — E78.2 MIXED HYPERLIPIDEMIA: ICD-10-CM

## 2022-10-04 DIAGNOSIS — Z51.81 MEDICATION MONITORING ENCOUNTER: Primary | ICD-10-CM

## 2022-10-04 DIAGNOSIS — G25.81 RLS (RESTLESS LEGS SYNDROME): ICD-10-CM

## 2022-10-04 DIAGNOSIS — G89.29 CHRONIC MIDLINE LOW BACK PAIN WITHOUT SCIATICA: ICD-10-CM

## 2022-10-04 DIAGNOSIS — J44.9 CHRONIC OBSTRUCTIVE PULMONARY DISEASE, UNSPECIFIED COPD TYPE: ICD-10-CM

## 2022-10-04 PROCEDURE — 99214 OFFICE O/P EST MOD 30 MIN: CPT | Performed by: NURSE PRACTITIONER

## 2022-10-04 RX ORDER — APIXABAN 5 MG/1
5 TABLET, FILM COATED ORAL EVERY 12 HOURS SCHEDULED
Qty: 60 TABLET | Refills: 2 | Status: SHIPPED | OUTPATIENT
Start: 2022-10-04 | End: 2023-03-10 | Stop reason: SDUPTHER

## 2022-10-04 RX ORDER — ROPINIROLE 1 MG/1
1 TABLET, FILM COATED ORAL NIGHTLY
Qty: 30 TABLET | Refills: 5 | Status: SHIPPED | OUTPATIENT
Start: 2022-10-04

## 2022-10-04 RX ORDER — ATORVASTATIN CALCIUM 40 MG/1
40 TABLET, FILM COATED ORAL DAILY
Qty: 90 TABLET | Refills: 1 | Status: SHIPPED | OUTPATIENT
Start: 2022-10-04 | End: 2023-10-04

## 2022-10-04 RX ORDER — GABAPENTIN 300 MG/1
300 CAPSULE ORAL 4 TIMES DAILY
Qty: 120 CAPSULE | Refills: 2 | Status: SHIPPED | OUTPATIENT
Start: 2022-10-04

## 2022-10-04 RX ORDER — LISINOPRIL 10 MG/1
10 TABLET ORAL DAILY
Qty: 90 TABLET | Refills: 1 | Status: SHIPPED | OUTPATIENT
Start: 2022-10-04

## 2022-10-04 RX ORDER — ALBUTEROL SULFATE 90 UG/1
AEROSOL, METERED RESPIRATORY (INHALATION)
Qty: 54 G | Refills: 2 | OUTPATIENT
Start: 2022-10-04

## 2022-10-04 RX ORDER — DULOXETIN HYDROCHLORIDE 30 MG/1
30 CAPSULE, DELAYED RELEASE ORAL DAILY
Qty: 90 CAPSULE | Refills: 1 | Status: SHIPPED | OUTPATIENT
Start: 2022-10-04

## 2022-10-04 RX ORDER — DILTIAZEM HYDROCHLORIDE 120 MG/1
120 CAPSULE, COATED, EXTENDED RELEASE ORAL DAILY
Qty: 90 CAPSULE | Refills: 1 | Status: SHIPPED | OUTPATIENT
Start: 2022-10-04

## 2022-10-04 RX ORDER — ALBUTEROL SULFATE 90 UG/1
2 AEROSOL, METERED RESPIRATORY (INHALATION) EVERY 6 HOURS PRN
Qty: 54 G | Refills: 2 | Status: SHIPPED | OUTPATIENT
Start: 2022-10-04

## 2022-10-04 RX ORDER — HYDROCODONE BITARTRATE AND ACETAMINOPHEN 10; 325 MG/1; MG/1
1 TABLET ORAL EVERY 6 HOURS PRN
Qty: 120 TABLET | Refills: 0 | Status: SHIPPED | OUTPATIENT
Start: 2022-10-04 | End: 2022-11-03 | Stop reason: SDUPTHER

## 2022-10-04 RX ORDER — AMITRIPTYLINE HYDROCHLORIDE 50 MG/1
50 TABLET, FILM COATED ORAL NIGHTLY
Qty: 30 TABLET | Refills: 5 | Status: SHIPPED | OUTPATIENT
Start: 2022-10-04 | End: 2023-03-06 | Stop reason: SDUPTHER

## 2022-10-04 NOTE — PROGRESS NOTES
"Subjective   Cally Olsen is a 32 y.o. female who presents to the office to establish care which will include surveillance of several chronic conditions including HTN, DDD, insomnia, hyperchol, lupus, RLS, CVA, anxiety and depression.  Tells me that she had 19 strokes last September, was seen at [facility].  Says that she had the strokes due to elevated blood pressure and increased thickness of her blood.  Has never taken meds for lupus, never seen a rheumatologist for this issue.  Regarding pain medication she says she has seen Dr. Forte in the past, I see a visit with Dr. Estrella on 6/15/21.  When I asked if she was seeing neurology for her strokes she said she was told by neurology office that Crystal could manage her medications for this.  Is also scheduled to undergo breast biopsy with Dr. Park end of this month.  Says that she takes Gabapentin for \"nerve pain.\"  No recent lumbar imaging, last xray she had was in 2020 which showed minimal degenerative changes.    Of course, she was seeing MAURY Hoffmann.  History of Present Illness     The following portions of the patient's history were reviewed and updated as appropriate: allergies, current medications, past family history, past medical history, past social history, past surgical history and problem list.    Review of Systems   Constitutional: Negative for chills, fatigue and fever.   HENT: Negative for congestion, sneezing, sore throat and trouble swallowing.    Eyes: Negative for visual disturbance.   Respiratory: Negative for cough, chest tightness, shortness of breath and wheezing.    Cardiovascular: Negative for chest pain, palpitations and leg swelling.   Gastrointestinal: Negative for abdominal pain, constipation, diarrhea, nausea and vomiting.   Genitourinary: Negative for dysuria, frequency and urgency.   Musculoskeletal: Positive for back pain. Negative for neck pain.   Skin: Negative for rash.   Neurological: Negative for dizziness, " weakness and headaches.   Psychiatric/Behavioral: Positive for dysphoric mood and sleep disturbance. The patient is nervous/anxious.         In the past two weeks the pt has not felt down, depressed, hopeless or lost interest in doing things   All other systems reviewed and are negative.      Objective   Physical Exam  Vitals and nursing note reviewed.   Constitutional:       General: She is not in acute distress.     Appearance: Normal appearance. She is well-developed. She is not ill-appearing.   HENT:      Head: Normocephalic and atraumatic.      Right Ear: Tympanic membrane and external ear normal.      Left Ear: Tympanic membrane and external ear normal.      Nose: Nose normal.      Mouth/Throat:      Lips: Pink.      Mouth: Mucous membranes are moist.      Pharynx: Oropharynx is clear. Uvula midline.   Eyes:      General: Lids are normal. Vision grossly intact. No scleral icterus.        Right eye: No discharge.         Left eye: No discharge.      Extraocular Movements: Extraocular movements intact.      Conjunctiva/sclera: Conjunctivae normal.      Pupils: Pupils are equal, round, and reactive to light.   Neck:      Thyroid: No thyromegaly.      Trachea: Trachea and phonation normal.   Cardiovascular:      Rate and Rhythm: Normal rate and regular rhythm.      Heart sounds: Normal heart sounds. No murmur heard.    No friction rub. No gallop.   Pulmonary:      Effort: Pulmonary effort is normal. No respiratory distress.      Breath sounds: Normal breath sounds. No wheezing or rales.   Abdominal:      General: Bowel sounds are normal. There is no distension.      Palpations: Abdomen is soft.      Tenderness: There is no abdominal tenderness. There is no guarding or rebound.   Musculoskeletal:         General: Normal range of motion.      Cervical back: Normal range of motion and neck supple.   Lymphadenopathy:      Cervical: No cervical adenopathy.   Skin:     General: Skin is warm and dry.      Capillary  Refill: Capillary refill takes less than 2 seconds.      Findings: No rash.   Neurological:      General: No focal deficit present.      Mental Status: She is alert and oriented to person, place, and time.      GCS: GCS eye subscore is 4. GCS verbal subscore is 5. GCS motor subscore is 6.      Cranial Nerves: Cranial nerves are intact. No cranial nerve deficit.      Sensory: Sensation is intact.      Motor: Motor function is intact.      Coordination: Coordination is intact.      Gait: Gait is intact.      Deep Tendon Reflexes:      Reflex Scores:       Patellar reflexes are 2+ on the right side and 2+ on the left side.  Psychiatric:         Attention and Perception: Attention and perception normal.         Mood and Affect: Mood and affect normal.         Speech: Speech normal.         Behavior: Behavior normal. Behavior is cooperative.         Thought Content: Thought content normal. Thought content does not include homicidal or suicidal ideation. Thought content does not include homicidal or suicidal plan.         Cognition and Memory: Cognition and memory normal.         Judgment: Judgment normal.      Comments: Dressed appropriately for weather and situation, makes eye contact and engages in conversation         Assessment & Plan   Diagnoses and all orders for this visit:    1. Medication monitoring encounter (Primary)  -     ToxASSURE Select 13 Discrete -; Future    2. Chronic midline low back pain without sciatica  -     HYDROcodone-acetaminophen (NORCO)  MG per tablet; Take 1 tablet by mouth Every 6 (Six) Hours As Needed for Moderate Pain.  Dispense: 120 tablet; Refill: 0  -     gabapentin (NEURONTIN) 300 MG capsule; Take 1 capsule by mouth 4 (Four) Times a Day.  Dispense: 120 capsule; Refill: 2  -     XR Spine Lumbar 2 or 3 View  -     Ambulatory Referral to Pain Management    3. RLS (restless legs syndrome)  -     rOPINIRole (REQUIP) 1 MG tablet; Take 1 tablet by mouth Every Night. Take one tablet by  mouth one hour before bed for help with restless leg syndrome.  Dispense: 30 tablet; Refill: 5  -     gabapentin (NEURONTIN) 300 MG capsule; Take 1 capsule by mouth 4 (Four) Times a Day.  Dispense: 120 capsule; Refill: 2    4. Chronic obstructive pulmonary disease, unspecified COPD type (HCC)  -     albuterol sulfate  (90 Base) MCG/ACT inhaler; Inhale 2 puffs Every 6 (Six) Hours As Needed for Wheezing or Shortness of Air.  Dispense: 54 g; Refill: 2    5. Essential hypertension  -     dilTIAZem CD (Cardizem CD) 120 MG 24 hr capsule; Take 1 capsule by mouth Daily. For High blood pressure in addition to the lisinopril  Dispense: 90 capsule; Refill: 1  -     CBC & Differential; Future  -     Comprehensive Metabolic Panel; Future  -     T4, Free; Future  -     TSH; Future  -     Urinalysis With Culture If Indicated -; Future  -     Lipid Panel; Future  -     Microalbumin / Creatinine Urine Ratio - Urine, Clean Catch; Future    6. HAYLEE (generalized anxiety disorder)  -     DULoxetine (CYMBALTA) 30 MG capsule; Take 1 capsule by mouth Daily.  Dispense: 90 capsule; Refill: 1    7. Generalized anxiety disorder  -     amitriptyline (ELAVIL) 50 MG tablet; Take 1 tablet by mouth Every Night.  Dispense: 30 tablet; Refill: 5    8. Primary insomnia  -     amitriptyline (ELAVIL) 50 MG tablet; Take 1 tablet by mouth Every Night.  Dispense: 30 tablet; Refill: 5    9. Mixed hyperlipidemia  -     atorvastatin (LIPITOR) 40 MG tablet; Take 1 tablet by mouth Daily.  Dispense: 90 tablet; Refill: 1    Other orders  -     Eliquis 5 MG tablet tablet; Take 1 tablet by mouth Every 12 (Twelve) Hours.  Dispense: 60 tablet; Refill: 2  -     lisinopril (PRINIVIL,ZESTRIL) 10 MG tablet; Take 1 tablet by mouth Daily.  Dispense: 90 tablet; Refill: 1    Informed that patient that due to multiple controlled medications I will not be able to write for all her controlled medications, will be referring her out to pain management.    Will have her  medications refilled.   Will be updating labs and lumbar xray.     Patient understands the risks associated with this controlled medication, including tolerance and addiction.  Patient also agrees to only obtain this medication from me, and not from a another provider, unless that provider is covering for me in my absence.  Patient also agrees to be compliant in dosing, and not self adjust the dose of medication.  A signed controlled substance agreement is on file, and the patient has received a controlled substance education sheet at this a previous visit.  The patient has also signed a consent for treatment with a controlled substance as per [facility] policy. RORY was obtained, reviewed # [id number].          This document has been electronically signed by HITESH Nj on October 4, 2022 10:25 CDT

## 2022-10-13 ENCOUNTER — TELEPHONE (OUTPATIENT)
Dept: FAMILY MEDICINE CLINIC | Facility: CLINIC | Age: 32
End: 2022-10-13

## 2022-10-13 NOTE — TELEPHONE ENCOUNTER
I called and visited with the patient about her overdue XR orders. She advised she still needed the XR and she would like to come in tomorrow to complete it.

## 2022-10-27 ENCOUNTER — LAB (OUTPATIENT)
Dept: LAB | Facility: OTHER | Age: 32
End: 2022-10-27

## 2022-10-27 DIAGNOSIS — I10 ESSENTIAL HYPERTENSION: ICD-10-CM

## 2022-10-27 DIAGNOSIS — Z51.81 MEDICATION MONITORING ENCOUNTER: ICD-10-CM

## 2022-10-27 LAB
ALBUMIN SERPL-MCNC: 4.3 G/DL (ref 3.5–5)
ALBUMIN UR-MCNC: 5.9 MG/DL
ALBUMIN/GLOB SERPL: 1.8 G/DL (ref 1.1–1.8)
ALP SERPL-CCNC: 65 U/L (ref 38–126)
ALT SERPL W P-5'-P-CCNC: 24 U/L
ANION GAP SERPL CALCULATED.3IONS-SCNC: 5 MMOL/L (ref 5–15)
AST SERPL-CCNC: 22 U/L (ref 14–36)
BACTERIA UR QL AUTO: ABNORMAL /HPF
BASOPHILS # BLD AUTO: 0.02 10*3/MM3 (ref 0–0.2)
BASOPHILS NFR BLD AUTO: 0.2 % (ref 0–1.5)
BILIRUB SERPL-MCNC: 0.3 MG/DL (ref 0.2–1.3)
BILIRUB UR QL STRIP: NEGATIVE
BUN SERPL-MCNC: 14 MG/DL (ref 7–23)
BUN/CREAT SERPL: 28 (ref 7–25)
CALCIUM SPEC-SCNC: 9.2 MG/DL (ref 8.4–10.2)
CHLORIDE SERPL-SCNC: 104 MMOL/L (ref 101–112)
CHOLEST SERPL-MCNC: 120 MG/DL (ref 150–200)
CLARITY UR: ABNORMAL
CO2 SERPL-SCNC: 30 MMOL/L (ref 22–30)
COLOR UR: YELLOW
CREAT SERPL-MCNC: 0.5 MG/DL (ref 0.52–1.04)
CREAT UR-MCNC: 88.3 MG/DL
DEPRECATED RDW RBC AUTO: 44.8 FL (ref 37–54)
EGFRCR SERPLBLD CKD-EPI 2021: 126.4 ML/MIN/1.73
EOSINOPHIL # BLD AUTO: 0.05 10*3/MM3 (ref 0–0.4)
EOSINOPHIL NFR BLD AUTO: 0.6 % (ref 0.3–6.2)
ERYTHROCYTE [DISTWIDTH] IN BLOOD BY AUTOMATED COUNT: 13.5 % (ref 12.3–15.4)
GLOBULIN UR ELPH-MCNC: 2.4 GM/DL (ref 2.3–3.5)
GLUCOSE SERPL-MCNC: 111 MG/DL (ref 70–99)
GLUCOSE UR STRIP-MCNC: NEGATIVE MG/DL
HCT VFR BLD AUTO: 39.8 % (ref 34–46.6)
HDLC SERPL-MCNC: 38 MG/DL (ref 40–59)
HGB BLD-MCNC: 12.7 G/DL (ref 12–15.9)
HGB UR QL STRIP.AUTO: ABNORMAL
HYALINE CASTS UR QL AUTO: ABNORMAL /LPF
KETONES UR QL STRIP: NEGATIVE
LDLC SERPL CALC-MCNC: 59 MG/DL
LDLC/HDLC SERPL: 1.49 {RATIO} (ref 0–3.22)
LEUKOCYTE ESTERASE UR QL STRIP.AUTO: NEGATIVE
LYMPHOCYTES # BLD AUTO: 2.25 10*3/MM3 (ref 0.7–3.1)
LYMPHOCYTES NFR BLD AUTO: 27 % (ref 19.6–45.3)
MCH RBC QN AUTO: 30 PG (ref 26.6–33)
MCHC RBC AUTO-ENTMCNC: 31.9 G/DL (ref 31.5–35.7)
MCV RBC AUTO: 94.1 FL (ref 79–97)
MICROALBUMIN/CREAT UR: 66.8 MG/G
MONOCYTES # BLD AUTO: 0.64 10*3/MM3 (ref 0.1–0.9)
MONOCYTES NFR BLD AUTO: 7.7 % (ref 5–12)
NEUTROPHILS NFR BLD AUTO: 5.38 10*3/MM3 (ref 1.7–7)
NEUTROPHILS NFR BLD AUTO: 64.5 % (ref 42.7–76)
NITRITE UR QL STRIP: NEGATIVE
PH UR STRIP.AUTO: 7.5 [PH] (ref 5.5–8)
PLATELET # BLD AUTO: 251 10*3/MM3 (ref 140–450)
PMV BLD AUTO: 9 FL (ref 6–12)
POTASSIUM SERPL-SCNC: 4.2 MMOL/L (ref 3.4–5)
PROT SERPL-MCNC: 6.7 G/DL (ref 6.3–8.6)
PROT UR QL STRIP: ABNORMAL
RBC # BLD AUTO: 4.23 10*6/MM3 (ref 3.77–5.28)
RBC # UR STRIP: ABNORMAL /HPF
REF LAB TEST METHOD: ABNORMAL
SODIUM SERPL-SCNC: 139 MMOL/L (ref 137–145)
SP GR UR STRIP: 1.02 (ref 1–1.03)
SQUAMOUS #/AREA URNS HPF: ABNORMAL /HPF
TRIGL SERPL-MCNC: 127 MG/DL
UROBILINOGEN UR QL STRIP: ABNORMAL
VLDLC SERPL-MCNC: 23 MG/DL (ref 5–40)
WBC # UR STRIP: ABNORMAL /HPF
WBC NRBC COR # BLD: 8.34 10*3/MM3 (ref 3.4–10.8)

## 2022-10-27 PROCEDURE — 81001 URINALYSIS AUTO W/SCOPE: CPT | Performed by: NURSE PRACTITIONER

## 2022-10-27 PROCEDURE — 80053 COMPREHEN METABOLIC PANEL: CPT | Performed by: NURSE PRACTITIONER

## 2022-10-27 PROCEDURE — 82570 ASSAY OF URINE CREATININE: CPT | Performed by: NURSE PRACTITIONER

## 2022-10-27 PROCEDURE — G0481 DRUG TEST DEF 8-14 CLASSES: HCPCS | Performed by: NURSE PRACTITIONER

## 2022-10-27 PROCEDURE — 80307 DRUG TEST PRSMV CHEM ANLYZR: CPT | Performed by: NURSE PRACTITIONER

## 2022-10-27 PROCEDURE — 84439 ASSAY OF FREE THYROXINE: CPT | Performed by: NURSE PRACTITIONER

## 2022-10-27 PROCEDURE — 84443 ASSAY THYROID STIM HORMONE: CPT | Performed by: NURSE PRACTITIONER

## 2022-10-27 PROCEDURE — 80061 LIPID PANEL: CPT | Performed by: NURSE PRACTITIONER

## 2022-10-27 PROCEDURE — 36415 COLL VENOUS BLD VENIPUNCTURE: CPT

## 2022-10-27 PROCEDURE — 85025 COMPLETE CBC W/AUTO DIFF WBC: CPT | Performed by: NURSE PRACTITIONER

## 2022-10-27 PROCEDURE — 82043 UR ALBUMIN QUANTITATIVE: CPT | Performed by: NURSE PRACTITIONER

## 2022-10-28 LAB
T4 FREE SERPL-MCNC: 1.26 NG/DL (ref 0.93–1.7)
TSH SERPL DL<=0.05 MIU/L-ACNC: 0.76 UIU/ML (ref 0.27–4.2)

## 2022-10-31 ENCOUNTER — TELEPHONE (OUTPATIENT)
Dept: FAMILY MEDICINE CLINIC | Facility: CLINIC | Age: 32
End: 2022-10-31

## 2022-10-31 NOTE — TELEPHONE ENCOUNTER
Contacted the patient and informed her of the message. She stated understanding and thanked me.     Kamilla Jeffery APRN  You 2 minutes ago (5:07 PM)    She will need to see whoever her GYN provider is.     Contacted the patient and informed her of the message. She stated that she does not have a period, but she has spotted every day after her ablation. She complains of pain in her pelvic area that warps around her back. She also reports pain with urination.     ----- Message from Barbara Mriza RN sent at 10/31/2022  7:25 AM CDT -----    ----- Message -----  From: Kamilla Jeffery APRN  Sent: 10/29/2022  12:45 PM CDT  To: Laquita Baker MA Pls inform labs are relatively stable, she has blood in her urine.  Was she on her period when she gave a specimen?

## 2022-10-31 NOTE — TELEPHONE ENCOUNTER
Contacted the patient and informed her of the message. She stated understanding and thanked me. She has an appointment with pain management in December.     ----- Message from Barbara Mirza RN sent at 10/31/2022  7:24 AM CDT -----    ----- Message -----  From: Kamilla Jeffery APRN  Sent: 10/30/2022   5:25 AM CDT  To: Laquita Baker MA    Pls inform minimal degenerative disc disease.  Will continue with pain management referral.

## 2022-11-01 ENCOUNTER — TELEPHONE (OUTPATIENT)
Dept: FAMILY MEDICINE CLINIC | Facility: CLINIC | Age: 32
End: 2022-11-01

## 2022-11-01 DIAGNOSIS — M54.50 CHRONIC MIDLINE LOW BACK PAIN WITHOUT SCIATICA: ICD-10-CM

## 2022-11-01 DIAGNOSIS — G89.29 CHRONIC MIDLINE LOW BACK PAIN WITHOUT SCIATICA: ICD-10-CM

## 2022-11-01 DIAGNOSIS — F41.1 GAD (GENERALIZED ANXIETY DISORDER): ICD-10-CM

## 2022-11-01 RX ORDER — HYDROCODONE BITARTRATE AND ACETAMINOPHEN 10; 325 MG/1; MG/1
1 TABLET ORAL EVERY 6 HOURS PRN
Qty: 120 TABLET | Refills: 0 | OUTPATIENT
Start: 2022-11-01

## 2022-11-01 RX ORDER — CLONAZEPAM 1 MG/1
TABLET ORAL
Qty: 60 TABLET | OUTPATIENT
Start: 2022-11-01

## 2022-11-03 DIAGNOSIS — F41.1 GAD (GENERALIZED ANXIETY DISORDER): Primary | ICD-10-CM

## 2022-11-03 DIAGNOSIS — M54.50 CHRONIC MIDLINE LOW BACK PAIN WITHOUT SCIATICA: ICD-10-CM

## 2022-11-03 DIAGNOSIS — G89.29 CHRONIC MIDLINE LOW BACK PAIN WITHOUT SCIATICA: ICD-10-CM

## 2022-11-03 LAB
6MAM UR QL CFM: NEGATIVE
6MAM/CREAT UR: NOT DETECTED NG/MG CREAT
7AMINOCLONAZEPAM/CREAT UR: 621 NG/MG CREAT
A-OH ALPRAZ/CREAT UR: NOT DETECTED NG/MG CREAT
A-OH-TRIAZOLAM/CREAT UR CFM: NOT DETECTED NG/MG CREAT
ALFENTANIL/CREAT UR CFM: NOT DETECTED NG/MG CREAT
ALPHA-HYDROXYMIDAZOLAM, URINE: NOT DETECTED NG/MG CREAT
ALPRAZ/CREAT UR CFM: NOT DETECTED NG/MG CREAT
AMOBARBITAL UR QL CFM: NOT DETECTED
AMPHET/CREAT UR: NOT DETECTED NG/MG CREAT
AMPHETAMINES UR QL CFM: NEGATIVE
BARBITAL UR QL CFM: NOT DETECTED
BARBITURATES UR QL CFM: NEGATIVE
BENZODIAZ UR QL CFM: NORMAL
BUPRENORPHINE UR QL CFM: NEGATIVE
BUPRENORPHINE/CREAT UR: NOT DETECTED NG/MG CREAT
BUTABARBITAL UR QL CFM: NOT DETECTED
BUTALBITAL UR QL CFM: NOT DETECTED
BZE/CREAT UR: NOT DETECTED NG/MG CREAT
CANNABINOIDS UR QL CFM: NEGATIVE
CARBOXYTHC/CREAT UR: NOT DETECTED NG/MG CREAT
CLONAZEPAM/CREAT UR CFM: NOT DETECTED NG/MG CREAT
COCAETHYLENE/CREAT UR CFM: NOT DETECTED NG/MG CREAT
COCAINE UR QL CFM: NEGATIVE
COCAINE/CREAT UR CFM: NOT DETECTED NG/MG CREAT
CODEINE/CREAT UR: NOT DETECTED NG/MG CREAT
CREAT UR-MCNC: 94 MG/DL
DESALKYLFLURAZ/CREAT UR: NOT DETECTED NG/MG CREAT
DESMETHYLFLUNITRAZEPAM: NOT DETECTED NG/MG CREAT
DHC/CREAT UR: 389 NG/MG CREAT
DIAZEPAM/CREAT UR: NOT DETECTED NG/MG CREAT
DRUGS UR: NORMAL
EDDP/CREAT UR: NOT DETECTED NG/MG CREAT
ETHANOL UR CFM-MCNC: NOT DETECTED G/DL
ETHANOL UR QL CFM: NEGATIVE
FENTANYL UR QL CFM: NEGATIVE
FENTANYL/CREAT UR: NOT DETECTED NG/MG CREAT
FLUNITRAZEPAM UR QL CFM: NOT DETECTED NG/MG CREAT
HYDROCODONE/CREAT UR: 3933 NG/MG CREAT
HYDROMORPHONE/CREAT UR: 322 NG/MG CREAT
LORAZEPAM/CREAT UR: NOT DETECTED NG/MG CREAT
MDA/CREAT UR: NOT DETECTED NG/MG CREAT
MDMA/CREAT UR: NOT DETECTED NG/MG CREAT
MEPHOBARBITAL UR QL CFM: NOT DETECTED
METHADONE UR QL CFM: NEGATIVE
METHADONE/CREAT UR: NOT DETECTED NG/MG CREAT
METHAMPHET/CREAT UR: NOT DETECTED NG/MG CREAT
MIDAZOLAM/CREAT UR CFM: NOT DETECTED NG/MG CREAT
MORPHINE/CREAT UR: NOT DETECTED NG/MG CREAT
N-NORTRAMADOL/CREAT UR CFM: NOT DETECTED NG/MG CREAT
NARCOTICS UR: NEGATIVE
NORBUPRENORPHINE/CREAT UR: NOT DETECTED NG/MG CREAT
NORCODEINE/CREAT UR CFM: NOT DETECTED NG/MG CREAT
NORDIAZEPAM/CREAT UR: NOT DETECTED NG/MG CREAT
NORFENTANYL/CREAT UR: NOT DETECTED NG/MG CREAT
NORHYDROCODONE/CREAT UR: 4317 NG/MG CREAT
NORMORPHINE UR-MCNC: NOT DETECTED NG/MG CREAT
NOROXYCODONE/CREAT UR: NOT DETECTED NG/MG CREAT
NOROXYMORPHONE/CREAT UR CFM: NOT DETECTED NG/MG CREAT
O-NORTRAMADOL UR CFM-MCNC: NOT DETECTED NG/MG CREAT
OPIATES UR QL CFM: NORMAL
OXAZEPAM/CREAT UR: NOT DETECTED NG/MG CREAT
OXYCODONE UR QL CFM: NEGATIVE
OXYCODONE/CREAT UR: NOT DETECTED NG/MG CREAT
OXYMORPHONE/CREAT UR: NOT DETECTED NG/MG CREAT
PENTOBARB UR QL CFM: NOT DETECTED
PHENOBARB UR QL CFM: NOT DETECTED
SECOBARBITAL UR QL CFM: NOT DETECTED
SERVICE CMNT 02-IMP: NORMAL
SUFENTANIL/CREAT UR CFM: NOT DETECTED NG/MG CREAT
TAPENTADOL UR QL CFM: NEGATIVE
TAPENTADOL/CREAT UR: NOT DETECTED NG/MG CREAT
TEMAZEPAM/CREAT UR: NOT DETECTED NG/MG CREAT
THIOPENTAL UR QL CFM: NOT DETECTED
TRAMADOL UR QL CFM: NOT DETECTED NG/MG CREAT

## 2022-11-03 RX ORDER — SULFAMETHOXAZOLE AND TRIMETHOPRIM 800; 160 MG/1; MG/1
1 TABLET ORAL 2 TIMES DAILY
Qty: 14 TABLET | Refills: 0 | Status: SHIPPED | OUTPATIENT
Start: 2022-11-03 | End: 2022-11-15

## 2022-11-03 NOTE — TELEPHONE ENCOUNTER
Patient is requesting refill on pain medication. Sent to [facility]. She has an appointment with Dr Hernadez on 12/14/2022. (confirmed)

## 2022-11-04 ENCOUNTER — TELEPHONE (OUTPATIENT)
Dept: FAMILY MEDICINE CLINIC | Facility: CLINIC | Age: 32
End: 2022-11-04

## 2022-11-04 DIAGNOSIS — F41.1 GAD (GENERALIZED ANXIETY DISORDER): ICD-10-CM

## 2022-11-04 NOTE — TELEPHONE ENCOUNTER
Patient called requesting refill on Klonopin. Requested pharmacy [facility].This was refilled on 9/30/2022, 60 quantity, one tablet daily.

## 2022-11-04 NOTE — TELEPHONE ENCOUNTER
Massiel Johns MA   11/4/2022  3:37 PM   Patient has seen women's health and states she will schedule appointment soon.     Barbara Mirza RN   11/4/2022  8:59 AM CDT    ----- Message from Barbara Mirza RN sent at 11/4/2022  8:59 AM CDT -----   Message  Received: Yesterday  Kamilla Jeffery, Laquita Barba MA  Let's have her see GYN.  Can you see who did her ablation?

## 2022-11-05 DIAGNOSIS — I10 ESSENTIAL HYPERTENSION: ICD-10-CM

## 2022-11-07 DIAGNOSIS — F41.1 GAD (GENERALIZED ANXIETY DISORDER): ICD-10-CM

## 2022-11-07 RX ORDER — DILTIAZEM HYDROCHLORIDE 120 MG/1
120 CAPSULE, COATED, EXTENDED RELEASE ORAL DAILY
Qty: 60 CAPSULE | OUTPATIENT
Start: 2022-11-07

## 2022-11-07 RX ORDER — HYDROCODONE BITARTRATE AND ACETAMINOPHEN 10; 325 MG/1; MG/1
1 TABLET ORAL EVERY 6 HOURS PRN
Qty: 120 TABLET | Refills: 0 | Status: SHIPPED | OUTPATIENT
Start: 2022-11-07 | End: 2022-12-07 | Stop reason: SDUPTHER

## 2022-11-07 RX ORDER — CLONAZEPAM 1 MG/1
TABLET ORAL
Qty: 60 TABLET | Refills: 0 | Status: SHIPPED | OUTPATIENT
Start: 2022-11-07 | End: 2023-01-04

## 2022-11-14 ENCOUNTER — TELEPHONE (OUTPATIENT)
Dept: FAMILY MEDICINE CLINIC | Facility: CLINIC | Age: 32
End: 2022-11-14

## 2022-11-14 NOTE — TELEPHONE ENCOUNTER
clonazePAM (KlonoPIN) 1 MG tablet    PATIENT CALLED AND SAID MEDICATION  WAS SENT IN WRONG  SAID SHE COULD NOT  AT PHARMACY

## 2022-11-15 ENCOUNTER — LAB (OUTPATIENT)
Dept: LAB | Facility: OTHER | Age: 32
End: 2022-11-15

## 2022-11-15 ENCOUNTER — OFFICE VISIT (OUTPATIENT)
Dept: OBSTETRICS AND GYNECOLOGY | Facility: CLINIC | Age: 32
End: 2022-11-15

## 2022-11-15 VITALS
BODY MASS INDEX: 37.25 KG/M2 | HEART RATE: 93 BPM | SYSTOLIC BLOOD PRESSURE: 140 MMHG | DIASTOLIC BLOOD PRESSURE: 92 MMHG | WEIGHT: 202.4 LBS | HEIGHT: 62 IN

## 2022-11-15 DIAGNOSIS — Z91.14 OVERUSE OF MEDICATION: ICD-10-CM

## 2022-11-15 DIAGNOSIS — N93.9 COMPLAINT OF VAGINAL BLEEDING: ICD-10-CM

## 2022-11-15 DIAGNOSIS — R30.0 DYSURIA: ICD-10-CM

## 2022-11-15 DIAGNOSIS — R93.89 ABNORMAL PELVIC ULTRASOUND: ICD-10-CM

## 2022-11-15 DIAGNOSIS — R10.2 PELVIC PAIN: Primary | ICD-10-CM

## 2022-11-15 DIAGNOSIS — N89.8 VAGINAL DISCHARGE: ICD-10-CM

## 2022-11-15 DIAGNOSIS — R92.8 ABNORMAL MAMMOGRAM: ICD-10-CM

## 2022-11-15 LAB
BACTERIA UR QL AUTO: ABNORMAL /HPF
BILIRUB UR QL STRIP: ABNORMAL
CLARITY UR: CLEAR
COLOR UR: ABNORMAL
GLUCOSE UR STRIP-MCNC: ABNORMAL MG/DL
HGB UR QL STRIP.AUTO: ABNORMAL
HYALINE CASTS UR QL AUTO: ABNORMAL /LPF
KETONES UR QL STRIP: ABNORMAL
LEUKOCYTE ESTERASE UR QL STRIP.AUTO: ABNORMAL
NITRITE UR QL STRIP: ABNORMAL
PH UR STRIP.AUTO: 6.5 [PH] (ref 5.5–8)
PROT UR QL STRIP: ABNORMAL
RBC # UR STRIP: ABNORMAL /HPF
REF LAB TEST METHOD: ABNORMAL
SP GR UR STRIP: 1.01 (ref 1–1.03)
SQUAMOUS #/AREA URNS HPF: ABNORMAL /HPF
UROBILINOGEN UR QL STRIP: ABNORMAL
WBC # UR STRIP: ABNORMAL /HPF

## 2022-11-15 PROCEDURE — 87661 TRICHOMONAS VAGINALIS AMPLIF: CPT | Performed by: NURSE PRACTITIONER

## 2022-11-15 PROCEDURE — 87210 SMEAR WET MOUNT SALINE/INK: CPT | Performed by: NURSE PRACTITIONER

## 2022-11-15 PROCEDURE — 87086 URINE CULTURE/COLONY COUNT: CPT | Performed by: NURSE PRACTITIONER

## 2022-11-15 PROCEDURE — 87491 CHLMYD TRACH DNA AMP PROBE: CPT | Performed by: NURSE PRACTITIONER

## 2022-11-15 PROCEDURE — 87591 N.GONORRHOEAE DNA AMP PROB: CPT | Performed by: NURSE PRACTITIONER

## 2022-11-15 PROCEDURE — 81001 URINALYSIS AUTO W/SCOPE: CPT | Performed by: NURSE PRACTITIONER

## 2022-11-15 PROCEDURE — 99214 OFFICE O/P EST MOD 30 MIN: CPT | Performed by: NURSE PRACTITIONER

## 2022-11-15 RX ORDER — FLUCONAZOLE 150 MG/1
150 TABLET ORAL ONCE
Qty: 2 TABLET | Refills: 0 | Status: SHIPPED | OUTPATIENT
Start: 2022-11-15 | End: 2022-11-15

## 2022-11-15 RX ORDER — CIPROFLOXACIN 500 MG/1
500 TABLET, FILM COATED ORAL 2 TIMES DAILY
Qty: 10 TABLET | Refills: 0 | Status: SHIPPED | OUTPATIENT
Start: 2022-11-15 | End: 2022-11-20

## 2022-11-15 NOTE — PROGRESS NOTES
"Subjective   Cally Olsen is a 32 y.o. female.     History of Present Illness   Pt presents with numerous complaints today including left sided pelvic pain, dysuria, vaginal burning/itching/odor and discharge, constant bleeding x 6 years since endometrial ablation.     Pt states she has had burning and pain with urination x 1 month. She complained of this a few weeks ago with PCP and UA was completed. No culture completed. It showed some RBC and she was given Bactrim DS BID x 7 days. Pt states she completed it and states that it \"never works. The only thing that works starts with a C and is a capsule\". I have reviewed her medical record and do not see anything, but I suspect it may be ciprofloxacin. She is persistent about taking this antibiotic. She describes taking 2 entire boxes of AZO. Urine is extremely orange. She is taking it every few hours and it does help the dysuria.     She also complains of left sided pelvic pain x 2 weeks. Rates it 8/10. \"comes and goes but it's constant\" When asked the type of pain she states \"just pain! Like I've been shot or a baseball about to bust\". She states it wakes her up in the middle of the night and is excruciating. Lasting 4+ hours at a time despite norco and taking all of her other medications to help her sleep (gabapenin, amitriptyline, and klonopin). Family hx of ovarian cysts. Personal hx of cyst on right ovary on CT in 2020.     Pt had endometrial ablation in 2016 with Dr. Chen. She has complained of bleeding daily for the last 6 years. The last 2 times I have examined her, there has not been any bleeding on exam. She states it is daily, can fill up a panty liner at times. She states she is wanting a hysterectomy.     When asked if she had vaginal symptoms of discharge, itching, burning and odor, she states \"all of it\". She describes use of excessive amounts of feminine hygiene sprays, deodorants, wipes, etc.     LEEP completed in 2016 with endometrial " "ablation. Normal pap testing in 2020.     Pt has chronic breast pain and abnormal BIRADS 4 MMG/US bilaterally. I sent her to Dr. Park for biopsy.  She canceled 5 times and no showed 3 times since 9/27/22. There are other visits canceled that say \"provider\" but no reason given. According to pt, she has now been discharged and needs a new referral.     She continues to drink monster energy drinks multiple times a day and other bladder irritants. We have discussed the connection with chronic breast pain and caffeine, as well as bladder irritation.     The following portions of the patient's history were reviewed and updated as appropriate: allergies, current medications, past family history, past medical history, past social history, past surgical history and problem list.    Review of Systems   Constitutional: Negative.  Negative for chills and fever.   Respiratory: Negative.    Cardiovascular: Negative.    Gastrointestinal: Positive for abdominal pain (LLQ). Negative for abdominal distention, anal bleeding, constipation, diarrhea, nausea and vomiting.   Genitourinary: Positive for dysuria, frequency, menstrual problem, pelvic pain (left side), pelvic pressure, urgency, vaginal bleeding, vaginal discharge and vaginal pain. Negative for decreased urine volume, difficulty urinating, dyspareunia, flank pain, genital sores, hematuria and urinary incontinence.   Musculoskeletal: Positive for back pain.   Neurological: Positive for memory problem (hx of CVA).       Objective   Physical Exam  Vitals reviewed. Exam conducted with a chaperone present.   Constitutional:       General: She is not in acute distress.     Appearance: Normal appearance. She is obese. She is not ill-appearing, toxic-appearing or diaphoretic.   Cardiovascular:      Rate and Rhythm: Normal rate and regular rhythm.      Heart sounds: Normal heart sounds.   Pulmonary:      Effort: Pulmonary effort is normal.      Breath sounds: Normal breath sounds. "   Abdominal:      General: Abdomen is flat.      Palpations: Abdomen is soft.      Tenderness: There is abdominal tenderness in the left lower quadrant. There is no right CVA tenderness, left CVA tenderness, guarding or rebound.   Genitourinary:     General: Normal vulva.      Labia:         Right: No rash, tenderness, lesion or injury.         Left: No rash, tenderness, lesion or injury.       Vagina: No signs of injury and foreign body. Vaginal discharge (profuse, orange, thin, watery discharge.  no odor noted. wet prep, G/C/T and one swab obtained) and tenderness present. No erythema, bleeding or lesions.      Cervix: No cervical motion tenderness, discharge, friability, lesion, erythema, cervical bleeding or eversion.      Uterus: Normal.       Adnexa:         Right: No tenderness.          Left: Tenderness present.       Comments: Difficult to fully assess due to body habitus and pt's discomfort. No bleeding is noted again on exam. Wet prep: negative for clue cells, yeast buds, hyphae and trichomonads. Normal findings. TTP in the LLQ only. Ovaries not palpable.   Skin:     General: Skin is warm and dry.   Neurological:      Mental Status: She is alert and oriented to person, place, and time.   Psychiatric:         Mood and Affect: Affect is flat.       Brief Urine Lab Results  (Last result in the past 365 days)      Color   Clarity   Blood   Leuk Est   Nitrite   Protein   CREAT   Urine HCG        11/15/22 0856 Orange   Clear   Color obscures results   Color obscures results   Color obscures results   Color obscures results                  Contains abnormal data Urinalysis, Microscopic Only - Urine, Clean Catch  Order: [id number] - Reflex for Order [id number]   Status: Final result      Visible to patient: No (scheduled for 11/15/2022 11:17 PM)      Dx: Pelvic pain     Specimen Information: Urine, Clean Catch    0 Result Notes  Component Ref Range & Units 08:56   (11/15/22) 2 wk ago   (10/27/22) 1 yr ago    (21) 1 yr ago   (9/15/21) 1 yr ago   (21) 2 yr ago   (10/8/20) 2 yr ago   (10/8/20)   RBC, UA None Seen /HPF Too Numerous to Count Abnormal   13-20 Abnormal   31-50 Abnormal   31-50 Abnormal   6-12 Abnormal  R  1 R  >100 High  R    WBC, UA None Seen /HPF 3-5 Abnormal   0-2 Abnormal   3-5 Abnormal   None Seen  None Seen R  7 High  R  22 High  R    Bacteria, UA None Seen /HPF Trace Abnormal   Trace Abnormal   1+ Abnormal   Trace Abnormal   4+ Abnormal    FEW Abnormal  R    Squamous Epithelial Cells, UA None Seen, 0-2 /HPF 7-12 Abnormal   13-20 Abnormal   3-6 Abnormal   3-6 Abnormal   7-12 Abnormal   3 R  11 High  R    Hyaline Casts, UA None Seen /LPF None Seen  None Seen  None Seen  None Seen  None Seen           PROCEDURE: US PELVIS TRANSVAGINAL     COMPARISON: No comparison     HISTORY: LLQ pain, R10.2 Pelvic and perineal pain  LMP: Ablation in      FINDINGS: Realtime grayscale and color-flow imaging is obtained  of the pelvis.     The uterus measures 5.6 x 4.1 x 5.7 cm.  Within the uterus there are multiple hypoechoic or cystic areas,  the largest appears to be within the endometrium measuring 2.7 x  1.2 x 2 cm. No vascularity is seen within these areas. There  appears to be a  scar in the lower uterine segment.  There may be a nabothian cyst in the cervix.     The right ovary measures 4.2 x 2.2 x 2.2 cm.  There is a right ovarian cyst measuring 1.2 x 2 x 1.7 cm.  The left ovary measures 3.8 x 3.2 x 2.7 cm.  There is a left ovarian cyst measuring 3.5 x 3.1 x 3.4 cm.  Blood flow is seen in both ovaries on color Doppler imaging.     IMPRESSION:  Within the uterus there are multiple hypoechoic or cystic areas,  the largest appears to be within the endometrium measuring 2.7 x  1.2 x 2 cm. No vascularity is seen within these areas  These are of uncertain clinical significance. Recommend follow-up  contrast-enhanced pelvic MRI for further evaluation. Bilateral  ovarian cysts which are most likely  benign. Recommend follow-up  ultrasound in one year for the left ovarian cyst     Electronically signed by:  Popeye Morales MD  11/15/2022 10:54 AM  CST Workstation: [id number]    Assessment & Plan   Diagnoses and all orders for this visit:    1. Pelvic pain (Primary)  -     Urinalysis With Culture If Indicated - Urine, Clean Catch  -     Urinalysis, Microscopic Only - Urine, Clean Catch  -     US Non-ob Transvaginal  -     Chlamydia trachomatis, Neisseria gonorrhoeae, Trichomonas vaginalis, PCR - Swab, Cervix  -     Ambulatory Referral to Obstetrics / Gynecology  -     MRI Pelvis With & Without Contrast; Future    2. Vaginal discharge    3. Dysuria  -     ciprofloxacin (Cipro) 500 MG tablet; Take 1 tablet by mouth 2 (Two) Times a Day for 5 days.  Dispense: 10 tablet; Refill: 0  -     fluconazole (DIFLUCAN) 150 MG tablet; Take 1 tablet by mouth 1 (One) Time for 1 dose. Repeat dose in 72 hours if still symptomatic  Dispense: 2 tablet; Refill: 0  -     Urine Culture - Urine, Urine, Clean Catch    4. Overuse of medication    5. Complaint of vaginal bleeding  -     MRI Pelvis With & Without Contrast; Future    6. Abnormal mammogram  -     Ambulatory Referral to General Surgery    7. Abnormal pelvic ultrasound  -     MRI Pelvis With & Without Contrast; Future    UA difficult to assess due to AZO use. RBC TNTC. No vaginal bleeding on exam. I will treat with cipro BID x 5 days. I will culture urine and call pt with results and change Rx PRN. I strongly advised pt to discontinue use of AZO. I explained the risks of kidney damage with over use. Pt voices understanding but states she doesn't know what she will do about the bladder pain. I explained her monster energy drinks and other bladder irritant intake is not helping. I provided a handout and recommendations for pt. Encouraged water only.     Discussed normal wet prep findings. Discharge is clearly orange today. I obtained G/C/T and Oneswab given pt's symptoms. I will  call with results and Rx PRN. She states she is not currently sexually active and was monogamous in the last year.     Discussed vaginal bleeding after endometrial ablation. Labs do not reflect anemia and there is no bleeding on examinations over the last 3 years. Given her pain today, I recommend TVUS for further assessment. Pt was sent for stat US.  Discussed results with pt and Dr. Julio. We will proceed with MRI ASAP and schedule with Dr. Juilo following for consultation of hysterectomy.     Pt requested something stronger than Norco for the pain. I explained that she is already on Norco and I cannot prescribe controlled substances. I would recommend ibuprofen only PRN. If pain is severe and unrelenting, go to there ER.     I discussed my concern for not following through with breast biopsy. I am unsure if Dr. Park will see her again, but I will work to get her back in with him or another general surgeon ASAP. Pt states that she is committed to keeping her appointments and following through with recommendations.

## 2022-11-16 DIAGNOSIS — R92.8 ABNORMAL MAMMOGRAM OF BOTH BREASTS: Primary | ICD-10-CM

## 2022-11-17 ENCOUNTER — HOSPITAL ENCOUNTER (OUTPATIENT)
Dept: MRI IMAGING | Facility: HOSPITAL | Age: 32
Discharge: HOME | End: 2022-11-17
Admitting: NURSE PRACTITIONER

## 2022-11-17 DIAGNOSIS — R10.2 PELVIC PAIN: ICD-10-CM

## 2022-11-17 DIAGNOSIS — N93.9 COMPLAINT OF VAGINAL BLEEDING: ICD-10-CM

## 2022-11-17 DIAGNOSIS — R93.89 ABNORMAL PELVIC ULTRASOUND: ICD-10-CM

## 2022-11-17 LAB — BACTERIA SPEC AEROBE CULT: NORMAL

## 2022-11-17 PROCEDURE — 25010000002 GADOTERIDOL PER 1 ML: Performed by: NURSE PRACTITIONER

## 2022-11-17 PROCEDURE — A9579 GAD-BASE MR CONTRAST NOS,1ML: HCPCS | Performed by: NURSE PRACTITIONER

## 2022-11-17 PROCEDURE — 72197 MRI PELVIS W/O & W/DYE: CPT

## 2022-11-17 RX ADMIN — GADOTERIDOL 20 ML: 279.3 INJECTION, SOLUTION INTRAVENOUS at 10:39

## 2022-12-06 ENCOUNTER — OFFICE VISIT (OUTPATIENT)
Dept: OBSTETRICS AND GYNECOLOGY | Facility: CLINIC | Age: 32
End: 2022-12-06

## 2022-12-06 VITALS
HEIGHT: 62 IN | WEIGHT: 203.8 LBS | SYSTOLIC BLOOD PRESSURE: 128 MMHG | BODY MASS INDEX: 37.5 KG/M2 | DIASTOLIC BLOOD PRESSURE: 76 MMHG

## 2022-12-06 DIAGNOSIS — F41.1 GAD (GENERALIZED ANXIETY DISORDER): ICD-10-CM

## 2022-12-06 DIAGNOSIS — Z79.01 CHRONIC ANTICOAGULATION: ICD-10-CM

## 2022-12-06 DIAGNOSIS — R10.2 PELVIC PAIN: ICD-10-CM

## 2022-12-06 DIAGNOSIS — G89.29 CHRONIC MIDLINE LOW BACK PAIN WITHOUT SCIATICA: ICD-10-CM

## 2022-12-06 DIAGNOSIS — N99.85 POST ENDOMETRIAL ABLATION SYNDROME: Primary | ICD-10-CM

## 2022-12-06 DIAGNOSIS — M54.50 CHRONIC MIDLINE LOW BACK PAIN WITHOUT SCIATICA: ICD-10-CM

## 2022-12-06 PROCEDURE — 99214 OFFICE O/P EST MOD 30 MIN: CPT | Performed by: OBSTETRICS & GYNECOLOGY

## 2022-12-06 RX ORDER — SODIUM CHLORIDE 0.9 % (FLUSH) 0.9 %
10 SYRINGE (ML) INJECTION AS NEEDED
Status: CANCELLED | OUTPATIENT
Start: 2023-02-01

## 2022-12-06 RX ORDER — SODIUM CHLORIDE 0.9 % (FLUSH) 0.9 %
3 SYRINGE (ML) INJECTION EVERY 12 HOURS SCHEDULED
Status: CANCELLED | OUTPATIENT
Start: 2023-02-01

## 2022-12-06 RX ORDER — SODIUM CHLORIDE, SODIUM LACTATE, POTASSIUM CHLORIDE, CALCIUM CHLORIDE 600; 310; 30; 20 MG/100ML; MG/100ML; MG/100ML; MG/100ML
125 INJECTION, SOLUTION INTRAVENOUS CONTINUOUS
Status: CANCELLED | OUTPATIENT
Start: 2023-02-01

## 2022-12-06 RX ORDER — CLONAZEPAM 1 MG/1
TABLET ORAL
Qty: 60 TABLET | Refills: 0 | OUTPATIENT
Start: 2022-12-06

## 2022-12-06 RX ORDER — SODIUM CHLORIDE 9 MG/ML
40 INJECTION, SOLUTION INTRAVENOUS AS NEEDED
Status: CANCELLED | OUTPATIENT
Start: 2023-02-01

## 2022-12-06 RX ORDER — HYDROCODONE BITARTRATE AND ACETAMINOPHEN 10; 325 MG/1; MG/1
1 TABLET ORAL EVERY 6 HOURS PRN
Qty: 120 TABLET | Refills: 0 | Status: CANCELLED | OUTPATIENT
Start: 2022-12-06

## 2022-12-06 RX ORDER — BUPIVACAINE HCL/0.9 % NACL/PF 0.1 %
2 PLASTIC BAG, INJECTION (ML) EPIDURAL ONCE
Status: CANCELLED | OUTPATIENT
Start: 2023-02-01 | End: 2022-12-06

## 2022-12-06 NOTE — PROGRESS NOTES
"[facility]  Gynecology Consult  Date of Service: 2022  Referring provider: Sole Martinez-*    CC: Surgery consult    HPI  Cally Olsen is a 32 y.o.  premenopausal female who presents as a referral from [facility] for surgery consult.    Patient states that she has had pelvic pain that started \"about 3 kids ago.\"  She states that her pain is worse since her ablation in 2016.  She states that she can't tell when her period is but she does spot every day.  She states that the pain is in her lower pelvis.  She has tried Motrin, Tylenol, heating pad; nothing helped.    TVUS (11/15/2022):  The uterus measures 5.6 x 4.1 x 5.7 cm.  Within the uterus there are multiple hypoechoic or cystic areas, the largest appears to be within the endometrium measuring 2.7 x 1.2 x 2 cm.  No vascularity is seen within these areas. There appears to be a  scar in the lower uterine segment.  There may be a nabothian cyst in the cervix.  The right ovary measures 4.2 x 2.2 x 2.2 cm.  There is a right ovarian cyst measuring 1.2 x 2 x 1.7 cm.  The left ovary measures 3.8 x 3.2 x 2.7 cm.  There is a left ovarian cyst measuring 3.5 x 3.1 x 3.4 cm.  Blood flow is seen in both ovaries on color Doppler imaging.    ROS  Review of Systems   Constitutional: Negative.    HENT: Negative.    Eyes: Negative.    Respiratory: Positive for shortness of breath.    Cardiovascular: Negative.    Gastrointestinal: Negative.    Endocrine: Negative.    Genitourinary: Positive for pelvic pain.   Musculoskeletal: Positive for back pain.   Skin: Negative.    Allergic/Immunologic: Negative.    Neurological: Negative.    Hematological: Negative.    Psychiatric/Behavioral: The patient is nervous/anxious.      GYN HISTORY  Menarche: age 10  Menses: see HPI  History of STIs: None  Last pap smear:   Abnormal pap smear history: Remote history  Contraception: BTL     OB HISTORY  OB History "    Para Term  AB Living   6 5 5   1 5   SAB IAB Ectopic Molar Multiple Live Births   1         5      # Outcome Date GA Lbr Saul/2nd Weight Sex Delivery Anes PTL Lv   6 SAB            5 Term     F CS-Unspec   GONZALO   4 Term     M CS-Unspec   GONZALO   3 Term     F CS-Unspec   GONZALO   2 Term     M CS-Unspec   GONZALO   1 Term     F CS-Unspec   GONZALO     PAST MEDICAL HISTORY  Past Medical History:   Diagnosis Date   • Abnormal Pap smear of cervix    • Allergic rhinitis    • Anxiety    • Asthma    • Chronic bilateral low back pain with sciatica    • COPD (chronic obstructive pulmonary disease) ([facility])    • Depressive disorder    • Heartburn    • History of CVA (cerebrovascular accident) 2021   • Hyperlipidemia    • Hypertension    • Insomnia    • Restless leg syndrome    • Spasm of back muscles    • Stroke ([facility])    • Tobacco dependence syndrome      PAST SURGICAL HISTORY  Past Surgical History:   Procedure Laterality Date   •  SECTION  2015   •  SECTION     •  SECTION     •  SECTION     •  SECTION     • ENDOMETRIAL ABLATION     • LEEP     • TUBAL ABDOMINAL LIGATION  2015     FAMILY HISTORY  Family History   Problem Relation Age of Onset   • Alcohol abuse Father    • Heart disease Father    • Seizures Mother    • No Known Problems Brother    • No Known Problems Brother    • Breast cancer Maternal Grandmother    • Ovarian cancer Maternal Grandmother    • Breast cancer Maternal Aunt    • Colon cancer Other    • Uterine cancer Other    • Stroke Other      SOCIAL HISTORY  Social History     Socioeconomic History   • Marital status:    Tobacco Use   • Smoking status: Every Day     Packs/day: 1.00     Years: 17.00     Pack years: 17.00     Types: Cigarettes     Start date:    • Smokeless tobacco: Never   Vaping Use   • Vaping Use: Some days   • Substances: Nicotine   Substance and Sexual Activity   • Alcohol use: No   • Drug use: No   • Sexual activity: Not  Currently     Partners: Male     Birth control/protection: Tubal ligation     ALLERGIES  Allergies   Allergen Reactions   • Amoxil [Amoxicillin] Anaphylaxis     Anaphylaxis    • Latex, Natural Rubber Swelling     Had swelling when she wore gloves   • Hydroxyzine Hcl Rash and Swelling   • Nickel Rash   • Paroxetine Hcl Rash and Swelling   • Paxil [Paroxetine] Rash   • Vistaril [Hydroxyzine] Rash     HOME MEDICATIONS  Prior to Admission medications    Medication Sig Start Date End Date Taking? Authorizing Provider   albuterol (PROVENTIL) 2.5 MG/0.5ML nebulizer solution Take 2.5 mg by nebulization Every 4 (Four) Hours As Needed (shortness of breath, wheezing.). 1/27/22  Yes Sima Hoffmann APRN   albuterol sulfate  (90 Base) MCG/ACT inhaler Inhale 2 puffs Every 6 (Six) Hours As Needed for Wheezing or Shortness of Air. 10/4/22  Yes Kamilla Jeffery APRN   amitriptyline (ELAVIL) 50 MG tablet Take 1 tablet by mouth Every Night. 10/4/22  Yes Kamilla Jeffery APRN   atorvastatin (LIPITOR) 40 MG tablet Take 1 tablet by mouth Daily. 10/4/22 10/4/23 Yes Kamilla Jeffery APRN   cetirizine (zyrTEC) 10 MG tablet Take 1 tablet by mouth Daily. 12/14/21  Yes Sima Hoffmann APRN   clonazePAM (KlonoPIN) 1 MG tablet May take one tablet by mouth once daily as needed for anxiety; she continues on a weaning dose 11/7/22  Yes Kamilla Jeffery APRN   dilTIAZem CD (Cardizem CD) 120 MG 24 hr capsule Take 1 capsule by mouth Daily. For High blood pressure in addition to the lisinopril 10/4/22  Yes Kamilla Jeffery APRN   DULoxetine (CYMBALTA) 30 MG capsule Take 1 capsule by mouth Daily. 10/4/22  Yes Kamilla Jeffery APRN   Eliquis 5 MG tablet tablet Take 1 tablet by mouth Every 12 (Twelve) Hours. 10/4/22  Kamilla Durant APRN   gabapentin (NEURONTIN) 300 MG capsule Take 1 capsule by mouth 4 (Four) Times a Day. 10/4/22  Yes Kamilla Jeffery APRN   HYDROcodone-acetaminophen (NORCO)  MG per tablet Take 1 tablet by mouth  "Every 6 (Six) Hours As Needed for Moderate Pain. 22  Yes Kamilla Jeffery APRN   lisinopril (PRINIVIL,ZESTRIL) 10 MG tablet Take 1 tablet by mouth Daily. 10/4/22  Yes Kamilla Jeffery APRN   ondansetron (ZOFRAN) 4 MG tablet Take 1 tablet by mouth Every 8 (Eight) Hours As Needed for Nausea or Vomiting. 21  Yes Sima Hoffmann APRN   rOPINIRole (REQUIP) 1 MG tablet Take 1 tablet by mouth Every Night. Take one tablet by mouth one hour before bed for help with restless leg syndrome. 10/4/22  Yes Kamilla Jeffery APRN     PE  /76 (BP Location: Left arm, Patient Position: Sitting, Cuff Size: Adult)   Ht 157.5 cm (62\")   Wt 92.4 kg (203 lb 12.8 oz)   LMP  (LMP Unknown) Comment: last period around , patient said she has spotting daily  BMI 37.28 kg/m²        General: Alert, healthy, no distress, well nourished and well developed.  Neurologic: Alert, oriented to person, place, and time.  Gait normal.  Cranial nerves II-XII grossly intact.  HEENT: Normocephalic, atraumatic.  Extraocular muscles intact, pupils equal and reactive times two.    Neck: Supple, no adenopathy, thyroid normal size, non-tender, without nodularity, trachea midline.  Lungs: Normal respiratory effort.  Clear to auscultation bilaterally.  No wheezes, rhonci, or rales.  Heart: Regular rate and rhythm.  No murmer, rub or gallop.  Abdomen: Soft, non-tender, non-distended,no masses, no hepatosplenomegaly, no hernia.  Skin: No rash, no lesions.  Extremities: No cyanosis, clubbing or edema.  PELVIC EXAM: Deferred    IMPRESSION  Cally Olsen is a 32 y.o.  presenting with pelvic pain s/p endometrial ablation, consistent with post-endometrial ablation syndrome.  Discussed that 30% of women with pelvic pain who have ovarian preservation at the time of their hysterectomies require oophorectomy.  Discussed R/B/A.  Discussed risks including injury to surrounding organ (bowel, bladder, ureters, blood vessels, nerves), " infection, bleeding (possibly requiring blood transfusion), heart attack, stroke, and death.  Discussed possible need for conversion to open procedure.  Will give pre-op ABX (Ancef).  Discussed pro/cons of ovarian preservation.  Discussed that surgical menopause increases the risk of osteoporosis and early heart disease if untreated.  Discussed that if she chooses HRT, there is a decreased risk of colon cancer, decrease in osteoporosis, slight increase in breast cancer risk, and slight increase risk of DVT/clot per [employer] data.  Patient declines ovarian preservation.  Patient voices understanding and is agreeable.  She is not a candidate for HRT.    PLAN    1. Post endometrial ablation syndrome  - Case Request; Standing  - CBC and Differential; Future  - Basic Metabolic Panel; Future  - Type & Screen; Future  - sodium chloride 0.9 % flush 3 mL  - sodium chloride 0.9 % flush 10 mL  - sodium chloride 0.9 % infusion 40 mL  - lactated ringers infusion  - ceFAZolin (ANCEF) 2 g in sodium chloride 0.9 % 100 mL IVPB  - Case Request    2. Pelvic pain    3. Chronic anticoagulation  Will need medical clearance and guidance for stopping/resuming anticoagulation before and surgery.    Thank you for allowing me to participate in the care of this patient.  Please contact me with any questions or concerns.    This document has been electronically signed by Margy Julio MD on December 6, 2022 12:36 CST.    CC: Sole Martinez-*

## 2022-12-06 NOTE — TELEPHONE ENCOUNTER
HYDROcodone-acetaminophen (NORCO)  MG per    clonazePAM (KlonoPIN) 1 MG tablet [9638] (    [id number] - [place], [place] - [address] [place] [address] AT [facility] [phone]  - [phone] [address] [address]   Phone:  [phone]  Fax:  [phone]    PATIENT HAS APPOINTMENT WITH PAIN CLINIC 12/14/2022

## 2022-12-07 DIAGNOSIS — G89.29 CHRONIC MIDLINE LOW BACK PAIN WITHOUT SCIATICA: ICD-10-CM

## 2022-12-07 DIAGNOSIS — M54.50 CHRONIC MIDLINE LOW BACK PAIN WITHOUT SCIATICA: ICD-10-CM

## 2022-12-07 RX ORDER — HYDROCODONE BITARTRATE AND ACETAMINOPHEN 10; 325 MG/1; MG/1
1 TABLET ORAL EVERY 6 HOURS PRN
Qty: 28 TABLET | Refills: 0 | Status: SHIPPED | OUTPATIENT
Start: 2022-12-07

## 2022-12-14 ENCOUNTER — TELEPHONE (OUTPATIENT)
Dept: FAMILY MEDICINE CLINIC | Facility: CLINIC | Age: 32
End: 2022-12-14

## 2022-12-14 NOTE — TELEPHONE ENCOUNTER
"I spoke with patient yesterday on the phone; unsure where the note went. May not have saved it. But patient called wanting us to refill meds and stated [facility] had pretty much refused to see her on 12/14/22. Told me they hadn't received Sankarla's notes. I told her they shouldn't need Sankarla's notes since we were the referring provider and that our referral lady here sends everything they need when she does the referral. Then she continued to say that she just wanted to switch back to [facility] and was wanting us to refill meds until she could see [facility] at the end of the month, because she wasn't wanting to see [facility] now. Instructed patient we had originally sent her to [facility] with her approval and that she would need to keep that appt, because we would be unable to extend controlled medications for any longer since we had originally sent enough controlled meds until her first appt on 12/14/22. Patient then proceeded to be okay with keeping appt with [facility] and acted like they could see her then, which goes against the initial story she had told me. Patient stated she would keep appt with [facility] and and would see them until she could see [facility] again, but asked for me to send referral to [facility]. Told her I could send referral, but again told her to continue with pain mgmt at [facility] office.    Patient then shows up at [facility] office today 12/14/22 and requested to speak to [facility] nurse. Patient started convo by requesting controlled medicine refills. Informed patient I was the one she talked to on the phone yesterday, and reiterated that I encouraged her to keep appt with Dr. Hernadez today, as [facility] had already referred her out to get her controlled medications elsewhere. Pt stated she did see [facility] this morning and that \"she didn't give me anything\". Patient told me [facility] told her they do not write controlleds that they only do injections and that her PCP would have to continue controlled medications. I " told patient we had never had this issue prior, and Anjana called [facility] office to get their side of things while patient was here talking to me. See Herlinda note for that. Turns out patient wouldn't sign a release and refused a drug screen. Kept reiterating to patient that she would have to stick with pain management, that [facility] would not write her controlleds. Patient states that is putting her at risk for stroke and seizure. Encouraged patient that [facility] office could do injections and whatever else [facility] recommended to help her with her pain. Patient then brought up she wanted to see [facility], as she had told me on the phone the same thing yesterday. Told her that was fine if she wanted to switch, but we would not be able to write controlleds in the meantime, as we had already referred her to one pain mgmt, and had written enough controlleds to get her through until that appt, which was this morning. Patient stated she would just go to ER so they could help her with the transition period until she could see [facility] on the 29th. Patient was very pushy and asked several times about controlled medications, regardless of how many times I told her we could not do them. Patient asked about other routine meds, and I assured her we could continue those as her PCP and had no problem with that. Patient stated she was going to schedule an appt with [facility] but left without doing so.

## 2022-12-14 NOTE — TELEPHONE ENCOUNTER
PATIENT HAS BEEN REFERRED TO  PAIN MANAGEMENT AND [facility] WITH PEG CUADRA  APPOINTMENT WAS 12/14/20222 SHOWED UP AT [facility] OFFICE WANTING CONTROL MEDICATION I CALLED  [facility] TO SEE IF SHE SHOWED UP AND SHE DID BUT REFUSED TO SIGN A MEDICAL RELEASE FORM AND REFUSED DRUG SCREEN  SHE WAS TOLD MANY TIMES THAT WE WERE SENDING HER TO PAIN MANAGEMENT TO TAKE OVER HER MEDICATION SHE WAS SUPPLIED WITH ENOUGH MEDICATION UNTIL THE VISIT 12/14/2022 BUT SHE HAS REFUSED  TO SIGN FOR A MEDICAL RELEASE AND URINE DRUG SCREEN AT [facility]

## 2022-12-15 DIAGNOSIS — G89.29 CHRONIC MIDLINE LOW BACK PAIN WITHOUT SCIATICA: Primary | ICD-10-CM

## 2022-12-15 DIAGNOSIS — M54.50 CHRONIC MIDLINE LOW BACK PAIN WITHOUT SCIATICA: Primary | ICD-10-CM

## 2022-12-19 DIAGNOSIS — R11.0 NAUSEA: ICD-10-CM

## 2022-12-19 RX ORDER — ONDANSETRON 4 MG/1
TABLET, FILM COATED ORAL
Qty: 90 TABLET | Refills: 5 | OUTPATIENT
Start: 2022-12-19

## 2022-12-20 NOTE — PROGRESS NOTES
" Subjective   Cally Olsen is a 31 y.o. female.     Chief Complaint   Patient presents with   • Nausea   • Shortness of Breath             History of Present Illness     Had covid.  Still having sob.  Has been through multiple rounds of abx.   She has some nausea.  Denies constipation.  She has medicine at home for nausea. She reports she has lupus     Review of Systems   Constitutional: Negative for chills, fatigue and fever.   HENT: Negative for congestion, ear discharge, ear pain, facial swelling, hearing loss, postnasal drip, rhinorrhea, sinus pressure, sore throat, trouble swallowing and voice change.    Eyes: Negative for discharge, redness and visual disturbance.   Respiratory: Positive for shortness of breath. Negative for cough, chest tightness and wheezing.    Cardiovascular: Negative for chest pain and palpitations.   Gastrointestinal: Negative for abdominal pain, blood in stool, constipation, diarrhea, nausea and vomiting.   Endocrine: Negative for polydipsia and polyuria.   Genitourinary: Negative for dysuria, flank pain, hematuria and urgency.   Musculoskeletal: Negative for arthralgias, back pain, joint swelling and myalgias.   Skin: Negative for rash.   Neurological: Negative for dizziness, weakness, numbness and headaches.   Hematological: Negative for adenopathy.   Psychiatric/Behavioral: Negative for confusion and sleep disturbance. The patient is not nervous/anxious.            /85 (BP Location: Left arm, Patient Position: Sitting, Cuff Size: Adult)   Pulse 90   Temp 97.8 °F (36.6 °C) (Temporal)   Ht 160 cm (62.99\")   SpO2 99%   BMI 35.26 kg/m²       Objective     Physical Exam  Vitals and nursing note reviewed.   Constitutional:       Appearance: Normal appearance. She is well-developed.   HENT:      Head: Normocephalic and atraumatic.      Right Ear: External ear normal.      Left Ear: External ear normal.      Nose: Nose normal. No rhinorrhea.   Eyes:      General: No scleral " Subjective:    Patient ID:  Gemma Vick is a 65 y.o. female who presents for follow-up of No chief complaint on file.      The patient location is: Home  The chief complaint leading to consultation is: bradycardia  Visit type: Virtual visit with synchronous audio and video  Total time spent with patient: 15  Each patient to whom he or she provides medical services by telemedicine is:  (1) informed of the relationship between the physician and patient and the respective role of any other health care provider with respect to management of the patient; and (2) notified that he or she may decline to receive medical services by telemedicine and may withdraw from such care at any time.    Notes:   65 yoF referred for abnormal holter monitor    Prior visits: 9/15 She has history of near syncopal episodes over the past year. She has had holter monitors that showed 3s pauses. Her event monitor showed a near 4s pause; this was associated with her symptoms. She stopped metoprolol 50 mg qd and had no subsequent symptoms. She took metoprolol for history of CAD with remote history of MI, last 7 years ago. Her recent echo is listed below.     9/16: She has no subsequent syncope or near syncope events since stopping metoprolol. There are some episodes with waking up at night.     2017: Near syncope, very rare. Some episodes of near syncope are consistent with vasovagal syncope.     9/22:  No arrhythmic symptoms. Holter with very rare PAC and PVCs. Normal heart rate average 79 bpm    Interval history: She has noticed more fatigue. She notices symptoms are worse when she take amlodipine twice a day. She takes bisoprolol and coreg 40 mg qd (she claims she does not take this). Her heart rates have been in the 50s based on FitBit watch readings.     Holter 8/22:  · Predominant Rhythm Sinus rhythm with heart rates varying between 51 and 128 BPM with an average of 79BPM.  · Supraventricular Arrhythmias There were rare PACs totalling  413 and averaging 8.6 per hour.    Past Medical History:  2014: AAA (abdominal aortic aneurysm)  No date: Abdominal aneurysm      Comment:  3  No date: Acute coronary syndrome  No date: Arthritis  No date: BPPV (benign paroxysmal positional vertigo)  No date: Carotid artery plaque  2014: Carotid artery stenosis and occlusion  No date: Chronic back pain  No date: COPD (chronic obstructive pulmonary disease)  No date: Coronary artery disease  No date: Emphysema lung  No date: Hyperlipidemia  No date: Hypertension  No date: Myocardial infarction      Comment:  x3  No date: Neuropathy    Past Surgical History:  No date: CARDIAC CATHETERIZATION  No date: CORONARY ANGIOPLASTY  1988: HYSTERECTOMY    Social History    Socioeconomic History      Marital status:     Tobacco Use      Smoking status: Former        Packs/day: 0.50        Years: 44.00        Pack years: 22        Types: Cigarettes, Vaping w/o nicotine        Start date: 1970        Quit date: 2017        Years since quittin.6      Smokeless tobacco: Never      Tobacco comments: 3 MG NICOTINE FOR HEART.     Substance and Sexual Activity      Alcohol use: No      Drug use: No      Sexual activity: Not Currently        Birth control/protection: None    Social Determinants of Health  Financial Resource Strain: Medium Risk      Difficulty of Paying Living Expenses: Somewhat hard  Food Insecurity: Food Insecurity Present      Worried About Running Out of Food in the Last Year: Sometimes true      Ran Out of Food in the Last Year: Sometimes true  Transportation Needs: No Transportation Needs      Lack of Transportation (Medical): No      Lack of Transportation (Non-Medical): No  Physical Activity: Insufficiently Active      Days of Exercise per Week: 3 days      Minutes of Exercise per Session: 10 min  Stress: Stress Concern Present      Feeling of Stress : To some extent  Social Connections: Unknown      Frequency of Communication with  icterus.     Extraocular Movements: Extraocular movements intact.      Conjunctiva/sclera: Conjunctivae normal.      Pupils: Pupils are equal, round, and reactive to light.   Cardiovascular:      Rate and Rhythm: Normal rate and regular rhythm.      Heart sounds: Normal heart sounds. No friction rub. No gallop.    Pulmonary:      Effort: Pulmonary effort is normal.      Breath sounds: Normal breath sounds.   Abdominal:      General: Bowel sounds are normal. There is no distension.      Palpations: Abdomen is soft.      Tenderness: There is no abdominal tenderness.   Musculoskeletal:         General: No deformity. Normal range of motion.      Cervical back: Normal range of motion and neck supple.   Skin:     General: Skin is warm and dry.      Findings: No erythema or rash.   Neurological:      Mental Status: She is alert and oriented to person, place, and time.      Cranial Nerves: No cranial nerve deficit.   Psychiatric:         Behavior: Behavior normal.         Thought Content: Thought content normal.         Judgment: Judgment normal.             PAST MEDICAL HISTORY     Past Medical History:   Diagnosis Date   • Abnormal Pap smear of cervix    • Acute pharyngitis     unspecified     • Adjustment disorder with anxious mood     Celexa     • Allergic rhinitis    • Asthma    • Backache    • Candidiasis    • Cervical intraepithelial neoplasia grade 1    • Contraception     Desires permanent sterilization    • Depressive disorder    • Dysphagia     unspecified     • Encounter for gynecological examination without abnormal finding    • Encounter for  visit      visit status    • Excessive and frequent menstruation with irregular cycle    • Excessive and frequent menstruation with regular cycle    • Fatigue    • Generalized abdominal pain    • Generalized anxiety disorder    • Headache    • Heartburn    • Hip pain    • History of CVA (cerebrovascular accident) 2021   • Hypertension    • Insomnia       Trazodone   • Low grade squamous intraepithelial lesion (LGSIL) on cervicovaginal cytologic smear    • Malaise    • Organic anxiety disorder    • Ovarian cancer (HCC)    • Spasm of back muscles    • Stroke (HCC)    • Surgical follow-up care    • Tobacco dependence syndrome    • Urinary tract infection       PAST SURGICAL HISTORY     Past Surgical History:   Procedure Laterality Date   •  SECTION  2015    Repeat   • ENDOMETRIAL ABLATION     • INJECTION OF MEDICATION  2015    Kenalog (1)  -  SEFERINO Perla   • LEEP     • OTHER SURGICAL HISTORY  2015    Laparoscopy; tubal cautery (1)  - Exam under anesthesia and laparoscopic tubal fulguration.   • TUBAL ABDOMINAL LIGATION        SOCIAL HISTORY     Social History     Socioeconomic History   • Marital status:    Tobacco Use   • Smoking status: Current Every Day Smoker     Packs/day: 1.50     Years: 17.00     Pack years: 25.50     Types: Cigarettes     Start date:    • Smokeless tobacco: Never Used   Substance and Sexual Activity   • Alcohol use: No   • Drug use: No   • Sexual activity: Not Currently     Partners: Male     Birth control/protection: Tubal ligation      ALLERGIES   Amoxil [amoxicillin]; Latex, natural rubber; Hydroxyzine hcl; Latex; Nickel; Paroxetine hcl; Paxil [paroxetine]; and Vistaril [hydroxyzine]   MEDICATIONS     Current Outpatient Medications   Medication Sig Dispense Refill   • Albuterol Sulfate 2.5 MG/0.5ML nebulizer solution Inhale 2.5 mg 4 (Four) Times a Day As Needed (shortness of breath, wheezing.). 50 mL 5   • albuterol sulfate  (90 Base) MCG/ACT inhaler INHALE 2 PUFFS BY MOUTH EVERY 6 HOURS AS NEEDED FOR WHEEZING OR SHORTNESS OF BREATH 54 g 2   • amitriptyline (ELAVIL) 50 MG tablet Take 1 tablet by mouth Every Night. 30 tablet 5   • atorvastatin (LIPITOR) 40 MG tablet Take 1 tablet by mouth Daily. 7 tablet 0   • benzonatate (Tessalon Perles) 100 MG capsule Take 1 capsule by mouth 3 (Three) Times a Day  Friends and Family: Three times a week      Frequency of Social Gatherings with Friends and Family: Once a week      Active Member of Clubs or Organizations: Yes      Attends Club or Organization Meetings: More than 4 times per year      Marital Status:   Housing Stability: Low Risk       Unable to Pay for Housing in the Last Year: No      Number of Places Lived in the Last Year: 1      Unstable Housing in the Last Year: No    Review of patient's family history indicates:    Current Outpatient Medications:  albuterol (PROVENTIL/VENTOLIN HFA) 90 mcg/actuation inhaler, INHALE 2 PUFFS INTO LUNGS EVERY 6 HOURS AS NEEDED, Disp: 54 g, Rfl: 5  albuterol-ipratropium (DUO-NEB) 2.5 mg-0.5 mg/3 mL nebulizer solution, , Disp: , Rfl:   amLODIPine (NORVASC) 2.5 MG tablet, Take 1 tablet (2.5 mg total) by mouth 2 (two) times daily., Disp: 60 tablet, Rfl: 11  aspirin 81 MG Chew, Take 81 mg by mouth once daily., Disp: , Rfl:   bisoprolol (ZEBETA) 5 MG tablet, TAKE 1 TABLET BY MOUTH ONCE A DAY, Disp: 90 tablet, Rfl: 3  budesonide-glycopyr-formoterol (BREZTRI AEROSPHERE) 160-9-4.8 mcg/actuation HFAA, Inhale 2 puffs into the lungs 2 (two) times a day., Disp: 32.1 g, Rfl: 3  carvedilol PHOSPHATE 40 MG ORAL CM24 (COREG CR) 40 MG CM24, , Disp: , Rfl:   clopidogreL (PLAVIX) 75 mg tablet, TAKE 1 TABLET BY MOUTH ONCE A DAY, Disp: 90 tablet, Rfl: 3  COMBIVENT RESPIMAT  mcg/actuation inhaler, , Disp: , Rfl:   dicyclomine (BENTYL) 10 MG capsule, TAKE 1 CAPSULE BY MOUTH 4 TIMES DAILY BEFORE MEALS AND NIGHTLY Strength: 10 mg, Disp: 120 capsule, Rfl: 2  DULoxetine (CYMBALTA) 60 MG capsule, TAKE (1) CAPSULE BY MOUTH ONCE A DAY, Disp: 90 capsule, Rfl: 3  ezetimibe-simvastatin 10-40 mg (VYTORIN) 10-40 mg per tablet, TAKE 1 TABLET BY MOUTH EVERY EVENING, Disp: 90 tablet, Rfl: 3  ferrous gluconate (FERGON) 324 MG tablet, Take 1 tablet (324 mg total) by mouth 2 (two) times daily with meals., Disp: 180 tablet, Rfl: 1  furosemide (LASIX) 20 MG  As Needed for Cough. 60 capsule 0   • cetirizine (zyrTEC) 10 MG tablet Take 1 tablet by mouth Daily. 30 tablet 11   • clonazePAM (KlonoPIN) 1 MG tablet TAKE 1 TABLET BY MOUTH TWICE DAILY AS NEEDED FOR ANXIETY 60 tablet 0   • dilTIAZem CD (Cardizem CD) 120 MG 24 hr capsule Take 1 capsule by mouth Daily. For High blood pressure in addition to the lisinopril 60 capsule 0   • DULoxetine (CYMBALTA) 30 MG capsule Take 30 mg by mouth Daily.     • Eliquis 5 MG tablet tablet      • gabapentin (NEURONTIN) 400 MG capsule Take 1 capsule by mouth 4 (Four) Times a Day. 120 capsule 2   • guaiFENesin-codeine (GUAIFENESIN AC) 100-10 MG/5ML liquid Take 5 mL by mouth 4 (Four) Times a Day As Needed for Cough. Insurance will not pay, patient will pay cash 240 mL 0   • HYDROcodone-acetaminophen (NORCO)  MG per tablet TK 1 TABLETY BY MOUTH FOUR TIMES DAILY AS NEEDED FOR PAIN  0   • levoFLOXacin (Levaquin) 500 MG tablet Take 1 tablet by mouth Daily. 7 tablet 0   • lisinopril (PRINIVIL,ZESTRIL) 10 MG tablet      • ondansetron (ZOFRAN) 4 MG tablet Take 1 tablet by mouth Every 8 (Eight) Hours As Needed for Nausea or Vomiting. 90 tablet 5   • predniSONE (DELTASONE) 20 MG tablet Take 2 tablets by mouth 2 (Two) Times a Day With Meals for 4 days, THEN 1 tablet 2 (Two) Times a Day With Meals for 7 days. 30 tablet 0   • rOPINIRole (REQUIP) 1 MG tablet Take 1 tablet by mouth 2 (Two) Times a Day. Take 1 hour before bedtime. 180 tablet 1   • azithromycin (Zithromax) 250 MG tablet Take 2 tablets the first day, then 1 tablet daily for 4 days. 6 tablet 0   • fluconazole (DIFLUCAN) 150 MG tablet Take 1 tablet PO on day 3 and day 7 of antibiotic 2 tablet 0     No current facility-administered medications for this visit.        The following portions of the patient's history were reviewed and updated as appropriate: allergies, current medications, past family history, past medical history, past social history, past surgical history and problem  tablet, 1 TAB AS NEEDED FOR SWELLING LEG,OR WEIGHT GAIN OF 3 PDS IN 1 DAY OR 5 PDS IN 1WEEK, Disp: 90 tablet, Rfl: 0  ibuprofen (ADVIL,MOTRIN) 400 MG tablet, TAKE 1 TABLET BY MOUTH EVERY 6 HOURS AS NEEDED, Disp: 30 tablet, Rfl: 0  inhalation spacing device (COMPACT SPACE CHAMBER), Use as directed for inhalation., Disp: 1 each, Rfl: 2  metroNIDAZOLE (FLAGYL) 500 MG tablet, Take 1 tablet (500 mg total) by mouth 3 (three) times daily., Disp: 14 tablet, Rfl: 0  olmesartan (BENICAR) 40 MG tablet, TAKE 1 TABLET BY MOUTH ONCE A DAY, Disp: 90 tablet, Rfl: 3  OXYGEN-AIR DELIVERY SYSTEMS MISC, 3 L by Misc.(Non-Drug; Combo Route) route every evening., Disp: , Rfl:   pantoprazole (PROTONIX) 40 MG tablet, TAKE 1 TABLET BY MOUTH ONCE A DAY, Disp: 90 tablet, Rfl: 3  PREMARIN 0.3 mg tablet, TAKE 1 TABLET BY MOUTH ONCE A DAY, Disp: 90 tablet, Rfl: 0  vitamin D (VITAMIN D3) 1000 units Tab, Take 1,000 Units by mouth once daily., Disp: , Rfl:   zolpidem (AMBIEN) 10 mg Tab, TAKE 1 TABLET BY MOUTH IN THE EVENING, Disp: 30 tablet, Rfl: 3    No current facility-administered medications for this visit.            Review of Systems   Constitutional: Negative for diaphoresis, malaise/fatigue and weight gain.   HENT:  Negative for hoarse voice.    Eyes:  Negative for double vision and visual disturbance.   Cardiovascular:  Negative for chest pain, claudication, cyanosis, dyspnea on exertion, irregular heartbeat, leg swelling, near-syncope, orthopnea, palpitations, paroxysmal nocturnal dyspnea and syncope.   Respiratory:  Negative for cough, hemoptysis, shortness of breath and snoring.    Hematologic/Lymphatic: Negative for bleeding problem. Does not bruise/bleed easily.   Skin:  Negative for color change and poor wound healing.   Musculoskeletal:  Negative for muscle cramps, muscle weakness and myalgias.   Gastrointestinal:  Positive for bloating and abdominal pain. Negative for change in bowel habit, diarrhea, heartburn, hematemesis,  hematochezia, melena and nausea.   Neurological:  Negative for excessive daytime sleepiness, dizziness, headaches, light-headedness, loss of balance, numbness and weakness.   Psychiatric/Behavioral:  Negative for memory loss. The patient does not have insomnia.    Allergic/Immunologic: Negative for hives.      Objective:    Physical Exam      Assessment:       1. Sinus node dysfunction         Plan:       65 yoF here for reassessment of sinus bradycardia. Her average HR was near 80 bpm 4 months ago, I would be very surprised if there has been a significant change since then. Her fatigue with worse when she takes bid amlodipine, I suspect low BP as the main . Will have her stop bisoprolol and recheck holter.                list.        Assessment/Plan   Diagnoses and all orders for this visit:    1. Pneumonia due to COVID-19 virus (Primary)  -     XR Chest PA & Lateral (In Office)    2. Nausea  -     XR Abdomen Flat & Upright (In Office)    3. SOB (shortness of breath)        Xrays:  Normal.  Lungs clear, abdomen no large stool burden     EXPLAINED SIDE EFFECTS OF COVID MAY LAST A LONG TIME.     Reassurance.  She has medicines at home for nausea               No follow-ups on file.                  This document has been electronically signed by Quincy Perla MD on October 13, 2021 15:01 CDT

## 2023-01-04 DIAGNOSIS — R11.0 NAUSEA: ICD-10-CM

## 2023-01-04 DIAGNOSIS — F41.1 GAD (GENERALIZED ANXIETY DISORDER): ICD-10-CM

## 2023-01-04 RX ORDER — CLONAZEPAM 1 MG/1
TABLET ORAL
Qty: 30 TABLET | Refills: 0 | Status: SHIPPED | OUTPATIENT
Start: 2023-01-04

## 2023-01-06 RX ORDER — ONDANSETRON 4 MG/1
TABLET, FILM COATED ORAL
Qty: 9 TABLET | Refills: 0 | Status: SHIPPED | OUTPATIENT
Start: 2023-01-06

## 2023-01-10 ENCOUNTER — TELEPHONE (OUTPATIENT)
Dept: FAMILY MEDICINE CLINIC | Facility: CLINIC | Age: 33
End: 2023-01-10
Payer: MEDICARE

## 2023-01-10 NOTE — TELEPHONE ENCOUNTER
Received paperwork from patients pain management visit on 12/15/22- patient reported to Dr. Boston she was no longer taking klonopin, but has continued to ask us for refills of it. Called Dr. Boston's office and made them aware she was still taking klonopin. Hope requested for me to call patient to see why she reported she wasn't taking, etc. Attempted to call patient, VM left to call our office.

## 2023-01-11 ENCOUNTER — TELEPHONE (OUTPATIENT)
Dept: FAMILY MEDICINE CLINIC | Facility: CLINIC | Age: 33
End: 2023-01-11
Payer: MEDICARE

## 2023-01-11 NOTE — TELEPHONE ENCOUNTER
Per hope, send patient to psych due to inconsistencies, patient taking more than prescribed and patient telling Dr. Boston she no longer took it. Called patient to let her know, and she told me she would just talk to Hope about it Friday, that she hoped Hope would continue to give it to her. Also mentioned the pharmacy wouldn't let her  the prescription that was sent on 1/4/22. From my understanding, it is because it is too early, because patient was doubling up on them. Patient stated she would also talk to Hope about that Friday, that maybe [facility] would let her fill early. Estella made aware.

## 2023-01-23 ENCOUNTER — TELEPHONE (OUTPATIENT)
Dept: FAMILY MEDICINE CLINIC | Facility: CLINIC | Age: 33
End: 2023-01-23
Payer: MEDICARE

## 2023-01-24 ENCOUNTER — OFFICE VISIT (OUTPATIENT)
Dept: FAMILY MEDICINE CLINIC | Facility: CLINIC | Age: 33
End: 2023-01-24
Payer: MEDICARE

## 2023-01-24 VITALS
OXYGEN SATURATION: 99 % | BODY MASS INDEX: 37.73 KG/M2 | DIASTOLIC BLOOD PRESSURE: 84 MMHG | HEART RATE: 103 BPM | SYSTOLIC BLOOD PRESSURE: 142 MMHG | WEIGHT: 205 LBS | HEIGHT: 62 IN | TEMPERATURE: 98.8 F

## 2023-01-24 DIAGNOSIS — Z01.818 PRE-OPERATIVE CLEARANCE: Primary | ICD-10-CM

## 2023-01-24 DIAGNOSIS — F41.9 ANXIETY: ICD-10-CM

## 2023-01-24 PROCEDURE — 99213 OFFICE O/P EST LOW 20 MIN: CPT | Performed by: NURSE PRACTITIONER

## 2023-01-24 NOTE — PROGRESS NOTES
"Subjective   Cally Olsen is a 32 y.o. female who presents to the office today for pre-operative clearance. She is scheduled for a laparoscopic hysterectomy on 2/1/23. States overall wellness has been good since she starting following with Dr. Boston for pain management.  Chronic medical conditions controlled with current medications and reports she takes medications as ordered. She reports she is still struggling with her anxiety symptoms because she has not has klonopin refilled. She was referred to psychiatry 11/3/22 and given enough mediation to last her until she could be evaluated by psychiatry. States she did not follow-up with psychiatry because she did not understand why she needed to go \"here there and everywhere\" to get her medications. She is upset that she ran out of medication in December and states her uncontrolled anxiety and pain led her to having elevated blood pressure and stroke symptoms. Reports \"my blood pressure was héctor high and that made me feel off\". The stroke symptoms she experienced were \" my bones were like ice, couldn't hardly move, pain and anxiety out of control, jaw pain, and leg pain.\"  States at this point she knew she was entering \"stroke mode\" and she knew she would next experience \"fog-head\" and \"become paralyzed\".  No UrgentCare or ED visits noted in Epic system.  States symptoms resolved once she followed-up with pain management and norco and gabapentin were restarted.   History of Present Illness     The following portions of the patient's history were reviewed and updated as appropriate: allergies, current medications, past family history, past medical history, past social history, past surgical history and problem list.    Review of Systems   Constitutional: Negative.    HENT: Negative.    Eyes: Negative.    Respiratory: Negative.    Cardiovascular: Negative.    Gastrointestinal: Negative.    Endocrine: Negative.    Genitourinary: Negative.    Musculoskeletal: " Positive for back pain.   Skin: Negative.    Allergic/Immunologic: Negative.    Neurological: Negative.    Hematological: Negative.    Psychiatric/Behavioral: The patient is nervous/anxious.        Objective   Physical Exam  Vitals and nursing note reviewed.   Constitutional:       General: She is not in acute distress.     Appearance: Normal appearance. She is not ill-appearing.   HENT:      Head: Normocephalic.      Right Ear: External ear normal.      Left Ear: External ear normal.      Nose: Nose normal.      Mouth/Throat:      Mouth: Mucous membranes are moist.      Pharynx: Oropharynx is clear.   Eyes:      Extraocular Movements: Extraocular movements intact.      Conjunctiva/sclera: Conjunctivae normal.      Pupils: Pupils are equal, round, and reactive to light.   Cardiovascular:      Rate and Rhythm: Normal rate and regular rhythm.      Pulses: Normal pulses.      Heart sounds: Normal heart sounds. No murmur heard.    No friction rub. No gallop.   Pulmonary:      Effort: Pulmonary effort is normal. No respiratory distress.      Breath sounds: Normal breath sounds. No stridor. No wheezing, rhonchi or rales.   Chest:      Chest wall: No tenderness.   Abdominal:      General: Abdomen is flat. Bowel sounds are normal. There is no distension.      Palpations: Abdomen is soft. There is no mass.      Tenderness: There is no abdominal tenderness. There is no right CVA tenderness, left CVA tenderness, guarding or rebound.      Hernia: No hernia is present.   Musculoskeletal:         General: Normal range of motion.      Cervical back: Normal range of motion and neck supple. No rigidity or tenderness.   Skin:     General: Skin is warm and dry.      Capillary Refill: Capillary refill takes less than 2 seconds.      Coloration: Skin is not jaundiced or pale.      Findings: No bruising, erythema, lesion or rash.   Neurological:      General: No focal deficit present.      Mental Status: She is alert and oriented to  person, place, and time. Mental status is at baseline.      Cranial Nerves: No cranial nerve deficit.      Sensory: No sensory deficit.      Motor: No weakness.      Coordination: Coordination normal.      Gait: Gait normal.      Deep Tendon Reflexes: Reflexes normal.   Psychiatric:         Attention and Perception: Attention and perception normal.         Mood and Affect: Mood and affect normal.         Speech: Speech normal.         Behavior: Behavior normal. Behavior is cooperative.         Thought Content: Thought content normal. Thought content does not include homicidal or suicidal ideation. Thought content does not include homicidal or suicidal plan.         Cognition and Memory: Cognition and memory normal.         Judgment: Judgment normal.       Assessment & Plan   Diagnoses and all orders for this visit:    1. Pre-operative clearance (Primary)    2. Anxiety    She is cleared from a PCP standpoint for laparoscopic hysterectomy scheduled 2/1/23. Continue current medications as ordered. Discussed with patient that she needs to follow-up with psychiatry to be evaluated to continue taking klonopin PRN for anxiety. States she is going to ask around and call office to tell us who to refer her to see. Follow-up with Dr. Boston as scheduled for pain management. Follow-up here as scheduled for next follow-up with labs. Pt verbalized understanding and is agreeable to plan. No further questions.  Reviewed [facility]# [id number].           This document has been electronically signed by HITESH Nj on January 24, 2023 11:25 CST

## 2023-01-25 DIAGNOSIS — R92.8 ABNORMAL FINDING ON BREAST IMAGING: Primary | ICD-10-CM

## 2023-01-25 DIAGNOSIS — N63.0 BREAST NODULE: ICD-10-CM

## 2023-01-31 ENCOUNTER — TELEPHONE (OUTPATIENT)
Dept: OBSTETRICS AND GYNECOLOGY | Facility: CLINIC | Age: 33
End: 2023-01-31

## 2023-01-31 NOTE — TELEPHONE ENCOUNTER
Patient was transferred to office from preop department. I talked with patient about the lack of documentation for her anticoagulants from her PCP despite Dr. Julio reaching out.  Also discussed with that that her preop appointments are mandatory prior to a major abdominal surgery.  Patient verbalized understanding I told her she would need to reach out to her PCP to restart her medications and that we would be in contact before the end of the week to discuss a potential future surgery date.

## 2023-02-09 ENCOUNTER — TELEPHONE (OUTPATIENT)
Dept: OBSTETRICS AND GYNECOLOGY | Facility: CLINIC | Age: 33
End: 2023-02-09

## 2023-02-09 NOTE — TELEPHONE ENCOUNTER
PATIENT CALLED AND WOULD LIKE TO RESCHEDULE HER SURGERY THAT WAS ORIGINALLY SCHEDULED WITH DR BALDERAS. HER NUMBER [phone].          THANKSROSA

## 2023-02-15 ENCOUNTER — TELEPHONE (OUTPATIENT)
Dept: OBSTETRICS AND GYNECOLOGY | Facility: CLINIC | Age: 33
End: 2023-02-15

## 2023-02-20 ENCOUNTER — TELEPHONE (OUTPATIENT)
Dept: OBSTETRICS AND GYNECOLOGY | Facility: CLINIC | Age: 33
End: 2023-02-20
Payer: MEDICARE

## 2023-02-20 DIAGNOSIS — R30.0 BURNING WITH URINATION: Primary | ICD-10-CM

## 2023-02-20 NOTE — PROGRESS NOTES
Patient is having burning and pain with urination, frequency and urine has strong odor. Sole will order a urinalysis and we will call with results. She is also having vaginal odor, itching and burning. We will see her in office on 03/03/202 for an exam.

## 2023-02-20 NOTE — TELEPHONE ENCOUNTER
----- Message from Alayna Tam sent at 2/20/2023 12:02 PM CST -----  Patient is wanting to get a surgery date. The patient says she was iced out and had to reschedule her surgery. She has an upcoming appointment with Dr. Julio on 03/27/23

## 2023-02-22 ENCOUNTER — LAB (OUTPATIENT)
Dept: LAB | Facility: OTHER | Age: 33
End: 2023-02-22
Payer: MEDICARE

## 2023-02-22 DIAGNOSIS — R30.0 BURNING WITH URINATION: ICD-10-CM

## 2023-02-22 LAB
BACTERIA UR QL AUTO: ABNORMAL /HPF
BILIRUB UR QL STRIP: ABNORMAL
CLARITY UR: ABNORMAL
COLOR UR: ABNORMAL
GLUCOSE UR STRIP-MCNC: ABNORMAL MG/DL
HGB UR QL STRIP.AUTO: ABNORMAL
HYALINE CASTS UR QL AUTO: ABNORMAL /LPF
KETONES UR QL STRIP: ABNORMAL
LEUKOCYTE ESTERASE UR QL STRIP.AUTO: ABNORMAL
NITRITE UR QL STRIP: ABNORMAL
PH UR STRIP.AUTO: 5.5 [PH] (ref 5.5–8)
PROT UR QL STRIP: ABNORMAL
RBC # UR STRIP: ABNORMAL /HPF
REF LAB TEST METHOD: ABNORMAL
SP GR UR STRIP: 1.01 (ref 1–1.03)
SQUAMOUS #/AREA URNS HPF: ABNORMAL /HPF
UROBILINOGEN UR QL STRIP: ABNORMAL
WBC # UR STRIP: ABNORMAL /HPF

## 2023-02-22 PROCEDURE — 87086 URINE CULTURE/COLONY COUNT: CPT | Performed by: NURSE PRACTITIONER

## 2023-02-22 PROCEDURE — 81001 URINALYSIS AUTO W/SCOPE: CPT | Performed by: NURSE PRACTITIONER

## 2023-02-23 DIAGNOSIS — M54.50 CHRONIC MIDLINE LOW BACK PAIN WITHOUT SCIATICA: ICD-10-CM

## 2023-02-23 DIAGNOSIS — G25.81 RLS (RESTLESS LEGS SYNDROME): ICD-10-CM

## 2023-02-23 DIAGNOSIS — G89.29 CHRONIC MIDLINE LOW BACK PAIN WITHOUT SCIATICA: ICD-10-CM

## 2023-02-23 LAB — BACTERIA SPEC AEROBE CULT: NO GROWTH

## 2023-02-24 RX ORDER — GABAPENTIN 300 MG/1
CAPSULE ORAL
Qty: 120 CAPSULE | OUTPATIENT
Start: 2023-02-24

## 2023-02-24 NOTE — TELEPHONE ENCOUNTER
N.O.  Medication denied per Kamilla Jeffery.     Kamilla Jeffery, APRN  You 46 minutes ago (12:07 PM)  I sent her off to pain management last year for this.       N.O.  Last controlled visit 10/04/2022, last refill 10/04/2022.

## 2023-03-05 DIAGNOSIS — R30.0 DYSURIA: ICD-10-CM

## 2023-03-05 DIAGNOSIS — F51.01 PRIMARY INSOMNIA: Chronic | ICD-10-CM

## 2023-03-05 DIAGNOSIS — F41.1 GENERALIZED ANXIETY DISORDER: ICD-10-CM

## 2023-03-06 DIAGNOSIS — F41.1 GENERALIZED ANXIETY DISORDER: ICD-10-CM

## 2023-03-06 DIAGNOSIS — F51.01 PRIMARY INSOMNIA: Chronic | ICD-10-CM

## 2023-03-06 RX ORDER — AMITRIPTYLINE HYDROCHLORIDE 50 MG/1
50 TABLET, FILM COATED ORAL NIGHTLY
Qty: 30 TABLET | Refills: 5 | OUTPATIENT
Start: 2023-03-06

## 2023-03-06 RX ORDER — AMITRIPTYLINE HYDROCHLORIDE 50 MG/1
50 TABLET, FILM COATED ORAL NIGHTLY
Qty: 30 TABLET | Refills: 3 | Status: SHIPPED | OUTPATIENT
Start: 2023-03-06

## 2023-03-06 RX ORDER — FLUCONAZOLE 150 MG/1
TABLET ORAL
Qty: 2 TABLET | Refills: 0 | OUTPATIENT
Start: 2023-03-06

## 2023-03-10 RX ORDER — APIXABAN 5 MG/1
5 TABLET, FILM COATED ORAL EVERY 12 HOURS SCHEDULED
Qty: 60 TABLET | Refills: 2 | Status: SHIPPED | OUTPATIENT
Start: 2023-03-10

## 2023-03-10 RX ORDER — APIXABAN 5 MG/1
TABLET, FILM COATED ORAL
Qty: 60 TABLET | Refills: 2 | OUTPATIENT
Start: 2023-03-10

## 2023-03-10 NOTE — TELEPHONE ENCOUNTER
Eliquis 5 MG tablet       Barbara Mirza RN  You 9 hours ago (8:07 AM)  QS  Refill denied due to being under Crystal Hoffmann, APRN name to prevent error in represcribing. If appr please start new encounter under PCP to refill.

## 2023-03-30 ENCOUNTER — OFFICE VISIT (OUTPATIENT)
Dept: SURGERY | Facility: CLINIC | Age: 33
End: 2023-03-30
Payer: MEDICARE

## 2023-03-30 VITALS
TEMPERATURE: 97.6 F | DIASTOLIC BLOOD PRESSURE: 86 MMHG | HEIGHT: 62 IN | OXYGEN SATURATION: 97 % | HEART RATE: 91 BPM | WEIGHT: 207 LBS | BODY MASS INDEX: 38.09 KG/M2 | SYSTOLIC BLOOD PRESSURE: 130 MMHG

## 2023-03-30 DIAGNOSIS — R92.8 ABNORMAL MAMMOGRAM: Primary | ICD-10-CM

## 2023-03-30 PROCEDURE — 3075F SYST BP GE 130 - 139MM HG: CPT | Performed by: STUDENT IN AN ORGANIZED HEALTH CARE EDUCATION/TRAINING PROGRAM

## 2023-03-30 PROCEDURE — 1160F RVW MEDS BY RX/DR IN RCRD: CPT | Performed by: STUDENT IN AN ORGANIZED HEALTH CARE EDUCATION/TRAINING PROGRAM

## 2023-03-30 PROCEDURE — 1159F MED LIST DOCD IN RCRD: CPT | Performed by: STUDENT IN AN ORGANIZED HEALTH CARE EDUCATION/TRAINING PROGRAM

## 2023-03-30 PROCEDURE — 3079F DIAST BP 80-89 MM HG: CPT | Performed by: STUDENT IN AN ORGANIZED HEALTH CARE EDUCATION/TRAINING PROGRAM

## 2023-03-30 PROCEDURE — 99214 OFFICE O/P EST MOD 30 MIN: CPT | Performed by: STUDENT IN AN ORGANIZED HEALTH CARE EDUCATION/TRAINING PROGRAM

## 2023-03-30 RX ORDER — GABAPENTIN 600 MG/1
TABLET ORAL
COMMUNITY
Start: 2023-03-23

## 2023-03-30 RX ORDER — DIAZEPAM 5 MG/1
5 TABLET ORAL ONCE
OUTPATIENT
Start: 2023-03-30 | End: 2023-03-30

## 2023-03-30 RX ORDER — OXYCODONE HYDROCHLORIDE AND ACETAMINOPHEN 5; 325 MG/1; MG/1
1 TABLET ORAL ONCE
OUTPATIENT
Start: 2023-03-30 | End: 2023-03-30

## 2023-03-30 NOTE — H&P (VIEW-ONLY)
Subjective   Cally Olsen is a 33 y.o. female     Chief Complaint: Abnormal mammogram    History of Present Illness  32 yo woman with painful palpable mass of the left breast for which she underwent imaging which demonstrated a small lesion in the area that is most likely consistent with a fibroadenoma. She has no history of abnormal mammogram. Her grandmother  of breast cancer.        Review of Systems   Constitutional: Positive for fatigue and unexpected weight change.        Body aches   Eyes: Negative.    Respiratory: Positive for cough, shortness of breath and wheezing.    Cardiovascular: Positive for chest pain and leg swelling.   Gastrointestinal: Positive for abdominal pain and nausea.   Endocrine: Positive for heat intolerance.   Genitourinary: Positive for difficulty urinating, frequency and urgency.   Musculoskeletal: Positive for arthralgias, back pain, gait problem, joint swelling, neck pain and neck stiffness.   Skin: Positive for color change.   Allergic/Immunologic: Negative.    Neurological: Positive for dizziness, weakness, light-headedness and headaches.   Hematological: Bruises/bleeds easily.   Psychiatric/Behavioral: Negative.      Past Medical History:   Diagnosis Date   • Abnormal Pap smear of cervix    • Allergic rhinitis    • Anxiety    • Asthma    • Chronic bilateral low back pain with sciatica    • COPD (chronic obstructive pulmonary disease) ([facility])    • Depressive disorder    • Heartburn    • History of CVA (cerebrovascular accident) 2021   • Hyperlipidemia    • Hypertension    • Insomnia    • Lupus ([facility])    • Restless leg syndrome    • Spasm of back muscles    • Stroke ([facility])    • Tobacco dependence syndrome      Past Surgical History:   Procedure Laterality Date   •  SECTION  2015   •  SECTION     •  SECTION     •   SECTION     •  SECTION     • ENDOMETRIAL ABLATION     • LEEP     • TUBAL ABDOMINAL LIGATION  2015     Family History   Problem Relation Age of Onset   • Alcohol abuse Father    • Heart disease Father    • Seizures Mother    • No Known Problems Brother    • No Known Problems Brother    • Breast cancer Maternal Grandmother    • Ovarian cancer Maternal Grandmother    • Breast cancer Maternal Aunt    • Colon cancer Other    • Uterine cancer Other    • Stroke Other      Social History     Socioeconomic History   • Marital status:    Tobacco Use   • Smoking status: Every Day     Packs/day: 1.00     Years: 17.00     Pack years: 17.00     Types: Cigarettes     Start date:    • Smokeless tobacco: Never   Vaping Use   • Vaping Use: Some days   • Substances: Nicotine   Substance and Sexual Activity   • Alcohol use: No   • Drug use: No   • Sexual activity: Not Currently     Partners: Male     Birth control/protection: Tubal ligation     Allergies   Allergen Reactions   • Amoxil [Amoxicillin] Anaphylaxis     Anaphylaxis    • Latex, Natural Rubber Swelling     Had swelling when she wore gloves   • Hydroxyzine Hcl Rash and Swelling   • Nickel Rash   • Paroxetine Hcl Rash and Swelling   • Paxil [Paroxetine] Rash   • Vistaril [Hydroxyzine] Rash     Vitals:    23 1010   BP: 130/86   Pulse: 91   Temp: 97.6 °F (36.4 °C)   SpO2: 97%       Home Medications:  Prior to Admission medications    Medication Sig Start Date End Date Taking? Authorizing Provider   albuterol (PROVENTIL) 2.5 MG/0.5ML nebulizer solution Take 2.5 mg by nebulization Every 4 (Four) Hours As Needed (shortness of breath, wheezing.). 22  Yes Sima Hoffmann APRN   albuterol sulfate  (90 Base) MCG/ACT inhaler Inhale 2 puffs Every 6 (Six) Hours As Needed for Wheezing or Shortness of Air. 10/4/22  Yes Kamilla Jeffery APRN   amitriptyline (ELAVIL) 50 MG tablet Take 1 tablet by mouth Every Night. 3/6/23  Yes Kamilla Jeffery  HITESH   atorvastatin (LIPITOR) 40 MG tablet Take 1 tablet by mouth Daily. 10/4/22 10/4/23 Yes Kamilla Jeffery APRN   cetirizine (zyrTEC) 10 MG tablet Take 1 tablet by mouth Daily. 12/14/21  Yes Sima Hoffmann APRN   clonazePAM (KlonoPIN) 1 MG tablet TAKE 1 TABLET BY MOUTH DAILY AS NEEDED FOR ANXIETY 1/4/23  Yes Kamilla Jeffery APRN   dilTIAZem CD (Cardizem CD) 120 MG 24 hr capsule Take 1 capsule by mouth Daily. For High blood pressure in addition to the lisinopril 10/4/22  Yes Kaimlla Jeffery APRN   DULoxetine (CYMBALTA) 30 MG capsule Take 1 capsule by mouth Daily. 10/4/22  Yes Kamilla Jeffery APRN   Eliquis 5 MG tablet tablet Take 1 tablet by mouth Every 12 (Twelve) Hours. 3/10/23  Yes Kamilla Jeffery APRN   gabapentin (NEURONTIN) 600 MG tablet  3/23/23  Yes Provider, MD Marylou   HYDROcodone-acetaminophen (NORCO)  MG per tablet Take 1 tablet by mouth Every 6 (Six) Hours As Needed for Moderate Pain. 12/7/22  Yes Kamilla Jeffery APRN   lisinopril (PRINIVIL,ZESTRIL) 10 MG tablet Take 1 tablet by mouth Daily. 10/4/22  Yes Kamilla Jeffery APRN   ondansetron (ZOFRAN) 4 MG tablet TAKE 1 TABLET BY MOUTH EVERY 8 HOURS AS NEEDED FOR NAUSEA OR VOMITING 1/6/23  Yes Kamilla Jeffery APRN   rOPINIRole (REQUIP) 1 MG tablet Take 1 tablet by mouth Every Night. Take one tablet by mouth one hour before bed for help with restless leg syndrome. 10/4/22  Yes Kamilla Jeffery APRN   gabapentin (NEURONTIN) 300 MG capsule Take 1 capsule by mouth 4 (Four) Times a Day.  Patient not taking: Reported on 3/30/2023 10/4/22   Kamilla Jeffery APRN       Objective   Physical Exam  Constitutional:       Appearance: Normal appearance.   HENT:      Head: Normocephalic and atraumatic.      Nose: Nose normal. No congestion.      Mouth/Throat:      Mouth: Mucous membranes are moist.      Pharynx: Oropharynx is clear.   Eyes:      General: No scleral icterus.     Pupils: Pupils are equal, round, and reactive to light.    Cardiovascular:      Rate and Rhythm: Normal rate and regular rhythm.   Pulmonary:      Effort: Pulmonary effort is normal. No respiratory distress.   Skin:     General: Skin is warm and dry.      Comments: Small tender nodule in the 12:00 position of the right breast.    Neurological:      General: No focal deficit present.      Mental Status: She is alert and oriented to person, place, and time.   Psychiatric:         Mood and Affect: Mood normal.         Behavior: Behavior normal.         Assessment & Plan       The encounter diagnosis was Abnormal mammogram.    - Will plan on US guided right breast biopsy.  I have counseled the patient about the nature of the problem, the natural history of the disease, the risk and benefits of surgery, the expected recovery, and the alternatives to surgery.  After this discussion, they were given an opportunity to ask questions, have an understanding of diagnosis and treatment options, and wish to proceed with surgery.   -Class 2 Severe Obesity (BMI >=35 and <=39.9). Obesity-related health conditions include the following: none. Obesity is newly identified. BMI is is above average; BMI management plan is completed. We discussed portion control and increasing exercise.                     This document has been electronically signed by Audie Berry MD on March 30, 2023 18:40 CDT

## 2023-03-30 NOTE — PROGRESS NOTES
Subjective   Cally Olsen is a 33 y.o. female     Chief Complaint: Abnormal mammogram    History of Present Illness  34 yo woman with painful palpable mass of the left breast for which she underwent imaging which demonstrated a small lesion in the area that is most likely consistent with a fibroadenoma. She has no history of abnormal mammogram. Her grandmother  of breast cancer.        Review of Systems   Constitutional: Positive for fatigue and unexpected weight change.        Body aches   Eyes: Negative.    Respiratory: Positive for cough, shortness of breath and wheezing.    Cardiovascular: Positive for chest pain and leg swelling.   Gastrointestinal: Positive for abdominal pain and nausea.   Endocrine: Positive for heat intolerance.   Genitourinary: Positive for difficulty urinating, frequency and urgency.   Musculoskeletal: Positive for arthralgias, back pain, gait problem, joint swelling, neck pain and neck stiffness.   Skin: Positive for color change.   Allergic/Immunologic: Negative.    Neurological: Positive for dizziness, weakness, light-headedness and headaches.   Hematological: Bruises/bleeds easily.   Psychiatric/Behavioral: Negative.      Past Medical History:   Diagnosis Date   • Abnormal Pap smear of cervix    • Allergic rhinitis    • Anxiety    • Asthma    • Chronic bilateral low back pain with sciatica    • COPD (chronic obstructive pulmonary disease) ([facility])    • Depressive disorder    • Heartburn    • History of CVA (cerebrovascular accident) 2021   • Hyperlipidemia    • Hypertension    • Insomnia    • Lupus ([facility])    • Restless leg syndrome    • Spasm of back muscles    • Stroke ([facility])    • Tobacco dependence syndrome      Past Surgical History:   Procedure Laterality Date   •  SECTION  2015   •  SECTION     •  SECTION     •   SECTION     •  SECTION     • ENDOMETRIAL ABLATION     • LEEP     • TUBAL ABDOMINAL LIGATION  2015     Family History   Problem Relation Age of Onset   • Alcohol abuse Father    • Heart disease Father    • Seizures Mother    • No Known Problems Brother    • No Known Problems Brother    • Breast cancer Maternal Grandmother    • Ovarian cancer Maternal Grandmother    • Breast cancer Maternal Aunt    • Colon cancer Other    • Uterine cancer Other    • Stroke Other      Social History     Socioeconomic History   • Marital status:    Tobacco Use   • Smoking status: Every Day     Packs/day: 1.00     Years: 17.00     Pack years: 17.00     Types: Cigarettes     Start date:    • Smokeless tobacco: Never   Vaping Use   • Vaping Use: Some days   • Substances: Nicotine   Substance and Sexual Activity   • Alcohol use: No   • Drug use: No   • Sexual activity: Not Currently     Partners: Male     Birth control/protection: Tubal ligation     Allergies   Allergen Reactions   • Amoxil [Amoxicillin] Anaphylaxis     Anaphylaxis    • Latex, Natural Rubber Swelling     Had swelling when she wore gloves   • Hydroxyzine Hcl Rash and Swelling   • Nickel Rash   • Paroxetine Hcl Rash and Swelling   • Paxil [Paroxetine] Rash   • Vistaril [Hydroxyzine] Rash     Vitals:    23 1010   BP: 130/86   Pulse: 91   Temp: 97.6 °F (36.4 °C)   SpO2: 97%       Home Medications:  Prior to Admission medications    Medication Sig Start Date End Date Taking? Authorizing Provider   albuterol (PROVENTIL) 2.5 MG/0.5ML nebulizer solution Take 2.5 mg by nebulization Every 4 (Four) Hours As Needed (shortness of breath, wheezing.). 22  Yes Sima Hoffmann APRN   albuterol sulfate  (90 Base) MCG/ACT inhaler Inhale 2 puffs Every 6 (Six) Hours As Needed for Wheezing or Shortness of Air. 10/4/22  Yes Kamilla Jeffery APRN   amitriptyline (ELAVIL) 50 MG tablet Take 1 tablet by mouth Every Night. 3/6/23  Yes Kamilla Jeffery  HITESH   atorvastatin (LIPITOR) 40 MG tablet Take 1 tablet by mouth Daily. 10/4/22 10/4/23 Yes Kamilla Jeffery APRN   cetirizine (zyrTEC) 10 MG tablet Take 1 tablet by mouth Daily. 12/14/21  Yes Sima Hoffmann APRN   clonazePAM (KlonoPIN) 1 MG tablet TAKE 1 TABLET BY MOUTH DAILY AS NEEDED FOR ANXIETY 1/4/23  Yes Kamilla Jeffery APRN   dilTIAZem CD (Cardizem CD) 120 MG 24 hr capsule Take 1 capsule by mouth Daily. For High blood pressure in addition to the lisinopril 10/4/22  Yes Kamilla Jeffery APRN   DULoxetine (CYMBALTA) 30 MG capsule Take 1 capsule by mouth Daily. 10/4/22  Yes Kamilla Jeffery APRN   Eliquis 5 MG tablet tablet Take 1 tablet by mouth Every 12 (Twelve) Hours. 3/10/23  Yes Kamilla Jeffery APRN   gabapentin (NEURONTIN) 600 MG tablet  3/23/23  Yes Provider, MD Marylou   HYDROcodone-acetaminophen (NORCO)  MG per tablet Take 1 tablet by mouth Every 6 (Six) Hours As Needed for Moderate Pain. 12/7/22  Yes Kamilla Jeffery APRN   lisinopril (PRINIVIL,ZESTRIL) 10 MG tablet Take 1 tablet by mouth Daily. 10/4/22  Yes Kamilla Jeffery APRN   ondansetron (ZOFRAN) 4 MG tablet TAKE 1 TABLET BY MOUTH EVERY 8 HOURS AS NEEDED FOR NAUSEA OR VOMITING 1/6/23  Yes Kamilla Jeffery APRN   rOPINIRole (REQUIP) 1 MG tablet Take 1 tablet by mouth Every Night. Take one tablet by mouth one hour before bed for help with restless leg syndrome. 10/4/22  Yes Kamilla Jeffery APRN   gabapentin (NEURONTIN) 300 MG capsule Take 1 capsule by mouth 4 (Four) Times a Day.  Patient not taking: Reported on 3/30/2023 10/4/22   Kamilla Jeffery APRN       Objective   Physical Exam  Constitutional:       Appearance: Normal appearance.   HENT:      Head: Normocephalic and atraumatic.      Nose: Nose normal. No congestion.      Mouth/Throat:      Mouth: Mucous membranes are moist.      Pharynx: Oropharynx is clear.   Eyes:      General: No scleral icterus.     Pupils: Pupils are equal, round, and reactive to light.    Cardiovascular:      Rate and Rhythm: Normal rate and regular rhythm.   Pulmonary:      Effort: Pulmonary effort is normal. No respiratory distress.   Skin:     General: Skin is warm and dry.      Comments: Small tender nodule in the 12:00 position of the right breast.    Neurological:      General: No focal deficit present.      Mental Status: She is alert and oriented to person, place, and time.   Psychiatric:         Mood and Affect: Mood normal.         Behavior: Behavior normal.         Assessment & Plan       The encounter diagnosis was Abnormal mammogram.    - Will plan on US guided right breast biopsy.  I have counseled the patient about the nature of the problem, the natural history of the disease, the risk and benefits of surgery, the expected recovery, and the alternatives to surgery.  After this discussion, they were given an opportunity to ask questions, have an understanding of diagnosis and treatment options, and wish to proceed with surgery.   -Class 2 Severe Obesity (BMI >=35 and <=39.9). Obesity-related health conditions include the following: none. Obesity is newly identified. BMI is is above average; BMI management plan is completed. We discussed portion control and increasing exercise.                     This document has been electronically signed by Audie Berry MD on March 30, 2023 18:40 CDT

## 2023-04-08 DIAGNOSIS — I10 ESSENTIAL HYPERTENSION: ICD-10-CM

## 2023-04-10 RX ORDER — DILTIAZEM HYDROCHLORIDE 120 MG/1
120 CAPSULE, COATED, EXTENDED RELEASE ORAL DAILY
Qty: 90 CAPSULE | Refills: 0 | Status: SHIPPED | OUTPATIENT
Start: 2023-04-10

## 2023-04-11 ENCOUNTER — HOSPITAL ENCOUNTER (OUTPATIENT)
Dept: ULTRASOUND IMAGING | Facility: HOSPITAL | Age: 33
Discharge: HOME | End: 2023-04-11
Admitting: STUDENT IN AN ORGANIZED HEALTH CARE EDUCATION/TRAINING PROGRAM
Payer: MEDICARE

## 2023-04-11 VITALS
HEIGHT: 63 IN | OXYGEN SATURATION: 97 % | SYSTOLIC BLOOD PRESSURE: 143 MMHG | WEIGHT: 203.93 LBS | TEMPERATURE: 97.7 F | BODY MASS INDEX: 36.13 KG/M2 | RESPIRATION RATE: 18 BRPM | DIASTOLIC BLOOD PRESSURE: 80 MMHG | HEART RATE: 81 BPM

## 2023-04-11 DIAGNOSIS — R92.8 ABNORMAL MAMMOGRAM: ICD-10-CM

## 2023-04-11 PROCEDURE — A4648 IMPLANTABLE TISSUE MARKER: HCPCS

## 2023-04-11 PROCEDURE — 88305 TISSUE EXAM BY PATHOLOGIST: CPT

## 2023-04-11 PROCEDURE — 19083 BX BREAST 1ST LESION US IMAG: CPT | Performed by: STUDENT IN AN ORGANIZED HEALTH CARE EDUCATION/TRAINING PROGRAM

## 2023-04-11 RX ORDER — LIDOCAINE HYDROCHLORIDE AND EPINEPHRINE 10; 10 MG/ML; UG/ML
20 INJECTION, SOLUTION INFILTRATION; PERINEURAL ONCE
Status: COMPLETED | OUTPATIENT
Start: 2023-04-11 | End: 2023-04-11

## 2023-04-11 RX ORDER — DIAZEPAM 5 MG/1
5 TABLET ORAL ONCE
Status: COMPLETED | OUTPATIENT
Start: 2023-04-11 | End: 2023-04-11

## 2023-04-11 RX ORDER — OXYCODONE HYDROCHLORIDE AND ACETAMINOPHEN 5; 325 MG/1; MG/1
1 TABLET ORAL ONCE
Status: COMPLETED | OUTPATIENT
Start: 2023-04-11 | End: 2023-04-11

## 2023-04-11 RX ADMIN — LIDOCAINE HYDROCHLORIDE AND EPINEPHRINE 20 ML: 10; 10 INJECTION, SOLUTION INFILTRATION; PERINEURAL at 08:20

## 2023-04-11 RX ADMIN — DIAZEPAM 5 MG: 5 TABLET ORAL at 07:01

## 2023-04-11 RX ADMIN — OXYCODONE HYDROCHLORIDE AND ACETAMINOPHEN 1 TABLET: 5; 325 TABLET ORAL at 07:01

## 2023-04-11 NOTE — INTERVAL H&P NOTE
H&P reviewed. The patient was examined and there are no changes to the H&P.      Vitals:    04/11/23 0654   BP: 160/87   Pulse: 90   Resp: 20   Temp: 96.8 °F (36 °C)   SpO2: 98%      I have counseled the patient about the nature of the problem, the natural history of the disease, the risk and benefits of surgery, the expected recovery, and the alternatives to surgery.  After this discussion, they were given an opportunity to ask questions, have an understanding of diagnosis and treatment options, and wish to proceed with surgery.

## 2023-04-12 ENCOUNTER — TELEPHONE (OUTPATIENT)
Dept: SURGERY | Facility: CLINIC | Age: 33
End: 2023-04-12
Payer: MEDICARE

## 2023-04-12 DIAGNOSIS — R92.8 ABNORMAL MAMMOGRAM: Primary | ICD-10-CM

## 2023-04-12 LAB — REF LAB TEST METHOD: NORMAL

## 2023-04-12 NOTE — TELEPHONE ENCOUNTER
MS MAURIZIO CALLING STATING HER BREAST IS REALLY BRUISED, SOME SWELLING.     SHE HAS TRIED TAKING IBUPROFEN AN TYLENOL AND NEITHER ARE HELPING.    SHE HAS TRIED HOT AND COLD COMPRESSES, DOES NOT HELP FOR LONG.      PLEASE ADVISE    HER PHONE [phone]

## 2023-04-13 RX ORDER — TRAMADOL HYDROCHLORIDE 50 MG/1
50 TABLET ORAL EVERY 6 HOURS PRN
Qty: 20 TABLET | Refills: 0 | Status: SHIPPED | OUTPATIENT
Start: 2023-04-13 | End: 2023-04-17

## 2023-04-13 NOTE — TELEPHONE ENCOUNTER
Called spoke to Ms. Olsen. She is having significant pain, bruising and swelling. She is on long term narcotics at home, but I will call in some additional tramadol to help with her pain. I have asked her to call for an appointment if she has any concerns otherwise she wants to wait and see me on 4/18.

## 2023-04-18 DIAGNOSIS — E78.2 MIXED HYPERLIPIDEMIA: ICD-10-CM

## 2023-04-18 RX ORDER — ATORVASTATIN CALCIUM 40 MG/1
40 TABLET, FILM COATED ORAL DAILY
Qty: 90 TABLET | Refills: 0 | Status: SHIPPED | OUTPATIENT
Start: 2023-04-18 | End: 2023-07-17

## 2023-04-18 RX ORDER — LISINOPRIL 10 MG/1
10 TABLET ORAL DAILY
Qty: 90 TABLET | Refills: 0 | Status: SHIPPED | OUTPATIENT
Start: 2023-04-18

## 2023-04-20 ENCOUNTER — OFFICE VISIT (OUTPATIENT)
Dept: SURGERY | Facility: CLINIC | Age: 33
End: 2023-04-20
Payer: MEDICARE

## 2023-04-20 VITALS
SYSTOLIC BLOOD PRESSURE: 140 MMHG | BODY MASS INDEX: 36.14 KG/M2 | HEART RATE: 103 BPM | HEIGHT: 63 IN | DIASTOLIC BLOOD PRESSURE: 84 MMHG | OXYGEN SATURATION: 97 % | WEIGHT: 204 LBS | TEMPERATURE: 98.1 F

## 2023-04-20 DIAGNOSIS — R92.8 ABNORMAL MAMMOGRAM: ICD-10-CM

## 2023-04-20 DIAGNOSIS — R10.11 RIGHT UPPER QUADRANT ABDOMINAL PAIN: Primary | ICD-10-CM

## 2023-04-23 NOTE — PROGRESS NOTES
Chief Complaint   Patient presents with   • Follow-up     Mammotome results        32 yo woman who presents for follow up of breast biopsy which was benign. She is still having some bruising. She does also mention that her gallbladder has been bothering her. She says that it was the worst one that one doctor had ever seen. She says she has no stones, but that she sometimes has intermittent right upper quadrant pain associated with meals.       Past Medical History:   Diagnosis Date   • Abnormal Pap smear of cervix    • Allergic rhinitis    • Anxiety    • Asthma    • Chronic bilateral low back pain with sciatica    • COPD (chronic obstructive pulmonary disease)    • Depressive disorder    • Heartburn    • History of CVA (cerebrovascular accident) 2021   • Hyperlipidemia    • Hypertension    • Insomnia    • Lupus    • Restless leg syndrome    • Spasm of back muscles    • Stroke    • Tobacco dependence syndrome        Past Surgical History:   Procedure Laterality Date   •  SECTION  2015   •  SECTION     •  SECTION     •  SECTION     •  SECTION     • ENDOMETRIAL ABLATION     • LEEP     • TUBAL ABDOMINAL LIGATION  2015         Current Outpatient Medications:   •  albuterol (PROVENTIL) 2.5 MG/0.5ML nebulizer solution, Take 2.5 mg by nebulization Every 4 (Four) Hours As Needed (shortness of breath, wheezing.)., Disp: 50 mL, Rfl: 5  •  albuterol sulfate  (90 Base) MCG/ACT inhaler, Inhale 2 puffs Every 6 (Six) Hours As Needed for Wheezing or Shortness of Air., Disp: 54 g, Rfl: 2  •  amitriptyline (ELAVIL) 50 MG tablet, Take 1 tablet by mouth Every Night., Disp: 30 tablet, Rfl: 3  •  atorvastatin (LIPITOR) 40 MG tablet, TAKE 1 TABLET BY MOUTH DAILY, Disp: 90 tablet, Rfl: 0  •  cetirizine (zyrTEC) 10 MG tablet, Take 1 tablet by mouth Daily., Disp: 30 tablet, Rfl: 11  •  clonazePAM (KlonoPIN) 1 MG tablet, TAKE 1 TABLET BY MOUTH DAILY AS NEEDED FOR ANXIETY, Disp:  30 tablet, Rfl: 0  •  dilTIAZem CD (CARDIZEM CD) 120 MG 24 hr capsule, TAKE 1 CAPSULE BY MOUTH DAILY FOR HIGH BLOOD PRESSURE IN ADDITION TO THE LISINOPRIL, Disp: 90 capsule, Rfl: 0  •  DULoxetine (CYMBALTA) 30 MG capsule, Take 1 capsule by mouth Daily., Disp: 90 capsule, Rfl: 1  •  Eliquis 5 MG tablet tablet, Take 1 tablet by mouth Every 12 (Twelve) Hours., Disp: 60 tablet, Rfl: 2  •  gabapentin (NEURONTIN) 600 MG tablet, Take 1 tablet by mouth 2 (Two) Times a Day., Disp: , Rfl:   •  HYDROcodone-acetaminophen (NORCO)  MG per tablet, Take 1 tablet by mouth Every 6 (Six) Hours As Needed for Moderate Pain., Disp: 28 tablet, Rfl: 0  •  lisinopril (PRINIVIL,ZESTRIL) 10 MG tablet, TAKE 1 TABLET BY MOUTH DAILY, Disp: 90 tablet, Rfl: 0  •  ondansetron (ZOFRAN) 4 MG tablet, TAKE 1 TABLET BY MOUTH EVERY 8 HOURS AS NEEDED FOR NAUSEA OR VOMITING, Disp: 9 tablet, Rfl: 0  •  rOPINIRole (REQUIP) 1 MG tablet, Take 1 tablet by mouth Every Night. Take one tablet by mouth one hour before bed for help with restless leg syndrome., Disp: 30 tablet, Rfl: 5    Allergies   Allergen Reactions   • Amoxil [Amoxicillin] Anaphylaxis     Anaphylaxis    • Latex, Natural Rubber Swelling     Had swelling when she wore gloves   • Hydroxyzine Hcl Rash and Swelling   • Nickel Rash   • Paroxetine Hcl Rash and Swelling   • Paxil [Paroxetine] Rash   • Vistaril [Hydroxyzine] Rash       Family History   Problem Relation Age of Onset   • Alcohol abuse Father    • Heart disease Father    • Seizures Mother    • No Known Problems Brother    • No Known Problems Brother    • Breast cancer Maternal Grandmother    • Ovarian cancer Maternal Grandmother    • Breast cancer Maternal Aunt    • Colon cancer Other    • Uterine cancer Other    • Stroke Other        Social History     Socioeconomic History   • Marital status:    Tobacco Use   • Smoking status: Every Day     Packs/day: 1.00     Years: 17.00     Pack years: 17.00     Types: Cigarettes     Start  date: 2004   • Smokeless tobacco: Never   Vaping Use   • Vaping Use: Some days   • Substances: Nicotine   Substance and Sexual Activity   • Alcohol use: No   • Drug use: No   • Sexual activity: Not Currently     Partners: Male     Birth control/protection: Tubal ligation       Review of Systems    Physical Exam  Constitutional:       Appearance: Normal appearance.   HENT:      Head: Normocephalic and atraumatic.      Nose: Nose normal. No congestion.      Mouth/Throat:      Mouth: Mucous membranes are moist.      Pharynx: Oropharynx is clear.   Eyes:      General: No scleral icterus.     Pupils: Pupils are equal, round, and reactive to light.   Cardiovascular:      Rate and Rhythm: Normal rate and regular rhythm.   Pulmonary:      Effort: Pulmonary effort is normal. No respiratory distress.   Abdominal:      Tenderness: There is abdominal tenderness.   Skin:     General: Skin is warm and dry.      Comments: Right breast with some bruising and a small hematoma   Neurological:      General: No focal deficit present.      Mental Status: She is alert and oriented to person, place, and time.   Psychiatric:         Mood and Affect: Mood normal.         Behavior: Behavior normal.           ASSESSMENT    Diagnoses and all orders for this visit:    1. Right upper quadrant abdominal pain (Primary)  -     US Gallbladder; Future    2. Abnormal mammogram  -     US Breast Right Limited; Future        PLAN    1. Will need repeat mamaogram to check clip placement. I also have ordered a RUQ ultrasound to evaluate her gallbladder. She may be a candidate for removal as well.

## 2023-04-24 DIAGNOSIS — G25.81 RLS (RESTLESS LEGS SYNDROME): ICD-10-CM

## 2023-04-24 RX ORDER — ROPINIROLE 1 MG/1
1 TABLET, FILM COATED ORAL NIGHTLY
Qty: 30 TABLET | Refills: 0 | Status: SHIPPED | OUTPATIENT
Start: 2023-04-24

## 2023-04-26 ENCOUNTER — TELEPHONE (OUTPATIENT)
Dept: SURGERY | Facility: CLINIC | Age: 33
End: 2023-04-26
Payer: MEDICARE

## 2023-04-27 DIAGNOSIS — R10.11 RIGHT UPPER QUADRANT ABDOMINAL PAIN: Primary | ICD-10-CM

## 2023-04-28 ENCOUNTER — OFFICE VISIT (OUTPATIENT)
Dept: FAMILY MEDICINE CLINIC | Facility: CLINIC | Age: 33
End: 2023-04-28
Payer: MEDICARE

## 2023-04-28 VITALS
HEIGHT: 63 IN | SYSTOLIC BLOOD PRESSURE: 124 MMHG | OXYGEN SATURATION: 97 % | DIASTOLIC BLOOD PRESSURE: 84 MMHG | HEART RATE: 88 BPM | BODY MASS INDEX: 36.14 KG/M2 | WEIGHT: 204 LBS

## 2023-04-28 DIAGNOSIS — F41.1 GAD (GENERALIZED ANXIETY DISORDER): ICD-10-CM

## 2023-04-28 DIAGNOSIS — R45.851 SUICIDAL IDEATION: Primary | ICD-10-CM

## 2023-04-28 PROCEDURE — 3074F SYST BP LT 130 MM HG: CPT | Performed by: NURSE PRACTITIONER

## 2023-04-28 PROCEDURE — 99214 OFFICE O/P EST MOD 30 MIN: CPT | Performed by: NURSE PRACTITIONER

## 2023-04-28 PROCEDURE — 3079F DIAST BP 80-89 MM HG: CPT | Performed by: NURSE PRACTITIONER

## 2023-04-28 PROCEDURE — 1159F MED LIST DOCD IN RCRD: CPT | Performed by: NURSE PRACTITIONER

## 2023-04-28 PROCEDURE — 1160F RVW MEDS BY RX/DR IN RCRD: CPT | Performed by: NURSE PRACTITIONER

## 2023-04-28 RX ORDER — CLONAZEPAM 1 MG/1
TABLET ORAL
Qty: 30 TABLET | OUTPATIENT
Start: 2023-04-28

## 2023-05-04 DIAGNOSIS — F41.1 GAD (GENERALIZED ANXIETY DISORDER): ICD-10-CM

## 2023-05-04 RX ORDER — DULOXETIN HYDROCHLORIDE 30 MG/1
30 CAPSULE, DELAYED RELEASE ORAL DAILY
Qty: 90 CAPSULE | Refills: 1 | Status: SHIPPED | OUTPATIENT
Start: 2023-05-04

## 2023-05-10 ENCOUNTER — PREP FOR SURGERY (OUTPATIENT)
Dept: OTHER | Facility: HOSPITAL | Age: 33
End: 2023-05-10
Payer: MEDICARE

## 2023-05-10 DIAGNOSIS — N99.85 POST ENDOMETRIAL ABLATION SYNDROME: Primary | ICD-10-CM

## 2023-05-10 RX ORDER — ACETAMINOPHEN 500 MG
1000 TABLET ORAL ONCE
OUTPATIENT
Start: 2023-05-10 | End: 2023-05-10

## 2023-05-10 RX ORDER — GABAPENTIN 300 MG/1
600 CAPSULE ORAL ONCE
OUTPATIENT
Start: 2023-05-10 | End: 2023-05-10

## 2023-05-10 RX ORDER — SODIUM CHLORIDE 9 MG/ML
40 INJECTION, SOLUTION INTRAVENOUS AS NEEDED
OUTPATIENT
Start: 2023-05-10

## 2023-05-10 RX ORDER — SCOLOPAMINE TRANSDERMAL SYSTEM 1 MG/1
1 PATCH, EXTENDED RELEASE TRANSDERMAL CONTINUOUS
OUTPATIENT
Start: 2023-05-10 | End: 2023-05-13

## 2023-05-10 RX ORDER — SODIUM CHLORIDE, SODIUM LACTATE, POTASSIUM CHLORIDE, CALCIUM CHLORIDE 600; 310; 30; 20 MG/100ML; MG/100ML; MG/100ML; MG/100ML
125 INJECTION, SOLUTION INTRAVENOUS CONTINUOUS
OUTPATIENT
Start: 2023-05-10

## 2023-05-10 RX ORDER — SODIUM CHLORIDE 0.9 % (FLUSH) 0.9 %
10 SYRINGE (ML) INJECTION AS NEEDED
OUTPATIENT
Start: 2023-05-10

## 2023-05-10 RX ORDER — BUPIVACAINE HCL/0.9 % NACL/PF 0.1 %
2 PLASTIC BAG, INJECTION (ML) EPIDURAL ONCE
OUTPATIENT
Start: 2023-05-10 | End: 2023-05-10

## 2023-05-10 RX ORDER — SODIUM CHLORIDE 0.9 % (FLUSH) 0.9 %
3 SYRINGE (ML) INJECTION EVERY 12 HOURS SCHEDULED
OUTPATIENT
Start: 2023-05-10

## 2023-05-14 NOTE — PROGRESS NOTES
Subjective   Cally Olsen is a 33 y.o. female who presents to the office that was originally scheduled as an initial Medicare wellness visit but during the intake portion, patient admitted to my nurse that she has suicidal ideation.  Says that she would use a gun to take her life.  There are guns in the home but she does not have the code to get into the gun safe.  Tells me that she has had this going on all of her life.  Is under stress.  Is not able to pinpoint anyone issue.  Depression comes in waves and at times is very overwhelming for her.  Feels like she is falling off a lizz.  I referred her to psychiatry in November 3, 2022 but she did not go.  Has 5 children, 3 of which live with her.  Also has her mother and her  that live with her.  Did attempt suicide at 16 years of age with a knife.  Does not believe that talking to anyone will help her depression.  Has been to therapy but its been quite sometime.  Reviewing her referral list the behavioral health referral was closed on January 27 of this year due to no contact with patient.  History of Present Illness     The following portions of the patient's history were reviewed and updated as appropriate: allergies, current medications, past family history, past medical history, past social history, past surgical history and problem list.    Review of Systems   Constitutional: Negative for chills, fatigue and fever.   HENT: Negative for congestion, sneezing, sore throat and trouble swallowing.    Eyes: Negative for visual disturbance.   Respiratory: Negative for cough, chest tightness, shortness of breath and wheezing.    Cardiovascular: Negative for chest pain, palpitations and leg swelling.   Gastrointestinal: Negative for abdominal pain, constipation, diarrhea, nausea and vomiting.   Genitourinary: Negative for dysuria, frequency and urgency.   Musculoskeletal: Negative for neck pain.   Skin: Negative for rash.   Neurological: Negative for  dizziness, weakness and headaches.   Psychiatric/Behavioral: Positive for dysphoric mood.        In the past two weeks the pt has not felt down, depressed, hopeless or lost interest in doing things   All other systems reviewed and are negative.      Objective   Physical Exam  Vitals and nursing note reviewed.   Constitutional:       Appearance: She is well-developed.   HENT:      Head: Normocephalic and atraumatic.      Right Ear: External ear normal.      Left Ear: External ear normal.      Nose: Nose normal.   Eyes:      General: No scleral icterus.        Right eye: No discharge.         Left eye: No discharge.      Conjunctiva/sclera: Conjunctivae normal.      Pupils: Pupils are equal, round, and reactive to light.   Neck:      Thyroid: No thyromegaly.   Cardiovascular:      Rate and Rhythm: Normal rate and regular rhythm.      Heart sounds: Normal heart sounds. No murmur heard.    No friction rub. No gallop.   Pulmonary:      Effort: Pulmonary effort is normal. No respiratory distress.      Breath sounds: Normal breath sounds. No wheezing or rales.   Abdominal:      General: Bowel sounds are normal. There is no distension.      Palpations: Abdomen is soft.      Tenderness: There is no abdominal tenderness. There is no guarding or rebound.   Musculoskeletal:         General: Normal range of motion.      Cervical back: Normal range of motion and neck supple.   Lymphadenopathy:      Cervical: No cervical adenopathy.   Skin:     General: Skin is warm and dry.      Findings: No rash.   Neurological:      Mental Status: She is alert and oriented to person, place, and time.      Cranial Nerves: No cranial nerve deficit.   Psychiatric:         Thought Content: Thought content includes suicidal ideation. Thought content includes suicidal plan.         Assessment & Plan   Diagnoses and all orders for this visit:    1. Suicidal ideation (Primary)    After talking with patient about her suicidal ideation, it was stated to  her that she can not to be by herself until she is evaluated by a mental health provider.  Patient was not left alone while in this facility.  I informed the patient that she would need to call a family member or friend to come get her and take her to the emergency room she if she did not want to go by ambulance personnel.  She stated she did not want to go by EMS, called her mother who lives in [facility], stated it would be a while before she could get to our facility.  So she called her  to come and get her and take her to the emergency room.  Upon arriving at this facility he states to me that he is upset about having to come as it has caused him $700 to be off of work today.  I informed him that Cally is having suicidal ideation and is thought about taking her life by shooting herself.  He is signing appropriate paperwork to take patient to [facility] for evaluation.  Upon leaving this facility patient was not in no apparent distress.  Has been receiving controlled through general surgery as she has had abnormal breast imaging requiring surgical intervention.               This document has been electronically signed by HITESH Nj on May 14, 2023 07:00 CDT

## 2023-05-16 DIAGNOSIS — R11.0 NAUSEA: ICD-10-CM

## 2023-05-17 RX ORDER — ONDANSETRON 4 MG/1
4 TABLET, FILM COATED ORAL EVERY 12 HOURS PRN
Qty: 12 TABLET | Refills: 0 | Status: SHIPPED | OUTPATIENT
Start: 2023-05-17

## 2023-05-23 ENCOUNTER — OFFICE VISIT (OUTPATIENT)
Dept: FAMILY MEDICINE CLINIC | Facility: CLINIC | Age: 33
End: 2023-05-23
Payer: MEDICARE

## 2023-05-23 VITALS
DIASTOLIC BLOOD PRESSURE: 86 MMHG | HEIGHT: 63 IN | SYSTOLIC BLOOD PRESSURE: 134 MMHG | OXYGEN SATURATION: 97 % | BODY MASS INDEX: 36.14 KG/M2 | HEART RATE: 104 BPM | WEIGHT: 204 LBS

## 2023-05-23 DIAGNOSIS — Z01.818 PRE-OPERATIVE CLEARANCE: Primary | ICD-10-CM

## 2023-05-23 PROCEDURE — 3079F DIAST BP 80-89 MM HG: CPT | Performed by: NURSE PRACTITIONER

## 2023-05-23 PROCEDURE — 3075F SYST BP GE 130 - 139MM HG: CPT | Performed by: NURSE PRACTITIONER

## 2023-05-23 PROCEDURE — 1159F MED LIST DOCD IN RCRD: CPT | Performed by: NURSE PRACTITIONER

## 2023-05-23 PROCEDURE — 1160F RVW MEDS BY RX/DR IN RCRD: CPT | Performed by: NURSE PRACTITIONER

## 2023-05-23 PROCEDURE — 99213 OFFICE O/P EST LOW 20 MIN: CPT | Performed by: NURSE PRACTITIONER

## 2023-05-23 NOTE — PROGRESS NOTES
Subjective   Cally Olsen is a 33 y.o. female who presents to the office for a preop clearance.  Will be having a total hysterectomy with Dr. Julio on May 31.  History of Present Illness     The following portions of the patient's history were reviewed and updated as appropriate: allergies, current medications, past family history, past medical history, past social history, past surgical history and problem list.    Review of Systems   Constitutional: Negative for chills, fatigue and fever.   HENT: Negative for congestion, sneezing, sore throat and trouble swallowing.    Eyes: Negative for visual disturbance.   Respiratory: Negative for cough, chest tightness, shortness of breath and wheezing.    Cardiovascular: Negative for chest pain, palpitations and leg swelling.   Gastrointestinal: Negative for abdominal pain, constipation, diarrhea, nausea and vomiting.   Genitourinary: Negative for dysuria, frequency and urgency.   Musculoskeletal: Negative for neck pain.   Skin: Negative for rash.   Neurological: Negative for dizziness, weakness and headaches.   Psychiatric/Behavioral:        In the past two weeks the pt has not felt down, depressed, hopeless or lost interest in doing things   All other systems reviewed and are negative.      Objective   Physical Exam  Vitals and nursing note reviewed.   Constitutional:       General: She is not in acute distress.     Appearance: She is well-developed. She is not ill-appearing.   HENT:      Head: Normocephalic and atraumatic.      Right Ear: Tympanic membrane and external ear normal.      Left Ear: Tympanic membrane and external ear normal.      Nose: Nose normal.   Eyes:      General: Lids are normal. Vision grossly intact. No scleral icterus.        Right eye: No discharge.         Left eye: No discharge.      Extraocular Movements: Extraocular movements intact.      Conjunctiva/sclera: Conjunctivae normal.      Pupils: Pupils are equal, round, and reactive to  light.   Neck:      Thyroid: No thyromegaly.      Trachea: Trachea and phonation normal.   Cardiovascular:      Rate and Rhythm: Normal rate and regular rhythm.      Pulses: Normal pulses.      Heart sounds: Normal heart sounds. No murmur heard.    No friction rub. No gallop.   Pulmonary:      Effort: Pulmonary effort is normal. No respiratory distress.      Breath sounds: Normal breath sounds. No wheezing or rales.   Abdominal:      General: Bowel sounds are normal. There is no distension.      Palpations: Abdomen is soft.      Tenderness: There is no abdominal tenderness. There is no guarding or rebound.   Musculoskeletal:         General: Normal range of motion.      Cervical back: Normal range of motion and neck supple.      Right lower leg: No edema.      Left lower leg: No edema.   Lymphadenopathy:      Cervical: No cervical adenopathy.   Skin:     General: Skin is warm and dry.      Capillary Refill: Capillary refill takes less than 2 seconds.      Findings: No rash.   Neurological:      General: No focal deficit present.      Mental Status: She is alert and oriented to person, place, and time.      GCS: GCS eye subscore is 4. GCS verbal subscore is 5. GCS motor subscore is 6.      Cranial Nerves: Cranial nerves 2-12 are intact. No cranial nerve deficit.   Psychiatric:         Behavior: Behavior normal. Behavior is cooperative.         Thought Content: Thought content normal.         Judgment: Judgment normal.         Assessment & Plan   Diagnoses and all orders for this visit:    1. Pre-operative clearance (Primary)    It is my opinion that she is suitable for surgical candidacy with Dr. Julio next week.  Continue with all preop instructions.               This document has been electronically signed by HITESH Nj on May 23, 2023 13:48 CDT

## 2023-05-30 ENCOUNTER — PRE-ADMISSION TESTING (OUTPATIENT)
Dept: PREADMISSION TESTING | Facility: HOSPITAL | Age: 33
End: 2023-05-30
Payer: MEDICARE

## 2023-05-30 ENCOUNTER — OFFICE VISIT (OUTPATIENT)
Dept: OBSTETRICS AND GYNECOLOGY | Facility: CLINIC | Age: 33
End: 2023-05-30

## 2023-05-30 VITALS
DIASTOLIC BLOOD PRESSURE: 84 MMHG | BODY MASS INDEX: 35.44 KG/M2 | SYSTOLIC BLOOD PRESSURE: 136 MMHG | WEIGHT: 200 LBS | HEIGHT: 63 IN

## 2023-05-30 VITALS — OXYGEN SATURATION: 95 % | RESPIRATION RATE: 16 BRPM

## 2023-05-30 DIAGNOSIS — N99.85 POST ENDOMETRIAL ABLATION SYNDROME: ICD-10-CM

## 2023-05-30 DIAGNOSIS — G89.29 CHRONIC PELVIC PAIN IN FEMALE: Chronic | ICD-10-CM

## 2023-05-30 DIAGNOSIS — Z86.73 HISTORY OF CVA (CEREBROVASCULAR ACCIDENT): Chronic | ICD-10-CM

## 2023-05-30 DIAGNOSIS — R10.2 PELVIC PAIN: ICD-10-CM

## 2023-05-30 DIAGNOSIS — Z01.818 PRE-OP EVALUATION: Primary | ICD-10-CM

## 2023-05-30 DIAGNOSIS — Z79.01 CHRONIC ANTICOAGULATION: ICD-10-CM

## 2023-05-30 DIAGNOSIS — F11.90 CHRONIC NARCOTIC USE: ICD-10-CM

## 2023-05-30 DIAGNOSIS — R10.2 CHRONIC PELVIC PAIN IN FEMALE: Chronic | ICD-10-CM

## 2023-05-30 LAB
ABO GROUP BLD: NORMAL
ANION GAP SERPL CALCULATED.3IONS-SCNC: 11 MMOL/L (ref 5–15)
BASOPHILS # BLD AUTO: 0.03 10*3/MM3 (ref 0–0.2)
BASOPHILS NFR BLD AUTO: 0.3 % (ref 0–1.5)
BLD GP AB SCN SERPL QL: NEGATIVE
BUN SERPL-MCNC: 12 MG/DL (ref 6–20)
BUN/CREAT SERPL: 21.4 (ref 7–25)
CALCIUM SPEC-SCNC: 9.9 MG/DL (ref 8.6–10.5)
CHLORIDE SERPL-SCNC: 103 MMOL/L (ref 98–107)
CO2 SERPL-SCNC: 25 MMOL/L (ref 22–29)
CREAT SERPL-MCNC: 0.56 MG/DL (ref 0.57–1)
DEPRECATED RDW RBC AUTO: 42.5 FL (ref 37–54)
EGFRCR SERPLBLD CKD-EPI 2021: 122.2 ML/MIN/1.73
EOSINOPHIL # BLD AUTO: 0.06 10*3/MM3 (ref 0–0.4)
EOSINOPHIL NFR BLD AUTO: 0.7 % (ref 0.3–6.2)
ERYTHROCYTE [DISTWIDTH] IN BLOOD BY AUTOMATED COUNT: 12.9 % (ref 12.3–15.4)
GLUCOSE SERPL-MCNC: 89 MG/DL (ref 65–99)
HCT VFR BLD AUTO: 42.8 % (ref 34–46.6)
HGB BLD-MCNC: 14.3 G/DL (ref 12–15.9)
IMM GRANULOCYTES # BLD AUTO: 0.03 10*3/MM3 (ref 0–0.05)
IMM GRANULOCYTES NFR BLD AUTO: 0.3 % (ref 0–0.5)
LYMPHOCYTES # BLD AUTO: 2.26 10*3/MM3 (ref 0.7–3.1)
LYMPHOCYTES NFR BLD AUTO: 24.7 % (ref 19.6–45.3)
Lab: NORMAL
MCH RBC QN AUTO: 29.9 PG (ref 26.6–33)
MCHC RBC AUTO-ENTMCNC: 33.4 G/DL (ref 31.5–35.7)
MCV RBC AUTO: 89.5 FL (ref 79–97)
MONOCYTES # BLD AUTO: 0.59 10*3/MM3 (ref 0.1–0.9)
MONOCYTES NFR BLD AUTO: 6.4 % (ref 5–12)
NEUTROPHILS NFR BLD AUTO: 6.18 10*3/MM3 (ref 1.7–7)
NEUTROPHILS NFR BLD AUTO: 67.6 % (ref 42.7–76)
NRBC BLD AUTO-RTO: 0 /100 WBC (ref 0–0.2)
PLATELET # BLD AUTO: 329 10*3/MM3 (ref 140–450)
PMV BLD AUTO: 9.3 FL (ref 6–12)
POTASSIUM SERPL-SCNC: 4.6 MMOL/L (ref 3.5–5.2)
QT INTERVAL: 378 MS
QTC INTERVAL: 441 MS
RBC # BLD AUTO: 4.78 10*6/MM3 (ref 3.77–5.28)
RH BLD: POSITIVE
SODIUM SERPL-SCNC: 139 MMOL/L (ref 136–145)
T&S EXPIRATION DATE: NORMAL
WBC NRBC COR # BLD: 9.15 10*3/MM3 (ref 3.4–10.8)

## 2023-05-30 PROCEDURE — 80048 BASIC METABOLIC PNL TOTAL CA: CPT

## 2023-05-30 PROCEDURE — 93005 ELECTROCARDIOGRAM TRACING: CPT

## 2023-05-30 PROCEDURE — 85025 COMPLETE CBC W/AUTO DIFF WBC: CPT

## 2023-05-30 PROCEDURE — 86850 RBC ANTIBODY SCREEN: CPT

## 2023-05-30 PROCEDURE — 86901 BLOOD TYPING SEROLOGIC RH(D): CPT

## 2023-05-30 PROCEDURE — 86900 BLOOD TYPING SEROLOGIC ABO: CPT

## 2023-05-30 PROCEDURE — 36415 COLL VENOUS BLD VENIPUNCTURE: CPT

## 2023-05-30 RX ORDER — CLONAZEPAM 1 MG/1
1 TABLET ORAL DAILY PRN
COMMUNITY

## 2023-05-30 RX ORDER — ATORVASTATIN CALCIUM 40 MG/1
40 TABLET, FILM COATED ORAL NIGHTLY
COMMUNITY

## 2023-05-30 RX ORDER — ASPIRIN 325 MG
325 TABLET, DELAYED RELEASE (ENTERIC COATED) ORAL DAILY
COMMUNITY

## 2023-05-30 NOTE — PAT
Chlorhexidine scrub given with instruction sheet.   Instruction reviewed in PAT, understanding verbalized.    Patient states she has stopped eliquis per MD instruction.

## 2023-05-30 NOTE — H&P (VIEW-ONLY)
"[facility]  Gynecology   Date of Service: 2023     CC: Pre-op visit    HPI  Cally Olsen is a 33 y.o.  premenopausal female who presents     She was seen initially as a referral from HITESH Ford for surgery consult in 2022.  Patient states that she has had pelvic pain that started \"about 3 kids ago.\"  She states that her pain is worse since her ablation in 2016.  She states that she can't tell when her period is but she does spot every day.  She states that the pain is in her lower pelvis.  She has tried Motrin, Tylenol, heating pad; nothing helped.    TVUS (11/15/2022):  The uterus measures 5.6 x 4.1 x 5.7 cm.  Within the uterus there are multiple hypoechoic or cystic areas, the largest appears to be within the endometrium measuring 2.7 x 1.2 x 2 cm.  No vascularity is seen within these areas. There appears to be a  scar in the lower uterine segment.  There may be a nabothian cyst in the cervix.  The right ovary measures 4.2 x 2.2 x 2.2 cm.  There is a right ovarian cyst measuring 1.2 x 2 x 1.7 cm.  The left ovary measures 3.8 x 3.2 x 2.7 cm.  There is a left ovarian cyst measuring 3.5 x 3.1 x 3.4 cm.  Blood flow is seen in both ovaries on color Doppler imaging.    She was scheduled for surgery at the end of 2023 but her PCP did not provide any clearance or management of anti-coagulation so her surgery was moved to tomorrow.    She presents today for pre-op visit.  No new concerns.  She was seen by her PCP on  and received medical clearance.  Her last dose of Eliquis was on .  She does seen pain management with Dr. Boston; she states that she was told that we could be able to write her scripts.    ROS  Review of Systems   Constitutional: Negative.    HENT: Negative.    Eyes: Negative.    Respiratory: Negative.    Cardiovascular: Negative.    Gastrointestinal: Negative.    Endocrine: Negative.    Genitourinary: " Positive for pelvic pain.   Musculoskeletal: Positive for back pain.   Skin: Negative.    Allergic/Immunologic: Negative.    Neurological: Negative.    Hematological: Negative.    Psychiatric/Behavioral: The patient is nervous/anxious.      GYN HISTORY  Menarche: age 10  Menses: see HPI  History of STIs: None  Last pap smear:   Abnormal pap smear history: Remote history  Contraception: BTL     OB HISTORY  OB History    Para Term  AB Living   6 5 5   1 5   SAB IAB Ectopic Molar Multiple Live Births   1         5      # Outcome Date GA Lbr Saul/2nd Weight Sex Delivery Anes PTL Lv   6 SAB            5 Term     F CS-Unspec   GONZALO   4 Term     M CS-Unspec   GONZALO   3 Term     F CS-Unspec   GONZALO   2 Term     M CS-Unspec   GONZALO   1 Term     F CS-Unspec   GONZALO     PAST MEDICAL HISTORY  Past Medical History:   Diagnosis Date   • Abnormal Pap smear of cervix    • Allergic rhinitis    • Anxiety    • Asthma    • Chronic bilateral low back pain with sciatica    • COPD (chronic obstructive pulmonary disease)    • Depressive disorder    • Heartburn    • History of CVA (cerebrovascular accident) 2021   • Hyperlipidemia    • Hypertension    • Insomnia    • Lupus    • Restless leg syndrome    • Spasm of back muscles    • Stroke    • Tobacco dependence syndrome      PAST SURGICAL HISTORY  Past Surgical History:   Procedure Laterality Date   • BREAST BIOPSY  2023   •  SECTION  2015   •  SECTION     •  SECTION     •  SECTION     •  SECTION     • ENDOMETRIAL ABLATION     • LEEP     • TUBAL ABDOMINAL LIGATION  2015     FAMILY HISTORY  Family History   Problem Relation Age of Onset   • Alcohol abuse Father    • Heart disease Father    • Seizures Mother    • No Known Problems Brother    • No Known Problems Brother    • Breast cancer Maternal Grandmother    • Ovarian cancer Maternal Grandmother    • Breast cancer Maternal Aunt    • Colon cancer Other    • Uterine  cancer Other    • Stroke Other      SOCIAL HISTORY  Social History     Socioeconomic History   • Marital status:    Tobacco Use   • Smoking status: Every Day     Packs/day: 1.00     Years: 17.00     Pack years: 17.00     Types: Cigarettes     Start date: 2004   • Smokeless tobacco: Never   Vaping Use   • Vaping Use: Some days   • Substances: Nicotine   Substance and Sexual Activity   • Alcohol use: No   • Drug use: No   • Sexual activity: Not Currently     Partners: Male     Birth control/protection: Tubal ligation     ALLERGIES  Allergies   Allergen Reactions   • Amoxil [Amoxicillin] Anaphylaxis     Anaphylaxis    • Latex, Natural Rubber Swelling     Had swelling when she wore gloves   • Hydroxyzine Hcl Rash and Swelling   • Nickel Rash   • Paroxetine Hcl Rash and Swelling   • Paxil [Paroxetine] Rash   • Vistaril [Hydroxyzine] Rash     HOME MEDICATIONS  Prior to Admission medications    Medication Sig Start Date End Date Taking? Authorizing Provider   albuterol (PROVENTIL) 2.5 MG/0.5ML nebulizer solution Take 2.5 mg by nebulization Every 4 (Four) Hours As Needed (shortness of breath, wheezing.). 1/27/22  Yes Sima Hoffmann APRN   albuterol sulfate  (90 Base) MCG/ACT inhaler Inhale 2 puffs Every 6 (Six) Hours As Needed for Wheezing or Shortness of Air. 10/4/22  Yes Kamilla Jeffery APRN   amitriptyline (ELAVIL) 50 MG tablet Take 1 tablet by mouth Every Night. 3/6/23  Yes Kamilla Jeffery APRN   cetirizine (zyrTEC) 10 MG tablet Take 1 tablet by mouth Daily. 12/14/21  Yes Sima Hoffmann APRN   dilTIAZem CD (CARDIZEM CD) 120 MG 24 hr capsule TAKE 1 CAPSULE BY MOUTH DAILY FOR HIGH BLOOD PRESSURE IN ADDITION TO THE LISINOPRIL 4/10/23  Yes Kamilla Jeffery APRN   DULoxetine (CYMBALTA) 30 MG capsule TAKE 1 CAPSULE BY MOUTH DAILY 5/4/23  Yes Kamilla Jeffery APRN   Eliquis 5 MG tablet tablet Take 1 tablet by mouth Every 12 (Twelve) Hours. 3/10/23  Yes Kamilla Jeffery APRN   gabapentin (NEURONTIN)  "600 MG tablet Take 1 tablet by mouth 2 (Two) Times a Day. 3/23/23  Yes Marylou French MD   HYDROcodone-acetaminophen (NORCO)  MG per tablet Take 1 tablet by mouth Every 6 (Six) Hours As Needed for Moderate Pain. 12/7/22  Yes Kamilla Jeffery APRN   lisinopril (PRINIVIL,ZESTRIL) 10 MG tablet TAKE 1 TABLET BY MOUTH DAILY 4/18/23  Yes Kamilla Jeffery APRN   ondansetron (ZOFRAN) 4 MG tablet Take 1 tablet by mouth Every 12 (Twelve) Hours As Needed for Nausea or Vomiting. 5/17/23  Yes Kamilla Jeffery APRN   rOPINIRole (REQUIP) 1 MG tablet Take 1 tablet by mouth Every Night. Take one tablet by mouth one hour before bed for help with restless leg syndrome. 4/24/23  Yes Kamilla Jeffery APRN   atorvastatin (LIPITOR) 40 MG tablet TAKE 1 TABLET BY MOUTH DAILY  Patient taking differently: Take 1 tablet by mouth Every Night. 4/18/23 5/30/23 Yes Kamilla Jeffery APRN   clonazePAM (KlonoPIN) 1 MG tablet TAKE 1 TABLET BY MOUTH DAILY AS NEEDED FOR ANXIETY  Patient taking differently: 1 tablet. TAKE 1 TABLET BY MOUTH DAILY AS NEEDED FOR ANXIETY 1/4/23 5/30/23 Yes Kamilla Jeffery APRN   aspirin 325 MG EC tablet Take 1 tablet by mouth Daily.    Marylou French MD   atorvastatin (LIPITOR) 40 MG tablet Take 1 tablet by mouth Every Night.    Marylou French MD   clonazePAM (KlonoPIN) 1 MG tablet Take 1 tablet by mouth Daily As Needed.    Marylou French MD   Unable to find 2 each Daily. Uro (OTC supplement)    ProviderMarylou MD PE  /84 (BP Location: Left arm, Patient Position: Sitting, Cuff Size: Adult)   Ht 160 cm (63\")   Wt 90.7 kg (200 lb)   BMI 35.43 kg/m²        General: Alert, healthy, no distress, well nourished and well developed.  Neurologic: Alert, oriented to person, place, and time.  Gait normal.  Cranial nerves II-XII grossly intact.  HEENT: Normocephalic, atraumatic.  Extraocular muscles intact, pupils equal and reactive times two.    Neck: Supple, no adenopathy, thyroid " normal size, non-tender, without nodularity, trachea midline.  Lungs: Normal respiratory effort.  Clear to auscultation bilaterally.  No wheezes, rhonci, or rales.  Heart: Regular rate and rhythm.  No murmer, rub or gallop.  Abdomen: Soft, non-tender, non-distended,no masses, no hepatosplenomegaly, no hernia.  Skin: No rash, no lesions.  Extremities: No cyanosis, clubbing or edema.  PELVIC EXAM: Deferred    IMPRESSION  Cally Olsen is a 33 y.o.  presenting with pelvic pain s/p endometrial ablation, consistent with post-endometrial ablation syndrome.  Patient strongly desires definitive surgical management, which I agree with.  She desires to attempt laparoscopic surgery but voices understanding that could require open procedure due to multiple c-sections.  Proceed with total laparoscopic hysterectomy, bilateral salpingo-oophorectomy, possible exploratory laparotomy, cystoscopy.  Discussed that 30% of women with pelvic pain who have ovarian preservation at the time of their hysterectomies require oophorectomy.  Discussed R/B/A.  Discussed risks including injury to surrounding organ (bowel, bladder, ureters, blood vessels, nerves), infection, bleeding (possibly requiring blood transfusion), heart attack, stroke, and death.  Discussed possible need for conversion to open procedure.  Will give pre-op ABX (Ancef).  Discussed pro/cons of ovarian preservation.  Discussed that surgical menopause increases the risk of osteoporosis and early heart disease if untreated.  Discussed that she is not a candidate for HRT given her clot history.  Patient declines ovarian preservation.  Patient voices understanding and is agreeable.    PLAN    1. Pre-op evaluation    2. Post endometrial ablation syndrome    3. Pelvic pain    4. Chronic anticoagulation  Resume anticoagulation as directed by PCP.    5. History of CVA (cerebrovascular accident)     6. Chronic narcotic use  Discussed continuing current pain regimen to  control baseline pain; will add additional pain regimen to help with post-op pain.     This document has been electronically signed by Margy Julio MD on May 30, 2023 10:30 CDT.

## 2023-05-30 NOTE — PROGRESS NOTES
"[facility]  Gynecology   Date of Service: 2023     CC: Pre-op visit    HPI  Cally Olsen is a 33 y.o.  premenopausal female who presents     She was seen initially as a referral from HITESH Ford for surgery consult in 2022.  Patient states that she has had pelvic pain that started \"about 3 kids ago.\"  She states that her pain is worse since her ablation in 2016.  She states that she can't tell when her period is but she does spot every day.  She states that the pain is in her lower pelvis.  She has tried Motrin, Tylenol, heating pad; nothing helped.    TVUS (11/15/2022):  The uterus measures 5.6 x 4.1 x 5.7 cm.  Within the uterus there are multiple hypoechoic or cystic areas, the largest appears to be within the endometrium measuring 2.7 x 1.2 x 2 cm.  No vascularity is seen within these areas. There appears to be a  scar in the lower uterine segment.  There may be a nabothian cyst in the cervix.  The right ovary measures 4.2 x 2.2 x 2.2 cm.  There is a right ovarian cyst measuring 1.2 x 2 x 1.7 cm.  The left ovary measures 3.8 x 3.2 x 2.7 cm.  There is a left ovarian cyst measuring 3.5 x 3.1 x 3.4 cm.  Blood flow is seen in both ovaries on color Doppler imaging.    She was scheduled for surgery at the end of 2023 but her PCP did not provide any clearance or management of anti-coagulation so her surgery was moved to tomorrow.    She presents today for pre-op visit.  No new concerns.  She was seen by her PCP on  and received medical clearance.  Her last dose of Eliquis was on .  She does seen pain management with Dr. Boston; she states that she was told that we could be able to write her scripts.    ROS  Review of Systems   Constitutional: Negative.    HENT: Negative.    Eyes: Negative.    Respiratory: Negative.    Cardiovascular: Negative.    Gastrointestinal: Negative.    Endocrine: Negative.    Genitourinary: " Positive for pelvic pain.   Musculoskeletal: Positive for back pain.   Skin: Negative.    Allergic/Immunologic: Negative.    Neurological: Negative.    Hematological: Negative.    Psychiatric/Behavioral: The patient is nervous/anxious.      GYN HISTORY  Menarche: age 10  Menses: see HPI  History of STIs: None  Last pap smear:   Abnormal pap smear history: Remote history  Contraception: BTL     OB HISTORY  OB History    Para Term  AB Living   6 5 5   1 5   SAB IAB Ectopic Molar Multiple Live Births   1         5      # Outcome Date GA Lbr Saul/2nd Weight Sex Delivery Anes PTL Lv   6 SAB            5 Term     F CS-Unspec   GONZALO   4 Term     M CS-Unspec   GONZALO   3 Term     F CS-Unspec   GONZALO   2 Term     M CS-Unspec   GONZALO   1 Term     F CS-Unspec   GONZALO     PAST MEDICAL HISTORY  Past Medical History:   Diagnosis Date   • Abnormal Pap smear of cervix    • Allergic rhinitis    • Anxiety    • Asthma    • Chronic bilateral low back pain with sciatica    • COPD (chronic obstructive pulmonary disease)    • Depressive disorder    • Heartburn    • History of CVA (cerebrovascular accident) 2021   • Hyperlipidemia    • Hypertension    • Insomnia    • Lupus    • Restless leg syndrome    • Spasm of back muscles    • Stroke    • Tobacco dependence syndrome      PAST SURGICAL HISTORY  Past Surgical History:   Procedure Laterality Date   • BREAST BIOPSY  2023   •  SECTION  2015   •  SECTION     •  SECTION     •  SECTION     •  SECTION     • ENDOMETRIAL ABLATION     • LEEP     • TUBAL ABDOMINAL LIGATION  2015     FAMILY HISTORY  Family History   Problem Relation Age of Onset   • Alcohol abuse Father    • Heart disease Father    • Seizures Mother    • No Known Problems Brother    • No Known Problems Brother    • Breast cancer Maternal Grandmother    • Ovarian cancer Maternal Grandmother    • Breast cancer Maternal Aunt    • Colon cancer Other    • Uterine  cancer Other    • Stroke Other      SOCIAL HISTORY  Social History     Socioeconomic History   • Marital status:    Tobacco Use   • Smoking status: Every Day     Packs/day: 1.00     Years: 17.00     Pack years: 17.00     Types: Cigarettes     Start date: 2004   • Smokeless tobacco: Never   Vaping Use   • Vaping Use: Some days   • Substances: Nicotine   Substance and Sexual Activity   • Alcohol use: No   • Drug use: No   • Sexual activity: Not Currently     Partners: Male     Birth control/protection: Tubal ligation     ALLERGIES  Allergies   Allergen Reactions   • Amoxil [Amoxicillin] Anaphylaxis     Anaphylaxis    • Latex, Natural Rubber Swelling     Had swelling when she wore gloves   • Hydroxyzine Hcl Rash and Swelling   • Nickel Rash   • Paroxetine Hcl Rash and Swelling   • Paxil [Paroxetine] Rash   • Vistaril [Hydroxyzine] Rash     HOME MEDICATIONS  Prior to Admission medications    Medication Sig Start Date End Date Taking? Authorizing Provider   albuterol (PROVENTIL) 2.5 MG/0.5ML nebulizer solution Take 2.5 mg by nebulization Every 4 (Four) Hours As Needed (shortness of breath, wheezing.). 1/27/22  Yes Sima Hoffmann APRN   albuterol sulfate  (90 Base) MCG/ACT inhaler Inhale 2 puffs Every 6 (Six) Hours As Needed for Wheezing or Shortness of Air. 10/4/22  Yes Kamilla Jeffery APRN   amitriptyline (ELAVIL) 50 MG tablet Take 1 tablet by mouth Every Night. 3/6/23  Yes Kamilla Jeffery APRN   cetirizine (zyrTEC) 10 MG tablet Take 1 tablet by mouth Daily. 12/14/21  Yes Sima Hoffmann APRN   dilTIAZem CD (CARDIZEM CD) 120 MG 24 hr capsule TAKE 1 CAPSULE BY MOUTH DAILY FOR HIGH BLOOD PRESSURE IN ADDITION TO THE LISINOPRIL 4/10/23  Yes Kamilla Jeffery APRN   DULoxetine (CYMBALTA) 30 MG capsule TAKE 1 CAPSULE BY MOUTH DAILY 5/4/23  Yes Kamilla Jeffery APRN   Eliquis 5 MG tablet tablet Take 1 tablet by mouth Every 12 (Twelve) Hours. 3/10/23  Yes Kamilla Jeffery APRN   gabapentin (NEURONTIN)  "600 MG tablet Take 1 tablet by mouth 2 (Two) Times a Day. 3/23/23  Yes Marylou French MD   HYDROcodone-acetaminophen (NORCO)  MG per tablet Take 1 tablet by mouth Every 6 (Six) Hours As Needed for Moderate Pain. 12/7/22  Yes Kamilla Jeffery APRN   lisinopril (PRINIVIL,ZESTRIL) 10 MG tablet TAKE 1 TABLET BY MOUTH DAILY 4/18/23  Yes Kamilla Jeffery APRN   ondansetron (ZOFRAN) 4 MG tablet Take 1 tablet by mouth Every 12 (Twelve) Hours As Needed for Nausea or Vomiting. 5/17/23  Yes Kamilla Jeffery APRN   rOPINIRole (REQUIP) 1 MG tablet Take 1 tablet by mouth Every Night. Take one tablet by mouth one hour before bed for help with restless leg syndrome. 4/24/23  Yes Kamilla Jeffery APRN   atorvastatin (LIPITOR) 40 MG tablet TAKE 1 TABLET BY MOUTH DAILY  Patient taking differently: Take 1 tablet by mouth Every Night. 4/18/23 5/30/23 Yes Kamilla Jeffery APRN   clonazePAM (KlonoPIN) 1 MG tablet TAKE 1 TABLET BY MOUTH DAILY AS NEEDED FOR ANXIETY  Patient taking differently: 1 tablet. TAKE 1 TABLET BY MOUTH DAILY AS NEEDED FOR ANXIETY 1/4/23 5/30/23 Yes Kamilla Jeffery APRN   aspirin 325 MG EC tablet Take 1 tablet by mouth Daily.    Marylou French MD   atorvastatin (LIPITOR) 40 MG tablet Take 1 tablet by mouth Every Night.    Marylou French MD   clonazePAM (KlonoPIN) 1 MG tablet Take 1 tablet by mouth Daily As Needed.    Marylou French MD   Unable to find 2 each Daily. Uro (OTC supplement)    ProviderMarylou MD PE  /84 (BP Location: Left arm, Patient Position: Sitting, Cuff Size: Adult)   Ht 160 cm (63\")   Wt 90.7 kg (200 lb)   BMI 35.43 kg/m²        General: Alert, healthy, no distress, well nourished and well developed.  Neurologic: Alert, oriented to person, place, and time.  Gait normal.  Cranial nerves II-XII grossly intact.  HEENT: Normocephalic, atraumatic.  Extraocular muscles intact, pupils equal and reactive times two.    Neck: Supple, no adenopathy, thyroid " normal size, non-tender, without nodularity, trachea midline.  Lungs: Normal respiratory effort.  Clear to auscultation bilaterally.  No wheezes, rhonci, or rales.  Heart: Regular rate and rhythm.  No murmer, rub or gallop.  Abdomen: Soft, non-tender, non-distended,no masses, no hepatosplenomegaly, no hernia.  Skin: No rash, no lesions.  Extremities: No cyanosis, clubbing or edema.  PELVIC EXAM: Deferred    IMPRESSION  Cally Olsen is a 33 y.o.  presenting with pelvic pain s/p endometrial ablation, consistent with post-endometrial ablation syndrome.  Patient strongly desires definitive surgical management, which I agree with.  She desires to attempt laparoscopic surgery but voices understanding that could require open procedure due to multiple c-sections.  Proceed with total laparoscopic hysterectomy, bilateral salpingo-oophorectomy, possible exploratory laparotomy, cystoscopy.  Discussed that 30% of women with pelvic pain who have ovarian preservation at the time of their hysterectomies require oophorectomy.  Discussed R/B/A.  Discussed risks including injury to surrounding organ (bowel, bladder, ureters, blood vessels, nerves), infection, bleeding (possibly requiring blood transfusion), heart attack, stroke, and death.  Discussed possible need for conversion to open procedure.  Will give pre-op ABX (Ancef).  Discussed pro/cons of ovarian preservation.  Discussed that surgical menopause increases the risk of osteoporosis and early heart disease if untreated.  Discussed that she is not a candidate for HRT given her clot history.  Patient declines ovarian preservation.  Patient voices understanding and is agreeable.    PLAN    1. Pre-op evaluation    2. Post endometrial ablation syndrome    3. Pelvic pain    4. Chronic anticoagulation  Resume anticoagulation as directed by PCP.    5. History of CVA (cerebrovascular accident)     6. Chronic narcotic use  Discussed continuing current pain regimen to  control baseline pain; will add additional pain regimen to help with post-op pain.     This document has been electronically signed by Margy Julio MD on May 30, 2023 10:30 CDT.

## 2023-05-31 ENCOUNTER — HOSPITAL ENCOUNTER (OUTPATIENT)
Facility: HOSPITAL | Age: 33
Setting detail: HOSPITAL OUTPATIENT SURGERY
Discharge: HOME | End: 2023-05-31
Attending: OBSTETRICS & GYNECOLOGY | Admitting: OBSTETRICS & GYNECOLOGY
Payer: MEDICARE

## 2023-05-31 ENCOUNTER — ANESTHESIA EVENT (OUTPATIENT)
Dept: PERIOP | Facility: HOSPITAL | Age: 33
End: 2023-05-31
Payer: MEDICARE

## 2023-05-31 ENCOUNTER — ANESTHESIA (OUTPATIENT)
Dept: PERIOP | Facility: HOSPITAL | Age: 33
End: 2023-05-31
Payer: MEDICARE

## 2023-05-31 ENCOUNTER — APPOINTMENT (OUTPATIENT)
Dept: GENERAL RADIOLOGY | Facility: HOSPITAL | Age: 33
End: 2023-05-31
Payer: MEDICARE

## 2023-05-31 VITALS
HEART RATE: 90 BPM | SYSTOLIC BLOOD PRESSURE: 167 MMHG | OXYGEN SATURATION: 96 % | TEMPERATURE: 97.4 F | WEIGHT: 198.63 LBS | BODY MASS INDEX: 39 KG/M2 | RESPIRATION RATE: 17 BRPM | HEIGHT: 60 IN | DIASTOLIC BLOOD PRESSURE: 76 MMHG

## 2023-05-31 DIAGNOSIS — Z90.721 STATUS POST HYSTERECTOMY WITH OOPHORECTOMY: ICD-10-CM

## 2023-05-31 DIAGNOSIS — N99.85 POST ENDOMETRIAL ABLATION SYNDROME: Primary | ICD-10-CM

## 2023-05-31 DIAGNOSIS — Z90.710 STATUS POST HYSTERECTOMY WITH OOPHORECTOMY: ICD-10-CM

## 2023-05-31 LAB — B-HCG UR QL: NEGATIVE

## 2023-05-31 PROCEDURE — 25010000002 DEXAMETHASONE PER 1 MG: Performed by: NURSE ANESTHETIST, CERTIFIED REGISTERED

## 2023-05-31 PROCEDURE — 25010000002 ROPIVACAINE PER 1 MG: Performed by: OBSTETRICS & GYNECOLOGY

## 2023-05-31 PROCEDURE — 81025 URINE PREGNANCY TEST: CPT | Performed by: OBSTETRICS & GYNECOLOGY

## 2023-05-31 PROCEDURE — 25010000002 HYDROMORPHONE 1 MG/ML SOLUTION: Performed by: ANESTHESIOLOGY

## 2023-05-31 PROCEDURE — 25010000002 ONDANSETRON PER 1 MG: Performed by: NURSE ANESTHETIST, CERTIFIED REGISTERED

## 2023-05-31 PROCEDURE — 25010000002 SUCCINYLCHOLINE PER 20 MG: Performed by: NURSE ANESTHETIST, CERTIFIED REGISTERED

## 2023-05-31 PROCEDURE — 58571 TLH W/T/O 250 G OR LESS: CPT | Performed by: OBSTETRICS & GYNECOLOGY

## 2023-05-31 PROCEDURE — 25010000002 ROPIVACAINE PER 1 MG: Performed by: NURSE ANESTHETIST, CERTIFIED REGISTERED

## 2023-05-31 PROCEDURE — 71045 X-RAY EXAM CHEST 1 VIEW: CPT

## 2023-05-31 PROCEDURE — 25010000002 MIDAZOLAM PER 1 MG: Performed by: NURSE ANESTHETIST, CERTIFIED REGISTERED

## 2023-05-31 PROCEDURE — 25010000002 KETOROLAC TROMETHAMINE PER 15 MG: Performed by: NURSE ANESTHETIST, CERTIFIED REGISTERED

## 2023-05-31 PROCEDURE — 58571 TLH W/T/O 250 G OR LESS: CPT | Performed by: SPECIALIST/TECHNOLOGIST, OTHER

## 2023-05-31 PROCEDURE — 25010000002 FENTANYL CITRATE (PF) 100 MCG/2ML SOLUTION: Performed by: NURSE ANESTHETIST, CERTIFIED REGISTERED

## 2023-05-31 PROCEDURE — 25010000002 HYDROMORPHONE 1 MG/ML SOLUTION: Performed by: NURSE ANESTHETIST, CERTIFIED REGISTERED

## 2023-05-31 PROCEDURE — 25010000002 NEOSTIGMINE 10 MG/10ML SOLUTION: Performed by: NURSE ANESTHETIST, CERTIFIED REGISTERED

## 2023-05-31 PROCEDURE — 88307 TISSUE EXAM BY PATHOLOGIST: CPT

## 2023-05-31 PROCEDURE — 25010000002 CEFAZOLIN PER 500 MG: Performed by: NURSE ANESTHETIST, CERTIFIED REGISTERED

## 2023-05-31 PROCEDURE — 25010000002 PROPOFOL 200 MG/20ML EMULSION: Performed by: NURSE ANESTHETIST, CERTIFIED REGISTERED

## 2023-05-31 DEVICE — FLOSEAL HEMOSTATIC MATRIX, 10ML
Type: IMPLANTABLE DEVICE | Site: ABDOMEN | Status: FUNCTIONAL
Brand: FLOSEAL HEMOSTATIC MATRIX

## 2023-05-31 DEVICE — ABSORBABLE RELOAD
Type: IMPLANTABLE DEVICE | Site: ABDOMEN | Status: FUNCTIONAL
Brand: V-LOC 180

## 2023-05-31 RX ORDER — ONDANSETRON 2 MG/ML
4 INJECTION INTRAMUSCULAR; INTRAVENOUS ONCE AS NEEDED
Status: DISCONTINUED | OUTPATIENT
Start: 2023-05-31 | End: 2023-05-31 | Stop reason: HOSPADM

## 2023-05-31 RX ORDER — VECURONIUM BROMIDE 1 MG/ML
INJECTION, POWDER, LYOPHILIZED, FOR SOLUTION INTRAVENOUS AS NEEDED
Status: DISCONTINUED | OUTPATIENT
Start: 2023-05-31 | End: 2023-05-31 | Stop reason: SURG

## 2023-05-31 RX ORDER — SODIUM CHLORIDE, SODIUM GLUCONATE, SODIUM ACETATE, POTASSIUM CHLORIDE AND MAGNESIUM CHLORIDE 526; 502; 368; 37; 30 MG/100ML; MG/100ML; MG/100ML; MG/100ML; MG/100ML
INJECTION, SOLUTION INTRAVENOUS CONTINUOUS PRN
Status: DISCONTINUED | OUTPATIENT
Start: 2023-05-31 | End: 2023-05-31 | Stop reason: SURG

## 2023-05-31 RX ORDER — SODIUM CHLORIDE, SODIUM LACTATE, POTASSIUM CHLORIDE, CALCIUM CHLORIDE 600; 310; 30; 20 MG/100ML; MG/100ML; MG/100ML; MG/100ML
125 INJECTION, SOLUTION INTRAVENOUS CONTINUOUS
Status: DISCONTINUED | OUTPATIENT
Start: 2023-05-31 | End: 2023-05-31 | Stop reason: HOSPADM

## 2023-05-31 RX ORDER — KETOROLAC TROMETHAMINE 15 MG/ML
INJECTION, SOLUTION INTRAMUSCULAR; INTRAVENOUS AS NEEDED
Status: DISCONTINUED | OUTPATIENT
Start: 2023-05-31 | End: 2023-05-31 | Stop reason: SURG

## 2023-05-31 RX ORDER — FENTANYL CITRATE 50 UG/ML
INJECTION, SOLUTION INTRAMUSCULAR; INTRAVENOUS AS NEEDED
Status: DISCONTINUED | OUTPATIENT
Start: 2023-05-31 | End: 2023-05-31 | Stop reason: SURG

## 2023-05-31 RX ORDER — HYDROCODONE BITARTRATE AND ACETAMINOPHEN 10; 325 MG/1; MG/1
1 TABLET ORAL ONCE AS NEEDED
Status: COMPLETED | OUTPATIENT
Start: 2023-05-31 | End: 2023-05-31

## 2023-05-31 RX ORDER — EPHEDRINE SULFATE 50 MG/ML
5 INJECTION, SOLUTION INTRAVENOUS ONCE AS NEEDED
Status: DISCONTINUED | OUTPATIENT
Start: 2023-05-31 | End: 2023-05-31 | Stop reason: HOSPADM

## 2023-05-31 RX ORDER — CEFAZOLIN SODIUM 1 G/3ML
INJECTION, POWDER, FOR SOLUTION INTRAMUSCULAR; INTRAVENOUS AS NEEDED
Status: DISCONTINUED | OUTPATIENT
Start: 2023-05-31 | End: 2023-05-31 | Stop reason: SURG

## 2023-05-31 RX ORDER — SODIUM CHLORIDE 0.9 % (FLUSH) 0.9 %
3 SYRINGE (ML) INJECTION EVERY 12 HOURS SCHEDULED
Status: DISCONTINUED | OUTPATIENT
Start: 2023-05-31 | End: 2023-05-31 | Stop reason: HOSPADM

## 2023-05-31 RX ORDER — IBUPROFEN 600 MG/1
600 TABLET ORAL EVERY 6 HOURS PRN
Status: DISCONTINUED | OUTPATIENT
Start: 2023-05-31 | End: 2023-05-31 | Stop reason: HOSPADM

## 2023-05-31 RX ORDER — SODIUM CHLORIDE 9 MG/ML
40 INJECTION, SOLUTION INTRAVENOUS AS NEEDED
Status: DISCONTINUED | OUTPATIENT
Start: 2023-05-31 | End: 2023-05-31 | Stop reason: HOSPADM

## 2023-05-31 RX ORDER — PROMETHAZINE HYDROCHLORIDE 25 MG/1
25 TABLET ORAL ONCE AS NEEDED
Status: DISCONTINUED | OUTPATIENT
Start: 2023-05-31 | End: 2023-05-31 | Stop reason: HOSPADM

## 2023-05-31 RX ORDER — DEXAMETHASONE SODIUM PHOSPHATE 4 MG/ML
INJECTION, SOLUTION INTRA-ARTICULAR; INTRALESIONAL; INTRAMUSCULAR; INTRAVENOUS; SOFT TISSUE AS NEEDED
Status: DISCONTINUED | OUTPATIENT
Start: 2023-05-31 | End: 2023-05-31 | Stop reason: SURG

## 2023-05-31 RX ORDER — FLUMAZENIL 0.1 MG/ML
0.2 INJECTION INTRAVENOUS AS NEEDED
Status: DISCONTINUED | OUTPATIENT
Start: 2023-05-31 | End: 2023-05-31 | Stop reason: HOSPADM

## 2023-05-31 RX ORDER — ROPIVACAINE HYDROCHLORIDE 5 MG/ML
INJECTION, SOLUTION EPIDURAL; INFILTRATION; PERINEURAL AS NEEDED
Status: DISCONTINUED | OUTPATIENT
Start: 2023-05-31 | End: 2023-05-31 | Stop reason: SURG

## 2023-05-31 RX ORDER — IBUPROFEN 400 MG/1
400 TABLET ORAL EVERY 6 HOURS PRN
Qty: 30 TABLET | Refills: 0 | Status: SHIPPED | OUTPATIENT
Start: 2023-05-31 | End: 2023-06-14

## 2023-05-31 RX ORDER — ACETAMINOPHEN 325 MG/1
650 TABLET ORAL ONCE AS NEEDED
Status: DISCONTINUED | OUTPATIENT
Start: 2023-05-31 | End: 2023-05-31 | Stop reason: HOSPADM

## 2023-05-31 RX ORDER — NEOSTIGMINE METHYLSULFATE 1 MG/ML
INJECTION, SOLUTION INTRAVENOUS AS NEEDED
Status: DISCONTINUED | OUTPATIENT
Start: 2023-05-31 | End: 2023-05-31 | Stop reason: SURG

## 2023-05-31 RX ORDER — PROMETHAZINE HYDROCHLORIDE 25 MG/1
25 SUPPOSITORY RECTAL ONCE AS NEEDED
Status: DISCONTINUED | OUTPATIENT
Start: 2023-05-31 | End: 2023-05-31 | Stop reason: HOSPADM

## 2023-05-31 RX ORDER — PROPOFOL 10 MG/ML
INJECTION, EMULSION INTRAVENOUS AS NEEDED
Status: DISCONTINUED | OUTPATIENT
Start: 2023-05-31 | End: 2023-05-31 | Stop reason: SURG

## 2023-05-31 RX ORDER — GABAPENTIN 300 MG/1
600 CAPSULE ORAL ONCE
Status: COMPLETED | OUTPATIENT
Start: 2023-05-31 | End: 2023-05-31

## 2023-05-31 RX ORDER — BUPIVACAINE HYDROCHLORIDE AND EPINEPHRINE 5; 5 MG/ML; UG/ML
INJECTION, SOLUTION EPIDURAL; INTRACAUDAL; PERINEURAL AS NEEDED
Status: DISCONTINUED | OUTPATIENT
Start: 2023-05-31 | End: 2023-05-31 | Stop reason: HOSPADM

## 2023-05-31 RX ORDER — GABAPENTIN 600 MG/1
600 TABLET ORAL 3 TIMES DAILY
Qty: 90 TABLET | Refills: 0 | Status: SHIPPED | OUTPATIENT
Start: 2023-05-31 | End: 2023-06-30

## 2023-05-31 RX ORDER — ONDANSETRON 2 MG/ML
INJECTION INTRAMUSCULAR; INTRAVENOUS AS NEEDED
Status: DISCONTINUED | OUTPATIENT
Start: 2023-05-31 | End: 2023-05-31 | Stop reason: SURG

## 2023-05-31 RX ORDER — SODIUM CHLORIDE 0.9 % (FLUSH) 0.9 %
10 SYRINGE (ML) INJECTION AS NEEDED
Status: DISCONTINUED | OUTPATIENT
Start: 2023-05-31 | End: 2023-05-31 | Stop reason: HOSPADM

## 2023-05-31 RX ORDER — ACETAMINOPHEN 500 MG
1000 TABLET ORAL ONCE
Status: COMPLETED | OUTPATIENT
Start: 2023-05-31 | End: 2023-05-31

## 2023-05-31 RX ORDER — NALOXONE HCL 0.4 MG/ML
0.4 VIAL (ML) INJECTION AS NEEDED
Status: DISCONTINUED | OUTPATIENT
Start: 2023-05-31 | End: 2023-05-31 | Stop reason: HOSPADM

## 2023-05-31 RX ORDER — DIPHENHYDRAMINE HYDROCHLORIDE 50 MG/ML
12.5 INJECTION INTRAMUSCULAR; INTRAVENOUS
Status: DISCONTINUED | OUTPATIENT
Start: 2023-05-31 | End: 2023-05-31 | Stop reason: HOSPADM

## 2023-05-31 RX ORDER — SCOLOPAMINE TRANSDERMAL SYSTEM 1 MG/1
1 PATCH, EXTENDED RELEASE TRANSDERMAL CONTINUOUS
Status: DISCONTINUED | OUTPATIENT
Start: 2023-05-31 | End: 2023-05-31 | Stop reason: HOSPADM

## 2023-05-31 RX ORDER — HYDROMORPHONE HYDROCHLORIDE 2 MG/1
2 TABLET ORAL EVERY 6 HOURS PRN
Qty: 20 TABLET | Refills: 0 | Status: SHIPPED | OUTPATIENT
Start: 2023-05-31

## 2023-05-31 RX ORDER — DOCUSATE SODIUM 250 MG
250 CAPSULE ORAL 2 TIMES DAILY
Qty: 60 CAPSULE | Refills: 1 | Status: SHIPPED | OUTPATIENT
Start: 2023-05-31

## 2023-05-31 RX ORDER — MIDAZOLAM HYDROCHLORIDE 1 MG/ML
INJECTION INTRAMUSCULAR; INTRAVENOUS AS NEEDED
Status: DISCONTINUED | OUTPATIENT
Start: 2023-05-31 | End: 2023-05-31 | Stop reason: SURG

## 2023-05-31 RX ORDER — MEPERIDINE HYDROCHLORIDE 25 MG/ML
12.5 INJECTION INTRAMUSCULAR; INTRAVENOUS; SUBCUTANEOUS
Status: DISCONTINUED | OUTPATIENT
Start: 2023-05-31 | End: 2023-05-31 | Stop reason: HOSPADM

## 2023-05-31 RX ORDER — ALBUTEROL SULFATE 2.5 MG/3ML
2.5 SOLUTION RESPIRATORY (INHALATION) ONCE
Status: COMPLETED | OUTPATIENT
Start: 2023-05-31 | End: 2023-05-31

## 2023-05-31 RX ORDER — ROPIVACAINE HYDROCHLORIDE 5 MG/ML
INJECTION, SOLUTION EPIDURAL; INFILTRATION; PERINEURAL AS NEEDED
Status: DISCONTINUED | OUTPATIENT
Start: 2023-05-31 | End: 2023-05-31 | Stop reason: HOSPADM

## 2023-05-31 RX ORDER — SUCCINYLCHOLINE CHLORIDE 20 MG/ML
INJECTION INTRAMUSCULAR; INTRAVENOUS AS NEEDED
Status: DISCONTINUED | OUTPATIENT
Start: 2023-05-31 | End: 2023-05-31 | Stop reason: SURG

## 2023-05-31 RX ADMIN — SODIUM CHLORIDE, POTASSIUM CHLORIDE, SODIUM LACTATE AND CALCIUM CHLORIDE 125 ML/HR: 600; 310; 30; 20 INJECTION, SOLUTION INTRAVENOUS at 06:16

## 2023-05-31 RX ADMIN — ROPIVACAINE HYDROCHLORIDE 30 MG: 5 INJECTION, SOLUTION EPIDURAL; INFILTRATION; PERINEURAL at 07:19

## 2023-05-31 RX ADMIN — MIDAZOLAM HYDROCHLORIDE 2 MG: 1 INJECTION, SOLUTION INTRAMUSCULAR; INTRAVENOUS at 07:09

## 2023-05-31 RX ADMIN — HYDROMORPHONE HYDROCHLORIDE 0.25 MG: 1 INJECTION, SOLUTION INTRAMUSCULAR; INTRAVENOUS; SUBCUTANEOUS at 10:43

## 2023-05-31 RX ADMIN — HYDROMORPHONE HYDROCHLORIDE 0.5 MG: 1 INJECTION, SOLUTION INTRAMUSCULAR; INTRAVENOUS; SUBCUTANEOUS at 11:05

## 2023-05-31 RX ADMIN — ONDANSETRON 4 MG: 2 INJECTION INTRAMUSCULAR; INTRAVENOUS at 09:03

## 2023-05-31 RX ADMIN — HYDROMORPHONE HYDROCHLORIDE 0.5 MG: 1 INJECTION, SOLUTION INTRAMUSCULAR; INTRAVENOUS; SUBCUTANEOUS at 11:16

## 2023-05-31 RX ADMIN — VECURONIUM BROMIDE 2 MG: 1 INJECTION, POWDER, LYOPHILIZED, FOR SOLUTION INTRAVENOUS at 07:55

## 2023-05-31 RX ADMIN — HYDROMORPHONE HYDROCHLORIDE 0.25 MG: 1 INJECTION, SOLUTION INTRAMUSCULAR; INTRAVENOUS; SUBCUTANEOUS at 10:28

## 2023-05-31 RX ADMIN — GABAPENTIN 600 MG: 300 CAPSULE ORAL at 06:16

## 2023-05-31 RX ADMIN — KETOROLAC TROMETHAMINE 15 MG: 15 INJECTION, SOLUTION INTRAMUSCULAR; INTRAVENOUS at 09:18

## 2023-05-31 RX ADMIN — PROPOFOL 250 MG: 10 INJECTION, EMULSION INTRAVENOUS at 07:19

## 2023-05-31 RX ADMIN — MIDAZOLAM HYDROCHLORIDE 2 MG: 1 INJECTION, SOLUTION INTRAMUSCULAR; INTRAVENOUS at 07:11

## 2023-05-31 RX ADMIN — CEFAZOLIN SODIUM 2 G: 1 INJECTION, POWDER, FOR SOLUTION INTRAMUSCULAR; INTRAVENOUS at 07:45

## 2023-05-31 RX ADMIN — ALBUTEROL SULFATE 2.5 MG: 2.5 SOLUTION RESPIRATORY (INHALATION) at 07:01

## 2023-05-31 RX ADMIN — DEXAMETHASONE SODIUM PHOSPHATE 4 MG: 4 INJECTION, SOLUTION INTRAMUSCULAR; INTRAVENOUS at 07:59

## 2023-05-31 RX ADMIN — ACETAMINOPHEN 1000 MG: 500 TABLET, FILM COATED ORAL at 06:16

## 2023-05-31 RX ADMIN — PROPOFOL 30 MG: 10 INJECTION, EMULSION INTRAVENOUS at 08:46

## 2023-05-31 RX ADMIN — VECURONIUM BROMIDE 0.5 MG: 1 INJECTION, POWDER, LYOPHILIZED, FOR SOLUTION INTRAVENOUS at 07:19

## 2023-05-31 RX ADMIN — FENTANYL CITRATE 25 MCG: 50 INJECTION, SOLUTION INTRAMUSCULAR; INTRAVENOUS at 07:58

## 2023-05-31 RX ADMIN — HYDROMORPHONE HYDROCHLORIDE 0.25 MG: 1 INJECTION, SOLUTION INTRAMUSCULAR; INTRAVENOUS; SUBCUTANEOUS at 10:13

## 2023-05-31 RX ADMIN — VECURONIUM BROMIDE 2 MG: 1 INJECTION, POWDER, LYOPHILIZED, FOR SOLUTION INTRAVENOUS at 08:27

## 2023-05-31 RX ADMIN — FENTANYL CITRATE 50 MCG: 50 INJECTION, SOLUTION INTRAMUSCULAR; INTRAVENOUS at 08:02

## 2023-05-31 RX ADMIN — VECURONIUM BROMIDE 2 MG: 1 INJECTION, POWDER, LYOPHILIZED, FOR SOLUTION INTRAVENOUS at 09:08

## 2023-05-31 RX ADMIN — VECURONIUM BROMIDE 5.5 MG: 1 INJECTION, POWDER, LYOPHILIZED, FOR SOLUTION INTRAVENOUS at 07:32

## 2023-05-31 RX ADMIN — GLYCOPYRROLATE 0.6 MCG: 0.2 INJECTION, SOLUTION INTRAMUSCULAR; INTRAVITREAL at 09:29

## 2023-05-31 RX ADMIN — IBUPROFEN 600 MG: 600 TABLET, FILM COATED ORAL at 12:23

## 2023-05-31 RX ADMIN — SCOLOPAMINE TRANSDERMAL SYSTEM 1 PATCH: 1 PATCH, EXTENDED RELEASE TRANSDERMAL at 06:16

## 2023-05-31 RX ADMIN — NEOSTIGMINE METHYLSULFATE 3 MG: 0.5 INJECTION INTRAVENOUS at 09:29

## 2023-05-31 RX ADMIN — HYDROCODONE BITARTRATE AND ACETAMINOPHEN 1 TABLET: 10; 325 TABLET ORAL at 14:40

## 2023-05-31 RX ADMIN — SUCCINYLCHOLINE CHLORIDE 140 MG: 20 INJECTION, SOLUTION INTRAMUSCULAR; INTRAVENOUS at 07:19

## 2023-05-31 RX ADMIN — HYDROMORPHONE HYDROCHLORIDE 0.25 MG: 1 INJECTION, SOLUTION INTRAMUSCULAR; INTRAVENOUS; SUBCUTANEOUS at 10:02

## 2023-05-31 RX ADMIN — FENTANYL CITRATE 50 MCG: 50 INJECTION, SOLUTION INTRAMUSCULAR; INTRAVENOUS at 07:19

## 2023-05-31 RX ADMIN — FENTANYL CITRATE 25 MCG: 50 INJECTION, SOLUTION INTRAMUSCULAR; INTRAVENOUS at 07:42

## 2023-05-31 RX ADMIN — FENTANYL CITRATE 50 MCG: 50 INJECTION, SOLUTION INTRAMUSCULAR; INTRAVENOUS at 08:46

## 2023-05-31 RX ADMIN — SODIUM CHLORIDE, SODIUM GLUCONATE, SODIUM ACETATE, POTASSIUM CHLORIDE AND MAGNESIUM CHLORIDE: 526; 502; 368; 37; 30 INJECTION, SOLUTION INTRAVENOUS at 08:00

## 2023-05-31 NOTE — OP NOTE
[facility]  Operative Report    Name: Cally Olsen  MRN: [id number]  Date: 2023  CSN: [id number]      Location: [facility] OR Room #10    Service: Gynecology    Pre-op Diagnosis:   1.  Post-endometrial ablation syndrome  2.  Chronic pelvic pain  3.  Class II obesity, Body mass index is 38.79 kg/m².   4.  COPD  5.  History of CVA, on Eliquis    Post-op Diagnosis: Same    Surgeon: Margy Julio MD FACOG    Assistant: MIAH Gonzalez CSA MS3    Staff:  Circulator: Linda Toney RN; Meera Drew RN  Scrub Person: Dorothy Aquino  Assistant: Carline Rodas CSA    Anesthesia: General ETT    Anesthesia Staff:  Anesthesiologist: Aspen Robertson DO  CRNA: Ankit Dominguez CRNA    Operation: Total laparoscopic hysterectomy, bilateral salpingo-oophorectomy, cystoscopy    Drains: None (Gutierrez catheter removed at the end of the procedure)    Complications: None    Findings: Obese abdomen.  On laparoscopy, uterus appears normal.  Evidence of prior tubal ligation noted.  Bilateral fallopian tubes and ovaries normal; left ovary slightly scarred to sigmoid colon.  Bladder scarred to lower uterine segment, bladder redundant.    Condition: Stable    Specimens/Disposition: Uterus, cervix, bilateral ovaries and fallopian tube    Estimated Blood Loss: 40 mL  IV Fluids: 1500 mL crystalloid  Urine Output: 300 mL    Indications: Cally Olsen, 33 y.o.,  with chronic pelvic pain in addition to chronic pain, managed with chronic narcotics and pain management.  She is on chronic anticoagulation due to history of stroke.  She has had a prior endometrial ablation with worsening pain, consistent with post-endometrial ablation syndrome.  Declined ovarian preservation.    Description of Operation:  After appropriate consents were obtained, the patient was taken to the operating room.  The patient was identified and the procedure verified.  The patient was given general  endotracheal anesthesia and placed in the dorsal lithotomy position.  She was draped, prepped in the usual sterile manner. Time out was performed and procedure verified.  Gutierrez catheter was placed.  A bi-valve speculum was placed into the vagina.  A single toothed tenaculum was used to grasp the anterior lip of the cervix.  A V-care uterine manipulator was placed through the cervix into the uterus.  Attention was then turned towards the abdomen.  A 5 mm supraumbilical incision was then made with scalpel.  A 5 mm trocar and sleeve was passed through the umbilical incision without difficulty under direct visualization.  Pneumoperitoneum was established; opening pressure was 5 mmHg.  The above findings were noted.  A 5 mm incision was made lateral to the umbilicus on the right side through which a 5 mm trocar and sleeve were passed through under direct visualization without difficulty.  A 11 mm incision was made lateral to the umbilicus on the left side, through which a 11 mm trocar and sleeve were passed through under direct visualization.  The right infundibulopelvic ligament and fallopian tube were grasped, coagulated, and transected.  Serial pedicles of coagulation and transection were performed to free the ovary and fallopian tube from surrounding tissue.  Then, attention was turned towards the left ovary and the same procedure was performed.  At this time the pedicles were inspected and noted to be hemostatic.  The specimens were removed through the left laparoscopic port.  The manipulator was noted to have perforated and this required multiple adjustments throughout the procedure in order to safely perfor the hysterectomy laparoscopically; this is attributed to her prior endometrial ablation causing a false track to be made.  The Enseal was then used to clamp, cauterize, and cut the utero-ovarian ligament on the left.  The Enseal was used clamp, cauterize, and cut the round ligament and portions of the broad  ligament.  Attention was then turned towards the right side of the uterus.  The Enseal was used to clamp, cauterize, and cut the right utero-ovarian ligament.  The Enseal was used to clamp, cauterize, and cut the right round ligament and portions of the right broad ligament.  Careful attention was given as the bladder flap was then created using blunt and sharp dissection.  This was carefully performed due to her history of 5 prior  sections.  Once the bladder was completely dissected off the uterus, the Enseal was again used to clamp, cauterize, and cut the left uterine artery.  The left cardinal ligament was then clamped cauterized, and cut.  In like fashion on the right the uterine artery was skeletonized clamped, cauterized and cut.  The right cardinal ligament was clamped, cauterized, and cut.  The uterus and cervix were then amputated from the vagina using the bovie L-hook.  The vaginal cuff was closed with Endostitch.  Hemostasis was noted from the vaginal cuff.  Suction irrigation was then performed.  Looking laparoscopically, good hemostasis was noted at this time from the vaginal cuff.  Floseal was applied to the vaginal cuff.  The fascia of the left incision was closed using Giovanni-Tyson with 0-Vicryl.  TAP block was performed, injecting a total of 30 cc 0.5% ropivacaine bilaterally in the upper and lower quadrants.  The intraabdominal carbon dioxide was allowed to escape.  The side ports were removed under direct visualization and then the umbilical port was removed under direct visualization.  The skin incisions were closed with 3-0 Monocryl and covered with surgical glue.  Cystoscopy was performed that showed no evidence of bladder injury and good ureteral jet effluxes bilaterally.  The vagina was inspected and found not to contain any instruments or sponges.  Vaginal cuff palpated intact.  Sponge, needle, and instrument counts were correct x2.  There were no intraoperative complications,  and patient tolerated the procedure well.  She was extubated and escorted to the recovery room in stable condition.    The patient received Ancef 2g for antibiotic prophylaxis prior to the start of the procedure (she does have PCN allergy but has tolerated cephalosporins in the past).    Carline Rodas, MIAH CSA was responsible for performing the following activities: Retraction, Suction, Irrigation, Suturing, Closing and Held/Positioned Camera and their skilled assistance was necessary for the success of this case.    This document has been electronically signed by Margy Julio MD on May 31, 2023 09:43 CDT.

## 2023-05-31 NOTE — INTERVAL H&P NOTE
H&P reviewed. The patient was examined and there are no changes to the H&P. Proceed with TLH/BSO, possible ex lap, cystoscopy. Risks previously discussed.    This document has been electronically signed by Margy Julio MD on May 31, 2023 07:02 CDT.

## 2023-05-31 NOTE — ANESTHESIA PREPROCEDURE EVALUATION
Anesthesia Evaluation     Patient summary reviewed and Nursing notes reviewed   no history of anesthetic complications:  NPO Solid Status: > 8 hours  NPO Liquid Status: > 8 hours           Airway   Mallampati: II  TM distance: >3 FB  Neck ROM: full  Dental    (+) poor dentition    Pulmonary     breath sounds clear to auscultation  (+) a smoker Current Smoked day of surgery, COPD, asthma,  (-) shortness of breath, sleep apnea, rhonchi, decreased breath sounds, wheezes, rales    PE comment: Albuterol tx ordered pre-op  Cardiovascular   Exercise tolerance: poor (<4 METS)    ECG reviewed  PT is on anticoagulation therapy  Rhythm: regular  Rate: normal    (+) hypertension 2 medications or greater, hyperlipidemia,   (-) past MI, dysrhythmias, angina, murmur, cardiac stents, DVT    ROS comment: EK23  Normal sinus rhythm with sinus arrhythmia  Normal ECG  When compared with ECG of 07-OCT-2020 14:58,  Borderline criteria for Lateral infarct are no longer Present    Neuro/Psych  (+) CVA residual symptoms, numbness, psychiatric history Anxiety and Depression,    (-) seizures  GI/Hepatic/Renal/Endo    (+) morbid obesity, GERD well controlled,    (-) no renal disease, diabetes, no thyroid disorder    Musculoskeletal     Abdominal   (+) obese,    Substance History   (-) alcohol use, drug use     OB/GYN    (-)  Pregnant        Other   arthritis,    history of cancer (cervical ) active    ROS/Med Hx Other: Hx: Uses the albuterol inhaler about once a week sometimes twice in the summer. Uses the albuterol nebulizer about once a month.     CVA: pt reports having multiple last one was 2 years ago. Left sided weakness, short term memory loss. Is currently on Eliquis last dose was 23.    Hx:GERD: controlled on OTC Tums    Hx: per pt she had a 4C liver laceration 11 years ago                         Anesthesia Plan    ASA 3     general     (Hcg: negative  HGB: 14.3  )  intravenous induction     Anesthetic plan, risks,  benefits, and alternatives have been provided, discussed and informed consent has been obtained with: patient and spouse/significant other.    Use of blood products discussed with patient  Consented to blood products.       CODE STATUS:    Level Of Support Discussed With: Patient  Code Status (Patient has no pulse and is not breathing): CPR (Attempt to Resuscitate)  Medical Interventions (Patient has pulse or is breathing): Full Support  Release to patient: Routine Release

## 2023-05-31 NOTE — ANESTHESIA PROCEDURE NOTES
Airway  Urgency: elective    Date/Time: 5/31/2023 7:22 AM  End Time:5/31/2023 7:23 AM  Airway not difficult    General Information and Staff    Patient location during procedure: OR  CRNA/CAA: Ankit Dominguez CRNA    Indications and Patient Condition  Indications for airway management: airway protection    Preoxygenated: yes  MILS maintained throughout  Mask difficulty assessment: 0 - not attempted    Final Airway Details  Final airway type: endotracheal airway      Successful airway: ETT  Cuffed: yes   Successful intubation technique: direct laryngoscopy  Endotracheal tube insertion site: oral  Blade: Tootie  Blade size: 3  ETT size (mm): 7.0  Cormack-Lehane Classification: grade IIa - partial view of glottis  Placement verified by: chest auscultation and capnometry   Cuff volume (mL): 8  Measured from: gums  ETT/EBT to gums (cm): 21  Number of attempts at approach: 1  Assessment: lips, teeth, and gum same as pre-op and atraumatic intubation

## 2023-05-31 NOTE — ANESTHESIA POSTPROCEDURE EVALUATION
Patient: Cally Jean Baptiste Raúl    Procedure Summary     Date: 05/31/23 Room / Location: [facility] OR [address] [place] [address] OR    Anesthesia Start: 0710 Anesthesia Stop: 0945    Procedure: TOTAL LAPAROSCOPIC HYSTERECTOMY, BILATERAL SALPINGO-OOPHORECTOMY,  CYSTOSCOPY (Bilateral: Abdomen) Diagnosis:       Post endometrial ablation syndrome      (Post endometrial ablation syndrome [N99.85])    Surgeons: Margy Julio MD Provider: Aspen Robertson DO    Anesthesia Type: general ASA Status: 3          Anesthesia Type: general    Vitals  No vitals data found for the desired time range.          Post Anesthesia Care and Evaluation    Patient location during evaluation: PACU  Patient participation: complete - patient participated  Level of consciousness: awake  Pain management: adequate    Airway patency: patent  Anesthetic complications: No anesthetic complications  PONV Status: none  Cardiovascular status: acceptable and hemodynamically stable  Respiratory status: acceptable, face mask and spontaneous ventilation  Hydration status: acceptable    Comments: ---------------------------               05/31/23                      0609         ---------------------------   BP:          144/89         Pulse:         88           Resp:          18           Temp:   98.1 °F (36.7 °C)   SpO2:          95%         ---------------------------

## 2023-06-02 DIAGNOSIS — R11.0 NAUSEA: ICD-10-CM

## 2023-06-02 DIAGNOSIS — Z79.01 CHRONIC ANTICOAGULATION: Primary | Chronic | ICD-10-CM

## 2023-06-02 LAB — REF LAB TEST METHOD: NORMAL

## 2023-06-02 RX ORDER — APIXABAN 5 MG/1
TABLET, FILM COATED ORAL
Qty: 60 TABLET | Refills: 5 | Status: SHIPPED | OUTPATIENT
Start: 2023-06-02

## 2023-06-02 RX ORDER — ONDANSETRON 4 MG/1
TABLET, FILM COATED ORAL
Qty: 12 TABLET | Refills: 0 | Status: SHIPPED | OUTPATIENT
Start: 2023-06-02

## 2023-06-05 ENCOUNTER — TELEPHONE (OUTPATIENT)
Dept: OBSTETRICS AND GYNECOLOGY | Facility: CLINIC | Age: 33
End: 2023-06-05

## 2023-06-05 NOTE — TELEPHONE ENCOUNTER
PT requested refill of pain meds sent to pharmacy on file. PT can be reached at the number on file to discuss further if necessary.

## 2023-06-06 ENCOUNTER — TELEPHONE (OUTPATIENT)
Dept: OBSTETRICS AND GYNECOLOGY | Facility: CLINIC | Age: 33
End: 2023-06-06
Payer: MEDICARE

## 2023-06-06 NOTE — TELEPHONE ENCOUNTER
I called and spoke with patient notified her that per Dr. Julio we would not be able to refill her narcotics and she will need to speak with her pain management doctor.  Patient verbalized understanding and has no other concerns at this time.

## 2023-06-06 NOTE — TELEPHONE ENCOUNTER
----- Message from Alayna Tam sent at 6/6/2023 10:41 AM CDT -----  PATIENT IS WANTING TO GET A REFILL ON HER PAIN MEDICATION   PT USES [facility] IN [place]

## 2023-06-15 ENCOUNTER — TELEPHONE (OUTPATIENT)
Dept: OBSTETRICS AND GYNECOLOGY | Facility: CLINIC | Age: 33
End: 2023-06-15
Payer: MEDICARE

## 2023-06-15 DIAGNOSIS — G89.18 POST-OP PAIN: Primary | ICD-10-CM

## 2023-06-15 RX ORDER — HYDROMORPHONE HYDROCHLORIDE 2 MG/1
2 TABLET ORAL EVERY 6 HOURS PRN
Qty: 5 TABLET | Refills: 0 | Status: SHIPPED | OUTPATIENT
Start: 2023-06-15

## 2023-06-15 NOTE — TELEPHONE ENCOUNTER
Patient no showed post-op appt scheduled for earlier in the week. Will send in #5 dilaudid 2 mg and no further narcotics will be prescribed.  She was rescheduled for post-op visit.    Margy Julio MD  6/15/2023  16:45 CDT

## 2023-07-05 LAB
QT INTERVAL: 378 MS
QTC INTERVAL: 441 MS

## 2023-08-01 DIAGNOSIS — E78.2 MIXED HYPERLIPIDEMIA: Primary | ICD-10-CM

## 2023-08-02 RX ORDER — ATORVASTATIN CALCIUM 40 MG/1
TABLET, FILM COATED ORAL
Qty: 90 TABLET | Refills: 1 | Status: SHIPPED | OUTPATIENT
Start: 2023-08-02

## 2023-08-04 DIAGNOSIS — I10 ESSENTIAL HYPERTENSION: ICD-10-CM

## 2023-08-04 DIAGNOSIS — R11.0 NAUSEA: ICD-10-CM

## 2023-08-04 RX ORDER — ONDANSETRON 4 MG/1
TABLET, FILM COATED ORAL
Qty: 12 TABLET | Refills: 0 | Status: SHIPPED | OUTPATIENT
Start: 2023-08-04

## 2023-08-04 RX ORDER — DILTIAZEM HYDROCHLORIDE 120 MG/1
120 CAPSULE, COATED, EXTENDED RELEASE ORAL DAILY
Qty: 90 CAPSULE | Refills: 0 | Status: SHIPPED | OUTPATIENT
Start: 2023-08-04

## 2023-08-04 RX ORDER — LISINOPRIL 10 MG/1
10 TABLET ORAL DAILY
Qty: 90 TABLET | Refills: 0 | Status: SHIPPED | OUTPATIENT
Start: 2023-08-04

## 2023-08-24 DIAGNOSIS — R11.0 NAUSEA: ICD-10-CM

## 2023-08-24 RX ORDER — ONDANSETRON 4 MG/1
TABLET, FILM COATED ORAL
Qty: 12 TABLET | Refills: 0 | Status: SHIPPED | OUTPATIENT
Start: 2023-08-24

## 2023-09-04 DIAGNOSIS — R23.2 HOT FLASHES: Primary | ICD-10-CM

## 2023-09-05 RX ORDER — CLONIDINE HYDROCHLORIDE 0.1 MG/1
TABLET ORAL
Qty: 60 TABLET | Refills: 0 | Status: SHIPPED | OUTPATIENT
Start: 2023-09-05 | End: 2023-09-05 | Stop reason: SDUPTHER

## 2023-09-05 RX ORDER — CLONIDINE HYDROCHLORIDE 0.1 MG/1
0.1 TABLET ORAL 2 TIMES DAILY
Qty: 60 TABLET | Refills: 0 | Status: SHIPPED | OUTPATIENT
Start: 2023-09-05 | End: 2023-09-06

## 2023-09-06 DIAGNOSIS — R23.2 HOT FLASHES: ICD-10-CM

## 2023-09-06 RX ORDER — CLONIDINE HYDROCHLORIDE 0.1 MG/1
TABLET ORAL
Qty: 180 TABLET | OUTPATIENT
Start: 2023-09-06

## 2023-09-06 RX ORDER — CLONIDINE HYDROCHLORIDE 0.1 MG/1
TABLET ORAL
Qty: 60 TABLET | Refills: 0 | Status: SHIPPED | OUTPATIENT
Start: 2023-09-06 | End: 2023-09-06

## 2023-09-06 RX ORDER — CLONIDINE HYDROCHLORIDE 0.1 MG/1
TABLET ORAL
Qty: 60 TABLET | Refills: 0 | Status: SHIPPED | OUTPATIENT
Start: 2023-09-06

## 2023-09-15 DIAGNOSIS — R11.0 NAUSEA: ICD-10-CM

## 2023-09-18 DIAGNOSIS — R31.9 HEMATURIA, UNSPECIFIED TYPE: Primary | ICD-10-CM

## 2023-09-18 RX ORDER — ONDANSETRON 4 MG/1
TABLET, FILM COATED ORAL
Qty: 12 TABLET | Refills: 0 | Status: SHIPPED | OUTPATIENT
Start: 2023-09-18

## 2023-09-18 NOTE — PROGRESS NOTES
Patient called and said she is having a lot of pain with urination and hematuria. Patient to have urinalysis at the lab 09/19/2023 and we will call with results.

## (undated) DEVICE — ELECTRD E/S MEGADYNE EZCLEAN L/WR LAP 5MM 33CM 1P/U

## (undated) DEVICE — BLUNT TIP TROCAR: Brand: AUTO SUTURE

## (undated) DEVICE — TOWEL,OR,DSP,ST,BLUE,DLX,XR,4/PK,20PK/CS: Brand: MEDLINE

## (undated) DEVICE — CORE TRUMPET FOR SINGLE SOLUTION BAG: Brand: CORE DYNAMICS

## (undated) DEVICE — CYSTO/BLADDER IRRIGATION SET, REGULATING CLAMP

## (undated) DEVICE — SOL IRRIG NACL 1000ML

## (undated) DEVICE — VCARE MEDIUM, UTERINE MANIPULATOR, VAGINAL-CERVICAL-AHLUWALIA'S-RETRACTOR-ELEVATOR: Brand: VCARE

## (undated) DEVICE — NDL HYPO PRECISIONGLIDE/REG 18G 1IN PNK

## (undated) DEVICE — TBG INSUFFLATION W/PUSH ON CON: Brand: MEDLINE INDUSTRIES, INC.

## (undated) DEVICE — STERILE POLYISOPRENE POWDER-FREE SURGICAL GLOVES WITH EMOLLIENT COATING: Brand: PROTEXIS

## (undated) DEVICE — SUTURING DEVICE: Brand: ENDO STITCH

## (undated) DEVICE — ENDOPATH XCEL BLADELESS TROCARS WITH STABILITY SLEEVES: Brand: ENDOPATH XCEL

## (undated) DEVICE — SUT VIC 0 CT1 36IN J946H

## (undated) DEVICE — OCCL COLPO PNEUMO  STRL

## (undated) DEVICE — SHEET,DRAPE,UNDERBUTTOCK,GRAD POUCH,PORT: Brand: MEDLINE

## (undated) DEVICE — TOTAL TRAY, 16FR 10ML SIL FOLEY, URN: Brand: MEDLINE

## (undated) DEVICE — TRENDELENBURG POSITIONING KIT DELUXE - EXTENDED LENGTH WITH BODY STRAP: Brand: SOULE MEDICAL

## (undated) DEVICE — SUT MNCRYL 3/0 Y936H

## (undated) DEVICE — APPL HEMOS FOR DELIVERY FLOSEAL

## (undated) DEVICE — ENSEAL X1 TISSUE SEALER, CURVED JAW, 37 CM SHAFT LENGTH: Brand: ENSEAL

## (undated) DEVICE — SYR LL TP 10ML STRL

## (undated) DEVICE — LAPAROSCOPIC SMOKE FILTRATION SYSTEM: Brand: PALL LAPAROSHIELD® PLUS LAPAROSCOPIC SMOKE FILTRATION SYSTEM

## (undated) DEVICE — ADHS SKIN PREMIERPRO EXOFIN TOPICAL HI/VISC .5ML

## (undated) DEVICE — PATIENT RETURN ELECTRODE, SINGLE-USE, CONTACT QUALITY MONITORING, ADULT, WITH 9FT CORD, FOR PATIENTS WEIGING OVER 33LBS. (15KG): Brand: MEGADYNE

## (undated) DEVICE — PK LAP GYN 60

## (undated) DEVICE — GOWN,AURORA,NOREINF,RAGLAN,XL,STERILE: Brand: MEDLINE

## (undated) DEVICE — LAPAROVUE VISIBILITY SYSTEM LAPAROSCOPIC SOLUTIONS: Brand: LAPAROVUE

## (undated) DEVICE — PENCL ES MEGADINE EZ/CLEAN BUTN W/HOLSTR 10FT

## (undated) DEVICE — ENDOPOUCH RETRIEVER SPECIMEN RETRIEVAL BAGS: Brand: ENDOPOUCH RETRIEVER

## (undated) DEVICE — NEEDLE,HYPODERM,SAFETY, 22GX1.5: Brand: MEDLINE

## (undated) DEVICE — SYS CLS PORTSITE CT CLOSESURE 5AND10/12

## (undated) DEVICE — SYR LUERLOK 50ML

## (undated) DEVICE — GLV SURG SENSICARE PI PF LF 7 GRN STRL

## (undated) DEVICE — SOL IRR NACL 0.9PCT BT 1000ML

## (undated) DEVICE — GLV SURG DERMASSURE GRN LF PF SZ 6.5

## (undated) DEVICE — CONTAINER,SPECIMEN,OR STERILE,4OZ: Brand: MEDLINE

## (undated) DEVICE — MONOPOLAR METZENBAUM SCISSOR TIP, DISPOSABLE: Brand: MONOPOLAR METZENBAUM SCISSOR TIP, DISPOSABLE